# Patient Record
Sex: FEMALE | Race: WHITE | Employment: OTHER | URBAN - METROPOLITAN AREA
[De-identification: names, ages, dates, MRNs, and addresses within clinical notes are randomized per-mention and may not be internally consistent; named-entity substitution may affect disease eponyms.]

---

## 2017-01-03 ENCOUNTER — TELEPHONE (OUTPATIENT)
Dept: INTERNAL MEDICINE CLINIC | Age: 67
End: 2017-01-03

## 2017-01-03 NOTE — TELEPHONE ENCOUNTER
Called the patient and left a message to call the office, awaiting return call. Urine culture came back neg. Did she start treatment with bactrim? Is her abd pain improved?

## 2017-01-03 NOTE — TELEPHONE ENCOUNTER
Called and notified patient of labs, neg urine culture. She states that her lower abd pain is gone, still has some lower back pain. Will complete treatment with macrobid. Will follow up this week.

## 2017-01-03 NOTE — TELEPHONE ENCOUNTER
Pt returned call. Was given info below. She says she started the med on Sunday. No Improvement with the pain.     Wants call back [phone]

## 2017-01-05 ENCOUNTER — HOSPITAL ENCOUNTER (OUTPATIENT)
Dept: MAMMOGRAPHY | Age: 67
Discharge: HOME | End: 2017-01-05
Attending: OBSTETRICS & GYNECOLOGY
Payer: MEDICARE

## 2017-01-05 DIAGNOSIS — Z12.31 VISIT FOR SCREENING MAMMOGRAM: ICD-10-CM

## 2017-01-05 PROCEDURE — 77067 SCR MAMMO BI INCL CAD: CPT

## 2017-05-23 ENCOUNTER — OFFICE VISIT (OUTPATIENT)
Dept: INTERNAL MEDICINE CLINIC | Age: 67
End: 2017-05-23

## 2017-05-23 VITALS
SYSTOLIC BLOOD PRESSURE: 114 MMHG | BODY MASS INDEX: 21.13 KG/M2 | WEIGHT: 126.8 LBS | HEIGHT: 65 IN | HEART RATE: 77 BPM | DIASTOLIC BLOOD PRESSURE: 70 MMHG | OXYGEN SATURATION: 95 % | TEMPERATURE: 95.5 F | RESPIRATION RATE: 12 BRPM

## 2017-05-23 DIAGNOSIS — G62.9 NEUROPATHY: ICD-10-CM

## 2017-05-23 DIAGNOSIS — M50.90 CERVICAL DISC DISEASE: Primary | ICD-10-CM

## 2017-05-23 DIAGNOSIS — M79.18 MYOFASCIAL PAIN: ICD-10-CM

## 2017-05-23 RX ORDER — DILTIAZEM HYDROCHLORIDE 180 MG/1
180 CAPSULE, EXTENDED RELEASE ORAL DAILY
COMMUNITY
Start: 2017-03-01 | End: 2017-11-10 | Stop reason: DRUGHIGH

## 2017-05-23 RX ORDER — ZOSTER VACCINE LIVE 19400 [PFU]/.65ML
INJECTION, POWDER, LYOPHILIZED, FOR SUSPENSION SUBCUTANEOUS
COMMUNITY
Start: 2017-02-14 | End: 2017-05-31 | Stop reason: ALTCHOICE

## 2017-05-23 RX ORDER — METHYLPREDNISOLONE 4 MG/1
TABLET ORAL
Qty: 1 DOSE PACK | Refills: 0 | Status: CANCELLED | OUTPATIENT
Start: 2017-05-23

## 2017-05-23 RX ORDER — GABAPENTIN 300 MG/1
CAPSULE ORAL
Qty: 60 CAP | Refills: 6 | Status: SHIPPED | OUTPATIENT
Start: 2017-05-23 | End: 2017-10-04 | Stop reason: SDUPTHER

## 2017-05-23 NOTE — PROGRESS NOTES
Chief Complaint   Patient presents with    Tingling     bilateral hand tingling     Shoulder Pain     Bilateral Pain Shoulder that radiates down each arm and this happens when lying down flat     1. Have you been to the ER, urgent care clinic since your last visit? Hospitalized since your last visit? No    2. Have you seen or consulted any other health care providers outside of the [address] [place] [address] since your last visit? Include any pap smears or colon screening.  No

## 2017-05-23 NOTE — MR AVS SNAPSHOT
Visit Information Date & Time Provider Department Dept. Phone Encounter #  
 5/23/2017 11:30 AM Dasha Barreto MD Internist of [facility] [address] [id number] Follow-up Instructions Return in about 4 months (around 9/23/2017). Your Appointments 6/7/2017 11:00 AM  
Follow Up with Monica Laguna MD  
[address] [facility]) Appt Note: 6mon f/u  
 [address] [address] [phone]  
  
   
 [facility] [id number] Upcoming Health Maintenance Date Due DTaP/Tdap/Td series (1 - Tdap) 1/2/2007 MEDICARE YEARLY EXAM 1/14/2016 Pneumococcal 65+ Low/Medium Risk (2 of 2 - PPSV23) 7/7/2017 INFLUENZA AGE 9 TO ADULT 8/1/2017 BREAST CANCER SCRN MAMMOGRAM 1/5/2018 GLAUCOMA SCREENING Q2Y 6/28/2018 COLONOSCOPY 4/3/2019 Allergies as of 5/23/2017  Review Complete On: 5/23/2017 By: aMddie Howard Severity Noted Reaction Type Reactions Plaquenil [Hydroxychloroquine] High 04/25/2016    Nausea and Vomiting Pcn [Penicillins]  05/17/2010    Itching Zithromax [Azithromycin]  05/17/2010    Rash, Other (comments), Cosmo Gracia's records state skin peeling. High fever, peeling Current Immunizations  Reviewed on 12/1/2016 Name Date Influenza Vaccine 11/2/2016, 11/6/2015, 9/20/2014, 10/29/2013 Influenza Vaccine Split 10/3/2012, 9/23/2011, 11/1/2010 Pneumococcal Conjugate (PCV-13) 7/7/2016 TD Vaccine 1/1/2007 Zoster Vaccine, Live 2/14/2017 Not reviewed this visit You Were Diagnosed With   
  
 Codes Comments Neuropathy     ICD-10-CM: G62.9 ICD-9-CM: 355.9 Myofascial pain     ICD-10-CM: M79.1 ICD-9-CM: 729.1 Vitals BP Pulse Temp Resp Height(growth percentile) Weight(growth percentile)  114/70 (BP 1 Location: Left arm, BP Patient Position: Sitting) 77 95.5 °F (35.3 °C) (Oral) 12 5' 5\" (1.651 m) 126 lb 12.8 oz (57.5 kg) LMP SpO2 BMI OB Status Smoking Status 04/25/2016 95% 21.1 kg/m2 Postmenopausal Never Smoker Vitals History BMI and BSA Data Body Mass Index Body Surface Area  
 21.1 kg/m 2 1.62 m 2 Preferred Pharmacy Pharmacy Name Phone [facility] [address], [address] [phone] Your Updated Medication List  
  
   
This list is accurate as of: 5/23/17 12:15 PM.  Always use your most recent med list.  
  
  
  
  
 acetaminophen 650 mg CR tablet Commonly known as:  TYLENOL Take 650 mg by mouth three (3) times daily as needed for Pain. CALCIUM 500+D 500 mg(1,250mg) -200 unit per tablet Generic drug:  calcium-vitamin D Take 1 Tab by mouth two (2) times daily (with meals). CARTIA  mg ER capsule Generic drug:  dilTIAZem CD Take 180 mg by mouth daily. cyanocobalamin 1,000 mcg tablet Take 1,000 mcg by mouth two (2) times a day. diclofenac 1 % Gel Commonly known as:  VOLTAREN Apply  to affected area four (4) times daily. folic acid 1 mg tablet Commonly known as:  Google Take 1 mg by mouth daily. gabapentin 300 mg capsule Commonly known as:  NEURONTIN  
2 tabs at bed time  
  
 melatonin Tab tablet Take 8 mg by mouth nightly. methotrexate 2.5 mg tablet Commonly known as:  [place] Take 2.5 mg by mouth every Wednesday. Take 6 tablets of 2.5 mg every Wednesday  
  
 predniSONE 5 mg tablet Commonly known as:  [place] Take 2.5 mg by mouth every other day. varicella-zoster vacine live 19,400 unit/0.65 mL Susr injection Generic drug:  varicella zoster vacine live VITAMIN C 500 mg tablet Generic drug:  ascorbic acid (vitamin C) Take 500 mg by mouth two (2) times a day. XARELTO 20 mg Tab tablet Generic drug:  rivaroxaban Take 20 mg by mouth daily (with dinner). Prescriptions Sent to Pharmacy Refills  
 gabapentin (NEURONTIN) 300 mg capsule 6 Si tabs at bed time Class: Normal  
 Pharmacy: Loida Huber at [address], [facility] #: [phone] We Performed the Following REFERRAL TO ORTHOPEDICS [[id number] Custom] Follow-up Instructions Return in about 4 months (around 2017). To-Do List   
 2017 9:00 AM  
  Appointment with [facility] ER BONE DEXA RM 1 at [facility] ER RAD BONE DENSITY ([phone]) OUTSIDE FILMS  - Any outside films related to the study being scheduled should be brought with you on the day of the exam.  If this cannot be done there may be a delay in the reading of the study. MEDICATIONS  - Patient must bring a complete list of all medications currently taking to include prescriptions, over-the-counter meds, herbals, vitamins & any dietary supplements - Patient must discontinue use of calcium, vitamins, or calcium supplements including antacids (calcium based) 24 hours before scan. GENERAL INSTRUCTIONS  - [facility] cannot accommodate patients on stretchers, patient must be able to walk into the room and be able to sit up for a portion of the exam.  
  
  
Referral Information Referral ID Referred By Referred To  
  
 [id number] Paula Baker Not Available Visits Status Start Date End Date 1 New Request 17 If your referral has a status of pending review or denied, additional information will be sent to support the outcome of this decision. Patient Instructions Health Maintenance Due Topic Date Due  
 DTaP/Tdap/Td series (1 - Tdap) 2007  MEDICARE YEARLY EXAM  2016 Medicare Part B Preventive Services Limitations Recommendation Scheduled Bone Mass Measurement 
(age 72 & older, biennial) Requires diagnosis related to osteoporosis or estrogen deficiency.  Biennial benefit unless patient has history of long-term glucocorticoid tx or baseline is needed because initial test was by other method This should be done at age 72 and again if on osteoporosis medication at 2-3 year intervals. Up to date Cardiovascular Screening Blood Tests (every 5 years) Total cholesterol, HDL, Triglycerides Order as a panel if possible We should check your cholesterol panel at least once every 5 years. Up to date Colorectal Cancer Screening 
-Fecal occult blood test (annual) -Flexible sigmoidoscopy (5y) 
-Screening colonoscopy (10y) -Barium Enema  Due per your Gastroenterologist's recommendations. Up to date Counseling to Prevent Tobacco Use (up to 8 sessions per year) - Counseling greater than 3 and up to 10 minutes - Counseling greater than 10 minutes Patients must be asymptomatic of tobacco-related conditions to receive as preventive service Continue with smoking cessation Diabetes Screening Tests (at least every 3 years, Medicare covers annually or at 6-month intervals for prediabetic patients) Fasting blood sugar (FBS) or glucose tolerance test (GTT) Patient must be diagnosed with one of the following: 
-Hypertension, Dyslipidemia, obesity, previous impaired FBS or GTT 
Or any two of the following: overweight, FH of diabetes, age ? 72, history of gestational diabetes, birth of baby weighing more than 9 pounds Should be done yearly Up to date Diabetes Self-Management Training (DSMT) (no USPSTF recommendation) Requires referral by treating physician for patient with diabetes or renal disease. 10 hours of initial DSMT session of no less than 30 minutes each in a continuous 12-month period. 2 hours of follow-up DSMT in subsequent years. Not applicable Glaucoma Screening (no USPSTF recommendation) Diabetes mellitus, family history, , age 48 or over,  American, age 72 or over Continue with annual eye exams. Up to date Human Immunodeficiency Virus (HIV) Screening (annually for increased risk patients) HIV-1 and HIV-2 by EIA, RONY, rapid antibody test, or oral mucosa transudate Patient must be at increased risk for HIV infection per USPSTF guidelines or pregnant. Tests covered annually for patients at increased risk. Pregnant patients may receive up to 3 test during pregnancy. Not applicable Medical Nutrition Therapy (MNT) (for diabetes or renal disease not recommended schedule) Requires referral by treating physician for patient with diabetes or renal disease. Can be provided in same year as diabetes self-management training (DSMT), and CMS recommends medical nutrition therapy take place after DSMT. Up to 3 hours for initial year and 2 hours in subsequent years. Not applicable Shingles Vaccination A shingles vaccine is also recommended once in a lifetime after age 61 Vaccination recommended for shingles vaccination. Up to date Seasonal Influenza Vaccination (annually)  Continue with yearly \"flu\" shot annually Up to date Pneumococcal Vaccination (once after 65)  Please receive this vaccination at age 72. Up to date Hepatitis B Vaccinations (if medium/high risk) Medium/high risk factors:  End-stage renal disease, Hemophiliacs who received Factor VIII or IX concentrates, Clients of institutions for the mentally retarded, Persons who live in the same house as a HepB virus carrier, Homosexual men, Illicit injectable drug abusers. Not applicable Screening Mammography (biennial age 54-69) Annually (age 36 or over) You need a mammogram yearly to screen for breast cancer. Up to date Screening Pap Tests and Pelvic Examination (up to age 79 and after 79 if unknown history or abnormal study last 10 years) Every 24 months except high risk You need no Pap smear at this time.    
Ultrasound Screening for Abdominal Aortic Aneurysm (AAA) (once) Patient must be referred through IPPE and not have had a screening for abdominal aortic aneurysm before under Medicare. Limited to patients who meet one of the following criteria: 
- Men who are 73-68 years old and have smoked more than 100 cigarettes in their lifetime. 
-Anyone with a FH of AAA 
-Anyone recommended for screening by USPSTF Not applicable Neuropathic Pain: Care Instructions Your Care Instructions Neuropathic pain is caused by pressure on or damage to your nerves. It's often simply called nerve pain. Some people feel this type of pain all the time. For others, it comes and goes. Diabetes, shingles, or an injury can cause nerve pain. Many people say the pain feels sharp, burning, or stabbing. But some people feel it as a dull ache. In some cases, it makes your skin very sensitive. So touch, pressure, and other sensations that did not hurt before may now cause pain. It's important to know that this kind of pain is real and can affect your quality of life. It's also important to know that treatment can help. Treatment includes pain medicines, exercise, and physical therapy. Medicines can help reduce the number of pain signals that travel over the nerves. This can make the painful areas less sensitive. It can also help you sleep better and improve your mood. But medicines are only one part of successful treatment. Most people do best with more than one kind of treatment. Your doctor may recommend that you try cognitive-behavioral therapy and stress management. Or, if needed, you may decide to try to quit smoking, lower your blood pressure, or better control blood sugar. These kinds of healthy changes can also make a difference. If you feel that your treatment is not working, talk to your doctor. And be sure to tell your doctor if you think you might be depressed or anxious. These are common problems that can also be treated. Follow-up care is a key part of your treatment and safety.  Be sure to make and go to all appointments, and call your doctor if you are having problems. It's also a good idea to know your test results and keep a list of the medicines you take. How can you care for yourself at home? · Be safe with medicines. Read and follow all instructions on the label. ¨ If the doctor gave you a prescription medicine for pain, take it as prescribed. ¨ If you are not taking a prescription pain medicine, ask your doctor if you can take an over-the-counter medicine. · Save hard tasks for days when you have less pain. Follow a hard task with an easy task. And remember to take breaks. · Relax, and reduce stress. You may want to try deep breathing or meditation. These can help. · Keep moving. Gentle, daily exercise can help reduce pain. Your doctor or physical therapist can tell you what type of exercise is best for you. This may include walking, swimming, and stationary biking. It may also include stretches and range-of-motion exercises. · Try heat, cold packs, and massage. · Get enough sleep. Constant pain can make you more tired. If the pain makes it hard to sleep, talk with your doctor. · Think positively. Your thoughts can affect your pain. Do fun things to distract yourself from the pain. See a movie, read a book, listen to music, or spend time with a friend. · Keep a pain diary. Try to write down how strong your pain is and what it feels like. Also try to notice and write down how your moods, thoughts, sleep, activities, and medicine affect your pain. These notes can help you and your doctor find the best ways to treat your pain. Reducing constipation caused by pain medicine Pain medicines often cause constipation. To reduce constipation: 
· Include fruits, vegetables, beans, and whole grains in your diet each day. These foods are high in fiber. · Drink plenty of fluids, enough so that your urine is light yellow or clear like water.  If you have kidney, heart, or liver disease and have to limit fluids, talk with your doctor before you increase the amount of fluids you drink. · Get some exercise every day. Build up slowly to 30 to 60 minutes a day on 5 or more days of the week. · Take a fiber supplement, such as Citrucel or Metamucil, every day if needed. Read and follow all instructions on the label. · Schedule time each day for a bowel movement. Having a daily routine may help. Take your time and do not strain when having a bowel movement. · Ask your doctor about a laxative. The goal is to have one easy bowel movement every 1 to 2 days. Do not let constipation go untreated for more than 3 days. When should you call for help? Call your doctor now or seek immediate medical care if: 
· You feel sad, anxious, or hopeless for more than a few days. This could mean you are depressed. Depression is common in people who have a lot of pain. But it can be treated. · You have trouble with bowel movements, such as: 
¨ No bowel movement in 3 days. ¨ Blood in the anal area, in your stool, or on the toilet paper. ¨ Diarrhea for more than 24 hours. Watch closely for changes in your health, and be sure to contact your doctor if: 
· Your pain is getting worse. · You can't sleep because of pain. · You are very worried or anxious about your pain. · You have trouble taking your pain medicine. · You have any concerns about your pain medicine or its side effects. · You have vomiting or cramps for more than 2 hours. Where can you learn more? Go to http://jinny-astrid.info/. Enter H595 in the search box to learn more about \"Neuropathic Pain: Care Instructions. \" Current as of: October 14, 2016 Content Version: 11.2 © 0164-0235 [employer]. Care instructions adapted under license by [employer] (which disclaims liability or warranty for this information).  If you have questions about a medical condition or this instruction, always ask your healthcare professional. [address] any warranty or liability for your use of this information. Introducing [facility]! Dear Andrew Garcia: 
Thank you for requesting a "[employer]" account. Our records indicate that you already have an active "[employer]" account. You can access your account anytime at [email or web address] Did you know that you can access your hospital and ER discharge instructions at any time in "[employer]"? You can also review all of your test results from your hospital stay or ER visit. Additional Information If you have questions, please visit the Frequently Asked Questions section of the "[employer]" website at [email or web address] Remember, "[employer]" is NOT to be used for urgent needs. For medical emergencies, dial 911. Now available from your iPhone and Android! Please provide this summary of care documentation to your next provider. Your primary care clinician is listed as Vanessa Carlton. If you have any questions after today's visit, please call [phone].

## 2017-05-23 NOTE — PATIENT INSTRUCTIONS
Health Maintenance Due   Topic Date Due    DTaP/Tdap/Td series (1 - Tdap) 01/02/2007    MEDICARE YEARLY EXAM  01/14/2016         Medicare Part B Preventive Services Limitations Recommendation Scheduled   Bone Mass Measurement  (age 72 & older, biennial) Requires diagnosis related to osteoporosis or estrogen deficiency. Biennial benefit unless patient has history of long-term glucocorticoid tx or baseline is needed because initial test was by other method This should be done at age 72 and again if on osteoporosis medication at 2-3 year intervals. Up to date   Cardiovascular Screening Blood Tests (every 5 years)  Total cholesterol, HDL, Triglycerides Order as a panel if possible We should check your cholesterol panel at least once every 5 years. Up to date   Colorectal Cancer Screening  -Fecal occult blood test (annual)  -Flexible sigmoidoscopy (5y)  -Screening colonoscopy (10y)  -Barium Enema  Due per your Gastroenterologist's recommendations. Up to date   Counseling to Prevent Tobacco Use (up to 8 sessions per year)  - Counseling greater than 3 and up to 10 minutes  - Counseling greater than 10 minutes Patients must be asymptomatic of tobacco-related conditions to receive as preventive service Continue with smoking cessation    Diabetes Screening Tests (at least every 3 years, Medicare covers annually or at 6-month intervals for prediabetic patients)    Fasting blood sugar (FBS) or glucose tolerance test (GTT) Patient must be diagnosed with one of the following:  -Hypertension, Dyslipidemia, obesity, previous impaired FBS or GTT  Or any two of the following: overweight, FH of diabetes, age ? 72, history of gestational diabetes, birth of baby weighing more than 9 pounds Should be done yearly Up to date   Diabetes Self-Management Training (DSMT) (no USPSTF recommendation) Requires referral by treating physician for patient with diabetes or renal disease.  10 hours of initial DSMT session of no less than 30 minutes each in a continuous 12-month period. 2 hours of follow-up DSMT in subsequent years. Not applicable    Glaucoma Screening (no USPSTF recommendation) Diabetes mellitus, family history, , age 48 or over,  American, age 72 or over Continue with annual eye exams. Up to date   Human Immunodeficiency Virus (HIV) Screening (annually for increased risk patients)  HIV-1 and HIV-2 by EIA, RONY, rapid antibody test, or oral mucosa transudate Patient must be at increased risk for HIV infection per USPSTF guidelines or pregnant. Tests covered annually for patients at increased risk. Pregnant patients may receive up to 3 test during pregnancy. Not applicable    Medical Nutrition Therapy (MNT) (for diabetes or renal disease not recommended schedule) Requires referral by treating physician for patient with diabetes or renal disease. Can be provided in same year as diabetes self-management training (DSMT), and CMS recommends medical nutrition therapy take place after DSMT. Up to 3 hours for initial year and 2 hours in subsequent years. Not applicable    Shingles Vaccination A shingles vaccine is also recommended once in a lifetime after age 61 Vaccination recommended for shingles vaccination. Up to date   Seasonal Influenza Vaccination (annually)  Continue with yearly \"flu\" shot annually Up to date   Pneumococcal Vaccination (once after 65)  Please receive this vaccination at age 72. Up to date   Hepatitis B Vaccinations (if medium/high risk) Medium/high risk factors:  End-stage renal disease,  Hemophiliacs who received Factor VIII or IX concentrates, Clients of institutions for the mentally retarded, Persons who live in the same house as a HepB virus carrier, Homosexual men, Illicit injectable drug abusers. Not applicable    Screening Mammography (biennial age 54-69) Annually (age 36 or over) You need a mammogram yearly to screen for breast cancer.  Up to date   Screening Pap Tests and Pelvic Examination (up to age 79 and after 79 if unknown history or abnormal study last 10 years) Every 24 months except high risk You need no Pap smear at this time. Ultrasound Screening for Abdominal Aortic Aneurysm (AAA) (once) Patient must be referred through IPPE and not have had a screening for abdominal aortic aneurysm before under Medicare. Limited to patients who meet one of the following criteria:  - Men who are 73-68 years old and have smoked more than 100 cigarettes in their lifetime.  -Anyone with a FH of AAA  -Anyone recommended for screening by USPSTF Not applicable               Neuropathic Pain: Care Instructions  Your Care Instructions  Neuropathic pain is caused by pressure on or damage to your nerves. It's often simply called nerve pain. Some people feel this type of pain all the time. For others, it comes and goes. Diabetes, shingles, or an injury can cause nerve pain. Many people say the pain feels sharp, burning, or stabbing. But some people feel it as a dull ache. In some cases, it makes your skin very sensitive. So touch, pressure, and other sensations that did not hurt before may now cause pain. It's important to know that this kind of pain is real and can affect your quality of life. It's also important to know that treatment can help. Treatment includes pain medicines, exercise, and physical therapy. Medicines can help reduce the number of pain signals that travel over the nerves. This can make the painful areas less sensitive. It can also help you sleep better and improve your mood. But medicines are only one part of successful treatment. Most people do best with more than one kind of treatment. Your doctor may recommend that you try cognitive-behavioral therapy and stress management. Or, if needed, you may decide to try to quit smoking, lower your blood pressure, or better control blood sugar. These kinds of healthy changes can also make a difference.   If you feel that your treatment is not working, talk to your doctor. And be sure to tell your doctor if you think you might be depressed or anxious. These are common problems that can also be treated. Follow-up care is a key part of your treatment and safety. Be sure to make and go to all appointments, and call your doctor if you are having problems. It's also a good idea to know your test results and keep a list of the medicines you take. How can you care for yourself at home? · Be safe with medicines. Read and follow all instructions on the label. ¨ If the doctor gave you a prescription medicine for pain, take it as prescribed. ¨ If you are not taking a prescription pain medicine, ask your doctor if you can take an over-the-counter medicine. · Save hard tasks for days when you have less pain. Follow a hard task with an easy task. And remember to take breaks. · Relax, and reduce stress. You may want to try deep breathing or meditation. These can help. · Keep moving. Gentle, daily exercise can help reduce pain. Your doctor or physical therapist can tell you what type of exercise is best for you. This may include walking, swimming, and stationary biking. It may also include stretches and range-of-motion exercises. · Try heat, cold packs, and massage. · Get enough sleep. Constant pain can make you more tired. If the pain makes it hard to sleep, talk with your doctor. · Think positively. Your thoughts can affect your pain. Do fun things to distract yourself from the pain. See a movie, read a book, listen to music, or spend time with a friend. · Keep a pain diary. Try to write down how strong your pain is and what it feels like. Also try to notice and write down how your moods, thoughts, sleep, activities, and medicine affect your pain. These notes can help you and your doctor find the best ways to treat your pain. Reducing constipation caused by pain medicine  Pain medicines often cause constipation.  To reduce constipation:  · Include fruits, vegetables, beans, and whole grains in your diet each day. These foods are high in fiber. · Drink plenty of fluids, enough so that your urine is light yellow or clear like water. If you have kidney, heart, or liver disease and have to limit fluids, talk with your doctor before you increase the amount of fluids you drink. · Get some exercise every day. Build up slowly to 30 to 60 minutes a day on 5 or more days of the week. · Take a fiber supplement, such as Citrucel or Metamucil, every day if needed. Read and follow all instructions on the label. · Schedule time each day for a bowel movement. Having a daily routine may help. Take your time and do not strain when having a bowel movement. · Ask your doctor about a laxative. The goal is to have one easy bowel movement every 1 to 2 days. Do not let constipation go untreated for more than 3 days. When should you call for help? Call your doctor now or seek immediate medical care if:  · You feel sad, anxious, or hopeless for more than a few days. This could mean you are depressed. Depression is common in people who have a lot of pain. But it can be treated. · You have trouble with bowel movements, such as:  ¨ No bowel movement in 3 days. ¨ Blood in the anal area, in your stool, or on the toilet paper. ¨ Diarrhea for more than 24 hours. Watch closely for changes in your health, and be sure to contact your doctor if:  · Your pain is getting worse. · You can't sleep because of pain. · You are very worried or anxious about your pain. · You have trouble taking your pain medicine. · You have any concerns about your pain medicine or its side effects. · You have vomiting or cramps for more than 2 hours. Where can you learn more? Go to http://jinny-astrid.info/. Enter F351 in the search box to learn more about \"Neuropathic Pain: Care Instructions. \"  Current as of: October 14, 2016  Content Version: 11.2  © 1055-7006 [employer]. Care instructions adapted under license by [employer] (which disclaims liability or warranty for this information). If you have questions about a medical condition or this instruction, always ask your healthcare professional. [address] any warranty or liability for your use of this information.

## 2017-05-23 NOTE — PROGRESS NOTES
INTERNISTS OF [facility]:  5/30/2017, MRN: [id number]      Noel Porter is a 78 y.o. female and presents to clinic for Tingling (bilateral hand tingling ) and Shoulder Pain (Bilateral Pain Shoulder that radiates down each arm and this happens when lying down flat)    Subjective: The patient is a 79-year-old female with history of SIERRA, left foot bone spur, lichen sclerosis per biopsy in 2012, cervical disc disease, peripheral artery disease, varicose veins, Sjogren's syndrome, Keratoconjunctivitis sicca, dysphasia, paroxysmal supraventricular tachycardia, mitral valve prolapse, multiple thyroid nodules, dysphasia, and neuropathy per EHR. BUE Paresthesias: The pt has h/o BUE paresthesias for >4 wks. pain originates from her shoulders in addition to her neck and radiates down to her fingertips. She had x-rays done of her cervical spine 2016. Findings were consistent with multilevel degenerative disc disease and osteoarthritis. There is also findings suggestive of neural foraminal narrowing. No alleviating factors are known. Pain is sharp in quality. Patient Active Problem List    Diagnosis Date Noted    SIERRA (nonalcoholic steatohepatitis) 05/30/2017    Bone spur of left foot 68/84/6160    Lichen sclerosus 66/83 05/30/2017    Cervical disc disease 01/08/2016    PAD [facility]peripheral artery disease) ([facility] 05/02/2014    Varicose veins of lower extremities with other complications 54/73/0078    Sjogren's syndrome ([facility]     Keratoconjunctivitis sicca ([facility] 03/14/2012    PSVT (paroxysmal supraventricular tachycardia) ([facility] 02/08/2011    Dysphagia 11/24/2010    MVP (mitral valve prolapse)     TIA (transient ischemic attack)     Multiple thyroid nodules     Neuropathy        Current Outpatient Prescriptions   Medication Sig Dispense Refill    CARTIA  mg ER capsule Take 180 mg by mouth daily.       VARICELLA-ZOSTER VACINE LIVE 19,400 unit/0.65 mL susr injection       gabapentin (NEURONTIN) 300 mg capsule 2 tabs at bed time 60 Cap 6    folic acid (FOLVITE) 1 mg tablet Take 1 mg by mouth daily.  predniSONE (DELTASONE) 5 mg tablet Take 2.5 mg by mouth every other day.  cyanocobalamin 1,000 mcg tablet Take 1,000 mcg by mouth two (2) times a day.  acetaminophen (TYLENOL) 650 mg CR tablet Take 650 mg by mouth three (3) times daily as needed for Pain.  XARELTO 20 mg tab tablet Take 20 mg by mouth daily (with dinner).  melatonin tab tablet Take 8 mg by mouth nightly.  ascorbic acid (VITAMIN C) 500 mg tablet Take 500 mg by mouth two (2) times a day.  calcium-vitamin D (CALCIUM 500+D) 500 mg(1,250mg) -200 unit per tablet Take 1 Tab by mouth two (2) times daily (with meals).  methotrexate (RHEUMATREX) 2.5 mg tablet Take 2.5 mg by mouth every Wednesday. Take 6 tablets of 2.5 mg every Wednesday      diclofenac (VOLTAREN) 1 % gel Apply  to affected area four (4) times daily. Allergies   Allergen Reactions    Plaquenil [Hydroxychloroquine] Nausea and Vomiting    Pcn [Penicillins] Itching    Zithromax [Azithromycin] Rash, Other (comments) and Hives     [facility] records state skin peeling. High fever, peeling       Past Medical History:   Diagnosis Date    [facility]     Arthritis     Feet    Memory change     Menopause     Multiple thyroid nodules 2009    under care of Dr. Zoya Worrell MVP (mitral valve prolapse)     under care of Dr. Nancy Lie Neuropathy     Plantar fasciitis     PSVT (paroxysmal supraventricular tachycardia) ([facility] 2/8/2011    Right knee pain     Routine gynecological examination     done by Dr. Elke Last S/P colonoscopy     done past 1-2 yrs?   Dr. Linder Sick Sjogren's syndrome [facility])     sees Dr. Avril Mitchell    TIA (transient ischemic attack)     possible history of    Tibialis tendonitis     following with Dr Garrett Hodgkin ankle       Past Surgical History:   Procedure Laterality Date    DILATION AND CURETTAGE  1987    HX BREAST AUGMENTATION  1984    HX DILATION AND CURETTAGE  1/2013    HX TONSILLECTOMY      IMPLANT BREAST SILICONE/EQ      TARSAL TUNNEL RELEASE  1994    Right       Family History   Problem Relation Age of Onset    Hypertension Father     High Cholesterol Father     Diabetes Father     Stroke Father     Heart Attack Father     Cancer Father     Heart Disease Father     Depression Sister     Breast Cancer Maternal Grandmother        Social History   Substance Use Topics    Smoking status: Never Smoker    Smokeless tobacco: Never Used      Comment: social - 1 cigarette per day    Alcohol use 0.0 oz/week     0 Standard drinks or equivalent per week      Comment: rarely       ROS   Review of Systems   Constitutional: Negative for chills and fever. HENT: Negative for ear pain, hearing loss and sore throat. Eyes: Negative for blurred vision and pain. Respiratory: Negative for cough and shortness of breath. Cardiovascular: Negative for chest pain. Gastrointestinal: Negative for abdominal pain. Genitourinary: Negative for dysuria. Musculoskeletal: Positive for joint pain and neck pain. Negative for myalgias. Skin: Negative for rash. Neurological: Positive for tingling. Negative for focal weakness and headaches. Endo/Heme/Allergies: Does not bruise/bleed easily. Psychiatric/Behavioral: Negative for substance abuse. Objective     Vitals:    05/23/17 1130 05/23/17 1134   BP: 114/70    Pulse: 77    Resp: 12    Temp: 95.5 °F (35.3 °C)    TempSrc: Oral    SpO2: 95%    Weight: 126 lb 12.8 oz (57.5 kg)    Height: 5' 5\" (1.651 m)    PainSc:   5   6   PainLoc: Hand Shoulder   LMP: 04/25/2016       Physical Exam   Constitutional: She is oriented to person, place, and time and well-developed, well-nourished, and in no distress. HENT:   Head: Normocephalic and atraumatic.    Right Ear: External ear normal.   Left Ear: External ear normal.   Nose: Nose normal.   Mouth/Throat: Oropharynx is clear and moist. No oropharyngeal exudate. Eyes: Conjunctivae and EOM are normal. Pupils are equal, round, and reactive to light. Right eye exhibits no discharge. Left eye exhibits no discharge. No scleral icterus. Neck: Neck supple. Cardiovascular: Normal rate, regular rhythm, normal heart sounds and intact distal pulses. Exam reveals no gallop and no friction rub. No murmur heard. Pulmonary/Chest: Effort normal and breath sounds normal. No respiratory distress. She has no wheezes. She has no rales. Abdominal: Soft. Bowel sounds are normal. She exhibits no distension. There is no tenderness. There is no rebound and no guarding. No hepatomegaly   Musculoskeletal: She exhibits no edema or tenderness (BUE are NTTP). All spinous processes are nontender to palpation. There are no paraspinal muscles are tender to palpation. Lymphadenopathy:     She has no cervical adenopathy. Neurological: She is alert and oriented to person, place, and time. She exhibits normal muscle tone. Gait normal.   Skin: Skin is warm and dry. No erythema. Psychiatric: Affect normal.   Nursing note and vitals reviewed.       LABS   Data Review:   Lab Results   Component Value Date/Time    WBC 5.6 12/31/2016 08:09 AM    HGB 14.8 12/31/2016 08:09 AM    HCT 44.1 12/31/2016 08:09 AM    PLATELET 810 13/12/8249 08:09 AM    MCV 92.1 12/31/2016 08:09 AM       Lab Results   Component Value Date/Time    Sodium 140 12/31/2016 08:09 AM    Potassium 3.9 12/31/2016 08:09 AM    Chloride 104 12/31/2016 08:09 AM    CO2 26 12/31/2016 08:09 AM    Anion gap 10 12/31/2016 08:09 AM    Glucose 75 12/31/2016 08:09 AM    BUN 15 12/31/2016 08:09 AM    Creatinine 0.78 12/31/2016 08:09 AM    BUN/Creatinine ratio 19 12/31/2016 08:09 AM    GFR est AA >60 12/31/2016 08:09 AM    GFR est non-AA >60 12/31/2016 08:09 AM    Calcium 8.9 12/31/2016 08:09 AM       Lab Results   Component Value Date/Time    Cholesterol, total 175 12/31/2016 08:09 AM    HDL Cholesterol 75 12/31/2016 08:09 AM    LDL, calculated 89.8 12/31/2016 08:09 AM    VLDL, calculated 10.2 12/31/2016 08:09 AM    Triglyceride 51 12/31/2016 08:09 AM    CHOL/HDL Ratio 2.3 12/31/2016 08:09 AM       Lab Results   Component Value Date/Time    Hemoglobin A1c 5.5 07/19/2016 09:10 AM       Assessment/Plan:   1. Neuropathy: Has h/o cervical disc disease and foraminal narrowing per xray results from 2016.  -We will try a trial of gabapentin.  -Placing a referral to orthopedics. ORDERS:  - gabapentin (NEURONTIN) 300 mg capsule; 2 tabs at bed time  Dispense: 60 Cap; Refill: 6  - REFERRAL TO ORTHOPEDICS    2. Health maintenance:  -Patient is overdue for a Medicare wellness visit. Health Maintenance Due   Topic Date Due    DTaP/Tdap/Td series (1 - Tdap) 01/02/2007    MEDICARE YEARLY EXAM  01/14/2016       Lab review: labs are reviewed in the EHR. All imaging study reports in the EHR were reviewed. I have discussed the diagnosis with the patient and the intended plan as seen in the above orders. The patient has received an after-visit summary and questions were answered concerning future plans. I have discussed medication side effects and warnings with the patient as well. I have reviewed the plan of care with the patient, accepted their input and they are in agreement with the treatment goals. All questions were answered. The patient understands the plan of care. Handouts provided today with above information. Pt instructed if symptoms worsen to call the office or report to the ED for continued care. Greater than 50% of the visit time was spent in counseling and/or coordination of care. Follow-up Disposition:  Return in about 4 months (around 9/23/2017).     Emil Cottrell MD

## 2017-05-30 PROBLEM — K75.81 NASH (NONALCOHOLIC STEATOHEPATITIS): Status: ACTIVE | Noted: 2017-05-30

## 2017-05-30 PROBLEM — M77.52 BONE SPUR OF LEFT FOOT: Status: ACTIVE | Noted: 2017-05-30

## 2017-05-30 PROBLEM — L90.0 LICHEN SCLEROSUS: Status: ACTIVE | Noted: 2017-05-30

## 2017-05-31 ENCOUNTER — OFFICE VISIT (OUTPATIENT)
Dept: ORTHOPEDIC SURGERY | Age: 67
End: 2017-05-31

## 2017-05-31 VITALS
OXYGEN SATURATION: 98 % | WEIGHT: 126.6 LBS | BODY MASS INDEX: 21.09 KG/M2 | DIASTOLIC BLOOD PRESSURE: 79 MMHG | RESPIRATION RATE: 16 BRPM | TEMPERATURE: 97.6 F | SYSTOLIC BLOOD PRESSURE: 116 MMHG | HEART RATE: 80 BPM | HEIGHT: 65 IN

## 2017-05-31 DIAGNOSIS — M79.18 MYOFASCIAL MUSCLE PAIN: ICD-10-CM

## 2017-05-31 DIAGNOSIS — G62.9 NEUROPATHY: Primary | ICD-10-CM

## 2017-05-31 RX ORDER — CHOLECALCIFEROL TAB 125 MCG (5000 UNIT) 125 MCG
1000 TAB ORAL DAILY
COMMUNITY

## 2017-05-31 NOTE — PROGRESS NOTES
[address]  [address]  [place]  Phone: [phone]  Fax: [phone]        Wyatt Rodriguez  : 1950  PCP: Elizabeth Narayanan MD  2017    PROGRESS NOTE      ASSESSMENT AND PLAN    Marilee Blount comes in to the office today for PT and medication f/u. She reports her pain increased after PT. She wore bilateral wrist splints which also did not relieve the pain. Then she was referred back to occupational therapy with some relief of hand discomfort. She reports a new pain in her cervical region. She reports to taking arthritis tylenol 650 mg TID. She is currently taking Xarelto for A-fib. She does have a antony score of 0-2, depending on the TIA she had in the past which was provoked by OCPs. We did dry needling in the cervical region today in office due to myofascial pain that provided great relief. This likely means her pain is mostly myofascial in nature. She was referred to PT for dry needling. We discussed getting an MRI if this round of PT does not help significantly. Pt will f/u in 6 weeks or sooner as needed. [facility] for neck pain. Diagnoses and all orders for this visit:    Neuropathy  -     REFERRAL TO PHYSICAL THERAPY    Myofascial muscle pain  -     REFERRAL TO PHYSICAL THERAPY         Follow-up Disposition:  Return in about 6 weeks (around 2017), or if symptoms worsen or fail to improve. HISTORY OF PRESENT ILLNESS  Marilee Blount is a 78 y.o. female. A&P / HPI from 2016:  Julius Lozada was in the office today c/o radiating pain into her fingers x 1.5 months, mild cervical pain is reported. Her symptoms are most likely related to neuropathy or CTS. Cervical radiculopathy is lower on the differential given her history. She has a component of myofascial pain related to posture with straightening of her cervical lordosis.  She will go to PT for a functional evaluation.      She will also take Gabapentin 300mg two tabs before bed. The risks, benefits, and potential side effects of this medication were discussed. She will have her EMG done by her neurologist in several days. She will f/u prn depending on the findings of her EMG, and she will have an MRI in the future if necessary.     This plan was reviewed with the patient and patient agrees. All questions were answered. More than half the visit today was spent on counseling and coordination of care. Heather Oliver Updates from 05/31/17:  Pt presents for PT and medication f/u. She reports her pain increased after PT. She wore bilateral wrist splints which also did not relive the pain. Then she was referred to PT for occupational therapy with no relief. She reports a new pain in her cervical region. PAST MEDICAL HISTORY   Past Medical History:   Diagnosis Date    A-fib [facility])     Arthritis     Feet    Memory change     Menopause     Multiple thyroid nodules 2009    under care of Dr. Anibal Arizmendi MVP (mitral valve prolapse)     under care of Dr. Mona Mercado Neuropathy     Plantar fasciitis     [facility]    Right knee pain     Routine gynecological examination     done by Dr. Anabel Rangel S/P colonoscopy     done past 1-2 yrs? Dr. Lidia Rahman Sjogren's syndrome [facility])     sees Dr. Margarita Trevizo    TIA (transient ischemic attack)     possible history of    Tibialis tendonitis     following with Dr Wilson Gamboa ankle       Past Surgical History:   Procedure Laterality Date    DILATION AND CURETTAGE  1987    HX BREAST AUGMENTATION  1984    HX DILATION AND CURETTAGE  1/2013    HX TONSILLECTOMY      IMPLANT BREAST SILICONE/EQ      TARSAL TUNNEL RELEASE  1994    Right   . MEDICATIONS      Current Outpatient Prescriptions   Medication Sig Dispense Refill    cholecalciferol, VITAMIN D3, (VITAMIN D3) 5,000 unit tab tablet Take  by mouth daily.       CARTIA XT 180 mg ER capsule Take 180 mg by mouth daily.  gabapentin (NEURONTIN) 300 mg capsule 2 tabs at bed time 60 Cap 6    folic acid (FOLVITE) 1 mg tablet Take 1 mg by mouth daily.  predniSONE (DELTASONE) 5 mg tablet Take 2.5 mg by mouth every other day.  cyanocobalamin 1,000 mcg tablet Take 1,000 mcg by mouth two (2) times a day.  diclofenac (VOLTAREN) 1 % gel Apply  to affected area four (4) times daily.  acetaminophen (TYLENOL) 650 mg CR tablet Take 650 mg by mouth three (3) times daily as needed for Pain.  XARELTO 20 mg tab tablet Take 20 mg by mouth daily (with dinner).  melatonin tab tablet Take 8 mg by mouth nightly.  ascorbic acid (VITAMIN C) 500 mg tablet Take 500 mg by mouth two (2) times a day.  calcium-vitamin D (CALCIUM 500+D) 500 mg(1,250mg) -200 unit per tablet Take 1 Tab by mouth two (2) times daily (with meals).  methotrexate (RHEUMATREX) 2.5 mg tablet Take 2.5 mg by mouth every Wednesday. Take 6 tablets of 2.5 mg every Wednesday          ALLERGIES    Allergies   Allergen Reactions    Plaquenil [Hydroxychloroquine] Nausea and Vomiting    Pcn [Penicillins] Itching    Zithromax [Azithromycin] Rash, Other (comments) and [facility] Gracia's records state skin peeling.   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 Respiratory: Negative for shortness of breath. Cardiovascular: Negative for chest pain and leg swelling. Gastrointestinal: Negative for constipation, nausea and vomiting. Neurological: Negative for dizziness, tingling, seizures, loss of consciousness and headaches. Psychiatric/Behavioral: The patient does not have insomnia. PHYSICAL EXAMINATION  Visit Vitals    /79    Pulse 80    Temp 97.6 °F (36.4 °C) (Oral)    Resp 16    Ht 5' 5\" (1.651 m)    Wt 126 lb 9.6 oz (57.4 kg)    LMP 04/25/2016    SpO2 98%    BMI 21.07 kg/m2       Pain Assessment  1/27/2016   Location of Pain Neck;Hand   Location Modifiers -   Severity of Pain 0   Quality of Pain Burning; Other (Comment)   Quality of Pain Comment numbness and tingling, tightness, grinding   Duration of Pain -   Frequency of Pain -   Aggravating Factors Other (Comment)   Aggravating Factors Comment night time, sleeping, lifting   Limiting Behavior -   Relieving Factors (No Data)   Relieving Factors Comment neurontin some   Result of Injury -           Constitutional:  Well developed, well nourished, in no acute distress. Psychiatric: Affect and mood are appropriate. Integumentary: No rashes or abrasions noted on exposed areas. SPINE/MUSCULOSKELETAL EXAM    Cervical spine:  Neck is midline. Normal muscle tone. No focal atrophy is noted. ROM pain free. Shoulder ROM intact but slightly less flexible on the right side. No tnderness to palpation. Negative Spurling's sign. Negative Tinel's sign.  Negative Covarrubias's sign.       Sensation grossly intact to light touch.      Lumbar spine:  No rash, ecchymosis, or gross obliquity. No fasciculations. No focal atrophy is noted. No pain with hip ROM. Range of motion is normal. No tenderness to palpation. No tenderness to palpation at the sciatic notch. SI joints non-tender. Trochanters non tender.      Sensation grossly intact to light touch. Updates from 05/31/17:  Tenderness to palpitation in the cervical region.         MOTOR:      Biceps  Triceps Deltoids Wrist Ext Wrist Flex Hand Intrin   Right 5/5 5/5 5/5 5/5 5/5 5/5   Left 5/5 5/5 5/5 5/5 5/5 5/5             Hip Flex  Quads Hamstrings Ankle DF EHL Ankle PF   Right 5/5 5/5 5/5 5/5 5/5 5/5   Left 5/5 5/5 5/5 5/5 5/5 5/5     DTRs are 1+ biceps, triceps, brachioradialis, patella, and Achilles.     Negative Straight Leg raise. Squat not tested. No difficulty with tandem gait. Normal heel walk. Painful, limited toe rise (she has orthotics in her shoes and has had the issue addressed in the past regarding achilles tendon pain).      Ambulation without assistive device. FWB.       RADIOGRAPHS  Cervical xray films from 1/8/2016 reviewed:  1) Multilevel degenerative disc disease. 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      reviewed     Ms. Chris Reyes has a reminder for a \"due or due soon\" health maintenance.  I have asked that she contact her primary care provider for follow-up on this health maintenance.      Kareem Spears     Written by Anthony [address], as dictated by Elsie Salguero MD.

## 2017-05-31 NOTE — PATIENT INSTRUCTIONS
Neck: Exercises  Your Care Instructions  Here are some examples of typical rehabilitation exercises for your condition. Start each exercise slowly. Ease off the exercise if you start to have pain. Your doctor or physical therapist will tell you when you can start these exercises and which ones will work best for you. How to do the exercises  Note: Stretching should make you feel a gentle stretch, but no pain. Stop any strengthening exercise that makes pain worse. Neck stretch    1. This stretch works best if you keep your shoulder down as you lean away from it. To help you remember to do this, start by relaxing your shoulders and lightly holding on to your thighs or your chair. 2. Tilt your head toward your shoulder and hold for 15 to 30 seconds. Let the weight of your head stretch your muscles. 3. If you would like a little added stretch, use your hand to gently and steadily pull your head toward your shoulder. For example, keeping your right shoulder down, lean your head to the left. 4. Repeat 2 to 4 times toward each shoulder. Diagonal neck stretch    1. Turn your head slightly toward the direction you will be stretching, and tilt your head diagonally toward your chest and hold for 15 to 30 seconds. 2. If you would like a little added stretch, use your hand to gently and steadily pull your head forward on the diagonal.  3. Repeat 2 to 4 times toward each side. Dorsal glide stretch    1. Sit or stand tall and look straight ahead. 2. Slowly tuck your chin as you glide your head backward over your body  3. Hold for a count of 6, and then relax for up to 10 seconds. 4. Repeat 8 to 12 times. Note: The dorsal glide stretches the back of the neck. If you feel pain, do not glide so far back. Some people find this exercise easier to do while lying on their backs with an ice pack on the neck. Chest and shoulder stretch    1.  Sit or stand tall and glide your head backward as in the dorsal glide stretch. 2. Raise both arms so that your hands are next to your ears. 3. Take a deep breath, and as you breathe out, lower your elbows down and behind your back. You will feel your shoulder blades slide down and together, and at the same time you will feel a stretch across your chest and the front of your shoulders. 4. Hold for about 6 seconds, and then relax for up to 10 seconds. 5. Repeat 8 to 12 times. Strengthening: Hands on head    1. Move your head backward, forward, and side to side against gentle pressure from your hands, holding each position for about 6 seconds. 2. Repeat 8 to 12 times. Follow-up care is a key part of your treatment and safety. Be sure to make and go to all appointments, and call your doctor if you are having problems. It's also a good idea to know your test results and keep a list of the medicines you take. Where can you learn more? Go to http://jinny-astrid.info/. Enter P975 in the search box to learn more about \"Neck: Exercises. \"  Current as of: May 23, 2016  Content Version: 11.2  © 6958-0183 [employer]. Care instructions adapted under license by [employer] (which disclaims liability or warranty for this information). If you have questions about a medical condition or this instruction, always ask your healthcare professional. [address] any warranty or liability for your use of this information. Neck: Exercises  Your Care Instructions  Here are some examples of typical rehabilitation exercises for your condition. Start each exercise slowly. Ease off the exercise if you start to have pain. Your doctor or physical therapist will tell you when you can start these exercises and which ones will work best for you. How to do the exercises  Note: Stretching should make you feel a gentle stretch, but no pain. Stop any strengthening exercise that makes pain worse. Neck stretch    5.  This stretch works best if you keep your shoulder down as you lean away from it. To help you remember to do this, start by relaxing your shoulders and lightly holding on to your thighs or your chair. 6. Tilt your head toward your shoulder and hold for 15 to 30 seconds. Let the weight of your head stretch your muscles. 7. If you would like a little added stretch, use your hand to gently and steadily pull your head toward your shoulder. For example, keeping your right shoulder down, lean your head to the left. 8. Repeat 2 to 4 times toward each shoulder. Diagonal neck stretch    4. Turn your head slightly toward the direction you will be stretching, and tilt your head diagonally toward your chest and hold for 15 to 30 seconds. 5. If you would like a little added stretch, use your hand to gently and steadily pull your head forward on the diagonal.  6. Repeat 2 to 4 times toward each side. Dorsal glide stretch    5. Sit or stand tall and look straight ahead. 6. Slowly tuck your chin as you glide your head backward over your body  7. Hold for a count of 6, and then relax for up to 10 seconds. 8. Repeat 8 to 12 times. Note: The dorsal glide stretches the back of the neck. If you feel pain, do not glide so far back. Some people find this exercise easier to do while lying on their backs with an ice pack on the neck. Chest and shoulder stretch    6. Sit or stand tall and glide your head backward as in the dorsal glide stretch. 7. Raise both arms so that your hands are next to your ears. 8. Take a deep breath, and as you breathe out, lower your elbows down and behind your back. You will feel your shoulder blades slide down and together, and at the same time you will feel a stretch across your chest and the front of your shoulders. 9. Hold for about 6 seconds, and then relax for up to 10 seconds. 10. Repeat 8 to 12 times. Strengthening: Hands on head    3.  Move your head backward, forward, and side to side against gentle pressure from your hands, holding each position for about 6 seconds. 4. Repeat 8 to 12 times. Follow-up care is a key part of your treatment and safety. Be sure to make and go to all appointments, and call your doctor if you are having problems. It's also a good idea to know your test results and keep a list of the medicines you take. Where can you learn more? Go to http://jinny-astrid.info/. Enter P975 in the search box to learn more about \"Neck: Exercises. \"  Current as of: May 23, 2016  Content Version: 11.2  © 5460-9889 [employer]. Care instructions adapted under license by [employer] (which disclaims liability or warranty for this information). If you have questions about a medical condition or this instruction, always ask your healthcare professional. [address] any warranty or liability for your use of this information.

## 2017-05-31 NOTE — MR AVS SNAPSHOT
Visit Information Date & Time Provider Department Dept. Phone Encounter #  
 5/31/2017 10:30 AM Bela Nolen MD [facility] [phone] Follow-up Instructions Return in about 6 weeks (around 7/12/2017), or if symptoms worsen or fail to improve. Your Appointments 6/7/2017 11:00 AM  
Follow Up with Vinny Melton MD  
[facility] [address] [facility]) Appt Note: 6mon f/u  
 [address] [facility] [address]  
[phone]  
  
   
 [place] [id number]  
  
    
 9/26/2017 11:00 AM  
Office Visit with Ashley Amado MD  
Internist of [address] [place] [address]) Appt Note: ov 4mos. grant [address], [address] [place] [address] [address]  
  
   
 [address], [address] Upcoming Health Maintenance Date Due DTaP/Tdap/Td series (1 - Tdap) 1/2/2007 MEDICARE YEARLY EXAM 1/14/2016 Pneumococcal 65+ Low/Medium Risk (2 of 2 - PPSV23) 7/7/2017 INFLUENZA AGE 9 TO ADULT 8/1/2017 BREAST CANCER SCRN MAMMOGRAM 1/5/2018 GLAUCOMA SCREENING Q2Y 6/28/2018 COLONOSCOPY 4/3/2019 Allergies as of 5/31/2017  Review Complete On: 5/31/2017 By: Kim Jaramillo LPN Severity Noted Reaction Type Reactions Plaquenil [Hydroxychloroquine] High 04/25/2016    Nausea and Vomiting Pcn [Penicillins]  05/17/2010    Itching Zithromax [Azithromycin]  05/17/2010    Rash, Other (comments), Hives [facility] records state skin peeling. High fever, peeling Current Immunizations  Reviewed on 12/1/2016 Name Date Influenza Vaccine 11/2/2016, 11/6/2015, 9/20/2014, 10/29/2013 Influenza Vaccine Split 10/3/2012, 9/23/2011, 11/1/2010 Pneumococcal Conjugate (PCV-13) 7/7/2016 TD Vaccine 1/1/2007 Zoster Vaccine, Live 2/14/2017 Not reviewed this visit You Were Diagnosed With   
 Codes Comments Neuropathy    -  Primary ICD-10-CM: G62.9 ICD-9-CM: 355.9 Myofascial muscle pain     ICD-10-CM: M79.1 ICD-9-CM: 729.1 Vitals BP Pulse Temp Resp Height(growth percentile) Weight(growth percentile) 116/79 80 97.6 °F (36.4 °C) (Oral) 16 5' 5\" (1.651 m) 126 lb 9.6 oz (57.4 kg) LMP SpO2 BMI OB Status Smoking Status 04/25/2016 98% 21.07 kg/m2 Postmenopausal Never Smoker Vitals History BMI and BSA Data Body Mass Index Body Surface Area 21.07 kg/m 2 1.62 m 2 Preferred Pharmacy Pharmacy Name Phone [facility] [address], [address] On license of [facility] [phone] Your Updated Medication List  
  
   
This list is accurate as of: 5/31/17 11:29 AM.  Always use your most recent med list.  
  
  
  
  
 acetaminophen 650 mg CR tablet Commonly known as:  TYLENOL Take 650 mg by mouth three (3) times daily as needed for Pain. CALCIUM 500+D 500 mg(1,250mg) -200 unit per tablet Generic drug:  calcium-vitamin D Take 1 Tab by mouth two (2) times daily (with meals). CARTIA  mg ER capsule Generic drug:  dilTIAZem CD Take 180 mg by mouth daily. cholecalciferol (VITAMIN D3) 5,000 unit Tab tablet Commonly known as:  VITAMIN D3 Take  by mouth daily. cyanocobalamin 1,000 mcg tablet Take 1,000 mcg by mouth two (2) times a day. diclofenac 1 % Gel Commonly known as:  VOLTAREN Apply  to affected area four (4) times daily. folic acid 1 mg tablet Commonly known as:  Google Take 1 mg by mouth daily. gabapentin 300 mg capsule Commonly known as:  NEURONTIN  
2 tabs at bed time  
  
 melatonin Tab tablet Take 8 mg by mouth nightly. methotrexate 2.5 mg tablet Commonly known as:  Mk Garcia Take 2.5 mg by mouth every Wednesday. Take 6 tablets of 2.5 mg every Wednesday  
  
 predniSONE 5 mg tablet Commonly known as:  Cornel Vargas  
 Take 2.5 mg by mouth every other day. VITAMIN C 500 mg tablet Generic drug:  ascorbic acid (vitamin C) Take 500 mg by mouth two (2) times a day. XARELTO 20 mg Tab tablet Generic drug:  rivaroxaban Take 20 mg by mouth daily (with dinner). We Performed the Following REFERRAL TO PHYSICAL THERAPY [XIZ23 Custom] Comments:  
 eval and treat, dry needling if indicated, Pilates equipment based exercises; Patient has cervical myofascial pain. Follow-up Instructions Return in about 6 weeks (around 7/12/2017), or if symptoms worsen or fail to improve. To-Do List   
 09/19/2017 9:00 AM  
  Appointment with [facility] BONE DEXA RM 1 at [facility] RAD BONE DENSITY ([phone]) OUTSIDE FILMS  - Any outside films related to the study being scheduled should be brought with you on the day of the exam.  If this cannot be done there may be a delay in the reading of the study. MEDICATIONS  - Patient must bring a complete list of all medications currently taking to include prescriptions, over-the-counter meds, herbals, vitamins & any dietary supplements - Patient must discontinue use of calcium, vitamins, or calcium supplements including antacids (calcium based) 24 hours before scan. GENERAL INSTRUCTIONS  - [facility] cannot accommodate patients on stretchers, patient must be able to walk into the room and be able to sit up for a portion of the exam.  
  
  
Referral Information Referral ID Referred By Referred To  
  
 [id number] Garret Cowden, DAVID Not Available Visits Status Start Date End Date 1 New Request 5/31/17 5/31/18 If your referral has a status of pending review or denied, additional information will be sent to support the outcome of this decision. Patient Instructions Neck: Exercises Your Care Instructions Here are some examples of typical rehabilitation exercises for your condition. Start each exercise slowly.  Ease off the exercise if you start to have pain. Your doctor or physical therapist will tell you when you can start these exercises and which ones will work best for you. How to do the exercises Note: Stretching should make you feel a gentle stretch, but no pain. Stop any strengthening exercise that makes pain worse. Neck stretch 1. This stretch works best if you keep your shoulder down as you lean away from it. To help you remember to do this, start by relaxing your shoulders and lightly holding on to your thighs or your chair. 2. Tilt your head toward your shoulder and hold for 15 to 30 seconds. Let the weight of your head stretch your muscles. 3. If you would like a little added stretch, use your hand to gently and steadily pull your head toward your shoulder. For example, keeping your right shoulder down, lean your head to the left. 4. Repeat 2 to 4 times toward each shoulder. Diagonal neck stretch 1. Turn your head slightly toward the direction you will be stretching, and tilt your head diagonally toward your chest and hold for 15 to 30 seconds. 2. If you would like a little added stretch, use your hand to gently and steadily pull your head forward on the diagonal. 
3. Repeat 2 to 4 times toward each side. Dorsal glide stretch 1. Sit or stand tall and look straight ahead. 2. Slowly tuck your chin as you glide your head backward over your body 3. Hold for a count of 6, and then relax for up to 10 seconds. 4. Repeat 8 to 12 times. Note: The dorsal glide stretches the back of the neck. If you feel pain, do not glide so far back. Some people find this exercise easier to do while lying on their backs with an ice pack on the neck. Chest and shoulder stretch 1. Sit or stand tall and glide your head backward as in the dorsal glide stretch. 2. Raise both arms so that your hands are next to your ears.  
3. Take a deep breath, and as you breathe out, lower your elbows down and behind your back. You will feel your shoulder blades slide down and together, and at the same time you will feel a stretch across your chest and the front of your shoulders. 4. Hold for about 6 seconds, and then relax for up to 10 seconds. 5. Repeat 8 to 12 times. Strengthening: Hands on head 1. Move your head backward, forward, and side to side against gentle pressure from your hands, holding each position for about 6 seconds. 2. Repeat 8 to 12 times. Follow-up care is a key part of your treatment and safety. Be sure to make and go to all appointments, and call your doctor if you are having problems. It's also a good idea to know your test results and keep a list of the medicines you take. Where can you learn more? Go to http://jinny-astrid.info/. Enter P975 in the search box to learn more about \"Neck: Exercises. \" Current as of: May 23, 2016 Content Version: 11.2 © 4152-4196 [employer]. Care instructions adapted under license by [employer] (which disclaims liability or warranty for this information). If you have questions about a medical condition or this instruction, always ask your healthcare professional. [address] any warranty or liability for your use of this information. Neck: Exercises Your Care Instructions Here are some examples of typical rehabilitation exercises for your condition. Start each exercise slowly. Ease off the exercise if you start to have pain. Your doctor or physical therapist will tell you when you can start these exercises and which ones will work best for you. How to do the exercises Note: Stretching should make you feel a gentle stretch, but no pain. Stop any strengthening exercise that makes pain worse. Neck stretch 5. This stretch works best if you keep your shoulder down as you lean away from it.  To help you remember to do this, start by relaxing your shoulders and lightly holding on to your thighs or your chair. 6. Tilt your head toward your shoulder and hold for 15 to 30 seconds. Let the weight of your head stretch your muscles. 7. If you would like a little added stretch, use your hand to gently and steadily pull your head toward your shoulder. For example, keeping your right shoulder down, lean your head to the left. 8. Repeat 2 to 4 times toward each shoulder. Diagonal neck stretch 4. Turn your head slightly toward the direction you will be stretching, and tilt your head diagonally toward your chest and hold for 15 to 30 seconds. 5. If you would like a little added stretch, use your hand to gently and steadily pull your head forward on the diagonal. 
6. Repeat 2 to 4 times toward each side. Dorsal glide stretch 5. Sit or stand tall and look straight ahead. 6. Slowly tuck your chin as you glide your head backward over your body 7. Hold for a count of 6, and then relax for up to 10 seconds. 8. Repeat 8 to 12 times. Note: The dorsal glide stretches the back of the neck. If you feel pain, do not glide so far back. Some people find this exercise easier to do while lying on their backs with an ice pack on the neck. Chest and shoulder stretch 6. Sit or stand tall and glide your head backward as in the dorsal glide stretch. 7. Raise both arms so that your hands are next to your ears. 8. Take a deep breath, and as you breathe out, lower your elbows down and behind your back. You will feel your shoulder blades slide down and together, and at the same time you will feel a stretch across your chest and the front of your shoulders. 9. Hold for about 6 seconds, and then relax for up to 10 seconds. 10. Repeat 8 to 12 times. Strengthening: Hands on head 3. Move your head backward, forward, and side to side against gentle pressure from your hands, holding each position for about 6 seconds. 4. Repeat 8 to 12 times. Follow-up care is a key part of your treatment and safety. Be sure to make and go to all appointments, and call your doctor if you are having problems. It's also a good idea to know your test results and keep a list of the medicines you take. Where can you learn more? Go to http://jinny-astrid.info/. Enter P975 in the search box to learn more about \"Neck: Exercises. \" Current as of: May 23, 2016 Content Version: 11.2 © 6770-2977 [employer] Care instructions adapted under license by [employer] (which disclaims liability or warranty for this information). If you have questions about a medical condition or this instruction, always ask your healthcare professional. [address] any warranty or liability for your use of this information. Introducing [facility] & HEALTH SERVICES! Dear Camille Mccoy: 
Thank you for requesting a Renovagen account. Our records indicate that you already have an active Renovagen account. You can access your account anytime at [email or web address] Did you know that you can access your hospital and ER discharge instructions at any time in Renovagen? You can also review all of your test results from your hospital stay or ER visit. Additional Information If you have questions, please visit the Frequently Asked Questions section of the Renovagen website at [email or web address] Remember, Renovagen is NOT to be used for urgent needs. For medical emergencies, dial 911. Now available from your iPhone and Android! Please provide this summary of care documentation to your next provider. Your primary care clinician is listed as Iesha Pierce. If you have any questions after today's visit, please call [phone].

## 2017-06-07 ENCOUNTER — OFFICE VISIT (OUTPATIENT)
Dept: NEUROLOGY | Age: 67
End: 2017-06-07

## 2017-06-07 VITALS
HEIGHT: 65 IN | OXYGEN SATURATION: 98 % | TEMPERATURE: 98.6 F | BODY MASS INDEX: 21.09 KG/M2 | WEIGHT: 126.6 LBS | HEART RATE: 77 BPM | SYSTOLIC BLOOD PRESSURE: 100 MMHG | DIASTOLIC BLOOD PRESSURE: 78 MMHG

## 2017-06-07 DIAGNOSIS — G45.4 TRANSIENT GLOBAL AMNESIA: ICD-10-CM

## 2017-06-07 DIAGNOSIS — R55 SYNCOPE, UNSPECIFIED SYNCOPE TYPE: Primary | ICD-10-CM

## 2017-06-07 NOTE — MR AVS SNAPSHOT
Visit Information Date & Time Provider Department Dept. Phone Encounter #  
 6/7/2017 11:00 AM Arabella Astorga, [facility] [phone] [id number] Follow-up Instructions Return if symptoms worsen or fail to improve. Your Appointments 7/12/2017 10:30 AM  
Follow Up with Tanvi Oliveros MD  
[address] [facility] [facility]) Appt Note: 6 wk f/u neck [address] [facility] [address]  
[phone]  
  
   
 [address] [facility],[address] [facility]  
  
    
 9/26/2017 11:00 AM  
Office Visit with lEvis Ramirez MD  
Internist of [address] [place] [address]) Appt Note: ov 4mos. grant [address], Suite [facility] [address] [place]  
  
   
 [address], [address] Upcoming Health Maintenance Date Due DTaP/Tdap/Td series (1 - Tdap) 1/2/2007 MEDICARE YEARLY EXAM 1/14/2016 Pneumococcal 65+ Low/Medium Risk (2 of 2 - PPSV23) 7/7/2017 INFLUENZA AGE 9 TO ADULT 8/1/2017 BREAST CANCER SCRN MAMMOGRAM 1/5/2018 GLAUCOMA SCREENING Q2Y 6/28/2018 COLONOSCOPY 4/3/2019 Allergies as of 6/7/2017  Review Complete On: 6/7/2017 By: Arabella Astorga MD  
  
 Severity Noted Reaction Type Reactions Plaquenil [Hydroxychloroquine] High 04/25/2016    Nausea and Vomiting Pcn [Penicillins]  05/17/2010    Itching Zithromax [Azithromycin]  05/17/2010    Rash, Other (comments), [facility]'s records state skin peeling. High fever, peeling Current Immunizations  Reviewed on 12/1/2016 Name Date Influenza Vaccine 11/2/2016, 11/6/2015, 9/20/2014, 10/29/2013 Influenza Vaccine Split 10/3/2012, 9/23/2011, 11/1/2010 Pneumococcal Conjugate (PCV-13) 7/7/2016 TD Vaccine 1/1/2007 Zoster Vaccine, Live 2/14/2017 Not reviewed this visit You Were Diagnosed With   
  
 Codes Comments Syncope, unspecified syncope type    -  Primary ICD-10-CM: R55 
ICD-9-CM: 780.2 Transient global amnesia     ICD-10-CM: G45.4 ICD-9-CM: 437.7 Vitals BP Pulse Temp Height(growth percentile) Weight(growth percentile) LMP  
 100/78 77 98.6 °F (37 °C) (Oral) 5' 5\" (1.651 m) 126 lb 9.6 oz (57.4 kg) 04/25/2016 SpO2 BMI OB Status Smoking Status 98% 21.07 kg/m2 Postmenopausal Never Smoker BMI and BSA Data Body Mass Index Body Surface Area 21.07 kg/m 2 1.62 m 2 Preferred Pharmacy Pharmacy Name Phone [facility] [address], [address] [facility] [phone] Your Updated Medication List  
  
   
This list is accurate as of: 6/7/17 12:06 PM.  Always use your most recent med list.  
  
  
  
  
 acetaminophen 650 mg CR tablet Commonly known as:  TYLENOL Take 650 mg by mouth three (3) times daily as needed for Pain. CALCIUM 500+D 500 mg(1,250mg) -200 unit per tablet Generic drug:  calcium-vitamin D Take 1 Tab by mouth two (2) times daily (with meals). CARTIA  mg ER capsule Generic drug:  dilTIAZem CD Take 180 mg by mouth daily. cholecalciferol (VITAMIN D3) 5,000 unit Tab tablet Commonly known as:  VITAMIN D3 Take  by mouth daily. cyanocobalamin 1,000 mcg tablet Take 1,000 mcg by mouth two (2) times a day. diclofenac 1 % Gel Commonly known as:  VOLTAREN Apply  to affected area four (4) times daily. folic acid 1 mg tablet Commonly known as:  Google Take 1 mg by mouth daily. gabapentin 300 mg capsule Commonly known as:  NEURONTIN  
2 tabs at bed time  
  
 melatonin Tab tablet Take 8 mg by mouth nightly. methotrexate 2.5 mg tablet Commonly known as:  Germán Skip Take 2.5 mg by mouth every Wednesday. Take 6 tablets of 2.5 mg every Wednesday  
  
 predniSONE 5 mg tablet Commonly known as:  Elise Sake Take 2.5 mg by mouth every other day. VITAMIN C 500 mg tablet Generic drug:  ascorbic acid (vitamin C) Take 500 mg by mouth two (2) times a day. XARELTO 20 mg Tab tablet Generic drug:  rivaroxaban Take 20 mg by mouth daily (with dinner). Follow-up Instructions Return if symptoms worsen or fail to improve. To-Do List   
 06/09/2017 10:00 AM  
  Appointment with Jean Paul Anguiano, PT at [facility]  [facility] ([phone])  
  
 09/19/2017 9:00 AM  
  Appointment with [facility] BONE DEXA RM 1 at [facility] RAD BONE DENSITY ([phone]) OUTSIDE FILMS  - Any outside films related to the study being scheduled should be brought with you on the day of the exam.  If this cannot be done there may be a delay in the reading of the study. MEDICATIONS  - Patient must bring a complete list of all medications currently taking to include prescriptions, over-the-counter meds, herbals, vitamins & any dietary supplements - Patient must discontinue use of calcium, vitamins, or calcium supplements including antacids (calcium based) 24 hours before scan. GENERAL INSTRUCTIONS  - [facility] cannot accommodate patients on stretchers, patient must be able to walk into the room and be able to sit up for a portion of the exam.  
  
  
Patient Instructions May use lidocaine and zostrix otc for joint pain Introducing [facility] & HEALTH SERVICES! Dear Mattie Soler: 
Thank you for requesting a Praccel account. Our records indicate that you already have an active Praccel account. You can access your account anytime at [email or web address] Did you know that you can access your hospital and ER discharge instructions at any time in Praccel? You can also review all of your test results from your hospital stay or ER visit. Additional Information If you have questions, please visit the Frequently Asked Questions section of the Praccel website at [email or web address] Remember, MyChart is NOT to be used for urgent needs. For medical emergencies, dial 911. Now available from your iPhone and Android! Please provide this summary of care documentation to your next provider. Your primary care clinician is listed as Sindi Farrar. If you have any questions after today's visit, please call [phone].

## 2017-06-07 NOTE — PROGRESS NOTES
[facility]             [address] [place], [address]    6/7/2017    HPI:  Kristina Sullivan is a 78 y.o., right handed,   female, who presents with episode of amnesia. patient was hospitalized at [facility] after her initial evaluation at [facility] for episode of amnesia with profound nausea and vomiting. Patient reports going into the hospital with no clear memory until the next night. She does recall the date of episode Saturday after prompting. She denies prior episodes of memory loss. She does have occasional difficulty with placement of items misnamed mean objects. She notes that she was on prednisone as a taper when she had this episode. She underwent extensive evaluation including EEG MRI and MRA examinations. These were all essentially normal. She does have father had dementia in his 76s. Patient also had new onset a fib and was placed on xarelto during admission. She also carries a diagnosis of Sjogren's and notes generalized pain, especially in the arms with movement improved with voltaren gel    The patient reports episode while exercising but seated where she began to experience visual disturbances like floaters when turning her head first to the right than left. When facing forward she closes her eyes and briefly lost consciousness. She denied postictal confusion. She has recently been diagnosed with atrial fib and has had her medications adjusted. It was lowered. Following this episode. She's had no further bouts. She has had no further episodes of transient global amnesia. Reviewed previous workup, which was extensive, including MRI/MRA head, neck, and EEG examination performed at [facility]    Patient returns without further episodes of amnesia or syncope. However she has multiple questions which are terms of nutritional supplementation and consideration of integrative medicine.   She reports that she has been diagnosed with rheumatoid arthritis. She notes no pain but increased stiffness she continues to have difficulty walking at times especially initially  No new difficulties  Current Outpatient Prescriptions   Medication Sig Dispense Refill    cholecalciferol, VITAMIN D3, (VITAMIN D3) 5,000 unit tab tablet Take  by mouth daily.  CARTIA  mg ER capsule Take 180 mg by mouth daily.  gabapentin (NEURONTIN) 300 mg capsule 2 tabs at bed time 60 Cap 6    folic acid (FOLVITE) 1 mg tablet Take 1 mg by mouth daily.  predniSONE (DELTASONE) 5 mg tablet Take 2.5 mg by mouth every other day.  cyanocobalamin 1,000 mcg tablet Take 1,000 mcg by mouth two (2) times a day.  diclofenac (VOLTAREN) 1 % gel Apply  to affected area four (4) times daily.  acetaminophen (TYLENOL) 650 mg CR tablet Take 650 mg by mouth three (3) times daily as needed for Pain.  XARELTO 20 mg tab tablet Take 20 mg by mouth daily (with dinner).  melatonin tab tablet Take 8 mg by mouth nightly.  ascorbic acid (VITAMIN C) 500 mg tablet Take 500 mg by mouth two (2) times a day.  calcium-vitamin D (CALCIUM 500+D) 500 mg(1,250mg) -200 unit per tablet Take 1 Tab by mouth two (2) times daily (with meals).  methotrexate (RHEUMATREX) 2.5 mg tablet Take 2.5 mg by mouth every Wednesday. Take 6 tablets of 2.5 mg every Wednesday         Past Medical History:   Diagnosis Date    A-fib [facility])     Arthritis     Feet    Memory change     Menopause     Multiple thyroid nodules 2009    under care of Dr. Karoline Romberg MVP (mitral valve prolapse)     under care of Dr. Acosta Bending Neuropathy     Plantar fasciitis     [facility]    Right knee pain     Routine gynecological examination     done by Dr. Sha Renner S/P colonoscopy     done past 1-2 yrs?   Dr. Dali Leon Sjogren's syndrome [facility])     sees Dr. Kim Bettencourt    TIA (transient ischemic attack)     possible history of    Tibialis tendonitis following with Dr Paradise Scott ankle       Past Surgical History:   Procedure Laterality Date    DILATION AND CURETTAGE  1987    HX BREAST AUGMENTATION  1984    HX DILATION AND CURETTAGE  1/2013    HX TONSILLECTOMY      IMPLANT BREAST SILICONE/EQ      TARSAL TUNNEL RELEASE  1994    Right     Family History   Problem Relation Age of Onset    Hypertension Father     High Cholesterol Father     Diabetes Father     Stroke Father     Heart Attack Father     Cancer Father     Heart Disease Father     Depression Sister     Breast Cancer Maternal Grandmother      Allergies   Allergen Reactions    Plaquenil [Hydroxychloroquine] Nausea and Vomiting    Pcn [Penicillins] Itching    Zithromax [Azithromycin] Rash, Other (comments) and Hives     [facility] records state skin peeling. High fever, peeling       Review of Systems:   Review of Systems - History obtained from the patient  General ROS: positive for  - sleep disturbance  Psychological ROS: negative  ENT ROS: negative  Hematological and Lymphatic ROS: negative  Endocrine ROS: negative  Respiratory ROS: no cough, shortness of breath, or wheezing  Cardiovascular ROS: positive for - palpitations  Gastrointestinal ROS: nausea   Genito-Urinary ROS: no dysuria, trouble voiding, or hematuria  Musculoskeletal ROS:  positive for - gait disturbance, joint pain, joint stiffness and pain in elbow - bilateral, foot - bilateral, knee - bilateral and wrist - bilateral  Neurological ROS: positive for - confusion, gait disturbance, memory loss and numbness/tingling  Dermatological ROS: negative    PHYSICAL EXAMINATION:    Visit Vitals    /78    Pulse 77    Temp 98.6 °F (37 °C) (Oral)    Ht 5' 5\" (1.651 m)    Wt 57.4 kg (126 lb 9.6 oz)    SpO2 98%    BMI 21.07 kg/m2     General:  Well defined, nourished, and groomed individual in no acute distress.     Neck: Supple, nontender, thyroid within normal limits, no JVD, no bruits, no pain with resistance to active range of motion. Heart: Regular rate and rhythm, no murmurs, rub, or gallop. Normal S1S2. Lungs:  Clear to auscultation bilaterally with equal chest expansion, no cough, no wheeze  Musculoskeletal:  Extremities revealed no edema Psych:  Good mood and normal affect    NEUROLOGICAL EXAMINATION:     Mental Status:   Alert and oriented to person, place, and time with recent and remote memory intact. Attention span and concentration are normal. Speech is fluent with a full fund of knowledge. Cranial Nerves:    II, III, IV, VI:  Visual acuity grossly intact. Visual fields are normal.    Pupils are equal, round, and reactive to light and accommodation. Extra-ocular movements are full and fluid. Fundoscopic exam was benign, no ptosis or nystagmus. V-XII: Hearing is grossly intact. Facial features are symmetric, with normal sensation and strength. Motor Examination: Normal tone, bulk, and strength, 5/5 muscle strength throughout except distally bue and ble. No cogwheel rigidity or clonus present. Coordination:     No resting or intention tremor    Gait and Station:  slow walking decreased arm swing. Assessment and Plan:   Sina Peraza is a 78 y.o. right handed female whose history and physical are consistent with single  episode of prolonged transient global amnesia. Sina Peraza who has risk factors including afib Connective tissue disease    Stephy Prom was seen today for neurologic problem. Diagnoses and all orders for this visit:    Syncope, unspecified syncope type    Transient global amnesia      Follow-up Disposition:  Return if symptoms worsen or fail to improve. Reveiwed work up  Notes in [facility]  Reviewed need for vit b12  Supplementation vit d supplementation. Reviewed extensive workup .   I spent 20  minutes with the patient in face-to-face consultation, of which greater than 50% was spent in counseling and coordination of care as described above.

## 2017-06-09 ENCOUNTER — HOSPITAL ENCOUNTER (OUTPATIENT)
Dept: PHYSICAL THERAPY | Age: 67
Discharge: HOME | End: 2017-06-09
Payer: MEDICARE

## 2017-06-09 PROCEDURE — G8985 CARRY GOAL STATUS: HCPCS

## 2017-06-09 PROCEDURE — 97530 THERAPEUTIC ACTIVITIES: CPT

## 2017-06-09 PROCEDURE — 97162 PT EVAL MOD COMPLEX 30 MIN: CPT

## 2017-06-09 PROCEDURE — G8984 CARRY CURRENT STATUS: HCPCS

## 2017-06-09 PROCEDURE — 97110 THERAPEUTIC EXERCISES: CPT

## 2017-06-09 NOTE — PROGRESS NOTES
Physical Therapy Evaluation Cervical Spine - [facility]    SUBJECTIVE  Pain Level (0-10 scale): 8  []constant []intermittent []improving []worsening []no change since onset    Any medication changes, allergies to medications, adverse drug reactions, diagnosis change, or new procedure performed?: [x] No    [] Yes (see summary sheet for update)  Subjective functional status/changes:     PLOF: Walking, gardening, R hand dominant  Limitations to PLOF: able to perform  Mechanism of Injury: 2-3 months ago pain started in B shlds, no specific injury; she reports her neck has hurt on/off over the past year  Current symptoms/Complaints: Unable to lie on either shoulder  Previous Treatment/Compliance: PT for neck pain in 2016, acupuncture treatment, gentle exercising  PMHx/Surgical Hx: arthritis, mitral valve prolapse, sjogren's syndrome, atrial fibrillation, carpal tunnel, rheumatoid arthritis  Work Hx: Retired  Living Situation: lives with   Pt Goals: \"Stop pain in shoulders, down arms. \"  Barriers: []pain []financial []time []transportation []other  Motivation: Fair  Substance use: []Alcohol []Tobacco []other:   FABQ Score: []low [x]elevate  Cognition: A & O x 3    Other: Fair health status, Carry CK/CK 49/60    OBJECTIVE/EXAMINATION  Symptoms  Aggravated by:   [] Bending [] Sitting [] Standing [] Reaching Overhead   [] Moving [] Cough [] Sneeze [] Eating   [] AM  [x] PM  Lying:  [] sup   [] pro   [x] sidelying   [] Other:     Eased by:    [] Bending [] Sitting [] Standing Lying: [] sup  [] pro  [] sidelying   [x] Moving [] AM  [] PM  [] Other:     General Health:  Red Flags Indicated? [] Yes    [x] No  [] Yes [x] No Recent weight change (If yes, due to dieting?  [] Yes  [] No)   [] Yes [x] No Persistent cough  [] Yes [x] No Unremitting pain at night  [] Yes [x] No Dizziness  [] Yes [x] No Blurred vision  [x] Yes [] No Hands more cold or painful in cold weather  [] Yes [x] No Ringing in ears  [] Yes [x] No Difficulty swallowing  [] Yes [x] No Dysfunction of bowel or bladder reports constipation though  [] Yes [x] No Recent illness within past 3 weeks (i.e, cold, flu)  [] Yes [x] No Jaw pain      Headaches: Do you have headaches? [] Yes   [x] No  How often do you get headaches? How long does the headache last?  What aggravates it? What relieves it? Does the headache coincide with any other symptoms (visual disturbances, light sensitivity)? Where is the headache? Does it change locations? Other:    OBJECTIVE  Posture: [] WNL  Head Position: fairly upright  Shoulder/Scapular Position:  C-Kyphosis:  [] increased   [] decreased   C-Lordosis:   [] increased   [] decreased  T-Kyphosis:  [] increased   [] decreased  T-Lordosis:   [] increased   [] decreased     TMJ: [x] N/A [] Abnormal - ROM:   Palpation:    Cervical Retraction: [] WNL    [] Abnormal:    Shoulder/Scapular Screen: [] WNL    [x] Abnormal: lowered R scap compared to L    Active Movements: [] N/A   [] Too acute   [] Other:  ROM  AROM MMT Comments:pain, area   Forward flexion 46 4    Extension 22 4    SB right 11 4    SB left  15 4    Rotation right 45 4    Rotation left 25 3  p!      Thoracic Spine: [] N/A    [] WNL   [x] Other: good rib springing, and poor CPA mobility along entire thoracic spine    PROM:    Palpation:  [] Min  [x] Mod  [] Severe    Location: TTP about B UT  [] Min  [] Mod  [] Severe    Location:  [] Min  [] Mod  [] Severe    Location:    Neuro Screen (myotome/dematome/felexes): [x] WNL - brisk reflexes B UE  Myotome Level Muscle Test Myotome Level Muscle Test   C5 Shoulder Adduction - Deltoid C8 Finger Flexors   C6 Wrist Extension T1 Finger Abduction - Interossei   C7 Elbow Extension     Comments:  Upper Limb Tension Tests: [] N/A       Ulnar: [] R    [] L    [] +    [] -       Median: [] R    [] L    [] +    [] -       Radial: [] R    [] L    [] +    [] -    Special Tests:  Cervical:        Vertebral Artery:  [] R    [] L    [] +    [] - Alar Ligament: [x] R    [x] L    [] +    [x] -       Sharp Padmini Lig: [x] R    [x] L    [] +    [x] -       Spurling's:  [] R    [x] L    [x] +    [] -       Distraction:  [x] R    [x] L    [x] +    [] -       Compression: [x] R    [x] L    [x] +    [] -    Thoracic Outlet Tests: [x] N/A       Adson's:  [] R    [] L    [] +    [] -       Hyperabduction: [] R    [] L    [] +    [] -       Ariel's:  [] R    [] L    [] +    [] -       Brenda:  [] R    [] L    [] +    [] -    Diaphragmatic Breathing: [] Normal    [] Abnormal    Muscle Flexibility: [] N/A   Scalenes: [] WNL    [] Tight    [] R    [] L   Upper Trap: [] WNL    [x] Tight    [x] R    [x] L   Levator: [] WNL    [] Tight    [] R    [] L   Pect.  Minor: [] WNL    [] Tight    [] R    [] L    Global Muscular Weakness: [] N/A   Lower and mid trap = 3-/5   Gross shld screening 4-/5    Other tests/comments:

## 2017-06-09 NOTE — PROGRESS NOTES
[facility]  [address]  [place]), [address]  [phone] fax    Plan of Care/ Statement of Necessity for Physical Therapy Services    Patient name: Marilee Blount Start of Care: 2017   Referral source: Nancy Johnson MD : 1950    Medical Diagnosis: Polyneuropathy, unspecified [G62.9]  Myalgia [M79.1]   Onset Date:2-3 months ago    Treatment Diagnosis: Cervical radiculopathy L>R   Prior Hospitalization: see medical history Provider#: [id number]   Medications: Verified on Patient summary List    Comorbidities: arthritis, mitral valve prolapse, sjogren's syndrome, atrial fibrillation, carpal tunnel, rheumatoid arthritis   Prior Level of Function: Walking, gardening; sees an acupuncturist; able to sleep on both sides without pain      The Plan of Care and following information is based on the information from the initial evaluation. Assessment/ key information: Pt presents with S&S consistent with cervical radiculopathy (L>R), with limitations noted in cervical AROM (flex = 46 degrees, ext = 22 degrees, R SB = 11 degrees, L SB = 15 degrees, R rot = 45 degrees, L rot = 25 degrees), painful L cervical rotation with 3/5 MMT, poor thoracic mobility, TTP about B UT, brisk reflexes B UE, (+) L Spurling's, (+) compression and distraction, B Lower and mid trap MMT 3-/5, and FOTO score of 49. Pt would benefit from skilled PT intervention to address the above listed limitations and allow improved functional task completion.     Evaluation Complexity History HIGH Complexity :3+ comorbidities / personal factors will impact the outcome/ POC ; Examination MEDIUM Complexity : 3 Standardized tests and measures addressing body structure, function, activity limitation and / or participation in recreation  ;Presentation LOW Complexity : Stable, uncomplicated  ;Clinical Decision Making MEDIUM Complexity : FOTO score of 26-74  Overall Complexity Rating: MEDIUM  Problem List: pain affecting function, decrease ROM, decrease strength, decrease ADL/ functional abilitiies, decrease activity tolerance and decrease flexibility/ joint mobility   Treatment Plan may include any combination of the following: Therapeutic exercise, Therapeutic activities, Neuromuscular re-education, Physical agent/modality, Manual therapy, Patient education, Self Care training, Functional mobility training and Other: dry needling if indicated  Patient / Family readiness to learn indicated by: asking questions, trying to perform skills and interest  Persons(s) to be included in education: patient (P)  Barriers to Learning/Limitations: yes;  physical  Patient Goal (s): Stop pain in shoulders, down arms.   Patient Self Reported Health Status: fair  Rehabilitation Potential: good    Short Term Goals: To be accomplished in one weeks:   1. Pt will demonstrate compliance with HEP for self management of symptoms. Long Term Goals: To be accomplished in four weeks:   1. Pt will demonstrate FOTO to 60 or greater to indicate improved functional task completion. 2.  Pt will demonstrate L cervical rotation to 50 degrees or greater to improve driving ADLs. 3.  Pt will demonstrate L cervical rotation MMT to 4/5 to improve cervical stability with ADLs. 4.  Pt will report ability to sleep four hours or more without waking up from pain to improve return to normalized sleep schedule. Frequency / Duration: Patient to be seen 2 times per week for 4 weeks. Patient/ Caregiver education and instruction: Diagnosis, prognosis, self care, activity modification and exercises   [x]  Plan of care has been reviewed with PTA    G-Codes (GP)  Carry   Current  CK= 40-59%    Goal  CK= 40-59%    The severity rating is based on clinical judgment and the FOTO score.     Certification Period: 60 days (06/09/2017 - 08/09/2017)  Tessa Carrizales, PT 6/9/2017 10:51 AM    ________________________________________________________________________    I certify that the above Therapy Services are being furnished while the patient is under my care. I agree with the treatment plan and certify that this therapy is necessary.     [de-identified] Signature:____________________  Date:____________Time: _________    Please sign and return to [facility] [address] [address] [address] [place] [address], [facility] [address]  [phone] fax

## 2017-06-09 NOTE — PROGRESS NOTES
PT DAILY TREATMENT NOTE - [facility] 3-16    Patient Name: Audrey Chávez  Date:2017  : 1950  [x]  Patient  Verified  [place]or: [place] MED[place] / Plan: [place] MEDICARE PART A & B / Product Type: Medicare /    In time:1005  Out time:1046  Total Treatment Time (min): 41  Total Timed Codes (min): 23  1:1 Treatment Time ( [address] only): 41   Visit #: 1 of 8    Treatment Area: Polyneuropathy, unspecified [G62.9]  Myalgia [M79.1]    SUBJECTIVE  Pain Level (0-10 scale): 8  Any medication changes, allergies to medications, adverse drug reactions, diagnosis change, or new procedure performed?: [x] No    [] Yes (see summary sheet for update)  Subjective functional status/changes:   [] No changes reported  Mechanism of Injury: 2-3 months ago pain started in B shlds, no specific injury; she reports her neck has hurt on/off over the past year  Current symptoms/Complaints: Unable to lie on either shoulder  Previous Treatment/Compliance: PT for neck pain in 2016, acupuncture treatment, gentle exercising  PMHx/Surgical Hx: arthritis, mitral valve prolapse, sjogren's syndrome, atrial fibrillation, carpal tunnel, rheumatoid arthritis    OBJECTIVE    18 min [x]Eval                  []Re-Eval     8 min Therapeutic Exercise:  [x] See flow sheet :   Rationale: increase ROM to improve the patients ability to perform ADLs    15 min Therapeutic Activity:  []  See flow sheet :   Rationale: Reviewed with pt involved anatomy and pathology, treatment plan, and goals.           With   [] TE   [] TA   [] neuro   [] other: Patient Education: [x] Review HEP    [] Progressed/Changed HEP based on:   [] positioning   [] body mechanics   [] transfers   [] heat/ice application    [] other:      Other Objective/Functional Measures: see eval.     Pain Level (0-10 scale) post treatment: 8    ASSESSMENT/Changes in Function:   Pt presents with S&S consistent with cervical radiculopathy (L>R), with limitations noted in cervical AROM (flex = 46 degrees, ext = 22 degrees, R SB = 11 degrees, L SB = 15 degrees, R rot = 45 degrees, L rot = 25 degrees), painful L cervical rotation with 3/5 MMT, poor thoracic mobility, TTP about B UT, brisk reflexes B UE, (+) L Spurling's, (+) compression and distraction, B Lower and mid trap MMT 3-/5, and FOTO score of 49. Pt would benefit from skilled PT intervention to address the above listed limitations and allow improved functional task completion. Patient will continue to benefit from skilled PT services to modify and progress therapeutic interventions, address functional mobility deficits, address ROM deficits, address strength deficits, analyze and address soft tissue restrictions, analyze and cue movement patterns, analyze and modify body mechanics/ergonomics, assess and modify postural abnormalities and instruct in home and community integration to attain remaining goals. [x]  See Plan of Care  []  See progress note/recertification  []  See Discharge Summary         Progress towards goals / Updated goals:  Short Term Goals: To be accomplished in one weeks:  1. Pt will demonstrate compliance with HEP for self management of symptoms. Long Term Goals: To be accomplished in four weeks:  1. Pt will demonstrate FOTO to 60 or greater to indicate improved functional task completion. 2. Pt will demonstrate L cervical rotation to 50 degrees or greater to improve driving ADLs. 3. Pt will demonstrate L cervical rotation MMT to 4/5 to improve cervical stability with ADLs. 4. Pt will report ability to sleep four hours or more without waking up from pain to improve return to normalized sleep schedule.     PLAN  [x]  Upgrade activities as tolerated     [x]  Continue plan of care  [x]  Update interventions per flow sheet       []  Discharge due to:_  []  Other:_      Brigid Sterling PT 6/9/2017  11:03 AM    Future Appointments  Date Time Provider Odin Rivera   6/13/2017 7:00 AM Dustin Swanson PTA [facility]   6/16/2017 10:30 AM Hannah Hdz, PT [facility]   6/19/2017 3:30 PM Hannah Hdz, PT [facility]   6/21/2017 11:00 AM Daisha Urban, PTA [facility]   7/12/2017 10:30 AM Kwasi Malone  [address]    9/19/2017 9:00 AM [facility] BONE DEXA RM 1 [facility]   9/26/2017 11:00 AM Lisset Dhaliwal MD [facility]

## 2017-06-13 ENCOUNTER — HOSPITAL ENCOUNTER (OUTPATIENT)
Dept: PHYSICAL THERAPY | Age: 67
Discharge: HOME | End: 2017-06-13
Payer: MEDICARE

## 2017-06-13 PROCEDURE — 97140 MANUAL THERAPY 1/> REGIONS: CPT

## 2017-06-13 PROCEDURE — 97110 THERAPEUTIC EXERCISES: CPT

## 2017-06-13 PROCEDURE — 97112 NEUROMUSCULAR REEDUCATION: CPT

## 2017-06-13 NOTE — PROGRESS NOTES
PT DAILY TREATMENT NOTE - [facility]     Patient Name: Lilibeth Cunningham  Date:2017  : 1950  [x]  Patient  Verified  Payor: Zarina Bars / Plan: [place] MED[place] & B / Product Type: Medicare /    In time:7:00  Out time:7:43  Total Treatment Time (min): 43  Total Timed Codes (min): 43  1:1 Treatment Time ([address] only): 45   Visit #: 2 of 8    Treatment Area: Myalgia [M79.1]  Cervicalgia [M54.2]    SUBJECTIVE  Pain Level (0-10 scale): 8/10  Any medication changes, allergies to medications, adverse drug reactions, diagnosis change, or new procedure performed?: [x] No    [] Yes (see summary sheet for update)  Subjective functional status/changes:   [] No changes reported  Pt reports taking a long hot bath which reduced symptoms. OBJECTIVE    25 min Therapeutic Exercise:  [x] See flow sheet :   Rationale: increase ROM and increase strength to improve the patients ability to tolerate ADLs. 10 min Neuromuscular Re-education:  [x]  See flow sheet :   Rationale: increase strength, improve coordination and increase proprioception  to improve the patients ability to perform functional activities. 8 min Manual Therapy:  SOR, manual traction, TPR to (B) UT. Rationale: decrease pain, increase ROM and increase tissue extensibility to improve tolerance to ADLs. With   [] TE   [] TA   [] neuro   [] other: Patient Education: [x] Review HEP    [] Progressed/Changed HEP based on:   [] positioning   [] body mechanics   [] transfers   [] heat/ice application    [] other:      Other Objective/Functional Measures: initiated exercises as per flow sheet. Pain Level (0-10 scale) post treatment: 8/10    ASSESSMENT/Changes in Function: Pt has significant trigger points to (B) UT/LS with significant TTP.      Patient will continue to benefit from skilled PT services to modify and progress therapeutic interventions, address functional mobility deficits, address ROM deficits, address strength deficits, analyze and address soft tissue restrictions, analyze and cue movement patterns, analyze and modify body mechanics/ergonomics and assess and modify postural abnormalities to attain remaining goals. []  See Plan of Care  []  See progress note/recertification  []  See Discharge Summary         Progress towards goals / Updated goals:  Short Term Goals: To be accomplished in one weeks:  1. Pt will demonstrate compliance with HEP for self management of symptoms. Long Term Goals: To be accomplished in four weeks:  1. Pt will demonstrate FOTO to 60 or greater to indicate improved functional task completion. 2. Pt will demonstrate L cervical rotation to 50 degrees or greater to improve driving ADLs. 3. Pt will demonstrate L cervical rotation MMT to 4/5 to improve cervical stability with ADLs. 4. Pt will report ability to sleep four hours or more without waking up from pain to improve return to normalized sleep schedule.     PLAN  []  Upgrade activities as tolerated     [x]  Continue plan of care  []  Update interventions per flow sheet       []  Discharge due to:_  []  Other:_      Carmenza Noel PTA 6/13/2017  7:06 AM    Future Appointments  Date Time Provider Odin Rivera   6/16/2017 10:30 AM Leonardo Zendejas PT [facility]   6/19/2017 3:30 PM [facility]   6/21/2017 11:00 AM [facility]   7/12/2017 10:30 AM Wing Fred  [address]   9/19/2017 9:00 AM HBV BONE DEXA RM 1 [facility]   9/26/2017 11:00 AM Samantha Hardy MD [facility]

## 2017-06-16 ENCOUNTER — HOSPITAL ENCOUNTER (OUTPATIENT)
Dept: PHYSICAL THERAPY | Age: 67
Discharge: HOME | End: 2017-06-16
Payer: MEDICARE

## 2017-06-16 PROCEDURE — 97110 THERAPEUTIC EXERCISES: CPT

## 2017-06-16 PROCEDURE — 97112 NEUROMUSCULAR REEDUCATION: CPT

## 2017-06-16 NOTE — PROGRESS NOTES
PT DAILY TREATMENT NOTE - [facility] 3-16    Patient Name: Noel Porter  Date:2017  : 1950  [x]  Patient  Verified  Payor: Onel Gardiner / Plan: [place] MEDICARE PART A & B / Product Type: Medicare /    In time:1030  Out time:1105  Total Treatment Time (min): 35  Total Timed Codes (min): 35  1:1 Treatment Time ( only): 23   Visit #: 3 of 8    Treatment Area: Myalgia [M79.1]  Cervicalgia [M54.2]    SUBJECTIVE  Pain Level (0-10 scale): 0  Any medication changes, allergies to medications, adverse drug reactions, diagnosis change, or new procedure performed?: [x] No    [] Yes (see summary sheet for update)  Subjective functional status/changes:   [] No changes reported  Pt reports her pain is okay if she is not doing anything. She still has pain with rolling on her side, and her fingers go numb with the arm bike, and she is able to compensate with using her palms. She does think sleeping at night has gotten a \"wee bit better. \"    OBJECTIVE     min []Eval                  []Re-Eval     27 min Therapeutic Exercise:  [x] See flow sheet :   Rationale: increase ROM and increase strength to improve the patients ability to perform ADLs    8 min Manual Therapy:  SOR, O/A MET, MFR along B UT   Rationale: decrease pain, increase ROM and increase tissue extensibility to improve ADLs             With   [] TE   [] TA   [] neuro   [] other: Patient Education: [x] Review HEP    [] Progressed/Changed HEP based on:   [] positioning   [] body mechanics   [] transfers   [] heat/ice application    [] other:      Other Objective/Functional Measures:   HEP - met    Pain Level (0-10 scale) post treatment: 0    ASSESSMENT/Changes in Function:   Pt demonstrates slowly improving symptoms, reporting slightly better ability to sleep recently. She does still have continued trigger points along B UT L>R today. Educated pt on difference between PT targeted treatment of a trigger point and a massage therapist's treatment.   Pt may trial with mechanical traction NV if symptom still present. Patient will continue to benefit from skilled PT services to modify and progress therapeutic interventions, address functional mobility deficits, address ROM deficits, address strength deficits, analyze and address soft tissue restrictions, analyze and cue movement patterns, analyze and modify body mechanics/ergonomics, assess and modify postural abnormalities and instruct in home and community integration to attain remaining goals. []  See Plan of Care  []  See progress note/recertification  []  See Discharge Summary         Progress towards goals / Updated goals:  Short Term Goals: To be accomplished in one weeks:  1. Pt will demonstrate compliance with HEP for self management of symptoms. Met 06/16/2017  Long Term Goals: To be accomplished in four weeks:  1. Pt will demonstrate FOTO to 60 or greater to indicate improved functional task completion. 2. Pt will demonstrate L cervical rotation to 50 degrees or greater to improve driving ADLs. 3. Pt will demonstrate L cervical rotation MMT to 4/5 to improve cervical stability with ADLs. 4. Pt will report ability to sleep four hours or more without waking up from pain to improve return to normalized sleep schedule.  Reports a \"wee bit better\" 06/16/2017    PLAN  [x]  Upgrade activities as tolerated     [x]  Continue plan of care  [x]  Update interventions per flow sheet       []  Discharge due to:_  []  Other:_      Leonardo Zendejas PT 6/16/2017  10:33 AM    Future Appointments  Date Time Provider Odin Rivera   6/19/2017 3:30 PM [facility]   6/21/2017 11:00 [facility]   7/12/2017 10:30 AM Wing Fred  [address]    9/19/2017 9:00 AM [facility] BONE DEXA RM 1 [facility]   9/26/2017 11:00 AM Samantha Hardy MD [facility]

## 2017-06-19 ENCOUNTER — HOSPITAL ENCOUNTER (OUTPATIENT)
Dept: PHYSICAL THERAPY | Age: 67
Discharge: HOME | End: 2017-06-19
Payer: MEDICARE

## 2017-06-19 PROCEDURE — 97140 MANUAL THERAPY 1/> REGIONS: CPT

## 2017-06-19 NOTE — PROGRESS NOTES
PT DAILY TREATMENT NOTE - [facility] 3-16    Patient Name: Davy Felix  Date:2017  : 1950  [x]  Patient  Verified  Payor: Arslan Hoffmann / Plan: [place] MEDICARE PART A & B / Product Type: Medicare /    In time:1530  Out time:1605  Total Treatment Time (min): 35  Total Timed Codes (min): 35  1:1 Treatment Time ( only): 14   Visit #: 4 of 8    Treatment Area: Myalgia [M79.1]  Cervicalgia [M54.2]    SUBJECTIVE  Pain Level (0-10 scale): 0  Any medication changes, allergies to medications, adverse drug reactions, diagnosis change, or new procedure performed?: [x] No    [] Yes (see summary sheet for update)  Subjective functional status/changes:   [] No changes reported  Pt reports her neck pain is better, but not perfect. She feels occasional feelings of catching, but they are fewer and farther between. OBJECTIVE     min []Eval                  []Re-Eval     27 min Therapeutic Exercise:  [x] See flow sheet :   Rationale: increase ROM and increase strength to improve the patients ability to perform ADLs    8 min Manual Therapy:  SOR, O/A MET, MFR along B UT   Rationale: decrease pain, increase ROM and increase tissue extensibility to perform ADLs          With   [] TE   [] TA   [] neuro   [] other: Patient Education: [x] Review HEP    [] Progressed/Changed HEP based on:   [] positioning   [] body mechanics   [] transfers   [] heat/ice application    [] other:      Other Objective/Functional Measures: requires cues with s/l shld stability exercises     Pain Level (0-10 scale) post treatment: 0    ASSESSMENT/Changes in Function:   Pt reports steadily improving symptoms, with less pain overall. She does report some occasional catching, L>R, however R UT trigger point more active with twitch response from manual MFR.     Patient will continue to benefit from skilled PT services to modify and progress therapeutic interventions, address functional mobility deficits, address ROM deficits, address strength deficits, analyze and address soft tissue restrictions, analyze and cue movement patterns, analyze and modify body mechanics/ergonomics, assess and modify postural abnormalities and instruct in home and community integration to attain remaining goals. []  See Plan of Care  []  See progress note/recertification  []  See Discharge Summary         Progress towards goals / Updated goals:  Short Term Goals: To be accomplished in one weeks:  1. Pt will demonstrate compliance with HEP for self management of symptoms. Met 06/16/2017  Long Term Goals: To be accomplished in four weeks:  1. Pt will demonstrate FOTO to 60 or greater to indicate improved functional task completion. 2. Pt will demonstrate L cervical rotation to 50 degrees or greater to improve driving ADLs. Not met, B UT trigger point restrictions 06/19/2017  3. Pt will demonstrate L cervical rotation MMT to 4/5 to improve cervical stability with ADLs. 4. Pt will report ability to sleep four hours or more without waking up from pain to improve return to normalized sleep schedule.  Reports a \"wee bit better\" 06/16/2017; reports better sleeping 06/19/2017    PLAN  [x]  Upgrade activities as tolerated     [x]  Continue plan of care  [x]  Update interventions per flow sheet       []  Discharge due to:_  []  Other:_      Cole Contreras, PT 6/19/2017  3:32 PM    Future Appointments  Date Time Provider Odin Rivera   6/21/2017 11:00 AM Sabiha Roberts PTA [facility]   7/12/2017 10:30 AM Lamar Carlos  [address]   9/19/2017 9:00 AM HBV BONE DEXA RM 1 [facility]   9/26/2017 11:00 AM Jeanna Beltran MD [facility]

## 2017-06-21 ENCOUNTER — HOSPITAL ENCOUNTER (OUTPATIENT)
Dept: PHYSICAL THERAPY | Age: 67
Discharge: HOME | End: 2017-06-21
Payer: MEDICARE

## 2017-06-21 PROCEDURE — 97140 MANUAL THERAPY 1/> REGIONS: CPT

## 2017-06-21 PROCEDURE — 97110 THERAPEUTIC EXERCISES: CPT

## 2017-06-21 NOTE — PROGRESS NOTES
PT DAILY TREATMENT NOTE - [facility]     Patient Name: Sabrina Cornelius  Date:2017  : 1950  [x]  Patient  Verified  Payor: Johnnie Pisano / Plan: [place] MEDICARE PART A & B / Product Type: Medicare /    In time:11:00  Out time:11:45  Total Treatment Time (min): 45  Total Timed Codes (min): 45  1:1 Treatment Time ([facility] only): 37   Visit #: 5 of 8    Treatment Area: Myalgia [M79.1]  Cervicalgia [M54.2]    SUBJECTIVE  Pain Level (0-10 scale): 0/10  Any medication changes, allergies to medications, adverse drug reactions, diagnosis change, or new procedure performed?: [x] No    [] Yes (see summary sheet for update)  Subjective functional status/changes:   [] No changes reported  Pt reports neck ans shoulders feel better and she has decreased difficulty sleeping but she does report her elbows are locking up at night. OBJECTIVE    27 min Therapeutic Exercise:  [x] See flow sheet :   Rationale: increase ROM and increase strength to improve the patients ability to tolerate ADLs. 10 min Neuromuscular Re-education:  [x]  See flow sheet :   Rationale: increase strength, improve coordination and increase proprioception  to improve the patients ability to perform functional activities. 8 min Manual Therapy:  SOR, TPR to (B) UT. Rationale: decrease pain, increase ROM, increase tissue extensibility and decrease trigger points to improve tolerance to functional activities. With   [] TE   [] TA   [] neuro   [] other: Patient Education: [x] Review HEP    [] Progressed/Changed HEP based on:   [] positioning   [] body mechanics   [] transfers   [] heat/ice application    [] other:      Other Objective/Functional Measures: mod vc's with s/l series     Pain Level (0-10 scale) post treatment: 0/10    ASSESSMENT/Changes in Function: Pt demonstrates improved cervical mobility when performing rotations and side bends.      Patient will continue to benefit from skilled PT services to modify and progress therapeutic interventions, address functional mobility deficits, address ROM deficits, address strength deficits, analyze and address soft tissue restrictions, analyze and cue movement patterns and analyze and modify body mechanics/ergonomics to attain remaining goals. []  See Plan of Care  []  See progress note/recertification  []  See Discharge Summary         Progress towards goals / Updated goals:  Short Term Goals: To be accomplished in one weeks:  1. Pt will demonstrate compliance with HEP for self management of symptoms. Met 06/16/2017  Long Term Goals: To be accomplished in four weeks:  1. Pt will demonstrate FOTO to 60 or greater to indicate improved functional task completion. 2. Pt will demonstrate L cervical rotation to 50 degrees or greater to improve driving ADLs. Not met, B UT trigger point restrictions 06/19/2017  3. Pt will demonstrate L cervical rotation MMT to 4/5 to improve cervical stability with ADLs. 4. Pt will report ability to sleep four hours or more without waking up from pain to improve return to normalized sleep schedule.  Reports a \"wee bit better\" 06/16/2017; reports better sleeping 06/19/2017    PLAN  []  Upgrade activities as tolerated     [x]  Continue plan of care  []  Update interventions per flow sheet       []  Discharge due to:_  []  Other:_      Abiodun Malloy PTA 6/21/2017  11:27 AM    Future Appointments  Date Time Provider Odin Rivera   6/27/2017 3:00 PM Ernestine Bridges, PT [facility]   6/30/2017 11:30 AM [facility]   7/5/2017 2:30 PM [facility] Bridges[facility]   7/12/2017 10:30 AM Cal Cruz  [address]    9/19/2017 9:00 AM HBV BONE DEXA RM 1 [facility]   9/26/2017 11:00 AM Emil Cottrell MD [facility]

## 2017-06-27 ENCOUNTER — HOSPITAL ENCOUNTER (OUTPATIENT)
Dept: PHYSICAL THERAPY | Age: 67
Discharge: HOME | End: 2017-06-27
Payer: MEDICARE

## 2017-06-27 PROCEDURE — 97112 NEUROMUSCULAR REEDUCATION: CPT

## 2017-06-27 PROCEDURE — 97140 MANUAL THERAPY 1/> REGIONS: CPT

## 2017-06-27 NOTE — PROGRESS NOTES
PT DAILY TREATMENT NOTE - [facility] 3-16    Patient Name: Susie Im  Date:2017  : 1950  [x]  Patient  Verified  Payor: Andreaaaliyah Patrick / Plan: [place] MEDICARE PART A & B / Product Type: Medicare /    In time:1500  Out time:1538  Total Treatment Time (min): 38  Total Timed Codes (min): 38  1:1 Treatment Time ( only): 25  Visit #: 6 of 8    Treatment Area: Myalgia [M79.1]  Cervicalgia [M54.2]    SUBJECTIVE  Pain Level (0-10 scale): 0  Any medication changes, allergies to medications, adverse drug reactions, diagnosis change, or new procedure performed?: [x] No    [] Yes (see summary sheet for update)  Subjective functional status/changes:   [] No changes reported  Pt reports she is feeling alright, but after lying on her side, she has a \"kink\" in her shld. OBJECTIVE     min []Eval                  []Re-Eval     8 min Therapeutic Exercise:  [x] See flow sheet :   Rationale: increase ROM and increase strength to improve the patients ability to perform ADLs    22 min Neuromuscular Re-education:  [x]  See flow sheet :   Rationale: increase strength, improve coordination and increase proprioception  to improve the patients ability to improve stability    8 min Manual Therapy:  SOR, O/A MET, MFR along L UT, type II ext MET along the L   Rationale: decrease pain, increase ROM and increase tissue extensibility to improve functional ADLs             With   [] TE   [] TA   [] neuro   [] other: Patient Education: [x] Review HEP    [] Progressed/Changed HEP based on:   [] positioning   [] body mechanics   [] transfers   [] heat/ice application    [] other:      Other Objective/Functional Measures: decreased mobility along L upper cervical flex     Pain Level (0-10 scale) post treatment: 0    ASSESSMENT/Changes in Function:   Pt demonstrated ability to progress in weight for s/l shld exercises. Able to release L UT pain with manual, and will start to teach self MFR with lacrosse ball.   Reviewed pillow positioning for decreased pain with sidelying. Patient will continue to benefit from skilled PT services to modify and progress therapeutic interventions, address functional mobility deficits, address ROM deficits, address strength deficits, analyze and address soft tissue restrictions, analyze and cue movement patterns, analyze and modify body mechanics/ergonomics, assess and modify postural abnormalities and instruct in home and community integration to attain remaining goals. []  See Plan of Care  []  See progress note/recertification  []  See Discharge Summary         Progress towards goals / Updated goals:  Short Term Goals: To be accomplished in one weeks:  1. Pt will demonstrate compliance with HEP for self management of symptoms. Met 06/16/2017  Long Term Goals: To be accomplished in four weeks:  1. Pt will demonstrate FOTO to 60 or greater to indicate improved functional task completion. 2. Pt will demonstrate L cervical rotation to 50 degrees or greater to improve driving ADLs. Not met, B UT trigger point restrictions 06/19/2017  3. Pt will demonstrate L cervical rotation MMT to 4/5 to improve cervical stability with ADLs. Not met, overactive L UT 06/27/2017  4. Pt will report ability to sleep four hours or more without waking up from pain to improve return to normalized sleep schedule.  Reports a \"wee bit better\" 06/16/2017; reports better sleeping 06/19/2017    PLAN  [x]  Upgrade activities as tolerated     [x]  Continue plan of care  [x]  Update interventions per flow sheet       []  Discharge due to:_  []  Other:_      Mary Anguiano PT 6/27/2017  3:03 PM    Future Appointments  Date Time Provider [facility]   6/30/2017 11:30 AM Rachel Sinha PTA [facility]   7/5/2017 2:30 PM Mary Anguiano PT [facility]   7/12/2017 10:30 AM Amos Carrillo  [address]   9/19/2017 9:00 AM HBV BONE DEXA RM 1 [facility]   9/26/2017 11:00 AM Clark Oquendo MD [facility]

## 2017-06-30 ENCOUNTER — HOSPITAL ENCOUNTER (OUTPATIENT)
Dept: PHYSICAL THERAPY | Age: 67
Discharge: HOME | End: 2017-06-30
Payer: MEDICARE

## 2017-06-30 PROCEDURE — 97110 THERAPEUTIC EXERCISES: CPT

## 2017-06-30 PROCEDURE — 97140 MANUAL THERAPY 1/> REGIONS: CPT

## 2017-06-30 NOTE — PROGRESS NOTES
PT DAILY TREATMENT NOTE - [facility]     Patient Name: Meenu Bonilla  Date:2017  : 1950  [x]  Patient  Verified  Payor: [place] MEDICARE / Plan: [place] MEDICARE PART A & B / Product Type: Medicare /    In time:11:30  Out time:12:13  Total Treatment Time (min): 43  Total Timed Codes (min): 43  1:1 Treatment Time ([address] only): 38   Visit #: 7 of 8    Treatment Area: Myalgia [M79.1]  Cervicalgia [M54.2]    SUBJECTIVE  Pain Level (0-10 scale): 0/10  Any medication changes, allergies to medications, adverse drug reactions, diagnosis change, or new procedure performed?: [x] No    [] Yes (see summary sheet for update)  Subjective functional status/changes:   [] No changes reported  Pt reports no new complaints. Pt reports no change in elbow pain. OBJECTIVE    25 min Therapeutic Exercise:  [x] See flow sheet :   Rationale: increase ROM and increase strength to improve the patients ability to tolerate ADLs. 10 min Neuromuscular Re-education:  [x]  See flow sheet :   Rationale: increase strength, improve coordination and increase proprioception  to improve the patients ability to perform functional activities. 8 min Manual Therapy:  SOR, manual traction, TPR to C/S paraspinals, UT   Rationale: decrease pain, increase ROM and increase tissue extensibility to improve tolerance to functional activities. With   [] TE   [] TA   [] neuro   [] other: Patient Education: [x] Review HEP    [] Progressed/Changed HEP based on:   [] positioning   [] body mechanics   [] transfers   [] heat/ice application    [] other:      Other Objective/Functional Measures: Continued limitations through cervical ROM per visual assessment. Pain Level (0-10 scale) post treatment: 0/10    ASSESSMENT/Changes in Function: Pt demonstrates some improved control in deep cervical flexors; demonstrating improved sitting posture.      Patient will continue to benefit from skilled PT services to modify and progress therapeutic interventions, address functional mobility deficits, address ROM deficits, address strength deficits, analyze and address soft tissue restrictions, analyze and cue movement patterns and analyze and modify body mechanics/ergonomics to attain remaining goals. []  See Plan of Care  []  See progress note/recertification  []  See Discharge Summary         Progress towards goals / Updated goals:  Short Term Goals: To be accomplished in one weeks:  1. Pt will demonstrate compliance with HEP for self management of symptoms. Met 06/16/2017  Long Term Goals: To be accomplished in four weeks:  1. Pt will demonstrate FOTO to 60 or greater to indicate improved functional task completion. 2. Pt will demonstrate L cervical rotation to 50 degrees or greater to improve driving ADLs. Not met, B UT trigger point restrictions 06/19/2017  3. Pt will demonstrate L cervical rotation MMT to 4/5 to improve cervical stability with ADLs. Not met, overactive L UT 06/27/2017  4. Pt will report ability to sleep four hours or more without waking up from pain to improve return to normalized sleep schedule.  Reports a \"wee bit better\" 06/16/2017; reports better sleeping 06/19/2017    PLAN  []  Upgrade activities as tolerated     [x]  Continue plan of care  []  Update interventions per flow sheet       []  Discharge due to:_  []  Other:_      Gera Cartwright, PTA 6/30/2017  1:05 PM    Future Appointments  Date Time Provider Odin Rivera   7/5/2017 2:30 PM Carlene Drummond, PT [facility]   7/12/2017 10:30 AM Chester Mercado  [address]   9/19/2017 9:00 AM HBV BONE DEXA RM 1 [facility]   9/26/2017 11:00 AM Philip Duverney, MD [facility]

## 2017-07-05 ENCOUNTER — HOSPITAL ENCOUNTER (OUTPATIENT)
Dept: PHYSICAL THERAPY | Age: 67
Discharge: HOME | End: 2017-07-05
Payer: MEDICARE

## 2017-07-05 PROCEDURE — 97140 MANUAL THERAPY 1/> REGIONS: CPT

## 2017-07-05 PROCEDURE — 97110 THERAPEUTIC EXERCISES: CPT

## 2017-07-05 PROCEDURE — G8984 CARRY CURRENT STATUS: HCPCS

## 2017-07-05 PROCEDURE — G8985 CARRY GOAL STATUS: HCPCS

## 2017-07-05 NOTE — PROGRESS NOTES
[facility]  [address]  [place], [address]  [phone] fax    Medicare Progress Report    Patient name: Watson García Start of Care: 2017   Referral source: Jazmine Torres MD : 1950                         Medical Diagnosis: Polyneuropathy, unspecified [G62.9]  Myalgia [M79.1] Onset Date:2-3 months ago                         Treatment Diagnosis: Cervical radiculopathy L>R   Prior Hospitalization: see medical history Provider#: [id number]   Medications: Verified on Patient summary List    Comorbidities: arthritis, mitral valve prolapse, sjogren's syndrome, atrial fibrillation, carpal tunnel, rheumatoid arthritis   Prior Level of Function: Walking, gardening; sees an acupuncturist; able to sleep on both sides without pain    Visits from Start of Care: 8    Missed Visits: 0    Reporting Period: 2017 to 2017    Subjective Reports: Pt reports her hands continue to go numb when she does the arm bike and with the pec stretch. She is unsure if the pain is arthritis or carpal tunnel or coming from her neck. Goals:  Short Term Goals: To be accomplished in one weeks:  1. Pt will demonstrate compliance with HEP for self management of symptoms. Met 2017  Long Term Goals: To be accomplished in four weeks:  1. Pt will demonstrate FOTO to 60 or greater to indicate improved functional task completion. Not met 54 2017  2. Pt will demonstrate L cervical rotation to 50 degrees or greater to improve driving ADLs. Not met, 23 degrees 2017  3. Pt will demonstrate L cervical rotation MMT to 4/5 to improve cervical stability with ADLs. Not met, overactive L UT 2017; met 4+/5 2017  4. Pt will report ability to sleep four hours or more without waking up from pain to improve return to normalized sleep schedule.  Reports a \"wee bit better\" 2017; reports better sleeping 2017; met 2017    Key functional changes:   HEP - compliant  FOTO 54  L cervical rotation 23 degrees  L cervical rotation MMT 4+/5  Sleeping four hours: met         Problems/ barriers to goal attainment: Hand arthritis/CTS    Assessment / Recommendations:   Pt demonstrates limited progress in PT - she reports continued \"zinger\" symptoms along B hands, shooting from ulnar aspect of hands to elbow, and restricted in L cervical rotation. She has been able to sleep better, and FOTO improved to 54/100. Will place pt on hold until MD follow up next week, with pt to call PT afterward to determine need of continued care. Pt reports understanding. If continuing with PT, will continue at 2x/wk for 2 weeks. Problem List: pain affecting function, decrease ROM, decrease strength, decrease ADL/ functional abilitiies, decrease activity tolerance, decrease flexibility/ joint mobility and decrease transfer abilities   Treatment Plan: Therapeutic exercise, Therapeutic activities, Neuromuscular re-education, Physical agent/modality, Manual therapy, Patient education, Self Care training and Functional mobility training    Patient Goal (s) has been updated and includes: Pt would like to get her hand issues under control. Updated Goals to be accomplished in 2 weeks:              1.  Pt will demonstrate FOTO to 60 or greater to indicate improved functional task completion. 2.  Pt will demonstrate L cervical rotation to 50 degrees or greater to improve driving ADLs. Frequency / Duration: Patient to be seen 2 times per week for 2 weeks:    G-Codes (GP)  Carry   Current  CK= 40-59%    Goal  CK= 40-59%      The severity rating is based on clinical judgment and the FOTO score.       Sheyla Holm, PT 7/5/2017 3:29 PM

## 2017-07-05 NOTE — PROGRESS NOTES
PT DAILY TREATMENT NOTE - [facility] 3-16    Patient Name: Donis Kang  Date:2017  : 1950  [x]  Patient  Verified  [place]or: [place] MED[place]ARE / Plan: [place] MEDICARE PART A & B / Product Type: Medicare /    In time:1435  Out time:1519  Total Treatment Time (min): 44  Total Timed Codes (min): 44  1:1 Treatment Time ( only): 29   Visit #: 8 of 8    Treatment Area: Myalgia [M79.1]  Cervicalgia [M54.2]    SUBJECTIVE  Pain Level (0-10 scale): 0  Any medication changes, allergies to medications, adverse drug reactions, diagnosis change, or new procedure performed?: [x] No    [] Yes (see summary sheet for update)  Subjective functional status/changes:   [] No changes reported  Pt reports her hands continue to go numb when she does the arm bike and with the pec stretch. She is unsure if the pain is arthritis or carpal tunnel or coming from her neck. OBJECTIVE     min []Eval                  []Re-Eval     36 min Therapeutic Exercise:  [x] See flow sheet :   Rationale: increase ROM and increase strength to improve the patients ability to perform ADLs    8 min Manual Therapy:  SOR, O/A MET to L, B UT MFR, B pec MFR   Rationale: decrease pain, increase ROM, increase tissue extensibility and decrease trigger points to improve ADLs        . With   [] TE   [] TA   [] neuro   [] other: Patient Education: [x] Review HEP    [] Progressed/Changed HEP based on:   [] positioning   [] body mechanics   [] transfers   [] heat/ice application    [] other:      Other Objective/Functional Measures:   HEP - compliant  FOTO 54  L cervical rotation 23 degrees  L cervical rotation MMT 4+/5  Sleeping four hours: met     Pain Level (0-10 scale) post treatment: 0    ASSESSMENT/Changes in Function:   Pt demonstrates limited progress in PT - she reports continued \"zinger\" symptoms along B hands, shooting from ulnar aspect of hands to elbow, and restricted in L cervical rotation.   She has been able to sleep better, and FOTO improved to 54/100. Will place pt on hold until MD follow up next week, with pt to call PT afterward to determine need of continued care. Pt reports understanding. If continuing with PT, will continue at 2x/wk for 2 weeks. Patient will continue to benefit from skilled PT services to modify and progress therapeutic interventions, address functional mobility deficits, address ROM deficits, address strength deficits, analyze and address soft tissue restrictions, analyze and cue movement patterns, analyze and modify body mechanics/ergonomics, assess and modify postural abnormalities and instruct in home and community integration to attain remaining goals. []  See Plan of Care  [x]  See progress note/recertification  []  See Discharge Summary         Progress towards goals / Updated goals:  Short Term Goals: To be accomplished in one weeks:  1. Pt will demonstrate compliance with HEP for self management of symptoms. Met 06/16/2017  Long Term Goals: To be accomplished in four weeks:  1. Pt will demonstrate FOTO to 60 or greater to indicate improved functional task completion. Not met 54 7/5/2017  2. Pt will demonstrate L cervical rotation to 50 degrees or greater to improve driving ADLs. Not met, 23 degrees 7/5/2017  3. Pt will demonstrate L cervical rotation MMT to 4/5 to improve cervical stability with ADLs. Not met, overactive L UT 06/27/2017; met 4+/5 7/5/2017  4. Pt will report ability to sleep four hours or more without waking up from pain to improve return to normalized sleep schedule.  Reports a \"wee bit better\" 06/16/2017; reports better sleeping 06/19/2017; met 7/5/2017    PLAN  [x]  Upgrade activities as tolerated     [x]  Continue plan of care  [x]  Update interventions per flow sheet       []  Discharge due to:_  [x]  Other: awaiting MD f/u      Juan Antonio Leiva, PT 7/5/2017  2:57 PM    Future Appointments  Date Time Provider Odin [place]   7/12/2017 10:30 AM Ayesha Manrique  [address] 9/19/2017 9:00 AM [facility] BONE DEXA RM 1 [facility]   9/26/2017 11:00 AM Pedrito Watkins MD [facility]

## 2017-07-12 ENCOUNTER — OFFICE VISIT (OUTPATIENT)
Dept: ORTHOPEDIC SURGERY | Age: 67
End: 2017-07-12

## 2017-07-12 VITALS
SYSTOLIC BLOOD PRESSURE: 106 MMHG | DIASTOLIC BLOOD PRESSURE: 74 MMHG | HEART RATE: 73 BPM | HEIGHT: 65 IN | BODY MASS INDEX: 21.33 KG/M2 | WEIGHT: 128 LBS | RESPIRATION RATE: 18 BRPM | TEMPERATURE: 97.6 F

## 2017-07-12 DIAGNOSIS — G62.9 NEUROPATHY: Primary | ICD-10-CM

## 2017-07-12 DIAGNOSIS — M79.18 MYOFASCIAL PAIN: ICD-10-CM

## 2017-07-12 RX ORDER — GABAPENTIN 300 MG/1
900 CAPSULE ORAL
Qty: 90 CAP | Refills: 2 | Status: SHIPPED | OUTPATIENT
Start: 2017-07-12 | End: 2017-10-28 | Stop reason: SDUPTHER

## 2017-07-12 NOTE — MR AVS SNAPSHOT
Visit Information Date & Time Provider Department Dept. Phone Encounter #  
 7/12/2017 10:30 AM Janelle Almazan MD [facility] [phone] [id number] Follow-up Instructions Return in about 5 weeks (around 8/16/2017), or if symptoms worsen or fail to improve. Your Appointments 9/26/2017 11:00 AM  
Office Visit with Gen Betts MD  
Internist of [facility] [address] [place] [address]) Appt Note: ov 4mos. grant [address], [address] [place] [id number] [place] [address] [address]  
  
   
 [address], [address] Upcoming Health Maintenance Date Due DTaP/Tdap/Td series (1 - Tdap) 1/2/2007 MEDICARE YEARLY EXAM 1/14/2016 Pneumococcal 65+ Low/Medium Risk (2 of 2 - PPSV23) 7/7/2017 INFLUENZA AGE 9 TO ADULT 8/1/2017 BREAST CANCER SCRN MAMMOGRAM 1/5/2018 GLAUCOMA SCREENING Q2Y 6/28/2018 COLONOSCOPY 4/3/2019 Allergies as of 7/12/2017  Review Complete On: 7/12/2017 By: Tete Perdomo Severity Noted Reaction Type Reactions Plaquenil [Hydroxychloroquine] High 04/25/2016    Nausea and Vomiting Pcn [Penicillins]  05/17/2010    Itching Prednisone  06/09/2017    Nausea and Vomiting She reports a plaquenil/prednisone come causes vomiting Zithromax [Azithromycin]  05/17/2010    Rash, Other (comments), [facility] Gracia's records state skin peeling. High fever, peeling Current Immunizations  Reviewed on 12/1/2016 Name Date Influenza Vaccine 11/2/2016, 11/6/2015, 9/20/2014, 10/29/2013 Influenza Vaccine Split 10/3/2012, 9/23/2011, 11/1/2010 Pneumococcal Conjugate (PCV-13) 7/7/2016 TD Vaccine 1/1/2007 Zoster Vaccine, Live 2/14/2017 Not reviewed this visit You Were Diagnosed With   
  
 Codes Comments Neuropathy ([facility] [address]    -  Primary ICD-10-CM: G62.9 ICD-9-CM: 355.9 Myofascial pain     ICD-10-CM: M79.1 ICD-9-CM: 729.1 Vitals BP Pulse Temp Resp Height(growth percentile) Weight(growth percentile) 106/74 73 97.6 °F (36.4 °C) (Oral) 18 5' 5\" (1.651 m) 128 lb (58.1 kg) LMP BMI OB Status Smoking Status 04/25/2016 21.3 kg/m2 Postmenopausal Never Smoker BMI and BSA Data Body Mass Index Body Surface Area  
 21.3 kg/m 2 1.63 m 2 Preferred Pharmacy Pharmacy Name Phone [facility], [address] [phone] Your Updated Medication List  
  
   
This list is accurate as of: 7/12/17 11:21 AM.  Always use your most recent med list.  
  
  
  
  
 acetaminophen 650 mg CR tablet Commonly known as:  TYLENOL Take 650 mg by mouth three (3) times daily as needed for Pain. CALCIUM 500+D 500 mg(1,250mg) -200 unit per tablet Generic drug:  calcium-vitamin D Take 1 Tab by mouth two (2) times daily (with meals). CARTIA  mg ER capsule Generic drug:  dilTIAZem CD Take 180 mg by mouth daily. cholecalciferol (VITAMIN D3) 5,000 unit Tab tablet Commonly known as:  VITAMIN D3 Take  by mouth daily. cyanocobalamin 1,000 mcg tablet Take 1,000 mcg by mouth two (2) times a day. diclofenac 1 % Gel Commonly known as:  VOLTAREN Apply  to affected area four (4) times daily. folic acid 1 mg tablet Commonly known as:  Google Take 1 mg by mouth daily. * gabapentin 300 mg capsule Commonly known as:  NEURONTIN  
2 tabs at bed time * gabapentin 300 mg capsule Commonly known as:  NEURONTIN Take 3 Caps by mouth nightly. melatonin Tab tablet Take 8 mg by mouth nightly. methotrexate 2.5 mg tablet Commonly known as:  Andrea Serve Take 2.5 mg by mouth every Wednesday. Take 6 tablets of 2.5 mg every Wednesday  
  
 predniSONE 5 mg tablet Commonly known as:  Sam Noss Take 2.5 mg by mouth every other day. VITAMIN C 500 mg tablet Generic drug:  ascorbic acid (vitamin C) Take 500 mg by mouth two (2) times a day. XARELTO 20 mg Tab tablet Generic drug:  rivaroxaban Take 20 mg by mouth daily (with dinner). * Notice: This list has 2 medication(s) that are the same as other medications prescribed for you. Read the directions carefully, and ask your doctor or other care provider to review them with you. Prescriptions Sent to Pharmacy Refills  
 gabapentin (NEURONTIN) 300 mg capsule 2 Sig: Take 3 Caps by mouth nightly. Class: Normal  
 Pharmacy: Rashaun Mayer at [address] [place] [address], [address] [place] [address] #: [phone] Route: Oral  
  
Follow-up Instructions Return in about 5 weeks (around 8/16/2017), or if symptoms worsen or fail to improve. To-Do List   
 07/19/2017 Neurology:  EMG TWO EXTREMITIES UPPER   
  
 09/19/2017 9:00 AM  
  Appointment with [facility] BONE DEXA RM 1 at [facility] RAD BONE DENSITY ([phone]) OUTSIDE FILMS  - Any outside films related to the study being scheduled should be brought with you on the day of the exam.  If this cannot be done there may be a delay in the reading of the study. MEDICATIONS  - Patient must bring a complete list of all medications currently taking to include prescriptions, over-the-counter meds, herbals, vitamins & any dietary supplements - Patient must discontinue use of calcium, vitamins, or calcium supplements including antacids (calcium based) 24 hours before scan. GENERAL INSTRUCTIONS  - [facility] cannot accommodate patients on stretchers, patient must be able to walk into the room and be able to sit up for a portion of the exam.  
  
  
Introducing [facility]! Dear Lilliam Heart: 
Thank you for requesting a [employer] account. Our records indicate that you already have an active [employer] account. You can access your account anytime at [email or web address] Did you know that you can access your hospital and ER discharge instructions at any time in [employer]? You can also review all of your test results from your hospital stay or ER visit. Additional Information If you have questions, please visit the Frequently Asked Questions section of the [employer] website at [email or web address] Remember, [employer] is NOT to be used for urgent needs. For medical emergencies, dial 911. Now available from your iPhone and Android! Please provide this summary of care documentation to your next provider. Your primary care clinician is listed as Rasheeda Gandhi. If you have any questions after today's visit, please call [phone].

## 2017-07-12 NOTE — PROGRESS NOTES
[facility] [address].  [address], [address]  [facility], [address]  Phone: [phone]  Fax: [phone]        Yesika Doran  : 1950  PCP: Philip Duverney, MD  2017    PROGRESS NOTE      ASSESSMENT AND PLAN    Mitzy Kohler comes in to the office today for PT f/u. She found that the physical therapy helped improved her pain in the cervical region. She is still experiencing pain and paraesthesia that radiates into the bilateral hands while sleeping. She is having trouble with grasp. Based on her continuing radicular and pain symptoms. I referred her to get a BLE EMG, to further evaluate the possibility of radiculopathy vs peripheral nerve injury. I also increased her Gabapentin to 900 mg at night time. She will try to take it earlier in the evening as well. Pt will f/u in 5 weeks or sooner as needed. Deepak Mansfield was seen today for neck pain and follow-up. Diagnoses and all orders for this visit:    Neuropathy ([facility] [address]  -     EMG TWO EXTREMITIES UPPER; Future  -     gabapentin (NEURONTIN) 300 mg capsule; Take 3 Caps by mouth nightly. Myofascial pain  -     gabapentin (NEURONTIN) 300 mg capsule; Take 3 Caps by mouth nightly. Follow-up Disposition:  Return in about 5 weeks (around 2017), or if symptoms worsen or fail to improve. HISTORY OF PRESENT ILLNESS  Mitzy Kohler is a 78 y.o. female. A&P / HPI from 2016:  Jr Gaffney was in the office today c/o radiating pain into her fingers x 1.5 months, mild cervical pain is reported. Her symptoms are most likely related to neuropathy or CTS. Cervical radiculopathy is lower on the differential given her history. She has a component of myofascial pain related to posture with straightening of her cervical lordosis. She will go to PT for a functional evaluation.       She will also take Gabapentin 300mg two tabs before bed.  The risks, benefits, and potential side effects of this medication were discussed. She will have her EMG done by her neurologist in several days. She will f/u prn depending on the findings of her EMG, and she will have an MRI in the future if necessary.      This plan was reviewed with the patient and patient agrees. All questions were answered. More than half the visit today was spent on counseling and coordination of care. .     Updates from 05/31/17:  Pt presents for PT and medication f/u. She reports her pain increased after PT. She wore bilateral wrist splints which also did not relive the pain. Then she was referred to PT for occupational therapy with no relief. She reports a new pain in her cervical region. Updates from 07/12/17:  Pt presents for improved pain in the cervical region. At the last visit she was referred to PT, which provided good relief. PAST MEDICAL HISTORY   Past Medical History:   Diagnosis Date    A-fib [facility])     Arthritis     Feet    Memory change     Menopause     Multiple thyroid nodules 2009    under care of Dr. Jesse Caro MVP (mitral valve prolapse)     under care of Dr. Alverto Fuller Neuropathy [facility])     Plantar fasciitis     [facility] 2/8/2011    Right knee pain     Routine gynecological examination     done by Dr. Brenda Eagle S/P colonoscopy     done past 1-2 yrs? Dr. Maureen Bravo Sjogren's syndrome [facility])     sees Dr. Laureen Fish    TIA (transient ischemic attack)     possible history of    Tibialis tendonitis     following with Dr Heidi Simms ankle       Past Surgical History:   Procedure Laterality Date    DILATION AND CURETTAGE  1987    HX BREAST AUGMENTATION  1984    HX DILATION AND CURETTAGE  1/2013    HX TONSILLECTOMY      IMPLANT BREAST SILICONE/EQ      TARSAL TUNNEL RELEASE  1994    Right   .       MEDICATIONS      Current Outpatient Prescriptions   Medication Sig Dispense Refill    cholecalciferol, VITAMIN D3, (VITAMIN D3) 5,000 unit tab tablet Take by mouth daily.  CARTIA  mg ER capsule Take 180 mg by mouth daily.  gabapentin (NEURONTIN) 300 mg capsule 2 tabs at bed time 60 Cap 6    folic acid (FOLVITE) 1 mg tablet Take 1 mg by mouth daily.  predniSONE (DELTASONE) 5 mg tablet Take 2.5 mg by mouth every other day.  cyanocobalamin 1,000 mcg tablet Take 1,000 mcg by mouth two (2) times a day.  diclofenac (VOLTAREN) 1 % gel Apply  to affected area four (4) times daily.  acetaminophen (TYLENOL) 650 mg CR tablet Take 650 mg by mouth three (3) times daily as needed for Pain.  XARELTO 20 mg tab tablet Take 20 mg by mouth daily (with dinner).  melatonin tab tablet Take 8 mg by mouth nightly.  ascorbic acid (VITAMIN C) 500 mg tablet Take 500 mg by mouth two (2) times a day.  calcium-vitamin D (CALCIUM 500+D) 500 mg(1,250mg) -200 unit per tablet Take 1 Tab by mouth two (2) times daily (with meals).  methotrexate (RHEUMATREX) 2.5 mg tablet Take 2.5 mg by mouth every Wednesday. Take 6 tablets of 2.5 mg every Wednesday          ALLERGIES    Allergies   Allergen Reactions    Plaquenil [Hydroxychloroquine] Nausea and Vomiting    Pcn [Penicillins] Itching    Prednisone Nausea and Vomiting     She reports a plaquenil/prednisone come causes vomiting    Zithromax [Azithromycin] Rash, Other (comments) and Hives     [facility] records state skin peeling.   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 Respiratory: Negative for shortness of breath. Cardiovascular: Negative for chest pain and leg swelling. Gastrointestinal: Negative for constipation, nausea and vomiting. Neurological: Negative for dizziness, tingling, seizures, loss of consciousness, weakness and headaches. Psychiatric/Behavioral: The patient does not have insomnia. PHYSICAL EXAMINATION  Visit Vitals    /74    Pulse 73    Temp 97.6 °F (36.4 °C) (Oral)    Resp 18    Ht 5' 5\" (1.651 m)    Wt 128 lb (58.1 kg)    LMP 04/25/2016    BMI 21.3 kg/m2       Pain Assessment  1/27/2016   Location of Pain Neck;Hand   Location Modifiers -   Severity of Pain 0   Quality of Pain Burning; Other (Comment)   Quality of Pain Comment numbness and tingling, tightness, grinding   Duration of Pain -   Frequency of Pain -   Aggravating Factors Other (Comment)   Aggravating Factors Comment night time, sleeping, lifting   Limiting Behavior -   Relieving Factors (No Data)   Relieving Factors Comment neurontin some   Result of Injury -           Constitutional:  Well developed, well nourished, in no acute distress. Psychiatric: Affect and mood are appropriate. Integumentary: No rashes or abrasions noted on exposed areas. SPINE/MUSCULOSKELETAL EXAM    Cervical spine:  Neck is midline. Normal muscle tone. No focal atrophy is noted. ROM pain free. Shoulder ROM intact but slightly less flexible on the right side. No tnderness to palpation. Negative Spurling's sign. Negative Tinel's sign.  Negative Covarrubias's sign.       Sensation grossly intact to light touch.       Lumbar spine:  No rash, ecchymosis, or gross obliquity. No fasciculations. No focal atrophy is noted. No pain with hip ROM. Range of motion is normal. No tenderness to palpation. No tenderness to palpation at the sciatic notch. SI joints non-tender. Trochanters non tender.      Sensation grossly intact to light touch.     Updates from 05/31/17:  Tenderness to palpitation in the cervical region. MOTOR:      Biceps  Triceps Deltoids Wrist Ext Wrist Flex Hand Intrin   Right 5/5 5/5 5/5 5/5 5/5 5/5   Left 5/5 5/5 5/5 5/5 5/5 5/5             Hip Flex  Quads Hamstrings Ankle DF EHL Ankle PF   Right 5/5 5/5 5/5 5/5 5/5 5/5   Left 5/5 5/5 5/5 5/5 5/5 5/5     DTRs are 1+ biceps, triceps, brachioradialis, patella, and Achilles.      Negative Straight Leg raise. Squat not tested. No difficulty with tandem gait. Normal heel walk. Painful, limited toe rise (she has orthotics in her shoes and has had the issue addressed in the past regarding achilles tendon pain).       Ambulation without assistive device. FWB.       RADIOGRAPHS  Cervical xray films from 1/8/2016 reviewed:  1) Multilevel degenerative disc disease. 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       reviewed      Ms. Krysta Sierra has a reminder for a \"due or due soon\" health maintenance. I have asked that she contact her primary care provider for follow-up on this health maintenance.       Written by Nilson Askew, as dictated by Dr. Jovita Moon. I, Dr. Jovita Moon, confirm that all documentation is accurate.

## 2017-07-18 ENCOUNTER — DOCUMENTATION ONLY (OUTPATIENT)
Dept: ORTHOPEDIC SURGERY | Age: 67
End: 2017-07-18

## 2017-07-18 NOTE — PROGRESS NOTES
LIAM BLE has been faxed to Dr. Lazaro Limb office for scheduling, [phone], fax [phone]. She is a current patient of the practice.

## 2017-07-20 ENCOUNTER — TELEPHONE (OUTPATIENT)
Dept: ORTHOPEDIC SURGERY | Age: 67
End: 2017-07-20

## 2017-07-20 NOTE — TELEPHONE ENCOUNTER
PATIENT CALLED FOR . PATIENT SAID THAT  NO ONE HAD CALLED HER TO SCHEDULE HER EMG. PATIENT WOULD LIKE A CALL BACK WITH THE DATE, TIME AND LOCATION FOR THE EMG. PATIENT TEL. [phone] OR C# [phone]. NOTE: PATIENT HAS A FU APPOINTMENT ALREADY SCHEDULE TO SEE  ON 8/17/17 TO GET THE RESULTS FOR THE EMG.

## 2017-08-04 ENCOUNTER — TELEPHONE (OUTPATIENT)
Dept: ORTHOPEDIC SURGERY | Age: 67
End: 2017-08-04

## 2017-08-04 NOTE — TELEPHONE ENCOUNTER
Patient called in requesting to see if Dr. Stephens Rather has received her results from her EMG she had done in July. Patient would like a confirmation call so she could schedule a sooner appt. Pt can be reached at [phone].

## 2017-08-04 NOTE — TELEPHONE ENCOUNTER
EMG results obtained in office. Placed in tray at , please contact patient to schedule EMG follow up.

## 2017-08-04 NOTE — TELEPHONE ENCOUNTER
Please verify we have the EMG results and have the patient moved up if the schedule is permitting with Dr. Don Javier

## 2017-08-14 DIAGNOSIS — G62.9 NEUROPATHY: ICD-10-CM

## 2017-08-17 ENCOUNTER — OFFICE VISIT (OUTPATIENT)
Dept: ORTHOPEDIC SURGERY | Age: 67
End: 2017-08-17

## 2017-08-17 VITALS
TEMPERATURE: 97.5 F | RESPIRATION RATE: 16 BRPM | DIASTOLIC BLOOD PRESSURE: 74 MMHG | SYSTOLIC BLOOD PRESSURE: 114 MMHG | BODY MASS INDEX: 20.73 KG/M2 | WEIGHT: 124.6 LBS | HEART RATE: 71 BPM

## 2017-08-17 DIAGNOSIS — G56.03 BILATERAL CARPAL TUNNEL SYNDROME: Primary | ICD-10-CM

## 2017-08-17 NOTE — PROGRESS NOTES
[address], [address]  [place], [address] [place] [address] Street  Phone: [phone]  Fax: [phone]        Vaughn Acosta  : 1950  PCP: Gabby Mccann MD  2017    PROGRESS NOTE      ASSESSMENT AND PLAN    Albert Lawrence comes in to the office today for a BUE EMG f/u. The study showed significant evidence of carpal tunnel syndrome that is more pronounced on the right extremity than the left. At this time, I referred her to f/u with her plastic surgeon for further treatment. I also advised her to ask her rheumatologist about Pennsaid to provide relief from the arthritis stand point since the diclofenac gel she is on is not working as well as the voltaren branded product. Pt will f/u prn. Diagnoses and all orders for this visit:    1. Bilateral carpal tunnel syndrome       Follow-up Disposition:  Return if symptoms worsen or fail to improve. HISTORY OF PRESENT ILLNESS  Albert Lawrence is a 78 y.o. female. A&P / HPI from 2016:  Shawn Almendarez was in the office today c/o radiating pain into her fingers x 1.5 months, mild cervical pain is reported. Her symptoms are most likely related to neuropathy or CTS. Cervical radiculopathy is lower on the differential given her history. She has a component of myofascial pain related to posture with straightening of her cervical lordosis. She will go to PT for a functional evaluation.       She will also take Gabapentin 300mg two tabs before bed. The risks, benefits, and potential side effects of this medication were discussed. She will have her EMG done by her neurologist in several days. She will f/u prn depending on the findings of her EMG, and she will have an MRI in the future if necessary.      This plan was reviewed with the patient and patient agrees. All questions were answered. More than half the visit today was spent on counseling and coordination of care. .      Updates from 05/31/17:  Pt presents for PT and medication f/u. She reports her pain increased after PT. She wore bilateral wrist splints which also did not relive the pain. Then she was referred to PT for occupational therapy with no relief. She reports a new pain in her cervical region.      Updates from 07/12/17:  Pt presents for improved pain in the cervical region. At the last visit she was referred to PT, which provided good relief.        Updates from 08/17/17:  Pt presents with continued radicular symptoms in the upper extremities that is more prominent on the right side. She was referred to get a BUE EMG, which showed significant evidence to prove carpal tunnel syndrome that is more pronounced on the right. She notes that her symptoms in the left upper extremity have increased since the last visit. PAST MEDICAL HISTORY   Past Medical History:   Diagnosis Date    A-fib [facility]     Arthritis     Feet    Memory change     Menopause     Multiple thyroid nodules 2009    under care of Dr. Devin Padgett MVP (mitral valve prolapse)     under care of Dr. Collins Age Neuropathy [facility])     Plantar fasciitis     PSVT (paroxysmal supraventricular tachycardia) ([facility] [address] 2/8/2011    Right knee pain     Routine gynecological examination     done by Dr. Shanon Cooks S/P colonoscopy     done past 1-2 yrs? Dr. Tasha Matute Sjogren's syndrome [facility])     sees Dr. Arianna Roblero    TIA (transient ischemic attack)     possible history of    Tibialis tendonitis     following with Dr Frank Malloy ankle       Past Surgical History:   Procedure Laterality Date    DILATION AND CURETTAGE  1987    HX BREAST AUGMENTATION  1984    HX DILATION AND CURETTAGE  1/2013    HX TONSILLECTOMY      IMPLANT BREAST SILICONE/EQ      TARSAL TUNNEL RELEASE  1994    Right   .       MEDICATIONS      Current Outpatient Prescriptions   Medication Sig Dispense Refill    cholecalciferol, VITAMIN D3, (VITAMIN D3) 5,000 unit tab tablet Take  by mouth daily.      CARTIA  mg ER capsule Take 180 mg by mouth daily.  gabapentin (NEURONTIN) 300 mg capsule 2 tabs at bed time 60 Cap 6    folic acid (FOLVITE) 1 mg tablet Take 1 mg by mouth daily.  predniSONE (DELTASONE) 5 mg tablet Take 2.5 mg by mouth every other day.  cyanocobalamin 1,000 mcg tablet Take 1,000 mcg by mouth two (2) times a day.  diclofenac (VOLTAREN) 1 % gel Apply  to affected area four (4) times daily.  acetaminophen (TYLENOL) 650 mg CR tablet Take 650 mg by mouth three (3) times daily as needed for Pain.  XARELTO 20 mg tab tablet Take 20 mg by mouth daily (with dinner).  melatonin tab tablet Take 8 mg by mouth nightly.  ascorbic acid (VITAMIN C) 500 mg tablet Take 500 mg by mouth two (2) times a day.  calcium-vitamin D (CALCIUM 500+D) 500 mg(1,250mg) -200 unit per tablet Take 1 Tab by mouth two (2) times daily (with meals).  gabapentin (NEURONTIN) 300 mg capsule Take 3 Caps by mouth nightly. 90 Cap 2    methotrexate (RHEUMATREX) 2.5 mg tablet Take 2.5 mg by mouth every Wednesday. Take 6 tablets of 2.5 mg every Wednesday          ALLERGIES    Allergies   Allergen Reactions    Plaquenil [Hydroxychloroquine] Nausea and Vomiting    Pcn [Penicillins] Itching    Prednisone Nausea and Vomiting     She reports a plaquenil/prednisone come causes vomiting    Zithromax [Azithromycin] Rash, Other (comments) and Hives     [facility] records state skin peeling.   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 Respiratory: Negative for shortness of breath. Cardiovascular: Negative for chest pain and leg swelling. Gastrointestinal: Negative for constipation, nausea and vomiting. Neurological: Negative for dizziness, tingling, seizures, loss of consciousness, weakness and headaches. Psychiatric/Behavioral: The patient does not have insomnia. PHYSICAL EXAMINATION  Visit Vitals    /74 (BP 1 Location: Left arm, BP Patient Position: Sitting)    Pulse 71    Temp 97.5 °F (36.4 °C) (Oral)    Resp 16    Wt 124 lb 9.6 oz (56.5 kg)    LMP 04/25/2016    BMI 20.73 kg/m2       Pain Assessment  8/17/2017   Location of Pain Back;Hand   Location Modifiers Right;Left   Severity of Pain 4   Quality of Pain Aching   Quality of Pain Comment numbness, tingling   Duration of Pain -   Frequency of Pain Constant   Aggravating Factors Walking;Standing;Stairs;Straightening;Bending   Aggravating Factors Comment -   Limiting Behavior -   Relieving Factors Nothing   Relieving Factors Comment -   Result of Injury -           Constitutional:  Well developed, well nourished, in no acute distress. Psychiatric: Affect and mood are appropriate. Integumentary: No rashes or abrasions noted on exposed areas. SPINE/MUSCULOSKELETAL EXAM    Cervical spine:  Neck is midline. Normal muscle tone. No focal atrophy is noted. ROM pain free. Shoulder ROM intact but slightly less flexible on the right side.  No tnderness to palpation. Negative Spurling's sign. Negative Tinel's sign. Negative Covarrubias's sign.       Sensation grossly intact to light touch.       Lumbar spine:  No rash, ecchymosis, or gross obliquity. No fasciculations. No focal atrophy is noted. No pain with hip ROM. Range of motion is normal. No tenderness to palpation. No tenderness to palpation at the sciatic notch. SI joints non-tender. Trochanters non tender.      Sensation grossly intact to light touch.      Updates from 05/31/17:  Tenderness to palpitation in the cervical region.         MOTOR:      Biceps  Triceps Deltoids Wrist Ext Wrist Flex Hand Intrin   Right 5/5 5/5 5/5 5/5 5/5 5/5   Left 5/5 5/5 5/5 5/5 5/5 5/5             Hip Flex  Quads Hamstrings Ankle DF EHL Ankle PF   Right 5/5 5/5 5/5 5/5 5/5 5/5   Left 5/5 5/5 5/5 5/5 5/5 5/5     DTRs are 1+ biceps, triceps, brachioradialis, patella, and Achilles.      Negative Straight Leg raise. Squat not tested. No difficulty with tandem gait. Normal heel walk. Painful, limited toe rise (she has orthotics in her shoes and has had the issue addressed in the past regarding achilles tendon pain).       Ambulation without assistive device. FWB.       RADIOGRAPHS  BUE EMG taken on 07/26/2017 personally reviewed with patient:  Impression: This EMG study, as preformed, is suggestive of a severe right-sided carpal tunnel syndrome with motor axonal involvement. There is a mild to moderate carpal tunnel syndrome on the left side without any motor axonal involvement. I do not see any signs of radiculopathy. Cervical xray films from 1/8/2016 reviewed:  1) Multilevel degenerative disc disease. 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       reviewed      Ms. Rhiannon Hillman has a reminder for a \"due or due soon\" health maintenance.  I have asked that she contact her primary care provider for follow-up on this health maintenance.       Written by Pam Rajan, as dictated by  Leela Fountain, Dr. Mattie Riggs, confirm that all documentation is accurate.

## 2017-08-17 NOTE — MR AVS SNAPSHOT
Visit Information Date & Time Provider Department Dept. Phone Encounter #  
 8/17/2017  8:30 AM Dara Vyas MD [facility] [phone] [id number] Follow-up Instructions Return if symptoms worsen or fail to improve. Your Appointments 9/26/2017 11:00 AM  
Office Visit with Krishna Moreno MD  
Internist of [address] [place] [address] [facility]) Appt Note: ov 4mos. grant [address], [address] [address] [address] [place]  
  
   
 [address], [address] Upcoming Health Maintenance Date Due DTaP/Tdap/Td series (1 - Tdap) 1/2/2007 MEDICARE YEARLY EXAM 1/14/2016 INFLUENZA AGE 9 TO ADULT 8/1/2017 BREAST CANCER SCRN MAMMOGRAM 1/5/2018 GLAUCOMA SCREENING Q2Y 6/28/2018 COLONOSCOPY 4/3/2019 Allergies as of 8/17/2017  Review Complete On: 8/17/2017 By: Suzie Porteous Severity Noted Reaction Type Reactions Plaquenil [Hydroxychloroquine] High 04/25/2016    Nausea and Vomiting Pcn [Penicillins]  05/17/2010    Itching Prednisone  06/09/2017    Nausea and Vomiting She reports a plaquenil/prednisone come causes vomiting Zithromax [Azithromycin]  05/17/2010    Rash, Other (comments), Hives [facility] records state skin peeling. High fever, peeling Current Immunizations  Reviewed on 12/1/2016 Name Date Influenza Vaccine 11/2/2016, 11/6/2015, 9/20/2014, 10/29/2013 Influenza Vaccine Split 10/3/2012, 9/23/2011, 11/1/2010 Pneumococcal Conjugate (PCV-13) 7/7/2016 Pneumococcal Polysaccharide (PPSV-23) 7/16/2017 TD Vaccine 1/1/2007 Zoster Vaccine, Live 2/14/2017 Not reviewed this visit You Were Diagnosed With   
  
 Codes Comments Bilateral carpal tunnel syndrome    -  Primary ICD-10-CM: G56.03 
ICD-9-CM: 354.0 Vitals  BP Pulse Temp Resp Weight(growth percentile) LMP  
 114/74 (BP 1 Location: Left arm, BP Patient Position: Sitting) 71 97.5 °F (36.4 °C) (Oral) 16 124 lb 9.6 oz (56.5 kg) 04/25/2016 BMI OB Status Smoking Status 20.73 kg/m2 Postmenopausal Never Smoker BMI and BSA Data Body Mass Index Body Surface Area 20.73 kg/m 2 1.61 m 2 Preferred Pharmacy Pharmacy Name Phone [facility] [id number], [address] [phone] Your Updated Medication List  
  
   
This list is accurate as of: 8/17/17  9:22 AM.  Always use your most recent med list.  
  
  
  
  
 acetaminophen 650 mg CR tablet Commonly known as:  TYLENOL Take 650 mg by mouth three (3) times daily as needed for Pain. CALCIUM 500+D 500 mg(1,250mg) -200 unit per tablet Generic drug:  calcium-vitamin D Take 1 Tab by mouth two (2) times daily (with meals). CARTIA  mg ER capsule Generic drug:  dilTIAZem CD Take 180 mg by mouth daily. cholecalciferol (VITAMIN D3) 5,000 unit Tab tablet Commonly known as:  VITAMIN D3 Take  by mouth daily. cyanocobalamin 1,000 mcg tablet Take 1,000 mcg by mouth two (2) times a day. diclofenac 1 % Gel Commonly known as:  VOLTAREN Apply  to affected area four (4) times daily. folic acid 1 mg tablet Commonly known as:  Google Take 1 mg by mouth daily. * gabapentin 300 mg capsule Commonly known as:  NEURONTIN  
2 tabs at bed time * gabapentin 300 mg capsule Commonly known as:  NEURONTIN Take 3 Caps by mouth nightly. melatonin Tab tablet Take 8 mg by mouth nightly. methotrexate 2.5 mg tablet Commonly known as:  Benedicto Buitrago Take 2.5 mg by mouth every Wednesday. Take 6 tablets of 2.5 mg every Wednesday  
  
 predniSONE 5 mg tablet Commonly known as:  Reji Palacios Take 2.5 mg by mouth every other day. VITAMIN C 500 mg tablet Generic drug:  ascorbic acid (vitamin C) Take 500 mg by mouth two (2) times a day. XARELTO 20 mg Tab tablet Generic drug:  rivaroxaban Take 20 mg by mouth daily (with dinner). * Notice: This list has 2 medication(s) that are the same as other medications prescribed for you. Read the directions carefully, and ask your doctor or other care provider to review them with you. Follow-up Instructions Return if symptoms worsen or fail to improve. To-Do List   
 09/19/2017 9:00 AM  
  Appointment with [facility] BONE DEXA RM 1 at [facility] RAD BONE DENSITY ([phone]) OUTSIDE FILMS  - Any outside films related to the study being scheduled should be brought with you on the day of the exam.  If this cannot be done there may be a delay in the reading of the study. MEDICATIONS  - Patient must bring a complete list of all medications currently taking to include prescriptions, over-the-counter meds, herbals, vitamins & any dietary supplements - Patient must discontinue use of calcium, vitamins, or calcium supplements including antacids (calcium based) 24 hours before scan. GENERAL INSTRUCTIONS  - [facility] cannot accommodate patients on stretchers, patient must be able to walk into the room and be able to sit up for a portion of the exam.  
  
  
Introducing [facility]! Dear Janes Seen: 
Thank you for requesting a [employer] account. Our records indicate that you already have an active [employer] account. You can access your account anytime at [email or web address] Did you know that you can access your hospital and ER discharge instructions at any time in [employer]? You can also review all of your test results from your hospital stay or ER visit. Additional Information If you have questions, please visit the Frequently Asked Questions section of the [employer] website at [email or web address] Remember, [employer] is NOT to be used for urgent needs. For medical emergencies, dial 911. Now available from your iPhone and Android! Please provide this summary of care documentation to your next provider. Your primary care clinician is listed as Jonathan Monroe. If you have any questions after today's visit, please call [phone].

## 2017-09-19 ENCOUNTER — HOSPITAL ENCOUNTER (OUTPATIENT)
Dept: BONE DENSITY | Age: 67
Discharge: HOME | End: 2017-09-19
Attending: OBSTETRICS & GYNECOLOGY
Payer: MEDICARE

## 2017-09-19 DIAGNOSIS — Z78.0 POSTMENOPAUSAL: ICD-10-CM

## 2017-09-19 PROCEDURE — 77080 DXA BONE DENSITY AXIAL: CPT

## 2017-10-04 ENCOUNTER — OFFICE VISIT (OUTPATIENT)
Dept: INTERNAL MEDICINE CLINIC | Age: 67
End: 2017-10-04

## 2017-10-04 VITALS
OXYGEN SATURATION: 98 % | BODY MASS INDEX: 20.99 KG/M2 | HEIGHT: 65 IN | RESPIRATION RATE: 20 BRPM | DIASTOLIC BLOOD PRESSURE: 71 MMHG | TEMPERATURE: 97.8 F | WEIGHT: 126 LBS | HEART RATE: 76 BPM | SYSTOLIC BLOOD PRESSURE: 103 MMHG

## 2017-10-04 DIAGNOSIS — M35.01 SJOGREN'S SYNDROME WITH KERATOCONJUNCTIVITIS SICCA (HCC): ICD-10-CM

## 2017-10-04 DIAGNOSIS — Z12.31 ENCOUNTER FOR SCREENING MAMMOGRAM FOR BREAST CANCER: ICD-10-CM

## 2017-10-04 DIAGNOSIS — Z00.00 INITIAL MEDICARE ANNUAL WELLNESS VISIT: ICD-10-CM

## 2017-10-04 DIAGNOSIS — G56.03 BILATERAL CARPAL TUNNEL SYNDROME: ICD-10-CM

## 2017-10-04 DIAGNOSIS — M85.89 OSTEOPENIA OF MULTIPLE SITES: ICD-10-CM

## 2017-10-04 DIAGNOSIS — Z13.220 SCREENING FOR HYPERLIPIDEMIA: ICD-10-CM

## 2017-10-04 DIAGNOSIS — E04.2 MULTIPLE THYROID NODULES: ICD-10-CM

## 2017-10-04 DIAGNOSIS — Z71.89 ADVANCE CARE PLANNING: ICD-10-CM

## 2017-10-04 DIAGNOSIS — M06.9 RHEUMATOID ARTHRITIS INVOLVING BOTH HANDS, UNSPECIFIED RHEUMATOID FACTOR PRESENCE: Primary | ICD-10-CM

## 2017-10-04 NOTE — PATIENT INSTRUCTIONS
Medicare Part B Preventive Services Limitations Recommendation Scheduled   Bone Mass Measurement  (age 72 & older, biennial) Requires diagnosis related to osteoporosis or estrogen deficiency. Biennial benefit unless patient has history of long-term glucocorticoid tx or baseline is needed because initial test was by other method This should be done at age 72 and again if on osteoporosis medication at 2-3 year intervals. Up to date, next one is due in [id number]   Cardiovascular Screening Blood Tests (every 5 years)  Total cholesterol, HDL, Triglycerides Order as a panel if possible We should check your cholesterol panel at least once every 5 years. Up to date   Colorectal Cancer Screening  -Fecal occult blood test (annual)  -Flexible sigmoidoscopy (5y)  -Screening colonoscopy (10y)  -Barium Enema  Due per your Gastroenterologist's recommendations. Due in 2019   Counseling to Prevent Tobacco Use (up to 8 sessions per year)  - Counseling greater than 3 and up to 10 minutes  - Counseling greater than 10 minutes Patients must be asymptomatic of tobacco-related conditions to receive as preventive service Continue with smoking cessation    Diabetes Screening Tests (at least every 3 years, Medicare covers annually or at 6-month intervals for prediabetic patients)    Fasting blood sugar (FBS) or glucose tolerance test (GTT) Patient must be diagnosed with one of the following:  -Hypertension, Dyslipidemia, obesity, previous impaired FBS or GTT  Or any two of the following: overweight, FH of diabetes, age ? 72, history of gestational diabetes, birth of baby weighing more than 9 pounds Should be done yearly Up to date   Diabetes Self-Management Training (DSMT) (no USPSTF recommendation) Requires referral by treating physician for patient with diabetes or renal disease. 10 hours of initial DSMT session of no less than 30 minutes each in a continuous 12-month period. 2 hours of follow-up DSMT in subsequent years.  Not applicable Glaucoma Screening (no USPSTF recommendation) Diabetes mellitus, family history, , age 48 or over,  American, age 72 or over Continue with annual eye exams. Due in 2018   Human Immunodeficiency Virus (HIV) Screening (annually for increased risk patients)  HIV-1 and HIV-2 by EIA, RONY, rapid antibody test, or oral mucosa transudate Patient must be at increased risk for HIV infection per USPSTF guidelines or pregnant. Tests covered annually for patients at increased risk. Pregnant patients may receive up to 3 test during pregnancy. Not applicable    Medical Nutrition Therapy (MNT) (for diabetes or renal disease not recommended schedule) Requires referral by treating physician for patient with diabetes or renal disease. Can be provided in same year as diabetes self-management training (DSMT), and CMS recommends medical nutrition therapy take place after DSMT. Up to 3 hours for initial year and 2 hours in subsequent years. Not applicable    Shingles Vaccination A shingles vaccine is also recommended once in a lifetime after age 61 Vaccination recommended for shingles vaccination. Up to date   Seasonal Influenza Vaccination (annually)  Continue with yearly \"flu\" shot annually Up to date   Pneumococcal Vaccination (once after 65)  Please receive this vaccination at age 72. Up to date   Hepatitis B Vaccinations (if medium/high risk) Medium/high risk factors:  End-stage renal disease,  Hemophiliacs who received Factor VIII or IX concentrates, Clients of institutions for the mentally retarded, Persons who live in the same house as a HepB virus carrier, Homosexual men, Illicit injectable drug abusers. Not applicable    Screening Mammography (biennial age 54-69) Annually (age 36 or over) You need a mammogram yearly to screen for breast cancer.  Up to date   Screening Pap Tests and Pelvic Examination (up to age 79 and after 79 if unknown history or abnormal study last 10 years) Every 24 months except high risk You need no Pap smear at this time. Not needed. Ultrasound Screening for Abdominal Aortic Aneurysm (AAA) (once) Patient must be referred through IPPE and not have had a screening for abdominal aortic aneurysm before under Medicare. Limited to patients who meet one of the following criteria:  - Men who are 73-68 years old and have smoked more than 100 cigarettes in their lifetime.  -Anyone with a FH of AAA  -Anyone recommended for screening by USPSTF Not applicable           Body Mass Index: Care Instructions  Your Care Instructions    Body mass index (BMI) can help you see if your weight is raising your risk for health problems. It uses a formula to compare how much you weigh with how tall you are. · A BMI lower than 18.5 is considered underweight. · A BMI between 18.5 and 24.9 is considered healthy. · A BMI between 25 and 29.9 is considered overweight. A BMI of 30 or higher is considered obese. If your BMI is in the normal range, it means that you have a lower risk for weight-related health problems. If your BMI is in the overweight or obese range, you may be at increased risk for weight-related health problems, such as high blood pressure, heart disease, stroke, arthritis or joint pain, and diabetes. If your BMI is in the underweight range, you may be at increased risk for health problems such as fatigue, lower protection (immunity) against illness, muscle loss, bone loss, hair loss, and hormone problems. BMI is just one measure of your risk for weight-related health problems. You may be at higher risk for health problems if you are not active, you eat an unhealthy diet, or you drink too much alcohol or use tobacco products. Follow-up care is a key part of your treatment and safety. Be sure to make and go to all appointments, and call your doctor if you are having problems. It's also a good idea to know your test results and keep a list of the medicines you take.   How can you care for yourself at home? · Practice healthy eating habits. This includes eating plenty of fruits, vegetables, whole grains, lean protein, and low-fat dairy. · If your doctor recommends it, get more exercise. Walking is a good choice. Bit by bit, increase the amount you walk every day. Try for at least 30 minutes on most days of the week. · Do not smoke. Smoking can increase your risk for health problems. If you need help quitting, talk to your doctor about stop-smoking programs and medicines. These can increase your chances of quitting for good. · Limit alcohol to 2 drinks a day for men and 1 drink a day for women. Too much alcohol can cause health problems. If you have a BMI higher than 25  · Your doctor may do other tests to check your risk for weight-related health problems. This may include measuring the distance around your waist. A waist measurement of more than 40 inches in men or 35 inches in women can increase the risk of weight-related health problems. · Talk with your doctor about steps you can take to stay healthy or improve your health. You may need to make lifestyle changes to lose weight and stay healthy, such as changing your diet and getting regular exercise. If you have a BMI lower than 18.5  · Your doctor may do other tests to check your risk for health problems. · Talk with your doctor about steps you can take to stay healthy or improve your health. You may need to make lifestyle changes to gain or maintain weight and stay healthy, such as getting more healthy foods in your diet and doing exercises to build muscle. Where can you learn more? Go to http://jinny-astrid.info/. Enter S176 in the search box to learn more about \"Body Mass Index: Care Instructions. \"  Current as of: January 23, 2017  Content Version: 11.3  © 5963-1337 [employer].  Care instructions adapted under license by [employer] (which disclaims liability or warranty for this information). If you have questions about a medical condition or this instruction, always ask your healthcare professional. [place] [address] any warranty or liability for your use of this information. Medicare Wellness Visit, Female    The best way to live healthy is to have a healthy lifestyle by eating a well-balanced diet, exercising regularly, limiting alcohol and stopping smoking. Regular physical exams and screening tests are another way to keep healthy. Preventive exams provided by your health care provider can find health problems before they become diseases or illnesses. Preventive services including immunizations, screening tests, monitoring and exams can help you take care of your own health. All people over age 72 should have a pneumovax  and and a prevnar shot to prevent pneumonia. These are once in a lifetime unless you and your provider decide differently. All people over 65 should have a yearly flu shot and a tetanus vaccine every 10 years. A bone mass density to screen for osteoporosis or thinning of the bones should be done every 2 years after 65. Screening for diabetes mellitus with a blood sugar test should be done every year. Glaucoma is a disease of the eye due to increased ocular pressure that can lead to blindness and it should be done every year by an eye professional.    Cardiovascular screening tests that check for elevated lipids (fatty part of blood) which can lead to heart disease and strokes should be done every 5 years. Colorectal screening that evaluates for blood or polyps in your colon should be done yearly as a stool test or every five years as a flexible sigmoidoscope or every 10 years as a colonoscopy up to age 76. Breast cancer screening with a mammogram is recommended biennially  for women age 54-69.     Screening for cervical cancer with a pap smear and pelvic exam is recommended for women after age 72 years every 2 years up to age 70 or when the provider and patient decide to stop. If there is a history of cervical abnormalities or other increased risk for cancer then the test is recommended yearly. Hepatitis C screening is also recommended for anyone born between 80 through [address]. A shingles vaccine is also recommended once in a lifetime after age 61. Your Medicare Wellness Exam is recommended annually.     Here is a list of your current Health Maintenance items with a due date:  Health Maintenance Due   Topic Date Due    Annual Well Visit  01/14/2016    Breast Cancer Screening  01/05/2018

## 2017-10-04 NOTE — PROGRESS NOTES
[facility]NISTS OF [facility]:  10/04/17, [id number]      The Initial Medicare Annual Wellness Exam PROGRESS NOTE    This is an Initial Medicare Annual Wellness Exam (AWV). I have reviewed the patient's medical history in detail and updated the computerized patient record. Lena Melton is a 78 y.o.  female and presents for an annual wellness exam.    SUBJECTIVE  The patient is a 50-year-old female with history of SIERRA, left foot bone spur, lichen sclerosis per biopsy in 2012, cervical disc disease, peripheral artery disease, varicose veins, Sjogren's syndrome, Keratoconjunctivitis sicca, dysphasia, rheumatoid arthritis, paroxysmal supraventricular tachycardia, mitral valve prolapse, multiple thyroid nodules, ?atrial fibrillation per EHR, plantar fasciitis, dysphasia. Health Maintenance History  Immunizations reviewed: Tdap up to date   Pneumovax: up to date   Flu: up to date  Zoster: up to date    Immunization History   Administered Date(s) Administered    Influenza High Dose Vaccine PF 09/26/2017    Influenza Vaccine 10/29/2013, 09/20/2014, 11/06/2015, 11/02/2016    Influenza Vaccine Split 11/01/2010, 09/23/2011, 10/03/2012    Pneumococcal Conjugate (PCV-13) 07/07/2016    Pneumococcal Polysaccharide (PPSV-23) 07/16/2017    TD Vaccine 01/01/2007    Tdap 09/26/2017    Zoster Vaccine, Live 02/14/2017       Colonoscopy:  No hematochezia. No melena. Up to date. Due in 2019. Eye exam: Up to date; next formal eye exam is due in 2018    Mammo: Up to date; no breast masses/pain    Dexascan: Up to date. Done earlier this year. +Osteopenia. Next one is due in 2-3 yrs. Pelvic/Pap: No vaginal bleeding.         Past Medical History:   Diagnosis Date    A-fib [facility])     Arthritis     Feet    Memory change     Menopause     Multiple thyroid nodules 2009    under care of Dr. Piter Brooks MVP (mitral valve prolapse)     under care of Dr. Mariela Doan Neuropathy     Plantar fasciitis     PSVT (paroxysmal supraventricular tachycardia) ([facility] [address] 2/8/2011    Right knee pain     Routine gynecological examination     done by Dr. Kristie Franklin S/P colonoscopy     done past 1-2 yrs? Dr. Mechelle Gloria Sjogren's syndrome [facility])     sees Dr. Aurora Francis    TIA (transient ischemic attack)     possible history of    Tibialis tendonitis     following with Dr Bradley Para ankle      Past Surgical History:   Procedure Laterality Date    DILATION AND CURETTAGE  1987    HX BREAST AUGMENTATION  1984    HX DILATION AND CURETTAGE  1/2013    HX TONSILLECTOMY      IMPLANT BREAST SILICONE/EQ      TARSAL TUNNEL RELEASE  1994    Right     Current Outpatient Prescriptions   Medication Sig Dispense Refill    gabapentin (NEURONTIN) 300 mg capsule Take 3 Caps by mouth nightly. 90 Cap 2    cholecalciferol, VITAMIN D3, (VITAMIN D3) 5,000 unit tab tablet Take  by mouth daily.  CARTIA  mg ER capsule Take 180 mg by mouth daily.  folic acid (FOLVITE) 1 mg tablet Take 1 mg by mouth daily.  methotrexate (RHEUMATREX) 2.5 mg tablet Take 2.5 mg by mouth every Wednesday. Take 6 tablets of 2.5 mg every Wednesday      cyanocobalamin 1,000 mcg tablet Take 1,000 mcg by mouth two (2) times a day.  diclofenac (VOLTAREN) 1 % gel Apply  to affected area four (4) times daily.  acetaminophen (TYLENOL) 650 mg CR tablet Take 650 mg by mouth three (3) times daily as needed for Pain.  XARELTO 20 mg tab tablet Take 20 mg by mouth daily (with dinner).  melatonin tab tablet Take 8 mg by mouth nightly.  ascorbic acid (VITAMIN C) 500 mg tablet Take 500 mg by mouth two (2) times a day.  calcium-vitamin D (CALCIUM 500+D) 500 mg(1,250mg) -200 unit per tablet Take 1 Tab by mouth two (2) times daily (with meals).        Allergies   Allergen Reactions    Plaquenil [Hydroxychloroquine] Nausea and Vomiting    Pcn [Penicillins] Itching    Prednisone Nausea and Vomiting     She reports a plaquenil/prednisone come causes vomiting    Zithromax [Azithromycin] Rash, Other (comments) and Hives     Deion edwardsour's records state skin peeling. High fever, peeling     Family History   Problem Relation Age of Onset    Hypertension Father     High Cholesterol Father     Diabetes Father     Stroke Father     Heart Attack Father     Cancer Father     Heart Disease Father     Depression Sister     Breast Cancer Maternal Grandmother      Social History   Substance Use Topics    Smoking status: Never Smoker    Smokeless tobacco: Never Used    Alcohol use 0.0 oz/week     0 Standard drinks or equivalent per week      Comment: rarely     Patient Active Problem List   Diagnosis Code    MVP (mitral valve prolapse) I34.1    TIA (transient ischemic attack) G45.9    Multiple thyroid nodules E04.2    Neuropathy G62.9    Dysphagia R13.10    PSVT (paroxysmal supraventricular tachycardia) ([facility] I47.1    Keratoconjunctivitis sicca ([facility]) M35.01    Sjogren's syndrome ([facility]) M35.00    PAD (peripheral artery disease) ([facility]) I73.9    Varicose veins of lower extremities with other complications V79.260    Cervical disc disease M50.90    SIERRA (nonalcoholic steatohepatitis) K75.81    Bone spur of left foot H75.26    Lichen sclerosus 41/07 L90.0    Bilateral carpal tunnel syndrome, per EMG testing 8/17 G56.03    Osteopenia of multiple sites, 9/17/17 M85.89    Rheumatoid arthritis ([facility] [address] M06.9       Review of Systems   Constitutional: Negative for chills and fever. HENT: Negative for ear pain, hearing loss and sore throat. Eyes: Negative for blurred vision and pain. Respiratory: Negative for cough and shortness of breath. Cardiovascular: Negative for chest pain. Gastrointestinal: Negative for abdominal pain, blood in stool and melena. Genitourinary: Negative for dysuria and hematuria. Musculoskeletal: Positive for joint pain. Negative for myalgias. Skin: Negative for rash. Neurological: Positive for tingling.  Negative for focal weakness and headaches. Endo/Heme/Allergies: Does not bruise/bleed easily. Psychiatric/Behavioral: Negative for substance abuse. Depression Risk Factor Screening:      Patient Health Questionnaire (PHQ-2)   Over the last 2 weeks, how often have you been bothered by any of the following problems? · Little interest or pleasure in doing things? · Not at all. [0]  · Feeling down, depressed, or hopeless? · Not at all. [0]    Total Score: 0/6  PHQ-2 Assessment Scoring:   A score of 2 or more requires further screening with the PHQ-9    Alcohol Risk Factor Screening:   Women: On any occasion during the past 3 months, have you had more than 3 drinks containing alcohol? no    Do you average more than 7 drinks per week? no    Tobacco Use Screening:     History   Smoking Status    Never Smoker   Smokeless Tobacco    Never Used       Hearing Loss    Hearing is good. Activities of Daily Living   Self-care. She has difficulty with chopping/cutting foods due to pain in hands from RA and from neuropathy in hands from carpal tunnel syndrome. She does not need assistance with ADLs or other IADLs. Requires assistance with: no ADLs    Fall Risk   No fall risk factors; no falls w/i the past year. Abuse Screen   Patient is not abused  None    Additional Examination Findings:  Vitals:    10/04/17 1312   BP: 103/71   Pulse: 76   Resp: 20   Temp: 97.8 °F (36.6 °C)   SpO2: 98%   Weight: 126 lb (57.2 kg)   Height: 5' 5\" (1.651 m)   PainSc:   9   PainLoc: Shoulder   LMP: 04/25/2016      Body mass index is 20.97 kg/(m^2). Evaluation of Cognitive Function:  Mood/affect: Euthymic  Appearance: Well-groomed  Family member/caregiver input: The pt is not accompanied by a family member today. General:   Well-nourished, well-groomed, pleasant, alert, in no acute distress.      Head:  Normocephalic, atraumatic  Ears:  External ears WNL  Eyes:  Clear sclera  Neuro:   Alert, conversant, appropriate, following commands, no sensory deficit   Skin:    No rashes noted  Psych:  Affect, mood and judgment appropriate      Dementia Screen (Mini-Cog): Three Item Recall: 3/3   Clock Drawing (ten past eleven) Exercise: unremarkable clock drawing exercise      LABS   Data Review:   Lab Results   Component Value Date/Time    Sodium 140 12/31/2016 08:09 AM    Potassium 3.9 12/31/2016 08:09 AM    Chloride 104 12/31/2016 08:09 AM    CO2 26 12/31/2016 08:09 AM    Anion gap 10 12/31/2016 08:09 AM    Glucose 75 12/31/2016 08:09 AM    BUN 15 12/31/2016 08:09 AM    Creatinine 0.78 12/31/2016 08:09 AM    BUN/Creatinine ratio 19 12/31/2016 08:09 AM    GFR est AA >60 12/31/2016 08:09 AM    GFR est non-AA >60 12/31/2016 08:09 AM    Calcium 8.9 12/31/2016 08:09 AM       Lab Results   Component Value Date/Time    WBC 5.6 12/31/2016 08:09 AM    HGB 14.8 12/31/2016 08:09 AM    HCT 44.1 12/31/2016 08:09 AM    PLATELET 386 89/98/3382 08:09 AM    MCV 92.1 12/31/2016 08:09 AM       Lab Results   Component Value Date/Time    Hemoglobin A1c 5.5 07/19/2016 09:10 AM       Lab Results   Component Value Date/Time    Cholesterol, total 175 12/31/2016 08:09 AM    HDL Cholesterol 75 12/31/2016 08:09 AM    LDL, calculated 89.8 12/31/2016 08:09 AM    VLDL, calculated 10.2 12/31/2016 08:09 AM    Triglyceride 51 12/31/2016 08:09 AM    CHOL/HDL Ratio 2.3 12/31/2016 08:09 AM       Patient Care Team:  Asia Ortiz MD as PCP - General (Family Practice)  Brian Ramirez MD as Physician (Rheumatology)  Susan Wilson DPM (Podiatry)  Wild Casey MD (Vascular Surgery)  Kyra Barba (Vascular Surgery)  Braulio Richardson MD (Orthopedic Surgery)  Waylon Borges MD as Physician (Rheumatology)  Andrew Hickman MD as Physician (Neurology)  Angel Baxter MD (Internal Medicine)    End-of-life planning  Advanced Directive in the case than an injury or illness causes the patient to be unable to make health care decisions was discussed with the patient.  We reviewed her scanned advanced directive. The content of her pre-existing advanced directive was confirmed. The patient has no changes to make the contact information for her power of . Meanwhile, it seems to be taken patient to her advanced directive was never scanned into the EHR. The patient was given a blank advance directive copy to complete with the second patient information so that he can be scanned into the EHR and updated. Advice/Referrals/Counselling/Plan:   Education and counseling provided:  Are appropriate based on today's review and evaluation  End-of-Life planning (with patient's consent)  Pneumococcal Vaccine  Influenza Vaccine  Screening Mammography  Screening Pap and pelvic (covered once every 2 years)  Colorectal cancer screening tests  Cardiovascular screening blood test  Bone mass measurement (DEXA)  Screening for glaucoma  Diabetes screening test  Include in education list (weight loss, physical activity, smoking cessation, fall prevention, and nutrition)    ICD-10-CM ICD-9-CM    1. Rheumatoid arthritis involving both hands, unspecified rheumatoid factor presence (HCC) M06.9 714.0    2. Encounter for screening mammogram for breast cancer Z12.31 V76.12 MANJINDER MAMMO BI SCREENING INCL CAD      CANCELED: MANJINDER MAMMO BI SCREENING INCL CAD   3. Multiple thyroid nodules E04.2 241.1 TSH AND FREE T4   4. Osteopenia of multiple sites, 9/17/17 M85.89 733.90    5. Bilateral carpal tunnel syndrome, per EMG testing 8/17 G56.03 354.0    6. Screening for hyperlipidemia Z13.220 V77.91 LIPID PANEL     reviewed diet, exercise and weight control  reviewed medications and side effects in detail.   Brief written plan, checklist    Assessment/Plan:    Health Maintenance:  -A mammogram was ordered for next year.  -Her next formal eye exam is due in 2018.  -Next colonoscopy is due in 2019.  -Her next DEXA scan is due in [id number].  -The patient is up-to-date on her vaccinations.  -We reviewed her advanced directive that has been scanned into the EHR. ORDERS:  - MANJINDER MAMMO BI SCREENING INCL CAD; Future      Lab review: labs are reviewed in the [address] [place] [address] (ACP) Provider Conversation     Date of ACP Conversation: 10/04/17  Persons included in Conversation:  patient    Authorized Decision Maker (if patient is incapable of making informed decisions): This person is:   Healthcare Agent/Medical Power of  under Advance Directive          For Patients with Decision Making Capacity:   Values/Goals: Exploration of values, goals, and preferences if recovery is not expected, even with continued medical treatment in the event of:  Imminent death  Severe, permanent brain injury    Conversation Outcomes / Follow-Up Plan:   Reviewed existing Advance Directive . Advanced Directive in the case than an injury or illness causes the patient to be unable to make health care decisions was discussed with the patient. We reviewed her scanned advanced directive. The content of her pre-existing advanced directive was confirmed. The patient has no changes to make the contact information for her power of . Meanwhile, it seems to be taken patient to her advanced directive was never scanned into the EHR. The patient was given a blank advance directive copy to complete with the second patient information so that he can be scanned into the EHR and updated. I have discussed the diagnosis with the patient and the intended plan as seen in the above orders. The patient has received an after-visit summary and questions were answered concerning future plans. I have discussed medication side effects and warnings with the patient as well. I have reviewed the plan of care with the patient, accepted their input and they are in agreement with the treatment goals. Follow-up Disposition:  Return in about 6 months (around 4/4/2018).

## 2017-10-04 NOTE — ACP (ADVANCE CARE PLANNING)
Advance Care Planning  Advance Care Planning (ACP) Provider Conversation      Date of ACP Conversation: 10/04/17  Persons included in Conversation:  patient     Authorized Decision Maker (if patient is incapable of making informed decisions): This person is:   Healthcare Agent/Medical Power of  under Advance Directive         For Patients with Decision Making Capacity:   Values/Goals: Exploration of values, goals, and preferences if recovery is not expected, even with continued medical treatment in the event of:  Imminent death  Severe, permanent brain injury     Conversation Outcomes / Follow-Up Plan:   Reviewed existing Advance Directive . Advanced Directive in the case than an injury or illness causes the patient to be unable to make health care decisions was discussed with the patient. We reviewed her scanned advanced directive. The content of her pre-existing advanced directive was confirmed. The patient has no changes to make the contact information for her power of . Meanwhile, it seems to be taken patient to her advanced directive was never scanned into the EHR.   The patient was given a blank advance directive copy to complete with the second patient information so that he can be scanned into the EHR and updated.      Dr. Alexandra Randle  [facility] [address].  Phone: [phone]  Fax: [phone]

## 2017-10-04 NOTE — PROGRESS NOTES
1. Have you been to the ER, urgent care clinic since your last visit? Hospitalized since your last visit? No    2. Have you seen or consulted any other health care providers outside of the [address] [place] [address] since your last visit? Include any pap smears or colon screening.  Yes Reason for visit: ortho

## 2017-10-04 NOTE — MR AVS SNAPSHOT
Visit Information Date & Time Provider Department Dept. Phone Encounter #  
 10/4/2017  1:30 PM David Harris MD Internist of [facility] [id number] Follow-up Instructions Return in about 6 months (around 4/4/2018). Your Appointments 4/5/2018 11:00 AM  
Office Visit with David Harris MD  
Internist of [place] [facility] [address] [facility]) Appt Note: 6 month f/u  
 [address] [facility], [address] [address] [address]  
  
   
 [address] [facility] Upcoming Health Maintenance Date Due  
 MEDICARE YEARLY EXAM 1/14/2016 BREAST CANCER SCRN MAMMOGRAM 1/5/2018 GLAUCOMA SCREENING Q2Y 6/28/2018 COLONOSCOPY 4/3/2019 DTaP/Tdap/Td series (2 - Td) 9/26/2027 Allergies as of 10/4/2017  Review Complete On: 10/4/2017 By: David Harris MD  
  
 Severity Noted Reaction Type Reactions Plaquenil [Hydroxychloroquine] High 04/25/2016    Nausea and Vomiting Pcn [Penicillins]  05/17/2010    Itching Prednisone  06/09/2017    Nausea and Vomiting She reports a plaquenil/prednisone come causes vomiting Zithromax [Azithromycin]  05/17/2010    Rash, Other (comments), Cosmo Gracia's records state skin peeling. High fever, peeling Current Immunizations  Reviewed on 12/1/2016 Name Date Influenza High Dose Vaccine PF 9/26/2017 Influenza Vaccine 11/2/2016, 11/6/2015, 9/20/2014, 10/29/2013 Influenza Vaccine Split 10/3/2012, 9/23/2011, 11/1/2010 Pneumococcal Conjugate (PCV-13) 7/7/2016 Pneumococcal Polysaccharide (PPSV-23) 7/16/2017 TD Vaccine 1/1/2007 Tdap 9/26/2017 Zoster Vaccine, Live 2/14/2017 Not reviewed this visit You Were Diagnosed With   
  
 Codes Comments Encounter for screening mammogram for breast cancer    -  Primary ICD-10-CM: Z12.31 
ICD-9-CM: V76.12 Multiple thyroid nodules     ICD-10-CM: E04.2 ICD-9-CM: 241.1 Osteopenia of multiple sites     ICD-10-CM: M85.89 ICD-9-CM: 733.90 Bilateral carpal tunnel syndrome     ICD-10-CM: G56.03 
ICD-9-CM: 354.0 Screening for hyperlipidemia     ICD-10-CM: Z13.220 ICD-9-CM: V77.91 Vitals BP Pulse Temp Resp Height(growth percentile) Weight(growth percentile) 103/71 76 97.8 °F (36.6 °C) 20 5' 5\" (1.651 m) 126 lb (57.2 kg) LMP SpO2 BMI OB Status Smoking Status 04/25/2016 98% 20.97 kg/m2 Postmenopausal Never Smoker Vitals History BMI and BSA Data Body Mass Index Body Surface Area  
 20.97 kg/m 2 1.62 m 2 Preferred Pharmacy Pharmacy Name Phone [facility] [address] [phone] Your Updated Medication List  
  
   
This list is accurate as of: 10/4/17  2:19 PM.  Always use your most recent med list.  
  
  
  
  
 acetaminophen 650 mg CR tablet Commonly known as:  TYLENOL Take 650 mg by mouth three (3) times daily as needed for Pain. CALCIUM 500+D 500 mg(1,250mg) -200 unit per tablet Generic drug:  calcium-vitamin D Take 1 Tab by mouth two (2) times daily (with meals). CARTIA  mg ER capsule Generic drug:  dilTIAZem CD Take 180 mg by mouth daily. cholecalciferol (VITAMIN D3) 5,000 unit Tab tablet Commonly known as:  VITAMIN D3 Take  by mouth daily. cyanocobalamin 1,000 mcg tablet Take 1,000 mcg by mouth two (2) times a day. diclofenac 1 % Gel Commonly known as:  VOLTAREN Apply  to affected area four (4) times daily. folic acid 1 mg tablet Commonly known as:  Google Take 1 mg by mouth daily. gabapentin 300 mg capsule Commonly known as:  NEURONTIN Take 3 Caps by mouth nightly. melatonin Tab tablet Take 8 mg by mouth nightly. methotrexate 2.5 mg tablet Commonly known as:  Dean Ace Take 2.5 mg by mouth every Wednesday. Take 6 tablets of 2.5 mg every Wednesday VITAMIN C 500 mg tablet Generic drug:  ascorbic acid (vitamin C) Take 500 mg by mouth two (2) times a day. XARELTO 20 mg Tab tablet Generic drug:  rivaroxaban Take 20 mg by mouth daily (with dinner). Follow-up Instructions Return in about 6 months (around 4/4/2018). To-Do List   
 12/04/2017 Lab:  LIPID PANEL Around 12/04/2017 Lab:  TSH AND FREE T4   
  
 01/31/2018 Imaging:  [facility] MAMMO BI SCREENING INCL CAD Patient Instructions Medicare Part B Preventive Services Limitations Recommendation Scheduled Bone Mass Measurement 
(age 72 & older, biennial) Requires diagnosis related to osteoporosis or estrogen deficiency. Biennial benefit unless patient has history of long-term glucocorticoid tx or baseline is needed because initial test was by other method This should be done at age 72 and again if on osteoporosis medication at 2-3 year intervals. Up to date, next one is due in [id number] Cardiovascular Screening Blood Tests (every 5 years) Total cholesterol, HDL, Triglycerides Order as a panel if possible We should check your cholesterol panel at least once every 5 years. Up to date Colorectal Cancer Screening 
-Fecal occult blood test (annual) -Flexible sigmoidoscopy (5y) 
-Screening colonoscopy (10y) -Barium Enema  Due per your Gastroenterologist's recommendations. Due in 2019 Counseling to Prevent Tobacco Use (up to 8 sessions per year) - Counseling greater than 3 and up to 10 minutes - Counseling greater than 10 minutes Patients must be asymptomatic of tobacco-related conditions to receive as preventive service Continue with smoking cessation Diabetes Screening Tests (at least every 3 years, Medicare covers annually or at 6-month intervals for prediabetic patients) Fasting blood sugar (FBS) or glucose tolerance test (GTT) Patient must be diagnosed with one of the following: -Hypertension, Dyslipidemia, obesity, previous impaired FBS or GTT 
Or any two of the following: overweight, FH of diabetes, age ? 72, history of gestational diabetes, birth of baby weighing more than 9 pounds Should be done yearly Up to date Diabetes Self-Management Training (DSMT) (no USPSTF recommendation) Requires referral by treating physician for patient with diabetes or renal disease. 10 hours of initial DSMT session of no less than 30 minutes each in a continuous 12-month period. 2 hours of follow-up DSMT in subsequent years. Not applicable Glaucoma Screening (no USPSTF recommendation) Diabetes mellitus, family history, , age 48 or over,  American, age 72 or over Continue with annual eye exams. Due in 2018 Human Immunodeficiency Virus (HIV) Screening (annually for increased risk patients) HIV-1 and HIV-2 by EIA, RONY, rapid antibody test, or oral mucosa transudate Patient must be at increased risk for HIV infection per USPSTF guidelines or pregnant. Tests covered annually for patients at increased risk. Pregnant patients may receive up to 3 test during pregnancy. Not applicable Medical Nutrition Therapy (MNT) (for diabetes or renal disease not recommended schedule) Requires referral by treating physician for patient with diabetes or renal disease. Can be provided in same year as diabetes self-management training (DSMT), and CMS recommends medical nutrition therapy take place after DSMT. Up to 3 hours for initial year and 2 hours in subsequent years. Not applicable Shingles Vaccination A shingles vaccine is also recommended once in a lifetime after age 61 Vaccination recommended for shingles vaccination. Up to date Seasonal Influenza Vaccination (annually)  Continue with yearly \"flu\" shot annually Up to date Pneumococcal Vaccination (once after 65)  Please receive this vaccination at age 72. Up to date Hepatitis B Vaccinations (if medium/high risk) Medium/high risk factors:  End-stage renal disease, Hemophiliacs who received Factor VIII or IX concentrates, Clients of institutions for the mentally retarded, Persons who live in the same house as a HepB virus carrier, Homosexual men, Illicit injectable drug abusers. Not applicable Screening Mammography (biennial age 54-69) Annually (age 36 or over) You need a mammogram yearly to screen for breast cancer. Up to date Screening Pap Tests and Pelvic Examination (up to age 79 and after 79 if unknown history or abnormal study last 10 years) Every 24 months except high risk You need no Pap smear at this time. Not needed. Ultrasound Screening for Abdominal Aortic Aneurysm (AAA) (once) Patient must be referred through IPPE and not have had a screening for abdominal aortic aneurysm before under Medicare. Limited to patients who meet one of the following criteria: 
- Men who are 73-68 years old and have smoked more than 100 cigarettes in their lifetime. 
-Anyone with a FH of AAA 
-Anyone recommended for screening by USPSTF Not applicable Body Mass Index: Care Instructions Your Care Instructions Body mass index (BMI) can help you see if your weight is raising your risk for health problems. It uses a formula to compare how much you weigh with how tall you are. · A BMI lower than 18.5 is considered underweight. · A BMI between 18.5 and 24.9 is considered healthy. · A BMI between 25 and 29.9 is considered overweight. A BMI of 30 or higher is considered obese. If your BMI is in the normal range, it means that you have a lower risk for weight-related health problems. If your BMI is in the overweight or obese range, you may be at increased risk for weight-related health problems, such as high blood pressure, heart disease, stroke, arthritis or joint pain, and diabetes.  If your BMI is in the underweight range, you may be at increased risk for health problems such as fatigue, lower protection (immunity) against illness, muscle loss, bone loss, hair loss, and hormone problems. BMI is just one measure of your risk for weight-related health problems. You may be at higher risk for health problems if you are not active, you eat an unhealthy diet, or you drink too much alcohol or use tobacco products. Follow-up care is a key part of your treatment and safety. Be sure to make and go to all appointments, and call your doctor if you are having problems. It's also a good idea to know your test results and keep a list of the medicines you take. How can you care for yourself at home? · Practice healthy eating habits. This includes eating plenty of fruits, vegetables, whole grains, lean protein, and low-fat dairy. · If your doctor recommends it, get more exercise. Walking is a good choice. Bit by bit, increase the amount you walk every day. Try for at least 30 minutes on most days of the week. · Do not smoke. Smoking can increase your risk for health problems. If you need help quitting, talk to your doctor about stop-smoking programs and medicines. These can increase your chances of quitting for good. · Limit alcohol to 2 drinks a day for men and 1 drink a day for women. Too much alcohol can cause health problems. If you have a BMI higher than 25 · Your doctor may do other tests to check your risk for weight-related health problems. This may include measuring the distance around your waist. A waist measurement of more than 40 inches in men or 35 inches in women can increase the risk of weight-related health problems. · Talk with your doctor about steps you can take to stay healthy or improve your health. You may need to make lifestyle changes to lose weight and stay healthy, such as changing your diet and getting regular exercise. If you have a BMI lower than 18.5 · Your doctor may do other tests to check your risk for health problems. · Talk with your doctor about steps you can take to stay healthy or improve your health. You may need to make lifestyle changes to gain or maintain weight and stay healthy, such as getting more healthy foods in your diet and doing exercises to build muscle. Where can you learn more? Go to http://jinny-astrid.info/. Enter S176 in the search box to learn more about \"Body Mass Index: Care Instructions. \" Current as of: January 23, 2017 Content Version: 11.3 © 5221-1161 [employer]. Care instructions adapted under license by [employer] (which disclaims liability or warranty for this information). If you have questions about a medical condition or this instruction, always ask your healthcare professional. [address] any warranty or liability for your use of this information. Introducing [facility]! Dear Judi Hubbard: 
Thank you for requesting a [employer] account. Our records indicate that you already have an active [employer] account. You can access your account anytime at [email or web address] Internet Did you know that you can access your hospital and ER discharge instructions at any time in [employer]? You can also review all of your test results from your hospital stay or ER visit. Additional Information If you have questions, please visit the Frequently Asked Questions section of the [employer] website at [email or web address] Remember, [employer] is NOT to be used for urgent needs. For medical emergencies, dial 911. Now available from your iPhone and Android! Please provide this summary of care documentation to your next provider. Your primary care clinician is listed as Michelle Montenegro. If you have any questions after today's visit, please call [phone].

## 2017-10-04 NOTE — PROGRESS NOTES
INTERNISTS OF [facility]:  10/4/2017, MRN: [id number]      Lena Melton is a 78 y.o. female and presents to clinic for Shoulder Pain (follow up ); Hand Pain; and Results (bone density)    Subjective: The patient is a 81-year-old female with history of SIERRA, left foot bone spur, Rheumatoid Arthritis,  lichen sclerosis per biopsy in 2012, cervical disc disease, peripheral artery disease, carpal tunnel syndrome b/l,  varicose veins, Sjogren's syndrome, Keratoconjunctivitis sicca, dysphasia, paroxysmal supraventricular tachycardia, mitral valve prolapse, multiple thyroid nodules, dysphasia, and neuropathy per EHR. 1.  Carpal tunnel syndrome: The pt was evaluated by the Orthopedics team. She was diagnosed with b/l carpal tunnel syndrome. The patient was recommended to have carpal tunnel syndrome on her right hand which seems to be more severe in her left hand. Patient states that symptoms are so severe, that she has been unable to  things properly. She is scheduled with the orthopedic surgery team later this month. She has no new symptoms. Symptoms have been present for over 4 weeks. Pain is sharp. 2.  Rheumatoid arthritis: The patient has underlying rheumatoid arthritis and Sjogren syndrome for over 3 months. She is followed by Dr. Analy Gan who just had labs drawn. On September 25, 2017, her uric acid level, hep B surface antigen, hep B core antibody, HCV antibody, calcium, phosphorus, creatinine, C4, urinalysis, protein to creatinine ratio (urine), CBC, C3, vitamin D level, anti-DNA double-stranded antibody, hepatic panel, and ESR were checked. The results are unremarkable. Her B12 level was checked and elevated at 1102. On that same day she also saw Dr. Polina Petersen in clinic. She brought in only the results of her labs but also his most recent note from the 25th. He had imaging done of both hands. There is concern for new developing erosions seen along her MCP joints.   As a result, methotrexate was started. She is scheduled to follow-up with him. 3.  Health maintenance: Patient is overdue for a lipid panel to screen for hyperlipidemia. 4.  Thyroid nodule: The patient has a history of multiple thyroid nodules. He has been roughly 1 year since she has had TFTs checked. 1/12/16 Thyroid ultrasound: Tiny nonspecific but generally benign-appearing nodules within the right and left thyroid lobes, similar to comparison studies. Some of the nodules noted previously are no longer seen. Patient Active Problem List    Diagnosis Date Noted    Osteopenia of multiple sites, 9/17/17 10/04/2017    Bilateral carpal tunnel syndrome, per EMG testing 8/17 08/17/2017    SIERRA (nonalcoholic steatohepatitis) 05/30/2017    Bone spur of left foot 96/02/3252    Lichen sclerosus 88/65 05/30/2017    Cervical disc disease 01/08/2016    PAD (peripheral artery disease) ([address] 05/02/2014    Varicose veins of lower extremities with other complications 62/52/9999    Sjogren's syndrome ([facility]     Keratoconjunctivitis sicca ([facility] [address] 03/14/2012    PSVT (paroxysmal supraventricular tachycardia) ([address] 02/08/2011    Dysphagia 11/24/2010    MVP (mitral valve prolapse)     TIA (transient ischemic attack)     Multiple thyroid nodules     Neuropathy        Current Outpatient Prescriptions   Medication Sig Dispense Refill    gabapentin (NEURONTIN) 300 mg capsule Take 3 Caps by mouth nightly. 90 Cap 2    cholecalciferol, VITAMIN D3, (VITAMIN D3) 5,000 unit tab tablet Take  by mouth daily.  CARTIA  mg ER capsule Take 180 mg by mouth daily.  folic acid (FOLVITE) 1 mg tablet Take 1 mg by mouth daily.  methotrexate (RHEUMATREX) 2.5 mg tablet Take 2.5 mg by mouth every Wednesday. Take 6 tablets of 2.5 mg every Wednesday      cyanocobalamin 1,000 mcg tablet Take 1,000 mcg by mouth two (2) times a day.  diclofenac (VOLTAREN) 1 % gel Apply  to affected area four (4) times daily.       acetaminophen (TYLENOL) 650 mg CR tablet Take 650 mg by mouth three (3) times daily as needed for Pain.  XARELTO 20 mg tab tablet Take 20 mg by mouth daily (with dinner).  melatonin tab tablet Take 8 mg by mouth nightly.  ascorbic acid (VITAMIN C) 500 mg tablet Take 500 mg by mouth two (2) times a day.  calcium-vitamin D (CALCIUM 500+D) 500 mg(1,250mg) -200 unit per tablet Take 1 Tab by mouth two (2) times daily (with meals). Allergies   Allergen Reactions    Plaquenil [Hydroxychloroquine] Nausea and Vomiting    Pcn [Penicillins] Itching    Prednisone Nausea and Vomiting     She reports a plaquenil/prednisone come causes vomiting    Zithromax [Azithromycin] Rash, Other (comments) and Hives     [facility] records state skin peeling. High fever, peeling       Past Medical History:   Diagnosis Date    A-fib ([facility]     Arthritis     Feet    Memory change     Menopause     Multiple thyroid nodules 2009    under care of Dr. Destinee Flores MVP (mitral valve prolapse)     under care of Dr. Leo Llanes Neuropathy     Plantar fasciitis     PSVT (paroxysmal supraventricular tachycardia) ([facility] 2/8/2011    Right knee pain     Routine gynecological examination     done by Dr. Brianna Purcell S/P colonoscopy     done past 1-2 yrs?   Dr. Belem Beltran Sjogren's syndrome [facility])     sees Dr. Yue Smith    TIA (transient ischemic attack)     possible history of    Tibialis tendonitis     following with Dr Sarah Dawkins ankle       Past Surgical History:   Procedure Laterality Date    DILATION AND CURETTAGE  1987    HX BREAST AUGMENTATION  1984    HX DILATION AND CURETTAGE  1/2013    HX TONSILLECTOMY      IMPLANT BREAST SILICONE/EQ      TARSAL TUNNEL RELEASE  1994    Right       Family History   Problem Relation Age of Onset    Hypertension Father     High Cholesterol Father     Diabetes Father     Stroke Father     Heart Attack Father     Cancer Father     Heart Disease Father     Depression Sister    [facility] Breast Cancer Maternal Grandmother        Social History   Substance Use Topics    Smoking status: Never Smoker    Smokeless tobacco: Never Used    Alcohol use 0.0 oz/week     0 Standard drinks or equivalent per week      Comment: rarely       ROS   Review of Systems   Constitutional: Negative for chills and fever. HENT: Negative for ear pain and sore throat. Eyes: Negative for blurred vision and pain. Respiratory: Negative for cough and shortness of breath. Cardiovascular: Negative for chest pain. Gastrointestinal: Negative for abdominal pain and blood in stool. Genitourinary: Negative for dysuria and hematuria. Musculoskeletal: Positive for joint pain. Negative for myalgias. Skin: Negative for rash. Neurological: Positive for tingling. Negative for focal weakness and headaches. Endo/Heme/Allergies: Does not bruise/bleed easily. Psychiatric/Behavioral: Negative for substance abuse. Objective     Vitals:    10/04/17 1312   BP: 103/71   Pulse: 76   Resp: 20   Temp: 97.8 °F (36.6 °C)   SpO2: 98%   Weight: 126 lb (57.2 kg)   Height: 5' 5\" (1.651 m)   PainSc:   9   PainLoc: Shoulder   LMP: 04/25/2016       Physical Exam   Constitutional: She is oriented to person, place, and time and well-developed, well-nourished, and in no distress. HENT:   Head: Normocephalic and atraumatic. Right Ear: External ear normal.   Left Ear: External ear normal.   Nose: Nose normal.   Mouth/Throat: Oropharynx is clear and moist. No oropharyngeal exudate. Clear TMs   Eyes: Conjunctivae and EOM are normal. Pupils are equal, round, and reactive to light. Right eye exhibits no discharge. Left eye exhibits no discharge. No scleral icterus. Neck: Neck supple. Cardiovascular: Normal rate, regular rhythm, normal heart sounds and intact distal pulses. Exam reveals no gallop and no friction rub. No murmur heard. Pulmonary/Chest: Effort normal and breath sounds normal. No respiratory distress.  She has no wheezes. She has no rales. Abdominal: Soft. Bowel sounds are normal. She exhibits no distension. There is no tenderness. There is no rebound and no guarding. Musculoskeletal: She exhibits no edema or tenderness (BUE are NTTP except along MCP and wrists). Lymphadenopathy:     She has no cervical adenopathy. Neurological: She is alert and oriented to person, place, and time. She exhibits normal muscle tone. Gait normal.   4/5 b/l  strength   Skin: Skin is warm and dry. No erythema. Psychiatric: Affect normal.   Nursing note and vitals reviewed. LABS   Data Review:   Lab Results   Component Value Date/Time    WBC 5.6 12/31/2016 08:09 AM    HGB 14.8 12/31/2016 08:09 AM    HCT 44.1 12/31/2016 08:09 AM    PLATELET 178 96/84/9907 08:09 AM    MCV 92.1 12/31/2016 08:09 AM       Lab Results   Component Value Date/Time    Sodium 140 12/31/2016 08:09 AM    Potassium 3.9 12/31/2016 08:09 AM    Chloride 104 12/31/2016 08:09 AM    CO2 26 12/31/2016 08:09 AM    Anion gap 10 12/31/2016 08:09 AM    Glucose 75 12/31/2016 08:09 AM    BUN 15 12/31/2016 08:09 AM    Creatinine 0.78 12/31/2016 08:09 AM    BUN/Creatinine ratio 19 12/31/2016 08:09 AM    GFR est AA >60 12/31/2016 08:09 AM    GFR est non-AA >60 12/31/2016 08:09 AM    Calcium 8.9 12/31/2016 08:09 AM       Lab Results   Component Value Date/Time    Cholesterol, total 175 12/31/2016 08:09 AM    HDL Cholesterol 75 12/31/2016 08:09 AM    LDL, calculated 89.8 12/31/2016 08:09 AM    VLDL, calculated 10.2 12/31/2016 08:09 AM    Triglyceride 51 12/31/2016 08:09 AM    CHOL/HDL Ratio 2.3 12/31/2016 08:09 AM       Lab Results   Component Value Date/Time    Hemoglobin A1c 5.5 07/19/2016 09:10 AM       Assessment/Plan:   1. Multiple thyroid nodules  -Checking TFTs. ORDERS:  - TSH AND FREE T4; Future    2. Bilateral carpal tunnel syndrome, per EMG testing 8/17  -We discussed treatment options for carpal tunnel syndrome. All questions were answered.   I encouraged patient to follow-up with the orthopedic surgery team.    3. Health Maintenance:  -Checking a lipid panel to screen for hyperlipidemia. ORDERS:  - LIPID PANEL; Future    4. Rheumatoid Arthritis and Sjogren's Syndrome:    - I encouraged the pt to f/u with Dr. Nancy Goyal. C/w rx per 's recommendations. Health Maintenance Due   Topic Date Due    MEDICARE YEARLY EXAM  01/14/2016    BREAST CANCER SCRN MAMMOGRAM  01/05/2018     Lab review: labs are reviewed in the EHR. I also reviewed Dr. Bushra Sweet lab results and his most recent note-the patient brought both of these documented. I have discussed the diagnosis with the patient and the intended plan as seen in the above orders. The patient has received an after-visit summary and questions were answered concerning future plans. I have discussed medication side effects and warnings with the patient as well. I have reviewed the plan of care with the patient, accepted their input and they are in agreement with the treatment goals. All questions were answered. The patient understands the plan of care. Handouts provided today with above information. Pt instructed if symptoms worsen to call the office or report to the ED for continued care. Greater than 50% of the visit time was spent in counseling and/or coordination of care. Follow-up Disposition:  Return in about 6 months (around 4/4/2018).     Wu Lewis MD

## 2017-10-18 ENCOUNTER — HOSPITAL ENCOUNTER (OUTPATIENT)
Dept: LAB | Age: 67
Discharge: HOME | End: 2017-10-18
Payer: MEDICARE

## 2017-10-18 LAB
ANION GAP SERPL CALC-SCNC: 7 MMOL/L (ref 3–18)
BUN SERPL-MCNC: 11 MG/DL (ref 7–18)
BUN/CREAT SERPL: 18 (ref 12–20)
CALCIUM SERPL-MCNC: 9.9 MG/DL (ref 8.5–10.1)
CHLORIDE SERPL-SCNC: 102 MMOL/L (ref 100–108)
CO2 SERPL-SCNC: 28 MMOL/L (ref 21–32)
CREAT SERPL-MCNC: 0.62 MG/DL (ref 0.6–1.3)
ERYTHROCYTE [DISTWIDTH] IN BLOOD BY AUTOMATED COUNT: 12.7 % (ref 11.6–14.5)
GLUCOSE SERPL-MCNC: 79 MG/DL (ref 74–99)
HCT VFR BLD AUTO: 43.5 % (ref 35–45)
HGB BLD-MCNC: 14.7 G/DL (ref 12–16)
MCH RBC QN AUTO: 30.9 PG (ref 24–34)
MCHC RBC AUTO-ENTMCNC: 33.8 G/DL (ref 31–37)
MCV RBC AUTO: 91.4 FL (ref 74–97)
PLATELET # BLD AUTO: 341 K/UL (ref 135–420)
PMV BLD AUTO: 9.4 FL (ref 9.2–11.8)
POTASSIUM SERPL-SCNC: 4.4 MMOL/L (ref 3.5–5.5)
RBC # BLD AUTO: 4.76 M/UL (ref 4.2–5.3)
SODIUM SERPL-SCNC: 137 MMOL/L (ref 136–145)
WBC # BLD AUTO: 8.1 K/UL (ref 4.6–13.2)

## 2017-10-18 PROCEDURE — 36415 COLL VENOUS BLD VENIPUNCTURE: CPT

## 2017-10-18 PROCEDURE — 85027 COMPLETE CBC AUTOMATED: CPT

## 2017-10-18 PROCEDURE — 80048 BASIC METABOLIC PNL TOTAL CA: CPT

## 2017-10-28 DIAGNOSIS — M79.18 MYOFASCIAL PAIN: ICD-10-CM

## 2017-10-28 DIAGNOSIS — G62.9 NEUROPATHY: ICD-10-CM

## 2017-10-30 RX ORDER — GABAPENTIN 300 MG/1
CAPSULE ORAL
Qty: 90 CAP | Refills: 1 | Status: SHIPPED | OUTPATIENT
Start: 2017-10-30 | End: 2018-06-14 | Stop reason: SDUPTHER

## 2017-11-02 ENCOUNTER — HOSPITAL ENCOUNTER (OUTPATIENT)
Dept: LAB | Age: 67
Discharge: HOME | End: 2017-11-02
Payer: MEDICARE

## 2017-11-02 DIAGNOSIS — E04.2 MULTIPLE THYROID NODULES: ICD-10-CM

## 2017-11-02 DIAGNOSIS — Z13.220 SCREENING FOR HYPERLIPIDEMIA: ICD-10-CM

## 2017-11-02 LAB
CHOLEST SERPL-MCNC: 168 MG/DL
HDLC SERPL-MCNC: 73 MG/DL (ref 40–60)
HDLC SERPL: 2.3 {RATIO} (ref 0–5)
LDLC SERPL CALC-MCNC: 84.4 MG/DL (ref 0–100)
LIPID PROFILE,FLP: ABNORMAL
T4 FREE SERPL-MCNC: 1.2 NG/DL (ref 0.7–1.5)
TRIGL SERPL-MCNC: 53 MG/DL (ref ?–150)
TSH SERPL DL<=0.05 MIU/L-ACNC: 1.72 UIU/ML (ref 0.36–3.74)
VLDLC SERPL CALC-MCNC: 10.6 MG/DL

## 2017-11-02 PROCEDURE — 36415 COLL VENOUS BLD VENIPUNCTURE: CPT | Performed by: INTERNAL MEDICINE

## 2017-11-02 PROCEDURE — 80061 LIPID PANEL: CPT | Performed by: INTERNAL MEDICINE

## 2017-11-02 PROCEDURE — 84439 ASSAY OF FREE THYROXINE: CPT | Performed by: INTERNAL MEDICINE

## 2017-11-08 NOTE — PROGRESS NOTES
Please let the pt know that her 10 year CVD risk per the ACC/AHA risk assessment is: 4.5%. Statin rx is thus not warranted at this time.  Her thyroid function is normal.    Dr. Jax Rai  Internists of [address].  Phone: [phone]  Fax: [phone]

## 2017-11-10 ENCOUNTER — OFFICE VISIT (OUTPATIENT)
Dept: INTERNAL MEDICINE CLINIC | Age: 67
End: 2017-11-10

## 2017-11-10 VITALS
HEIGHT: 65 IN | OXYGEN SATURATION: 97 % | HEART RATE: 84 BPM | TEMPERATURE: 96.2 F | DIASTOLIC BLOOD PRESSURE: 71 MMHG | BODY MASS INDEX: 21.13 KG/M2 | WEIGHT: 126.8 LBS | RESPIRATION RATE: 12 BRPM | SYSTOLIC BLOOD PRESSURE: 108 MMHG

## 2017-11-10 DIAGNOSIS — G56.03 BILATERAL CARPAL TUNNEL SYNDROME: ICD-10-CM

## 2017-11-10 DIAGNOSIS — M35.01 SJOGREN'S SYNDROME WITH KERATOCONJUNCTIVITIS SICCA (HCC): ICD-10-CM

## 2017-11-10 DIAGNOSIS — M06.9 RHEUMATOID ARTHRITIS INVOLVING BOTH HANDS, UNSPECIFIED RHEUMATOID FACTOR PRESENCE: ICD-10-CM

## 2017-11-10 DIAGNOSIS — I48.91 ATRIAL FIBRILLATION, UNSPECIFIED TYPE (HCC): Primary | ICD-10-CM

## 2017-11-10 RX ORDER — DILTIAZEM HYDROCHLORIDE 120 MG/1
120 CAPSULE, EXTENDED RELEASE ORAL DAILY
COMMUNITY
Start: 2017-10-28 | End: 2017-11-21 | Stop reason: ALTCHOICE

## 2017-11-10 RX ORDER — TETANUS TOXOID, REDUCED DIPHTHERIA TOXOID AND ACELLULAR PERTUSSIS VACCINE, ADSORBED 5; 2.5; 8; 8; 2.5 [IU]/.5ML; [IU]/.5ML; UG/.5ML; UG/.5ML; UG/.5ML
SUSPENSION INTRAMUSCULAR
COMMUNITY
Start: 2017-09-26 | End: 2018-03-16 | Stop reason: ALTCHOICE

## 2017-11-10 RX ORDER — SULFAMETHOXAZOLE AND TRIMETHOPRIM 800; 160 MG/1; MG/1
1 TABLET ORAL 2 TIMES DAILY
COMMUNITY
Start: 2017-11-02 | End: 2017-11-21 | Stop reason: ALTCHOICE

## 2017-11-10 NOTE — PROGRESS NOTES
INTERNISTS OF [facility]:  11/12/2017, MRN: [id number]      Tobin Corado is a 78 y.o. female and presents to clinic for Surgical Follow-up (Right Hand Carpal Tunnel done on 11-06-17)    Subjective: The patient is a 69-year-old female with history of SIERRA, left foot bone spur, lichen sclerosis per biopsy in 2012, cervical disc disease, peripheral artery disease, varicose veins, Sjogren's syndrome, dysphasia, rheumatoid arthritis, paroxysmal supraventricular tachycardia, mitral valve prolapse, multiple thyroid nodules, atrial fibrillation per EHR, plantar fasciitis, and dysphasia. 1.  Right hand carpal tunnel syndrome: The patient has had right hand carpal tunnel release surgery. She reports no pain today. She continues to have numbness and tingling along her right upper extremity. She has a cast on her right hand and wrist.  She had a follow-up appointment with the orthopedic team soon. 2.  Rheumatoid arthritis, Sjogren syndrome, and AF: The patient has a long-standing history of Sjogren's syndrome. She was started on methotrexate this year for presumed progression to rheumatoid arthritis. She has multiple joint pains. she reports no adverse side effects with taking methotrexate in the past.  Her methotrexate was stopped for her right hand carpal tunnel release syndrome surgery. Since then, the patient reports less joint pain. She has less low back pain as well. She is not sure if she is having arthralgias from methotrexate or Xarelto. Her Xarelto was also stopped due to #1. Her methotrexate and Xarelto were restarted postoperatively. So far, she has significant improvement in overall joint pain and low back pain. She is followed by Dr. Malgorzata Clemons with the rheumatology team.  She is unsure as to whether or not the methotrexate was helping to relieve arthralgias. Meanwhile, she is followed by the cardiology team for history of atrial fibrillation.   Although she restarted her Xarelto, she has questions regarding whether or not this medication may be contributing to her history of worsening arthralgias. She is on [place]. She denies chest pain or shortness of breath symptoms. No hematuria, hematochezia, melena, or vaginal bleeding. Patient Active Problem List    Diagnosis Date Noted    Osteopenia of multiple sites, 9/17/17 10/04/2017    Rheumatoid arthritis ([facility] 10/04/2017    Bilateral carpal tunnel syndrome, per EMG testing 8/17 08/17/2017    SIERRA (nonalcoholic steatohepatitis) 05/30/2017    Bone spur of left foot 63/48/2126    Lichen sclerosus 31/57 05/30/2017    Cervical disc disease 01/08/2016    Varicose veins of lower extremities with other complications 67/10/6177    [facility]     Dysphagia 11/24/2010    MVP (mitral valve prolapse)     TIA (transient ischemic attack)     Multiple thyroid nodules        Current Outpatient Prescriptions   Medication Sig Dispense Refill    CARTIA  mg ER capsule       trimethoprim-sulfamethoxazole (BACTRIM DS, SEPTRA DS) 160-800 mg per tablet Take 1 Tab by mouth two (2) times a day.  gabapentin (NEURONTIN) 300 mg capsule TAKE THREE CAPSULES BY MOUTH NIGHTLY 90 Cap 1    cholecalciferol, VITAMIN D3, (VITAMIN D3) 5,000 unit tab tablet Take 5,000 Units by mouth daily.  folic acid (FOLVITE) 1 mg tablet Take 1 mg by mouth daily.  cyanocobalamin 1,000 mcg tablet Take 1,000 mcg by mouth two (2) times a day.  diclofenac (VOLTAREN) 1 % gel Apply  to affected area four (4) times daily.  acetaminophen (TYLENOL) 650 mg CR tablet Take 650 mg by mouth three (3) times daily as needed for Pain.  XARELTO 20 mg tab tablet Take 20 mg by mouth daily (with dinner).  melatonin tab tablet Take 8 mg by mouth nightly.  ascorbic acid (VITAMIN C) 500 mg tablet Take 500 mg by mouth two (2) times a day.         calcium-vitamin D (CALCIUM 500+D) 500 mg(1,250mg) -200 unit per tablet Take 1 Tab by mouth two (2) times daily (with meals).  BOOSTRIX TDAP 2.5-8-5 Lf-mcg-Lf/0.5mL syrg       methotrexate (RHEUMATREX) 2.5 mg tablet Take 2.5 mg by mouth every Wednesday. Take 6 tablets of 2.5 mg every Wednesday         Allergies   Allergen Reactions    Plaquenil [Hydroxychloroquine] Nausea and Vomiting    Pcn [Penicillins] Itching    Prednisone Nausea and Vomiting     She reports a plaquenil/prednisone come causes vomiting    Zithromax [Azithromycin] Rash, Other (comments) and Hives     [facility] records state skin peeling. High fever, peeling       Past Medical History:   Diagnosis Date    A-[facility] ([facility] [address]     Arthritis     Feet    Memory change     Menopause     Multiple thyroid nodules 2009    under care of Dr. Anita Anand MVP (mitral valve prolapse)     under care of Dr. Lester Carlson Neuropathy     Plantar fasciitis     PS[facility]ox[facility] 2/8/2011    Right knee pain     Routine gynecological examination     done by Dr. Jens Amin S/P colonoscopy     done past 1-2 yrs? Dr. Dia Dawkins Sjogren's syndrome [facility])     sees Dr. Crystal Kapoor    TIA (transient ischemic attack)     possible history of    Tibialis tendonitis     following with Dr Navneet Marshall ankle       Past Surgical History:   Procedure Laterality Date    DILATION AND CURETTAGE  1987    HX BREAST AUGMENTATION  1984    HX DILATION AND CURETTAGE  1/2013    HX TONSILLECTOMY      IMPLANT BREAST SILICONE/EQ      TARSAL TUNNEL RELEASE  1994    Right       Family History   Problem Relation Age of Onset    Hypertension Father     High Cholesterol Father     Diabetes Father     Stroke Father     Heart Attack Father     Cancer Father     Heart Disease Father     Depression Sister     Breast Cancer Maternal Grandmother        Social History   Substance Use Topics    Smoking status: Never Smoker    Smokeless tobacco: Never Used    Alcohol use No       ROS   Review of Systems   Constitutional: Negative for chills and fever. HENT: Negative for ear pain and sore throat. Eyes: Negative for blurred vision and pain. Respiratory: Negative for cough and shortness of breath. Cardiovascular: Negative for chest pain. Gastrointestinal: Negative for abdominal pain, blood in stool and melena. Genitourinary: Negative for dysuria and hematuria. Musculoskeletal: Positive for back pain and joint pain. Negative for myalgias. Skin: Negative for rash. Neurological: Positive for tingling. Negative for focal weakness and headaches. Endo/Heme/Allergies: Does not bruise/bleed easily. Psychiatric/Behavioral: Negative for substance abuse. Objective     Vitals:    11/10/17 1232   BP: 108/71   Pulse: 84   Resp: 12   Temp: 96.2 °F (35.7 °C)   TempSrc: Oral   SpO2: 97%   Weight: 126 lb 12.8 oz (57.5 kg)   Height: 5' 5\" (1.651 m)   PainSc:   0 - No pain   PainLoc: Hand   LMP: 04/25/2016       Physical Exam   Constitutional: She is oriented to person, place, and time and well-developed, well-nourished, and in no distress. HENT:   Head: Normocephalic and atraumatic. Right Ear: External ear normal.   Left Ear: External ear normal.   Nose: Nose normal.   Mouth/Throat: Oropharynx is clear and moist. No oropharyngeal exudate. Clear TMs   Eyes: Conjunctivae and EOM are normal. Pupils are equal, round, and reactive to light. Right eye exhibits no discharge. Left eye exhibits no discharge. No scleral icterus. Neck: Neck supple. Cardiovascular: Normal rate, regular rhythm, normal heart sounds and intact distal pulses. Exam reveals no gallop and no friction rub. No murmur heard. Pulmonary/Chest: Effort normal and breath sounds normal. No respiratory distress. She has no wheezes. She has no rales. Abdominal: Soft. Bowel sounds are normal. She exhibits no distension. There is no tenderness. There is no rebound and no guarding. Musculoskeletal: She exhibits no edema or tenderness (LUE is NTTP). RUTYRONE has a cast/wrist splint. Lymphadenopathy:     She has no cervical adenopathy. Neurological: She is alert and oriented to person, place, and time. She exhibits normal muscle tone. Gait normal.   Skin: Skin is warm and dry. No erythema. Psychiatric: Affect normal.   Nursing note and vitals reviewed. LABS   Data Review:   Lab Results   Component Value Date/Time    WBC 8.1 10/18/2017 12:26 PM    HGB 14.7 10/18/2017 12:26 PM    HCT 43.5 10/18/2017 12:26 PM    PLATELET 002 39/48/6539 12:26 PM    MCV 91.4 10/18/2017 12:26 PM       Lab Results   Component Value Date/Time    Sodium 137 10/18/2017 12:26 PM    Potassium 4.4 10/18/2017 12:26 PM    Chloride 102 10/18/2017 12:26 PM    CO2 28 10/18/2017 12:26 PM    Anion gap 7 10/18/2017 12:26 PM    Glucose 79 10/18/2017 12:26 PM    BUN 11 10/18/2017 12:26 PM    Creatinine 0.62 10/18/2017 12:26 PM    BUN/Creatinine ratio 18 10/18/2017 12:26 PM    GFR est AA >60 10/18/2017 12:26 PM    GFR est non-AA >60 10/18/2017 12:26 PM    Calcium 9.9 10/18/2017 12:26 PM       Lab Results   Component Value Date/Time    Cholesterol, total 168 11/02/2017 10:00 AM    HDL Cholesterol 73 11/02/2017 10:00 AM    LDL, calculated 84.4 11/02/2017 10:00 AM    VLDL, calculated 10.6 11/02/2017 10:00 AM    Triglyceride 53 11/02/2017 10:00 AM    CHOL/HDL Ratio 2.3 11/02/2017 10:00 AM       Lab Results   Component Value Date/Time    Hemoglobin A1c 5.5 07/19/2016 09:10 AM       Assessment/Plan:   1. Atrial fibrillation:   -Continue with cardia. - Placing a referral to the cardiology team.  She would like to continue care with Dr. Deven Stokes since her previous Cardiologist retired.   -Although she is not having any worsening arthralgias since her Xarelto was restarted, she may want to transition to Eliquis which was discussed with her today. Per the drug label, Eliquis has not been associated with arthralgias. Per the drug label, Xarelto  can cause arthritis in 2-4% of patients.     ORDERS:  - CARTIA  mg ER capsule; - REFERRAL TO CARDIOLOGY    2.  Right carpal tunnel syndrome: Status post surgery.  -Symptomatic relief. He will follow-up with the orthopedic surgery team.    3.  Rheumatoid arthritis and Sjogren's disease: Stable. -Continue with medication per the rheumatology team.      Health Maintenance Due   Topic Date Due    BREAST CANCER SCRN MAMMOGRAM  01/05/2018     Lab review: labs are reviewed in the EHR    I have discussed the diagnosis with the patient and the intended plan as seen in the above orders. The patient has received an after-visit summary and questions were answered concerning future plans. I have discussed medication side effects and warnings with the patient as well. I have reviewed the plan of care with the patient, accepted their input and they are in agreement with the treatment goals. All questions were answered. The patient understands the plan of care. Handouts provided today with above information. Pt instructed if symptoms worsen to call the office or report to the ED for continued care. Greater than 50% of the visit time was spent in counseling and/or coordination of care. Follow-up Disposition:  Return if symptoms worsen or fail to improve.     Rosanna Sheridan MD

## 2017-11-10 NOTE — PATIENT INSTRUCTIONS
Health Maintenance Due   Topic Date Due    BREAST CANCER SCRN MAMMOGRAM  01/05/2018         Apixaban (By mouth)   Apixaban (a-PIX-a-ban)  Treats and prevents blood clots. This medicine is a blood thinner. Brand Name(s): Eliquis   There may be other brand names for this medicine. When This Medicine Should Not Be Used: This medicine is not right for everyone. Do not use it if you had an allergic reaction to apixaban or you have active bleeding. How to Use This Medicine:   Tablet  · Your doctor will tell you how much medicine to use. Do not use more than directed. · If you are not able to swallow the tablets whole, they may be crushed and mixed in water, 5% dextrose in water (D5W), apple juice, or applesauce. The crushed tablets may be mixed with 60 mL of water or D5W dose and given through a nasogastric tube (NGT). · This medicine should come with a Medication Guide. Ask your pharmacist for a copy if you do not have one. · Missed dose: Take a dose as soon as you remember. If it is almost time for your next dose, wait until then and take a regular dose. Do not take extra medicine to make up for a missed dose. · Store the medicine in a closed container at room temperature, away from heat, moisture, and direct light. Drugs and Foods to Avoid:   Ask your doctor or pharmacist before using any other medicine, including over-the-counter medicines, vitamins, and herbal products. · Some medicines can affect how apixaban works.  Tell your doctor if you are using any of the following:   ¨ Carbamazepine, clarithromycin, itraconazole, ketoconazole, phenytoin, rifampin, ritonavir, Samson's wort  ¨ Blood thinner (including clopidogrel, heparin, prasugrel, warfarin)  ¨ Medicine to treat depression  ¨ NSAID pain or arthritis medicine (including aspirin, celecoxib, diclofenac, ibuprofen, naproxen)  Warnings While Using This Medicine:   · Tell your doctor if you are pregnant or breastfeeding, or if you have kidney disease, liver disease, bleeding problems, or an artificial heart valve. · Do not stop using this medicine suddenly without asking your doctor. You might have a higher risk of stroke for a short time after you stop using this medicine. · This medicine increases your risk for bleeding that can become serious if not controlled. You may also bruise easily, and it may take longer than usual for bleeding to stop. · This medicine may increase your risk for blood clots in your spine or back if you undergo an epidural or spinal puncture. This could lead to paralysis. Tell your doctor if you ever had spine problems or back surgery. · Tell any doctor or dentist who treats you that you are using this medicine. With your doctor's supervision, you may need to stop using this medicine several days before you have surgery or medical tests. · Your doctor will do lab tests at regular visits to check on the effects of this medicine. Keep all appointments. · Keep all medicine out of the reach of children. Never share your medicine with anyone. Possible Side Effects While Using This Medicine:   Call your doctor right away if you notice any of these side effects:  · Allergic reaction: Itching or hives, swelling in your face or hands, swelling or tingling in your mouth or throat, chest tightness, trouble breathing  · Change in how much or how often you urinate, red or pink urine  · Chest pain, trouble breathing  · Coughing up blood, vomiting blood or material that looks like coffee grounds  · Numbness, tingling, or muscle weakness in your legs or feet  · Red or black, tarry stools  · Unusual bleeding, bruising, or weakness  If you notice other side effects that you think are caused by this medicine, tell your doctor. Call your doctor for medical advice about side effects.  You may report side effects to FDA at 7-656-FDA-7689  © 2017 [facility] Information is for End User's use only and may not be sold, redistributed or otherwise used for commercial purposes. The above information is an  only. It is not intended as medical advice for individual conditions or treatments. Talk to your doctor, nurse or pharmacist before following any medical regimen to see if it is safe and effective for you.

## 2017-11-10 NOTE — PROGRESS NOTES
Chief Complaint   Patient presents with    Surgical Follow-up     Right Hand Carpal Tunnel done on 11-06-17     1. Have you been to the ER, urgent care clinic since your last visit? Hospitalized since your last visit? No    2. Have you seen or consulted any other health care providers outside of the [address] [place] [address] since your last visit? Include any pap smears or colon screening.  No

## 2017-11-10 NOTE — MR AVS SNAPSHOT
Visit Information Date & Time Provider Department Dept. Phone Encounter #  
 11/10/2017 12:30 PM Mata Villarreal MD [facility] [address] [place] [address] Road [phone] [id number] Your Appointments 4/5/2018 11:00 AM  
Office Visit with Mata Villarreal MD  
[facility] [address] [place] [address] [address] [facility]) Appt Note: 6 month f/u  
 [address] [facility], [address] Tad Marcial [address] [place]  
  
   
 [address], [address] Upcoming Health Maintenance Date Due  
 BREAST CANCER SCRN MAMMOGRAM 1/5/2018 GLAUCOMA SCREENING Q2Y 6/28/2018 MEDICARE YEARLY EXAM 10/5/2018 COLONOSCOPY 4/3/2019 DTaP/Tdap/Td series (2 - Td) 9/26/2027 Allergies as of 11/10/2017  Review Complete On: 11/10/2017 By: Familia Watkins Severity Noted Reaction Type Reactions Plaquenil [Hydroxychloroquine] High 04/25/2016    Nausea and Vomiting Pcn [Penicillins]  05/17/2010    Itching Prednisone  06/09/2017    Nausea and Vomiting She reports a plaquenil/prednisone come causes vomiting Zithromax [Azithromycin]  05/17/2010    Rash, Other (comments), Cosmo Gracia's records state skin peeling. High fever, peeling Current Immunizations  Reviewed on 12/1/2016 Name Date Influenza High Dose Vaccine PF 9/26/2017 Influenza Vaccine 11/2/2016, 11/6/2015, 9/20/2014, 10/29/2013 Influenza Vaccine Split 10/3/2012, 9/23/2011, 11/1/2010 Pneumococcal Conjugate (PCV-13) 7/7/2016 Pneumococcal Polysaccharide (PPSV-23) 7/16/2017 TD Vaccine 1/1/2007 Tdap 9/26/2017 Zoster Vaccine, Live 2/14/2017 Not reviewed this visit You Were Diagnosed With   
  
 Codes Comments Atrial fibrillation, unspecified type ([facility] [address]    -  Primary ICD-10-CM: I48.91 
ICD-9-CM: 427.31 Vitals BP Pulse Temp Resp Height(growth percentile) Weight(growth percentile)  108/71 (BP 1 Location: Left arm, BP Patient Position: Sitting) 84 96.2 °F (35.7 °C) (Oral) 12 5' 5\" (1.651 m) 126 lb 12.8 oz (57.5 kg) LMP SpO2 BMI OB Status Smoking Status 04/25/2016 97% 21.1 kg/m2 Postmenopausal Never Smoker BMI and BSA Data Body Mass Index Body Surface Area  
 21.1 kg/m 2 1.62 m 2 Preferred Pharmacy Pharmacy Name Phone [facility] [address], [address] [facility] [phone] Your Updated Medication List  
  
   
This list is accurate as of: 11/10/17  1:15 PM.  Always use your most recent med list.  
  
  
  
  
 acetaminophen 650 mg Tber Commonly known as:  TYLENOL Take 650 mg by mouth three (3) times daily as needed for Pain. BOOSTRIX TDAP 2.5-8-5 Lf-mcg-Lf/0.5mL Syrg Generic drug:  Diphth, Pertus(Acell), Tetanus CALCIUM 500+D 500 mg(1,250mg) -200 unit per tablet Generic drug:  calcium-vitamin D Take 1 Tab by mouth two (2) times daily (with meals). CARTIA  mg ER capsule Generic drug:  dilTIAZem CD  
  
 cholecalciferol (VITAMIN D3) 5,000 unit Tab tablet Commonly known as:  VITAMIN D3 Take 5,000 Units by mouth daily. cyanocobalamin 1,000 mcg tablet Take 1,000 mcg by mouth two (2) times a day. diclofenac 1 % Gel Commonly known as:  VOLTAREN Apply  to affected area four (4) times daily. folic acid 1 mg tablet Commonly known as:  Google Take 1 mg by mouth daily. gabapentin 300 mg capsule Commonly known as:  NEURONTIN  
TAKE THREE CAPSULES BY MOUTH NIGHTLY  
  
 melatonin Tab tablet Take 8 mg by mouth nightly. methotrexate 2.5 mg tablet Commonly known as:  [facility] Sides Take 2.5 mg by mouth every Wednesday. Take 6 tablets of 2.5 mg every Wednesday  
  
 trimethoprim-sulfamethoxazole 160-800 mg per tablet Commonly known as:  BACTRIM DS, SEPTRA DS Take 1 Tab by mouth two (2) times a day. VITAMIN C 500 mg tablet Generic drug:  ascorbic acid (vitamin C) Take 500 mg by mouth two (2) times a day. XARELTO 20 mg Tab tablet Generic drug:  rivaroxaban Take 20 mg by mouth daily (with dinner). We Performed the Following REFERRAL TO CARDIOLOGY [JHL65 Custom] Referral Information Referral ID Referred By Referred To  
  
 [id number] Jigna ROSENBERG [address], [address] [place] Maria Antonialatisha  Phone: [phone] Fax: [phone] Visits Status Start Date End Date 1 Authorized 11/10/17 11/10/18 If your referral has a status of pending review or denied, additional information will be sent to support the outcome of this decision. Patient Instructions Health Maintenance Due Topic Date Due  
 BREAST CANCER SCRN MAMMOGRAM  01/05/2018 Apixaban (By mouth) Apixaban (a-PIX-a-ban) Treats and prevents blood clots. This medicine is a blood thinner. Brand Name(s): Eliquis There may be other brand names for this medicine. When This Medicine Should Not Be Used: This medicine is not right for everyone. Do not use it if you had an allergic reaction to apixaban or you have active bleeding. How to Use This Medicine:  
Tablet · Your doctor will tell you how much medicine to use. Do not use more than directed. · If you are not able to swallow the tablets whole, they may be crushed and mixed in water, 5% dextrose in water (D5W), apple juice, or applesauce. The crushed tablets may be mixed with 60 mL of water or D5W dose and given through a nasogastric tube (NGT). · This medicine should come with a Medication Guide. Ask your pharmacist for a copy if you do not have one. · Missed dose: Take a dose as soon as you remember. If it is almost time for your next dose, wait until then and take a regular dose. Do not take extra medicine to make up for a missed dose. · Store the medicine in a closed container at room temperature, away from heat, moisture, and direct light. Drugs and Foods to Avoid: Ask your doctor or pharmacist before using any other medicine, including over-the-counter medicines, vitamins, and herbal products. · Some medicines can affect how apixaban works. Tell your doctor if you are using any of the following: ¨ Carbamazepine, clarithromycin, itraconazole, ketoconazole, phenytoin, rifampin, ritonavir, Samson's wort ¨ Blood thinner (including clopidogrel, heparin, prasugrel, warfarin) ¨ Medicine to treat depression ¨ NSAID pain or arthritis medicine (including aspirin, celecoxib, diclofenac, ibuprofen, naproxen) Warnings While Using This Medicine: · Tell your doctor if you are pregnant or breastfeeding, or if you have kidney disease, liver disease, bleeding problems, or an artificial heart valve. · Do not stop using this medicine suddenly without asking your doctor. You might have a higher risk of stroke for a short time after you stop using this medicine. · This medicine increases your risk for bleeding that can become serious if not controlled. You may also bruise easily, and it may take longer than usual for bleeding to stop. · This medicine may increase your risk for blood clots in your spine or back if you undergo an epidural or spinal puncture. This could lead to paralysis. Tell your doctor if you ever had spine problems or back surgery. · Tell any doctor or dentist who treats you that you are using this medicine. With your doctor's supervision, you may need to stop using this medicine several days before you have surgery or medical tests. · Your doctor will do lab tests at regular visits to check on the effects of this medicine. Keep all appointments. · Keep all medicine out of the reach of children. Never share your medicine with anyone. Possible Side Effects While Using This Medicine:  
Call your doctor right away if you notice any of these side effects: · Allergic reaction: Itching or hives, swelling in your face or hands, swelling or tingling in your mouth or throat, chest tightness, trouble breathing · Change in how much or how often you urinate, red or pink urine · Chest pain, trouble breathing · Coughing up blood, vomiting blood or material that looks like coffee grounds · Numbness, tingling, or muscle weakness in your legs or feet · Red or black, tarry stools · Unusual bleeding, bruising, or weakness If you notice other side effects that you think are caused by this medicine, tell your doctor. Call your doctor for medical advice about side effects. You may report side effects to FDA at 8-617-FTO-2035 © 2017 [address] Information is for End User's use only and may not be sold, redistributed or otherwise used for commercial purposes. The above information is an  only. It is not intended as medical advice for individual conditions or treatments. Talk to your doctor, nurse or pharmacist before following any medical regimen to see if it is safe and effective for you. Introducing \[facility]\"" & HEALTH SERVICES! Dear Macho De Dios: 
Thank you for requesting a ICEX account. Our records indicate that you already have an active ICEX account. You can access your account anytime at [email or web address] Did you know that you can access your hospital and ER discharge instructions at any time in ICEX? You can also review all of your test results from your hospital stay or ER visit. Additional Information If you have questions, please visit the Frequently Asked Questions section of the ICEX website at [email or web address] Remember, ICEX is NOT to be used for urgent needs. For medical emergencies, dial 911. Now available from your iPhone and Android! Please provide this summary of care documentation to your next provider. Your primary care clinician is listed as Beatrice Evans.  If you have any questions after today's visit, please call [phone].

## 2017-11-14 ENCOUNTER — TELEPHONE (OUTPATIENT)
Dept: CARDIOLOGY CLINIC | Age: 67
End: 2017-11-14

## 2017-11-14 NOTE — TELEPHONE ENCOUNTER
Called and spoke with patient. Per Dr Lizette Daily to be seen for a fib.  Patient scheduled for 11/21/17

## 2017-11-21 ENCOUNTER — OFFICE VISIT (OUTPATIENT)
Dept: CARDIOLOGY CLINIC | Age: 67
End: 2017-11-21

## 2017-11-21 VITALS
HEART RATE: 79 BPM | HEIGHT: 65 IN | SYSTOLIC BLOOD PRESSURE: 103 MMHG | WEIGHT: 127 LBS | BODY MASS INDEX: 21.16 KG/M2 | DIASTOLIC BLOOD PRESSURE: 68 MMHG

## 2017-11-21 DIAGNOSIS — G45.9 TRANSIENT CEREBRAL ISCHEMIA, UNSPECIFIED TYPE: Primary | ICD-10-CM

## 2017-11-21 DIAGNOSIS — G56.03 BILATERAL CARPAL TUNNEL SYNDROME: ICD-10-CM

## 2017-11-21 DIAGNOSIS — I48.0 PAF (PAROXYSMAL ATRIAL FIBRILLATION) (HCC): ICD-10-CM

## 2017-11-21 RX ORDER — METOPROLOL TARTRATE 25 MG/1
12.5 TABLET, FILM COATED ORAL 2 TIMES DAILY
Qty: 40 TAB | Refills: 4 | Status: SHIPPED | OUTPATIENT
Start: 2017-11-21 | End: 2018-06-01 | Stop reason: SDUPTHER

## 2017-11-21 NOTE — MR AVS SNAPSHOT
Visit Information Date & Time Provider Department Dept. Phone Encounter #  
 11/21/2017 11:00 AM Tereso Martinez MD [facility] [address] [place] [address] [id number] Follow-up Instructions Return in about 2 months (around 1/21/2018). Your Appointments 1/23/2018  9:00 AM  
ESTABLISHED PATIENT with Tereso Martinez MD  
[facility]) Appt Note: 2 months [address] [facility], [address] [facility] [address]  
[phone]  
  
   
 [address] [facility], [address] [facility] [address]  
  
    
 4/5/2018 11:00 AM  
Office Visit with Sherin Rasheed MD  
[facility] [place] [address]) Appt Note: 6 month f/u  
 [address] [facility], [address] [address] [address] [facility]  
  
   
 [address], [address] Upcoming Health Maintenance Date Due  
 BREAST CANCER SCRN MAMMOGRAM 1/5/2018 GLAUCOMA SCREENING Q2Y 6/28/2018 MEDICARE YEARLY EXAM 10/5/2018 COLONOSCOPY 4/3/2019 DTaP/Tdap/Td series (2 - Td) 9/26/2027 Allergies as of 11/21/2017  Review Complete On: 11/21/2017 By: Tereso Martinez MD  
  
 Severity Noted Reaction Type Reactions Plaquenil [Hydroxychloroquine] High 04/25/2016    Nausea and Vomiting Pcn [Penicillins]  05/17/2010    Itching Prednisone  06/09/2017    Nausea and Vomiting She reports a plaquenil/prednisone come causes vomiting Zithromax [Azithromycin]  05/17/2010    Rash, Other (comments), Cosmo Gracia's records state skin peeling. High fever, peeling Current Immunizations  Reviewed on 12/1/2016 Name Date Influenza High Dose Vaccine PF 9/26/2017 Influenza Vaccine 11/2/2016, 11/6/2015, 9/20/2014, 10/29/2013 Influenza Vaccine Split 10/3/2012, 9/23/2011, 11/1/2010 Pneumococcal Conjugate (PCV-13) 7/7/2016 Pneumococcal Polysaccharide (PPSV-23) 7/16/2017 TD Vaccine 1/1/2007 Tdap 9/26/2017 Zoster Vaccine, Live 2/14/2017 Not reviewed this visit You Were Diagnosed With   
  
 Codes Comments Transient cerebral ischemia, unspecified type    -  Primary ICD-10-CM: G45.9 ICD-9-CM: 435.9 PAF (paroxysmal atrial fibrillation) (HCC)     ICD-10-CM: I48.0 ICD-9-CM: 427.31 Bilateral carpal tunnel syndrome     ICD-10-CM: G56.03 
ICD-9-CM: 354.0 Vitals BP Pulse Height(growth percentile) Weight(growth percentile) LMP BMI  
 103/68 79 5' 5\" (1.651 m) 127 lb (57.6 kg) 04/25/2016 21.13 kg/m2 OB Status Smoking Status Postmenopausal Never Smoker Vitals History BMI and BSA Data Body Mass Index Body Surface Area  
 21.13 kg/m 2 1.63 m 2 Preferred Pharmacy Pharmacy Name Phone [facility], [address] [facility] [phone] Your Updated Medication List  
  
   
This list is accurate as of: 11/21/17 12:04 PM.  Always use your most recent med list.  
  
  
  
  
 acetaminophen 650 mg Tber Commonly known as:  TYLENOL Take 650 mg by mouth three (3) times daily as needed for Pain. BOOSTRIX TDAP 2.5-8-5 Lf-mcg-Lf/0.5mL Syrg Generic drug:  Diphth, Pertus(Acell), Tetanus CALCIUM 500+D 500 mg(1,250mg) -200 unit per tablet Generic drug:  calcium-vitamin D Take 1 Tab by mouth two (2) times daily (with meals). cholecalciferol (VITAMIN D3) 5,000 unit Tab tablet Commonly known as:  VITAMIN D3 Take 5,000 Units by mouth daily. cyanocobalamin 1,000 mcg tablet Take 1,000 mcg by mouth two (2) times a day. diclofenac 1 % Gel Commonly known as:  VOLTAREN Apply  to affected area four (4) times daily. folic acid 1 mg tablet Commonly known as:  Google Take 1 mg by mouth daily. gabapentin 300 mg capsule Commonly known as:  NEURONTIN  
TAKE THREE CAPSULES BY MOUTH NIGHTLY  
  
 melatonin Tab tablet Take 8 mg by mouth nightly. methotrexate 2.5 mg tablet Commonly known as:  [facility] Sides Take 2.5 mg by mouth every Wednesday. Take 6 tablets of 2.5 mg every Wednesday  Stopped for surgery for on 11/6/2017  
  
 metoprolol tartrate 25 mg tablet Commonly known as:  LOPRESSOR Take 0.5 Tabs by mouth two (2) times a day. VITAMIN C 500 mg tablet Generic drug:  ascorbic acid (vitamin C) Take 500 mg by mouth two (2) times a day. XARELTO 20 mg Tab tablet Generic drug:  rivaroxaban Take 20 mg by mouth daily (with dinner). Prescriptions Sent to Pharmacy Refills  
 metoprolol tartrate (LOPRESSOR) 25 mg tablet 4 Sig: Take 0.5 Tabs by mouth two (2) times a day. Class: Normal  
 Pharmacy: [facility] at [address] [place] [address], [address] [place] [address] #: [phone] Route: Oral  
  
Follow-up Instructions Return in about 2 months (around 1/21/2018). Introducing [facility] & HEALTH SERVICES! Dear Swapnil Read: 
Thank you for requesting a Integr[employer]acking account. Our records indicate that you already have an active Integr[employer] Tracking account. You can access your account anytime at [email or web address] Did you know that you can access your hospital and ER discharge instructions at any time in Integr[employer] Tracking? You can also review all of your test results from your hospital stay or ER visit. Additional Information If you have questions, please visit the Frequently Asked Questions section of the Integrity Tracking website at [email or web address] Remember, [employer] Tracking is NOT to be used for urgent needs. For medical emergencies, dial 911. Now available from your iPhone and Android! Please provide this summary of care documentation to your next provider. Your primary care clinician is listed as Crow Alvarez. If you have any questions after today's visit, please call [phone].

## 2017-11-21 NOTE — PROGRESS NOTES
HISTORY OF PRESENT ILLNESS  Ty Barksdale is a 78 y.o. female. New Patient   The history is provided by the patient. This is a chronic problem. The current episode started more than 1 week ago. The problem occurs rarely. Pertinent negatives include no chest pain, no abdominal pain, no headaches and no shortness of breath. Palpitations    The history is provided by the patient. This is a chronic problem. The problem has not changed since onset. The problem occurs every several days. Pertinent negatives include no diaphoresis, no fever, no malaise/fatigue, no numbness, no chest pain, no claudication, no near-syncope, no orthopnea, no PND, no abdominal pain, no nausea, no vomiting, no headaches, no dizziness, no weakness, no cough, no hemoptysis, no shortness of breath and no sputum production. Her past medical history is significant for atrial fibrillation. Review of Systems   Constitutional: Negative for chills, diaphoresis, fever, malaise/fatigue and weight loss. HENT: Negative for congestion, ear discharge, ear pain, hearing loss, nosebleeds and tinnitus. Eyes: Negative for blurred vision. Respiratory: Negative for cough, hemoptysis, sputum production, shortness of breath, wheezing and stridor. Cardiovascular: Positive for palpitations. Negative for chest pain, orthopnea, claudication, leg swelling, PND and near-syncope. Gastrointestinal: Negative for abdominal pain, heartburn, nausea and vomiting. Musculoskeletal: Negative for myalgias and neck pain. Skin: Negative for itching and rash. Neurological: Negative for dizziness, tingling, tremors, focal weakness, loss of consciousness, weakness, numbness and headaches. Psychiatric/Behavioral: Negative for depression and suicidal ideas.      Family History   Problem Relation Age of Onset    Hypertension Father     High Cholesterol Father     Diabetes Father     Stroke Father     Heart Attack Father     Cancer Father     Heart Disease Father     Depression Sister     Breast Cancer Maternal Grandmother        Past Medical History:   Diagnosis Date    A-fib [facility])     Arthritis     Feet    Memory change     Menopause     Multiple thyroid nodules 2009    under care of Dr. Luisa Fatima MVP (mitral valve prolapse)     under care of Dr. Paulina Madrigal Neuropathy     Plantar fasciitis     PSVT (paroxysmal supraventricular tachycardia) ([facility] [address] 2/8/2011    Right knee pain     Routine gynecological examination     done by Dr. Yvonne Alberto S/P colonoscopy     done past 1-2 yrs? Dr. Bon Membreno Sjogren's syndrome [facility])     sees Dr. Edelmiro Dance    TIA (transient ischemic attack)     possible history of    Tibialis tendonitis     following with Dr Rivera Stafford ankle       Past Surgical History:   Procedure Laterality Date    DILATION AND CURETTAGE  1987    HX BREAST AUGMENTATION  1984    HX DILATION AND CURETTAGE  1/2013    HX TONSILLECTOMY      IMPLANT BREAST SILICONE/EQ      TARSAL TUNNEL RELEASE  1994    Right       Social History   Substance Use Topics    Smoking status: Never Smoker    Smokeless tobacco: Never Used    Alcohol use No       Allergies   Allergen Reactions    Plaquenil [Hydroxychloroquine] Nausea and Vomiting    Pcn [Penicillins] Itching    Prednisone Nausea and Vomiting     She reports a plaquenil/prednisone come causes vomiting    Zithromax [Azithromycin] Rash, Other (comments) and Hives     [facility] records state skin peeling. High fever, peeling       Outpatient Prescriptions Marked as Taking for the 11/21/17 encounter (Office Visit) with Kelsea Garner MD   Medication Sig Dispense Refill    metoprolol tartrate (LOPRESSOR) 25 mg tablet Take 0.5 Tabs by mouth two (2) times a day.  40 Tab 4    gabapentin (NEURONTIN) 300 mg capsule TAKE THREE CAPSULES BY MOUTH NIGHTLY (Patient taking differently: TAKE two  CAPSULES BY MOUTH NIGHTLY) 90 Cap 1    cholecalciferol, VITAMIN D3, (VITAMIN D3) 5,000 unit tab tablet Take 5,000 Units by mouth daily.  folic acid (FOLVITE) 1 mg tablet Take 1 mg by mouth daily.  cyanocobalamin 1,000 mcg tablet Take 1,000 mcg by mouth two (2) times a day.  acetaminophen (TYLENOL) 650 mg CR tablet Take 650 mg by mouth three (3) times daily as needed for Pain.  XARELTO 20 mg tab tablet Take 20 mg by mouth daily (with dinner).  melatonin tab tablet Take 8 mg by mouth nightly.  ascorbic acid (VITAMIN C) 500 mg tablet Take 500 mg by mouth two (2) times a day. Visit Vitals    /68    Pulse 79    Ht 5' 5\" (1.651 m)    Wt 57.6 kg (127 lb)    LMP 04/25/2016    BMI 21.13 kg/m2     Physical Exam   Constitutional: She is oriented to person, place, and time. She appears well-developed and well-nourished. No distress. HENT:   Head: Atraumatic. Mouth/Throat: No oropharyngeal exudate. Eyes: Conjunctivae are normal. Right eye exhibits no discharge. Left eye exhibits no discharge. No scleral icterus. Neck: Neck supple. No JVD present. No tracheal deviation present. No thyromegaly present. Cardiovascular: Normal rate and regular rhythm. Exam reveals no gallop. No murmur heard. Pulmonary/Chest: Effort normal and breath sounds normal. No stridor. She has no wheezes. She has no rales. Abdominal: Soft. There is no tenderness. There is no rebound and no guarding. Musculoskeletal: Normal range of motion. She exhibits no edema or tenderness. Lymphadenopathy:     She has no cervical adenopathy. Neurological: She is alert and oriented to person, place, and time. She exhibits normal muscle tone. Skin: Skin is warm. She is not diaphoretic. Psychiatric: She has a normal mood and affect. Her behavior is normal.     Echo 2016:  [address] SMALL LEFT VENTRICLE WITH MILD LEFT VENTRICULAR HYPERTROPHY PRESENT. NORMAL GLOBAL LEFT VENTRICULAR SYSTOLIC FUNCTION. NORMAL DIASTOLIC FUNCTION.    ESTIMATED EJECTION FRACTION OF 60% NORMAL RIGHT VENTRICULAR SIZE. NORMAL RIGHT VENTRICULAR GLOBAL SYSTOLIC FUNCTION. ESTIMATED PULMONARY ARTERY PRESSURE OF 21MMHG. NO HEMODYNAMICALLY SIGNIFICANT VALVULAR PATHOLOGY. NO PRIOR STUDY FOR COMPARISON. ASSESSMENT and PLAN    ICD-10-CM ICD-9-CM    1. Transient cerebral ischemia, unspecified type G45.9 435.9    2. PAF (paroxysmal atrial fibrillation) ([facility]) I48.0 427.31    3. Bilateral carpal tunnel syndrome, per EMG testing 8/17 G56.03 354.0      Orders Placed This Encounter    metoprolol tartrate (LOPRESSOR) 25 mg tablet     Sig: Take 0.5 Tabs by mouth two (2) times a day. Dispense:  40 Tab     Refill:  4     Follow-up Disposition:  Return in about 2 months (around 1/21/2018). current treatment plan is effective, no change in therapy. Patient with h/o ? ?? TIA vs transient amenesia in 2016 - also found to have atrial flutter- discharged home on xarelto and cartia. Cartia dose decreased from 240mg to 120mg daily. She has rare short lived palpitations. No syncope but c/o fatigue. Will discontinue cartia and add low dose lopressor 12.5mg bid. Continue xarelto for now.

## 2018-01-15 ENCOUNTER — HOSPITAL ENCOUNTER (OUTPATIENT)
Dept: MAMMOGRAPHY | Age: 68
Discharge: HOME | End: 2018-01-15
Attending: INTERNAL MEDICINE
Payer: MEDICARE

## 2018-01-15 DIAGNOSIS — Z12.31 VISIT FOR SCREENING MAMMOGRAM: ICD-10-CM

## 2018-01-15 PROCEDURE — 77063 BREAST TOMOSYNTHESIS BI: CPT

## 2018-01-16 NOTE — PROGRESS NOTES
Please let the pt know that her mammogram shows no suspicious findings. This study should be repeated in 1 year.     Dr. Arun Pascual  [facility], [address] [place] [address] Str.  Phone: [phone]  Fax: [phone]

## 2018-01-16 NOTE — PROGRESS NOTES
[facility]  [address]  [place], [address]  [phone] fax    Physical Therapy Discharge Summary  Patient name: Epi Graf Start of Care: 2017   Referral source: Asuncion Brown MD : 1950                         Medical Diagnosis: Polyneuropathy, unspecified [G62.9]  Myalgia [M79.1] Onset Date:2-3 months ago                         Treatment Diagnosis: Cervical radiculopathy L>R   Prior Hospitalization: see medical history Provider#: [id number]   Medications: Verified on Patient summary List    Comorbidities: arthritis, mitral valve prolapse, sjogren's syndrome, atrial fibrillation, carpal tunnel, rheumatoid arthritis   Prior Level of Function: Walking, gardening; sees an acupuncturist; able to sleep on both sides without pain    Visits from Start of Care: 8    Missed Visits: 0  Reporting Period : 2017 to 2017      Summary of Care:  Updated Goals to be accomplished in 2 weeks:              1.  Pt will demonstrate FOTO to 60 or greater to indicate improved functional task completion.                        0.  Pt will demonstrate L cervical rotation to 50 degrees or greater to improve driving ADLs. ASSESSMENT/RECOMMENDATIONS: Unable to further assess progress towards goals at this time due to non-compliance/lack of attendance. DC at this time with no further instructions to the patient.   Thank you for this referral.  [x]Discontinue therapy: []Patient has reached or is progressing toward set goals      [x]Patient is non-compliant or has abdicated      []Due to lack of appreciable progress towards set goals    Sheyla Holm, PT 2018 12:33 PM

## 2018-01-23 ENCOUNTER — OFFICE VISIT (OUTPATIENT)
Dept: CARDIOLOGY CLINIC | Age: 68
End: 2018-01-23

## 2018-01-23 VITALS
BODY MASS INDEX: 21.83 KG/M2 | HEART RATE: 80 BPM | DIASTOLIC BLOOD PRESSURE: 70 MMHG | SYSTOLIC BLOOD PRESSURE: 118 MMHG | HEIGHT: 65 IN | WEIGHT: 131 LBS

## 2018-01-23 DIAGNOSIS — G45.9 TRANSIENT CEREBRAL ISCHEMIA, UNSPECIFIED TYPE: ICD-10-CM

## 2018-01-23 DIAGNOSIS — I34.1 MVP (MITRAL VALVE PROLAPSE): ICD-10-CM

## 2018-01-23 DIAGNOSIS — I48.0 PAF (PAROXYSMAL ATRIAL FIBRILLATION) (HCC): Primary | ICD-10-CM

## 2018-01-23 NOTE — PROGRESS NOTES
1. Have you been to the ER, urgent care clinic since your last visit? Hospitalized since your last visit? No    2. Have you seen or consulted any other health care providers outside of the [address] [place] [address] since your last visit? Include any pap smears or colon screening.  Dr Chan Medley      Patient did not bring meds or list but states everything is the same

## 2018-01-23 NOTE — LETTER
Dear Pirya Anderson Re: Akua Dose : 1950 Ms. Greer is cleared with low risk for carpal tunnel surgery from a cardiac stand point. She will need to hold Xarelto 2 day's prior and restart ASAP post OP If you have any questions or any further assistance is needed please contact our office. Sincerely, 
 
  
 
 
 
 
cc:  Sylvia Rondon MD

## 2018-01-23 NOTE — LETTER
Tee Shelton 1950 1/23/2018 Dear Carley Forrest MD 
 
I had the pleasure of evaluating  Ms. Greer in office today. Below are the relevant portions of my assessment and plan of care. {No Diagnosis Found} Current Outpatient Prescriptions Medication Sig Dispense Refill  B.infantis-B.ani-B.long-B.bifi (PROBIOTIC 4X) 10-15 mg TbEC Take  by mouth.  metoprolol tartrate (LOPRESSOR) 25 mg tablet Take 0.5 Tabs by mouth two (2) times a day. 40 Tab 4  
 gabapentin (NEURONTIN) 300 mg capsule TAKE THREE CAPSULES BY MOUTH NIGHTLY (Patient taking differently: TAKE two  CAPSULES BY MOUTH NIGHTLY) 90 Cap 1  diclofenac (VOLTAREN) 1 % gel Apply  to affected area four (4) times daily.  acetaminophen (TYLENOL) 650 mg CR tablet Take 650 mg by mouth three (3) times daily as needed for Pain (taking 2 tid).  XARELTO 20 mg tab tablet Take 20 mg by mouth daily (with dinner).  melatonin tab tablet Take 8 mg by mouth nightly.  ascorbic acid (VITAMIN C) 500 mg tablet Take 500 mg by mouth two (2) times a day.  BOOSTRIX TDAP 2.5-8-5 Lf-mcg-Lf/0.5mL syrg  cholecalciferol, VITAMIN D3, (VITAMIN D3) 5,000 unit tab tablet Take 5,000 Units by mouth daily.  cyanocobalamin 1,000 mcg tablet Take 1,000 mcg by mouth two (2) times a day.  calcium-vitamin D (CALCIUM 500+D) 500 mg(1,250mg) -200 unit per tablet Take 1 Tab by mouth two (2) times daily (with meals). Orders Placed This Encounter  B.infantis-B.ani-B.long-B.bifi (PROBIOTIC 4X) 10-15 mg TbEC Sig: Take  by mouth. If you have questions, please do not hesitate to call me. I look forward to following Ms. Greer along with you.  
 
Sincerely, 
Edward Sparks MD

## 2018-01-23 NOTE — MR AVS SNAPSHOT
[address], [address] [facility] [address] 
[phone] Patient: Caitlyn Dawkins MRN: [id number] Visit Information Date & Time Provider Department Dept. Phone Encounter #  
 1/23/2018  9:00 AM Twyla Patel MD [facility] [address] [place] [address] [id number] Your Appointments 4/5/2018 11:00 AM  
Office Visit with Silvia Arana MD  
[facility] [address] [place] [address]) Appt Note: 6 month f/u  
 [address] [facility], [address] [id number] [address] [place]  
  
   
 [address], [address] Upcoming Health Maintenance Date Due  
 GLAUCOMA SCREENING Q2Y 6/28/2018 MEDICARE YEARLY EXAM 10/5/2018 BREAST CANCER SCRN MAMMOGRAM 1/15/2019 COLONOSCOPY 4/3/2019 DTaP/Tdap/Td series (2 - Td) 9/26/2027 Allergies as of 1/23/2018  Review Complete On: 1/23/2018 By: Denice Acosta LPN Severity Noted Reaction Type Reactions Plaquenil [Hydroxychloroquine] High 04/25/2016    Nausea and Vomiting Pcn [Penicillins]  05/17/2010    Itching Prednisone  06/09/2017    Nausea and Vomiting She reports a plaquenil/prednisone come causes vomiting Zithromax [Azithromycin]  05/17/2010    Rash, Other (comments), Hives [facility] records state skin peeling. High fever, peeling Current Immunizations  Reviewed on 12/1/2016 Name Date Influenza High Dose Vaccine PF 9/26/2017 Influenza Vaccine 11/2/2016, 11/6/2015, 9/20/2014, 10/29/2013 Influenza Vaccine Split 10/3/2012, 9/23/2011, 11/1/2010 Pneumococcal Conjugate (PCV-13) 7/7/2016 Pneumococcal Polysaccharide (PPSV-23) 7/16/2017 TD Vaccine 1/1/2007 Tdap 9/26/2017 Zoster Vaccine, Live 2/14/2017 Not reviewed this visit Vitals Height(growth percentile) Weight(growth percentile) LMP BMI OB Status Smoking Status 5' 5\" (1.651 m) 131 lb (59.4 kg) 04/25/2016 21.8 kg/m2 Postmenopausal Never Smoker BMI and BSA Data Body Mass Index Body Surface Area  
 21.8 kg/m 2 1.65 m 2 Preferred Pharmacy Pharmacy Name Phone [facility], [address] [phone] Your Updated Medication List  
  
   
This list is accurate as of: 1/23/18  9:56 AM.  Always use your most recent med list.  
  
  
  
  
 acetaminophen 650 mg Tber Commonly known as:  TYLENOL Take 650 mg by mouth three (3) times daily as needed for Pain (taking 2 tid). BOOSTRIX TDAP 2.5-8-5 Lf-mcg-Lf/0.5mL Syrg Generic drug:  Diphth, Pertus(Acell), Tetanus CALCIUM 500+D 500 mg(1,250mg) -200 unit per tablet Generic drug:  calcium-vitamin D Take 1 Tab by mouth two (2) times daily (with meals). cholecalciferol (VITAMIN D3) 5,000 unit Tab tablet Commonly known as:  VITAMIN D3 Take 5,000 Units by mouth daily. cyanocobalamin 1,000 mcg tablet Take 1,000 mcg by mouth two (2) times a day. diclofenac 1 % Gel Commonly known as:  VOLTAREN Apply  to affected area four (4) times daily. gabapentin 300 mg capsule Commonly known as:  NEURONTIN  
TAKE THREE CAPSULES BY MOUTH NIGHTLY  
  
 melatonin Tab tablet Take 8 mg by mouth nightly. metoprolol tartrate 25 mg tablet Commonly known as:  LOPRESSOR Take 0.5 Tabs by mouth two (2) times a day. PROBIOTIC 4X 10-15 mg Tbec Generic drug:  B.infantis-B.ani-B.long-B.bifi Take  by mouth. VITAMIN C 500 mg tablet Generic drug:  ascorbic acid (vitamin C) Take 500 mg by mouth two (2) times a day. XARELTO 20 mg Tab tablet Generic drug:  rivaroxaban Take 20 mg by mouth daily (with dinner). To-Do List   
 01/15/2019 1:00 PM  
  Appointment with [facility] KATELIN Bygget 9 RM 1 at [facility] ([phone]) PAYMENT  For Non-Medicare patients - $15.00 will be collected from you at the time of your exam.  You will be billed $35.00 from the reading [facility]. OUTSIDE FILMS  - Any outside films related to the study being scheduled should be brought with you on the day of the exam.  If this cannot be done there may be a delay in the reading of the study. MEDICATIONS  - Patient must bring a complete list of all medications currently taking to include prescriptions, over-the-counter meds, herbals, vitamins & any dietary supplements  GENERAL INSTRUCTIONS  - On the day of your exam do not use any bath powder, deodorant or lotions on the armpit area. -Tenderness of breasts may cause an increase of discomfort during procedure. If you are experiencing breast tenderness on the day of your appointment and would like to reschedule, please call [phone]. Introducing [facility] SERVICES! Dear Edman Mcardle: 
Thank you for requesting a [employer] account. Our records indicate that you already have an active [employer] account. You can access your account anytime at [email or web address] Did you know that you can access your hospital and ER discharge instructions at any time in [employer]? You can also review all of your test results from your hospital stay or ER visit. Additional Information If you have questions, please visit the Frequently Asked Questions section of the [employer] website at [email or web address] Remember, AchaLa is NOT to be used for urgent needs. For medical emergencies, dial 911. Now available from your iPhone and Android! Please provide this summary of care documentation to your next provider. Your primary care clinician is listed as Wilton Cushing. If you have any questions after today's visit, please call [phone].

## 2018-01-23 NOTE — PROGRESS NOTES
HISTORY OF PRESENT ILLNESS  David Cardona is a 80 y.o. female. Palpitations    The history is provided by the patient. This is a chronic problem. The problem has not changed since onset. The problem occurs rarely. Pertinent negatives include no diaphoresis, no fever, no malaise/fatigue, no numbness, no near-syncope, no orthopnea, no PND, no nausea, no vomiting, no dizziness, no weakness, no cough, no hemoptysis and no sputum production. Her past medical history is significant for atrial fibrillation. Review of Systems   Constitutional: Negative for chills, diaphoresis, fever, malaise/fatigue and weight loss. HENT: Negative for congestion, ear discharge, ear pain, hearing loss, nosebleeds and tinnitus. Eyes: Negative for blurred vision. Respiratory: Negative for cough, hemoptysis, sputum production, wheezing and stridor. Cardiovascular: Positive for palpitations. Negative for orthopnea, leg swelling, PND and near-syncope. Gastrointestinal: Negative for heartburn, nausea and vomiting. Musculoskeletal: Negative for myalgias and neck pain. Skin: Negative for itching and rash. Neurological: Negative for dizziness, tingling, tremors, focal weakness, loss of consciousness, weakness and numbness. Psychiatric/Behavioral: Negative for depression and suicidal ideas.      Family History   Problem Relation Age of Onset    Hypertension Father     High Cholesterol Father     Diabetes Father     Stroke Father     Heart Attack Father     Cancer Father     Heart Disease Father     Depression Sister     Breast Cancer Maternal Grandmother        Past Medical History:   Diagnosis Date    [facility]-fib [facility])     Arthritis     Feet    Memory change     Menopause     Multiple thyroid nodules 2009    under care of Dr. Martina Bui MVP (mitral valve prolapse)     under care of Dr. Belinda Nieves Neuropathy     Plantar fasciitis     PSVT (paroxysmal supraventricular tachycardia) ([facility] 2/8/2011    Right knee pain     Routine gynecological examination     done by Dr. Naveen Richter S/P colonoscopy     done past 1-2 yrs? Dr. [place] Walsh Sjogren's syndrome [facility])     sees Dr. Dinora Elizabeth    TIA (transient ischemic attack)     possible history of    Tibialis tendonitis     following with Dr Stephanie Easley ankle       Past Surgical History:   Procedure Laterality Date    DILATION AND CURETTAGE  1987    HX BREAST AUGMENTATION  1984    HX DILATION AND CURETTAGE  1/2013    HX TONSILLECTOMY      IMPLANT BREAST SILICONE/EQ      TARSAL TUNNEL RELEASE  1994    Right       Social History   Substance Use Topics    Smoking status: Never Smoker    Smokeless tobacco: Never Used    Alcohol use No       Allergies   Allergen Reactions    Plaquenil [Hydroxychloroquine] Nausea and Vomiting    Pcn [Penicillins] Itching    Prednisone Nausea and Vomiting     She reports a plaquenil/prednisone come causes vomiting    Zithromax [Azithromycin] Rash, Other (comments) and [facility]'s records state skin peeling. High fever, peeling       Outpatient Prescriptions Marked as Taking for the 1/23/18 encounter (Office Visit) with Cristel Lux MD   Medication Sig Dispense Refill    B.infantis-B.ani-B.long-B.bifi (PROBIOTIC 4X) 10-15 mg TbEC Take  by mouth.  metoprolol tartrate (LOPRESSOR) 25 mg tablet Take 0.5 Tabs by mouth two (2) times a day. 40 Tab 4    gabapentin (NEURONTIN) 300 mg capsule TAKE THREE CAPSULES BY MOUTH NIGHTLY (Patient taking differently: TAKE two  CAPSULES BY MOUTH NIGHTLY) 90 Cap 1    diclofenac (VOLTAREN) 1 % gel Apply  to affected area four (4) times daily.  acetaminophen (TYLENOL) 650 mg CR tablet Take 650 mg by mouth three (3) times daily as needed for Pain (taking 2 tid).  XARELTO 20 mg tab tablet Take 20 mg by mouth daily (with dinner).  melatonin tab tablet Take 8 mg by mouth nightly.  ascorbic acid (VITAMIN C) 500 mg tablet Take 500 mg by mouth two (2) times a day. Visit Vitals    /70    Pulse 80    Ht 5' 5\" (1.651 m)    Wt 59.4 kg (131 lb)    LMP 04/25/2016    BMI 21.8 kg/m2     Physical Exam   Constitutional: She is oriented to person, place, and time. She appears well-developed and well-nourished. No distress. HENT:   Head: Atraumatic. Mouth/Throat: No oropharyngeal exudate. Eyes: Conjunctivae are normal. Right eye exhibits no discharge. Left eye exhibits no discharge. No scleral icterus. Neck: Neck supple. No JVD present. No tracheal deviation present. No thyromegaly present. Cardiovascular: Normal rate and regular rhythm. Exam reveals no gallop. No murmur heard. Pulmonary/Chest: Effort normal and breath sounds normal. No stridor. She has no wheezes. She has no rales. Abdominal: Soft. There is no tenderness. There is no rebound and no guarding. Musculoskeletal: Normal range of motion. She exhibits no edema or tenderness. Lymphadenopathy:     She has no cervical adenopathy. Neurological: She is alert and oriented to person, place, and time. She exhibits normal muscle tone. Skin: Skin is warm. She is not diaphoretic. Psychiatric: She has a normal mood and affect. Her behavior is normal.     Echo 2016:  [address]. SMALL LEFT VENTRICLE WITH MILD LEFT VENTRICULAR HYPERTROPHY PRESENT. NORMAL GLOBAL LEFT VENTRICULAR SYSTOLIC FUNCTION. NORMAL DIASTOLIC FUNCTION. ESTIMATED EJECTION FRACTION OF 60%   NORMAL RIGHT VENTRICULAR SIZE. NORMAL RIGHT VENTRICULAR GLOBAL SYSTOLIC FUNCTION. ESTIMATED PULMONARY ARTERY PRESSURE OF 21MMHG. NO HEMODYNAMICALLY SIGNIFICANT VALVULAR PATHOLOGY. NO PRIOR STUDY FOR COMPARISON. ASSESSMENT and PLAN    ICD-10-CM ICD-9-CM    1. PAF (paroxysmal atrial fibrillation) (HCC) I48.0 427.31    2. MVP (mitral valve prolapse) I34.1 424.0    3. Transient cerebral ischemia, unspecified type G45.9 435.9      No orders of the defined types were placed in this encounter.     Follow-up Disposition:  Return in about 6 months (around 7/23/2018). current treatment plan is effective, no change in therapy. Patient with h/o ? ?? TIA vs transient amenesia in 2016 - also found to have atrial flutter- discharged home on xarelto and cartia. Cartia dose decreased from 240mg to 120mg daily but continued to have fatigue  She has rare short lived palpitations. She is doing better on lopressor. Continue xarelto for now.   Can proceed to planned carpel tunnel surgery with low cardiac risk

## 2018-02-06 ENCOUNTER — HOSPITAL ENCOUNTER (OUTPATIENT)
Dept: LAB | Age: 68
Discharge: HOME | End: 2018-02-06
Payer: MEDICARE

## 2018-02-06 DIAGNOSIS — Z01.812 BLOOD TESTS PRIOR TO TREATMENT OR PROCEDURE: ICD-10-CM

## 2018-02-06 LAB
ALBUMIN SERPL-MCNC: 3.8 G/DL (ref 3.4–5)
ALBUMIN/GLOB SERPL: 1.1 {RATIO} (ref 0.8–1.7)
ALP SERPL-CCNC: 76 U/L (ref 45–117)
ALT SERPL-CCNC: 22 U/L (ref 13–56)
ANION GAP SERPL CALC-SCNC: 4 MMOL/L (ref 3–18)
APTT PPP: 34.6 SEC (ref 23–36.4)
AST SERPL-CCNC: 19 U/L (ref 15–37)
ATRIAL RATE: 69 BPM
BILIRUB SERPL-MCNC: 0.4 MG/DL (ref 0.2–1)
BUN SERPL-MCNC: 13 MG/DL (ref 7–18)
BUN/CREAT SERPL: 22 (ref 12–20)
CALCIUM SERPL-MCNC: 8.9 MG/DL (ref 8.5–10.1)
CALCULATED P AXIS, ECG09: 54 DEGREES
CALCULATED R AXIS, ECG10: 47 DEGREES
CALCULATED T AXIS, ECG11: 45 DEGREES
CHLORIDE SERPL-SCNC: 106 MMOL/L (ref 100–108)
CO2 SERPL-SCNC: 29 MMOL/L (ref 21–32)
CREAT SERPL-MCNC: 0.6 MG/DL (ref 0.6–1.3)
DIAGNOSIS, 93000: NORMAL
ERYTHROCYTE [DISTWIDTH] IN BLOOD BY AUTOMATED COUNT: 12.9 % (ref 11.6–14.5)
GLOBULIN SER CALC-MCNC: 3.4 G/DL (ref 2–4)
GLUCOSE SERPL-MCNC: 87 MG/DL (ref 74–99)
HCT VFR BLD AUTO: 43.7 % (ref 35–45)
HGB BLD-MCNC: 14.8 G/DL (ref 12–16)
INR PPP: 1.2 (ref 0.8–1.2)
MCH RBC QN AUTO: 30.8 PG (ref 24–34)
MCHC RBC AUTO-ENTMCNC: 33.9 G/DL (ref 31–37)
MCV RBC AUTO: 91 FL (ref 74–97)
P-R INTERVAL, ECG05: 148 MS
PLATELET # BLD AUTO: 334 K/UL (ref 135–420)
PMV BLD AUTO: 9.2 FL (ref 9.2–11.8)
POTASSIUM SERPL-SCNC: 4.4 MMOL/L (ref 3.5–5.5)
PROT SERPL-MCNC: 7.2 G/DL (ref 6.4–8.2)
PROTHROMBIN TIME: 15 SEC (ref 11.5–15.2)
Q-T INTERVAL, ECG07: 392 MS
QRS DURATION, ECG06: 74 MS
QTC CALCULATION (BEZET), ECG08: 420 MS
RBC # BLD AUTO: 4.8 M/UL (ref 4.2–5.3)
SODIUM SERPL-SCNC: 139 MMOL/L (ref 136–145)
VENTRICULAR RATE, ECG03: 69 BPM
WBC # BLD AUTO: 5.2 K/UL (ref 4.6–13.2)

## 2018-02-06 PROCEDURE — 85730 THROMBOPLASTIN TIME PARTIAL: CPT

## 2018-02-06 PROCEDURE — 80053 COMPREHEN METABOLIC PANEL: CPT

## 2018-02-06 PROCEDURE — 93005 ELECTROCARDIOGRAM TRACING: CPT

## 2018-02-06 PROCEDURE — 85610 PROTHROMBIN TIME: CPT

## 2018-02-06 PROCEDURE — 36415 COLL VENOUS BLD VENIPUNCTURE: CPT

## 2018-02-06 PROCEDURE — 85027 COMPLETE CBC AUTOMATED: CPT

## 2018-02-19 ENCOUNTER — TELEPHONE (OUTPATIENT)
Dept: CARDIOLOGY CLINIC | Age: 68
End: 2018-02-19

## 2018-02-19 NOTE — TELEPHONE ENCOUNTER
Patient had EKG 2/6/18 that showed new changes and she would like to speak to you regarding results.

## 2018-03-16 ENCOUNTER — OFFICE VISIT (OUTPATIENT)
Dept: INTERNAL MEDICINE CLINIC | Age: 68
End: 2018-03-16

## 2018-03-16 VITALS
HEIGHT: 65 IN | RESPIRATION RATE: 12 BRPM | WEIGHT: 134.2 LBS | DIASTOLIC BLOOD PRESSURE: 70 MMHG | HEART RATE: 102 BPM | OXYGEN SATURATION: 93 % | SYSTOLIC BLOOD PRESSURE: 99 MMHG | TEMPERATURE: 98 F | BODY MASS INDEX: 22.36 KG/M2

## 2018-03-16 DIAGNOSIS — M06.9 RHEUMATOID ARTHRITIS INVOLVING BOTH HANDS, UNSPECIFIED RHEUMATOID FACTOR PRESENCE: Primary | ICD-10-CM

## 2018-03-16 DIAGNOSIS — M06.30 RHEUMATOID NODULE (HCC): ICD-10-CM

## 2018-03-16 RX ORDER — METHYLPREDNISOLONE 4 MG/1
TABLET ORAL
Qty: 1 DOSE PACK | Refills: 0 | Status: SHIPPED | OUTPATIENT
Start: 2018-03-16 | End: 2018-07-17 | Stop reason: ALTCHOICE

## 2018-03-16 RX ORDER — SULFAMETHOXAZOLE AND TRIMETHOPRIM 800; 160 MG/1; MG/1
TABLET ORAL
COMMUNITY
Start: 2018-02-19 | End: 2018-03-16 | Stop reason: ALTCHOICE

## 2018-03-16 NOTE — PROGRESS NOTES
INTERNISTS OF [facility]:  3/16/2018, MRN: [id number]      Pete Calvo is a 80 y.o. female and presents to clinic for Arm swelling (back of Right Forearm a small swollen area just below the right elbow) and Shoulder Pain (bilateral pain x 3 weeks )    Subjective: The patient is a 59-year-old female with history of SIERRA, left foot bone spur, lichen sclerosis per biopsy in 2012, cervical disc disease, peripheral artery disease, varicose veins, Sjogren's syndrome, dysphasia, rheumatoid arthritis (), paroxysmal supraventricular tachycardia, mitral valve prolapse, multiple thyroid nodules, atrial fibrillation per EHR, plantar fasciitis, and dysphasia. Shoulder Pain: Present x 3 wks. She sees her Rheumatologist on 3/26/18. She has underlying RA and Sjogren's syndrome for yrs. She was unable to tolerate plaquenil. She is not using voltaren topical rx. The back of her right forearm is swollen since 12/17. This swollen area is not associated with redness or pain. Her shoulders are so painful that she has cried over the past few wks. Lying flat makes her shoulder jts hurt significantly. The patient endorses stiffness in her knees and bilateral upper extremity's. Due to carpal tunnel surgery, her methotrexate was held.     Patient Active Problem List    Diagnosis Date Noted    PAF (paroxysmal atrial fibrillation) ([address] 11/21/2017    Osteopenia of multiple sites, 9/17/17 10/04/2017    Rheumatoid arthritis ([address] 10/04/2017    Bilateral carpal tunnel syndrome, per EMG testing 8/17 08/17/2017    SIERRA (nonalcoholic steatohepatitis) 05/30/2017    Bone spur of left foot 09/50/0276    Lichen sclerosus 44/58 05/30/2017    Cervical disc disease 01/08/2016    Varicose veins of lower extremities with other complications 17/04/4009    Sjogren's syndrome ([address]     Dysphagia 11/24/2010    MVP (mitral valve prolapse)     TIA (transient ischemic attack)     Multiple thyroid nodules        Current Outpatient Prescriptions   Medication Sig Dispense Refill    methylPREDNISolone (MEDROL DOSEPACK) 4 mg tablet Take as directed by the package instructions. 1 Dose Pack 0    B.infantis-B.ani-B.long-B.bifi (PROBIOTIC 4X) 10-15 mg TbEC Take  by mouth.  metoprolol tartrate (LOPRESSOR) 25 mg tablet Take 0.5 Tabs by mouth two (2) times a day. 40 Tab 4    gabapentin (NEURONTIN) 300 mg capsule TAKE THREE CAPSULES BY MOUTH NIGHTLY (Patient taking differently: TAKE two  CAPSULES BY MOUTH NIGHTLY) 90 Cap 1    cholecalciferol, VITAMIN D3, (VITAMIN D3) 5,000 unit tab tablet Take 5,000 Units by mouth daily.  cyanocobalamin 1,000 mcg tablet Take 1,000 mcg by mouth two (2) times a day.  diclofenac (VOLTAREN) 1 % gel Apply  to affected area four (4) times daily.  acetaminophen (TYLENOL) 650 mg CR tablet Take 650 mg by mouth three (3) times daily as needed for Pain (taking 2 tid).  XARELTO 20 mg tab tablet Take 20 mg by mouth daily (with dinner).  melatonin tab tablet Take 8 mg by mouth nightly.  ascorbic acid (VITAMIN C) 500 mg tablet Take 500 mg by mouth two (2) times a day.  calcium-vitamin D (CALCIUM 500+D) 500 mg(1,250mg) -200 unit per tablet Take 1 Tab by mouth two (2) times daily (with meals). Allergies   Allergen Reactions    Plaquenil [Hydroxychloroquine] Nausea and Vomiting    Pcn [Penicillins] Itching and Rash    Prednisone Nausea and Vomiting     She reports a plaquenil/prednisone come causes vomiting    Zithromax [Azithromycin] Rash, Other (comments) and Hives     [facility]'s records state skin peeling.   High fever, peeling       Past Medical History:   Diagnosis Date    [facility]     Arthritis     Feet    Memory change     Menopause     Multiple thyroid nodules 2009    under care of Dr. Rubia Horowitz MVP (mitral valve prolapse)     under care of Dr. Scott Kathleen Neuropathy     Plantar fasciitis     PSVT ([facility]oxysmal supraventricular tachycardia) [facility] 2/8/2011    Right knee pain     Routine gynecological examination     done by Dr. Rancho Chamberlain S/P colonoscopy     done past 1-2 yrs? Dr. Jos Moncada Sjogren's syndrome [facility])     sees Dr. Miky Gold    TIA (transient ischemic attack)     possible history of    Tibialis tendonitis     following with Dr Douglas Skiff ankle       Past Surgical History:   Procedure Laterality Date    DILATION AND CURETTAGE  1987    HX BREAST AUGMENTATION  1984    HX DILATION AND CURETTAGE  1/2013    HX TONSILLECTOMY      IMPLANT BREAST SILICONE/EQ      TARSAL TUNNEL RELEASE  1994    Right       Family History   Problem Relation Age of Onset    Hypertension Father     High Cholesterol Father     Diabetes Father     Stroke Father     Heart Attack Father     Cancer Father     Heart Disease Father     Depression Sister     Breast Cancer Maternal Grandmother        Social History   Substance Use Topics    Smoking status: Never Smoker    Smokeless tobacco: Never Used    Alcohol use No       ROS   Review of Systems   Constitutional: Negative for chills and fever. HENT: Negative for ear pain and sore throat. Eyes: Negative for blurred vision and pain. Respiratory: Negative for cough and shortness of breath. Cardiovascular: Negative for chest pain. Gastrointestinal: Negative for abdominal pain and blood in stool. Genitourinary: Negative for dysuria and hematuria. Musculoskeletal: Positive for joint pain. Negative for myalgias. Skin: Negative for rash. Neurological: Negative for tingling, focal weakness and headaches. Endo/Heme/Allergies: Does not bruise/bleed easily. Psychiatric/Behavioral: Negative for substance abuse.        Objective     Vitals:    03/16/18 1440   BP: 99/70   Pulse: (!) 102   Resp: 12   Temp: 98 °F (36.7 °C)   TempSrc: Oral   SpO2: 93%   Weight: 134 lb 3.2 oz (60.9 kg)   Height: 5' 5\" (1.651 m)   PainSc:   9   PainLoc: Shoulder   LMP: 04/25/2016       Physical Exam   Constitutional: She is oriented to person, place, and time and well-developed, well-nourished, and in no distress. HENT:   Head: Normocephalic and atraumatic. Right Ear: External ear normal.   Left Ear: External ear normal.   Nose: Nose normal.   Mouth/Throat: Oropharynx is clear and moist. No oropharyngeal exudate. Eyes: Conjunctivae and EOM are normal. Right eye exhibits no discharge. Left eye exhibits no discharge. No scleral icterus. Neck: Neck supple. Cardiovascular: Normal rate, regular rhythm, normal heart sounds and intact distal pulses. Exam reveals no gallop and no friction rub. No murmur heard. Pulmonary/Chest: Effort normal and breath sounds normal. No respiratory distress. She has no wheezes. She has no rales. Abdominal: Soft. Bowel sounds are normal. She exhibits no distension. There is no tenderness. There is no rebound and no guarding. Musculoskeletal: She exhibits no edema (BUE) or tenderness (BUE are NTTP except b/l shoulder jts). No effusions are present. Lymphadenopathy:     She has no cervical adenopathy. Neurological: She is alert and oriented to person, place, and time. She exhibits normal muscle tone. Gait normal.   Skin: Skin is warm and dry. No erythema. Soft nonerythematous nodule (circular) is present along her right forearm, NTTP and easily movable. Psychiatric: Affect normal.   Nursing note and vitals reviewed.       LABS   Data Review:   Lab Results   Component Value Date/Time    WBC 5.2 02/06/2018 10:12 AM    HGB 14.8 02/06/2018 10:12 AM    HCT 43.7 02/06/2018 10:12 AM    PLATELET 314 84/94/6761 10:12 AM    MCV 91.0 02/06/2018 10:12 AM       Lab Results   Component Value Date/Time    Sodium 139 02/06/2018 10:12 AM    Potassium 4.4 02/06/2018 10:12 AM    Chloride 106 02/06/2018 10:12 AM    CO2 29 02/06/2018 10:12 AM    Anion gap 4 02/06/2018 10:12 AM    Glucose 87 02/06/2018 10:12 AM    BUN 13 02/06/2018 10:12 AM    Creatinine 0.60 02/06/2018 10:12 AM    BUN/Creatinine ratio 22 (H) 02/06/2018 10:12 AM    GFR est AA >60 02/06/2018 10:12 AM    GFR est non-AA >60 02/06/2018 10:12 AM    Calcium 8.9 02/06/2018 10:12 AM       Lab Results   Component Value Date/Time    Cholesterol, total 168 11/02/2017 10:00 AM    HDL Cholesterol 73 (H) 11/02/2017 10:00 AM    LDL, calculated 84.4 11/02/2017 10:00 AM    VLDL, calculated 10.6 11/02/2017 10:00 AM    Triglyceride 53 11/02/2017 10:00 AM    CHOL/HDL Ratio 2.3 11/02/2017 10:00 AM       Lab Results   Component Value Date/Time    Hemoglobin A1c 5.5 07/19/2016 09:10 AM       Assessment/Plan:   Rheumatoid arthritis involving both hands: B/l shoulder pain is likely from RA. Her right forearm nodule is likely a RA nodule.  -I encouraged the patient to follow-up with her rheumatology team.  -We discussed the likely diagnosis of a rheumatoid nodule on her right forearm.  -I am prescribing a Medrol Dosepak for acute symptomatic relief.  -We discussed how she needs to restart medication for her RA like methotrexate. Will defer management to her rheumatologist.  -I encouraged the patient to use Voltaren gel. I discouraged her from using any p.o. NSAIDs since she is on Xarelto. ORDERS:  - methylPREDNISolone (MEDROL DOSEPACK) 4 mg tablet; Take as directed by the package instructions. Dispense: 1 Dose Pack; Refill: 0      There are no preventive care reminders to display for this patient. Lab review: labs are reviewed in the EHR    I have discussed the diagnosis with the patient and the intended plan as seen in the above orders. The patient has received an after-visit summary and questions were answered concerning future plans. I have discussed medication side effects and warnings with the patient as well. I have reviewed the plan of care with the patient, accepted their input and they are in agreement with the treatment goals. All questions were answered. The patient understands the plan of care. Handouts provided today with above information.  Pt instructed if symptoms worsen to call the office or report to the ED for continued care. Greater than 50% of the visit time was spent in counseling and/or coordination of care. Follow-up Disposition:  Return in about 6 months (around 9/16/2018) for BP check.     Sylvia Rondon MD

## 2018-03-16 NOTE — MR AVS SNAPSHOT
[address] [facility] 
 
 
 [address], [address] [facility] 
[phone] Patient: Tee Godfrey MRN: [id number] DMM:[birth year] Visit Information Date & Time Provider Department Dept. Phone Encounter #  
 3/16/2018  2:30 PM Marcus Cline MD [facility] [id number] Follow-up Instructions Return in about 6 months (around 9/16/2018) for BP check. Your Appointments 4/4/2018 12:15 PM  
Office Visit with Jorge Rebolledo MD  
[facility]) Appt Note: has questions about taking some natural supplements for RA  
 [address] [facility] [address], [address] [facility] [address]  
[phone]  
  
   
 [address], [address] [address]  
  
    
 4/5/2018 11:00 AM  
Office Visit with Marcus Cline MD  
[facility]) Appt Note: 6 month f/u  
 [address] [facility], [address] [address] [address] [facility]  
  
   
 [address], [address] 7/17/2018  8:45 AM  
ESTABLISHED PATIENT with Jorge Rebolledo MD  
[facility]) Appt Note: 6 months [address], [address] [facility] [address]  
[address], [address] [place] [id number] [facility] 9/18/2018 10:30 AM  
Office Visit with Marcus Cline MD  
[facility]) Appt Note: ov 6mos. [facility] [address], [address] [facility] [address]  
[phone] Upcoming Health Maintenance Date Due  
 GLAUCOMA SCREENING Q2Y 6/28/2018 MEDICARE YEARLY EXAM 10/5/2018 BREAST CANCER SCRN MAMMOGRAM 1/15/2019 COLONOSCOPY 4/3/2019 DTaP/Tdap/Td series (2 - Td) 9/26/2027 Allergies as of 3/16/2018  Review Complete On: 3/16/2018 By: Yandel Thompson Severity Noted Reaction Type Reactions Plaquenil [Hydroxychloroquine] High 04/25/2016    Nausea and Vomiting Pcn [Penicillins]  05/08/2010    Itching, Rash Prednisone  06/09/2017    Nausea and Vomiting She reports a plaquenil/prednisone come causes vomiting Zithromax [Azithromycin]  05/17/2010    Rash, Other (comments), Hives [facility] lanny's records state skin peeling. High fever, peeling Current Immunizations  Reviewed on 12/1/2016 Name Date Influenza High Dose Vaccine PF 9/26/2017 Influenza Vaccine 11/2/2016, 11/6/2015, 9/20/2014, 10/29/2013 Influenza Vaccine Split 10/3/2012, 9/23/2011, 11/1/2010 Pneumococcal Conjugate (PCV-13) 7/7/2016 Pneumococcal Polysaccharide (PPSV-23) 7/16/2017 TD Vaccine 1/1/2007 Tdap 9/26/2017 Zoster Vaccine, Live 2/14/2017 Not reviewed this visit You Were Diagnosed With   
  
 Codes Comments Rheumatoid arthritis involving both hands, unspecified rheumatoid factor presence ([facility]    -  Primary ICD-10-CM: M06.9 ICD-9-CM: 714.0 Vitals BP Pulse Temp Resp Height(growth percentile) Weight(growth percentile) 99/70 (BP 1 Location: Left arm, BP Patient Position: Sitting) (!) 102 98 °F (36.7 °C) (Oral) 12 5' 5\" (1.651 m) 134 lb 3.2 oz (60.9 kg) LMP SpO2 BMI OB Status Smoking Status 04/25/2016 93% 22.33 kg/m2 Postmenopausal Never Smoker BMI and BSA Data Body Mass Index Body Surface Area  
 22.33 kg/m 2 1.67 m 2 Preferred Pharmacy Pharmacy Name Phone [facility] [address], [address] [facility] [phone] Your Updated Medication List  
  
   
This list is accurate as of 3/16/18  3:10 PM.  Always use your most recent med list.  
  
  
  
  
 acetaminophen 650 mg Tber Commonly known as:  TYLENOL Take 650 mg by mouth three (3) times daily as needed for Pain (taking 2 tid). CALCIUM 500+D 500 mg(1,250mg) -200 unit per tablet Generic drug:  calcium-vitamin D  
 Take 1 Tab by mouth two (2) times daily (with meals). cholecalciferol (VITAMIN D3) 5,000 unit Tab tablet Commonly known as:  VITAMIN D3 Take 5,000 Units by mouth daily. cyanocobalamin 1,000 mcg tablet Take 1,000 mcg by mouth two (2) times a day. diclofenac 1 % Gel Commonly known as:  VOLTAREN Apply  to affected area four (4) times daily. gabapentin 300 mg capsule Commonly known as:  NEURONTIN  
TAKE THREE CAPSULES BY MOUTH NIGHTLY  
  
 melatonin Tab tablet Take 8 mg by mouth nightly. methylPREDNISolone 4 mg tablet Commonly known as:  Mardell Layman Take as directed by the package instructions. metoprolol tartrate 25 mg tablet Commonly known as:  LOPRESSOR Take 0.5 Tabs by mouth two (2) times a day. PROBIOTIC 4X 10-15 mg Tbec Generic drug:  B.infantis-B.ani-B.long-B.bifi Take  by mouth. VITAMIN C 500 mg tablet Generic drug:  ascorbic acid (vitamin C) Take 500 mg by mouth two (2) times a day. XARELTO 20 mg Tab tablet Generic drug:  rivaroxaban Take 20 mg by mouth daily (with dinner). Prescriptions Sent to Pharmacy Refills  
 methylPREDNISolone (MEDROL DOSEPACK) 4 mg tablet 0 Sig: Take as directed by the package instructions. Class: Normal  
 Pharmacy: [facility] Benitez at [address] [place] [address], [address] [place] [address] #: [phone] Follow-up Instructions Return in about 6 months (around 9/16/2018) for BP check. To-Do List   
 01/15/2019 1:00 PM  
  Appointment with [facility] MANJINDER James 9 RM 1 at [facility] ([phone]) PAYMENT  For Non-Medicare patients - $15.00 will be collected from you at the time of your exam.  You will be billed $35.00 from the reading [facility].   OUTSIDE FILMS  - Any outside films related to the study being scheduled should be brought with you on the day of the exam. If this cannot be done there may be a delay in the reading of the study. MEDICATIONS  - Patient must bring a complete list of all medications currently taking to include prescriptions, over-the-counter meds, herbals, vitamins & any dietary supplements  GENERAL INSTRUCTIONS  - On the day of your exam do not use any bath powder, deodorant or lotions on the armpit area. -Tenderness of breasts may cause an increase of discomfort during procedure. If you are experiencing breast tenderness on the day of your appointment and would like to reschedule, please call [phone]. Patient Instructions Shoulder Pain: Care Instructions Your Care Instructions You can hurt your shoulder by using it too much during an activity, such as fishing or baseball. It can also happen as part of the everyday wear and tear of getting older. Shoulder injuries can be slow to heal, but your shoulder should get better with time. Your doctor may recommend a sling to rest your shoulder. If you have injured your shoulder, you may need testing and treatment. Follow-up care is a key part of your treatment and safety. Be sure to make and go to all appointments, and call your doctor if you are having problems. It's also a good idea to know your test results and keep a list of the medicines you take. How can you care for yourself at home? · Take pain medicines exactly as directed. ¨ If the doctor gave you a prescription medicine for pain, take it as prescribed. ¨ If you are not taking a prescription pain medicine, ask your doctor if you can take an over-the-counter medicine. ¨ Do not take two or more pain medicines at the same time unless the doctor told you to. Many pain medicines contain acetaminophen, which is Tylenol. Too much acetaminophen (Tylenol) can be harmful. · If your doctor recommends that you wear a sling, use it as directed. Do not take it off before your doctor tells you to. · Put ice or a cold pack on the sore area for 10 to 20 minutes at a time. Put a thin cloth between the ice and your skin. · If there is no swelling, you can put moist heat, a heating pad, or a warm cloth on your shoulder. Some doctors suggest alternating between hot and cold. · Rest your shoulder for a few days. If your doctor recommends it, you can then begin gentle exercise of the shoulder, but do not lift anything heavy. When should you call for help? Call 911 anytime you think you may need emergency care. For example, call if: 
? · You have chest pain or pressure. This may occur with: ¨ Sweating. ¨ Shortness of breath. ¨ Nausea or vomiting. ¨ Pain that spreads from the chest to the neck, jaw, or one or both shoulders or arms. ¨ Dizziness or lightheadedness. ¨ A fast or uneven pulse. After calling 911, chew 1 adult-strength aspirin. Wait for an ambulance. Do not try to drive yourself. ? · Your arm or hand is cool or pale or changes color. ?Call your doctor now or seek immediate medical care if: 
? · You have signs of infection, such as: 
¨ Increased pain, swelling, warmth, or redness in your shoulder. ¨ Red streaks leading from a place on your shoulder. ¨ Pus draining from an area of your shoulder. ¨ Swollen lymph nodes in your neck, armpits, or groin. ¨ A fever. ? Watch closely for changes in your health, and be sure to contact your doctor if: 
? · You cannot use your shoulder. ? · Your shoulder does not get better as expected. Where can you learn more? Go to http://jinny-astrid.info/. Enter Q361 in the search box to learn more about \"Shoulder Pain: Care Instructions. \" Current as of: March 21, 2017 Content Version: 11.4 © 3619-0038 [employer]. Care instructions adapted under license by [employer] (which disclaims liability or warranty for this information).  If you have questions about a medical condition or this instruction, always ask your healthcare professional. [address] any warranty or liability for your use of this information. There are no preventive care reminders to display for this patient. Introducing [facility]! Dear Pepe Matthews: 
Thank you for requesting a Inspire account. Our records indicate that you already have an active Inspire account. You can access your account anytime at [email or web address] Did you know that you can access your hospital and ER discharge instructions at any time in Inspire? You can also review all of your test results from your hospital stay or ER visit. Additional Information If you have questions, please visit the Frequently Asked Questions section of the Inspire website at [email or web address] Remember, Inspire is NOT to be used for urgent needs. For medical emergencies, dial 911. Now available from your iPhone and Android! Please provide this summary of care documentation to your next provider. Your primary care clinician is listed as Arun Pascual. If you have any questions after today's visit, please call [phone].

## 2018-03-16 NOTE — PATIENT INSTRUCTIONS
Shoulder Pain: Care Instructions  Your Care Instructions    You can hurt your shoulder by using it too much during an activity, such as fishing or baseball. It can also happen as part of the everyday wear and tear of getting older. Shoulder injuries can be slow to heal, but your shoulder should get better with time. Your doctor may recommend a sling to rest your shoulder. If you have injured your shoulder, you may need testing and treatment. Follow-up care is a key part of your treatment and safety. Be sure to make and go to all appointments, and call your doctor if you are having problems. It's also a good idea to know your test results and keep a list of the medicines you take. How can you care for yourself at home? · Take pain medicines exactly as directed. ¨ If the doctor gave you a prescription medicine for pain, take it as prescribed. ¨ If you are not taking a prescription pain medicine, ask your doctor if you can take an over-the-counter medicine. ¨ Do not take two or more pain medicines at the same time unless the doctor told you to. Many pain medicines contain acetaminophen, which is Tylenol. Too much acetaminophen (Tylenol) can be harmful. · If your doctor recommends that you wear a sling, use it as directed. Do not take it off before your doctor tells you to. · Put ice or a cold pack on the sore area for 10 to 20 minutes at a time. Put a thin cloth between the ice and your skin. · If there is no swelling, you can put moist heat, a heating pad, or a warm cloth on your shoulder. Some doctors suggest alternating between hot and cold. · Rest your shoulder for a few days. If your doctor recommends it, you can then begin gentle exercise of the shoulder, but do not lift anything heavy. When should you call for help? Call 911 anytime you think you may need emergency care. For example, call if:  ? · You have chest pain or pressure. This may occur with:  ¨ Sweating.   ¨ Shortness of breath. ¨ Nausea or vomiting. ¨ Pain that spreads from the chest to the neck, jaw, or one or both shoulders or arms. ¨ Dizziness or lightheadedness. ¨ A fast or uneven pulse. After calling 911, chew 1 adult-strength aspirin. Wait for an ambulance. Do not try to drive yourself. ? · Your arm or hand is cool or pale or changes color. ?Call your doctor now or seek immediate medical care if:  ? · You have signs of infection, such as:  ¨ Increased pain, swelling, warmth, or redness in your shoulder. ¨ Red streaks leading from a place on your shoulder. ¨ Pus draining from an area of your shoulder. ¨ Swollen lymph nodes in your neck, armpits, or groin. ¨ A fever. ? Watch closely for changes in your health, and be sure to contact your doctor if:  ? · You cannot use your shoulder. ? · Your shoulder does not get better as expected. Where can you learn more? Go to http://jinny-astrid.info/. Enter W656 in the search box to learn more about \"Shoulder Pain: Care Instructions. \"  Current as of: March 21, 2017  Content Version: 11.4  © 6554-1349 [employer]. Care instructions adapted under license by [employer] (which disclaims liability or warranty for this information). If you have questions about a medical condition or this instruction, always ask your healthcare professional. [place] [address] any warranty or liability for your use of this information. There are no preventive care reminders to display for this patient.

## 2018-03-16 NOTE — PROGRESS NOTES
Chief Complaint   Patient presents with    Arm swelling     back of Right Forearm a small swollen area just below the right elbow    Shoulder Pain     bilateral pain x 3 weeks      1. Have you been to the ER, urgent care clinic since your last visit? Hospitalized since your last visit? No    2. Have you seen or consulted any other health care providers outside of the [address] [place] [address] since your last visit? Include any pap smears or colon screening.  No

## 2018-04-04 ENCOUNTER — OFFICE VISIT (OUTPATIENT)
Dept: CARDIOLOGY CLINIC | Age: 68
End: 2018-04-04

## 2018-04-25 ENCOUNTER — TELEPHONE (OUTPATIENT)
Dept: CARDIOLOGY CLINIC | Age: 68
End: 2018-04-25

## 2018-05-29 LAB — CREATININE, EXTERNAL: 0.64

## 2018-06-01 RX ORDER — METOPROLOL TARTRATE 25 MG/1
12.5 TABLET, FILM COATED ORAL 2 TIMES DAILY
Qty: 40 TAB | Refills: 4 | Status: SHIPPED | OUTPATIENT
Start: 2018-06-01 | End: 2018-12-21 | Stop reason: SDUPTHER

## 2018-06-11 ENCOUNTER — HOSPITAL ENCOUNTER (OUTPATIENT)
Dept: PHYSICAL THERAPY | Age: 68
Discharge: HOME | End: 2018-06-11
Payer: MEDICARE

## 2018-06-11 PROCEDURE — 97166 OT EVAL MOD COMPLEX 45 MIN: CPT

## 2018-06-11 PROCEDURE — G8985 CARRY GOAL STATUS: HCPCS

## 2018-06-11 PROCEDURE — G8984 CARRY CURRENT STATUS: HCPCS

## 2018-06-11 NOTE — PROGRESS NOTES
[facility]  [address]  [place], [address]  [phone] fax    Plan of Care/Statement of Necessity for Occupational Therapy Services    Patient name: Afshin Nearing Start of Care: 2018   Referral source: Bev Becker MD : 1950    Medical Diagnosis: Hand pain, left [M79.642]  Hand pain, right [M79.641]   Onset Date:12/15   Treatment Diagnosis: bilateral hand pain and weakness   Prior Hospitalization: see medical history Provider#: [id number]   Medications: Verified on Patient summary List    Comorbidities: Arthritis, mitral valve prolapse, Sjogren's syndrome   Prior Level of Function: Independent without limitations in ADL and IADL activities. The Plan of Care and following information is based on the information from the initial evaluation. Assessment/ key information: Patient is a 67y.o. female with a chief complaint of  generalized pain and stiffness in bilateral hands.  Patient has a history of arthritis and Sjogren's syndrome.  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 Patient received an initial evaluation today followed by education as to diagnosis, precautions and treatment plan. Evaluation Complexity: History MEDIUM Complexity : Expanded review of history including physical, cognitive and psychosocial  history  Examination MEDIUM Complexity : 3-5 performance deficits relating to physical, cognitive , or psychosocial skils that result in activity limitations and / or participation restrictions Clinical Decision Making MEDIUM Complexity : Patient may present with comorbidities that affect occupational performnce.  Miniml to moderate modification of tasks or assistance (eg, physical or verbal ) with assesment(s) is necessary to enable patient to complete evaluation   Overall Complexity Rating: MEDIUM    Problem List: Pain effecting function, Decreased range of motion, Decreased strength, Decreased coordination/prehension and Decreased ADL/functional abilities      Treatment Plan may include any combination of the following: Therapeutic exercise, Physical agent/modality, Manual therapy and Patient education    Patient / Family readiness to learn indicated by: asking questions and interest    Persons(s) to be included in education:   patient (P)    Barriers to Learning/Limitations: None    Patient Goal (s): improve strenght    Patient Self Reported Health Status: fair    Rehabilitation Potential: fair    Short Term Goals: To be accomplished in 2  weeks:  Goal:* Patient will be compliant with initial home exercise program to take an active role in their rehabilitation process. Goal:* Patient will demonstrate a good understanding of their condition and strategies for self-management. Long Term Goals: To be accomplished in 5 weeks:              Goal:* Patient will regain 200 degrees total arc of motion of the digits for bilateral hands to enable grasp of cylindrical objects such as a glass, handle or toothbrush. Status at eval: not assessed       Goal:* Patient will have 70 degrees of bilateral wrist extension in order to increase ease with ADL's such as bathing, eating and dressing and to eventually bear weight thru both of her upper extremities as in pushing up from a chair.      Goal:* Patient will have 55 degrees of bilateral wrist flexion in order to perform hygiene tasks and /or work with tools such as a screwdriver.      Goal:* Pt will have 20 pounds of  in bilateal hands to allow for functional grasp for all ADL activities including dressing, bathing and self care.   Status at eval: 15 bilaterally     Goal:* Patient will have improved bilateral lateral pinch strength of  10 pounds in order to perform fine motor tasks such as turning pages, turning ignition and opening containers        Goal:* Patient will have improved bilateral tip pinch strength of 5 pounds in order to perform fine motor tasks such as zippiing up zippers       Goal:* Patient will have improved bilateral lindsey pinch strength of 8 pounds in order to perform fine motor tasks such as writing    Frequency / Duration: Patient to be seen 1-2 times per week for 4 weeks:    Patient/ Caregiver education and instruction: Diagnosis, prognosis, self care and activity modification  [x]  Plan of care has been reviewed with KATHY Chavis   Current  CK= 40-59%    Goal  CJ= 20-39%  The severity rating is based on clinical judgment and the FOTO score. Certification Period: 6/11/18-9/4/18    Rubio Powers OT 6/11/2018 10:45 AM    ________________________________________________________________________    I certify that the above Therapy Services are being furnished while the patient is under my care. I agree with the treatment plan and certify that this therapy is necessary.     Physician's Signature:____________________  Date:____________Time:__________    Please sign and return to [facility] [address]  [address]  [place], [address]  [phone] fax

## 2018-06-11 NOTE — PROGRESS NOTES
Hand Therapy Evaluation and Daily Note    Patient Name: Epi Graf  Date:2018  : 1950  Age: 80 y.o.y/o  [x]  Patient  Verified  Payor: Julian Purvis / Plan: [place] MED[place] PART [place] & B / Product Type: Medicare /    Referring Provider: MD Trav Leiva MD Visit:  2018  Onset Date:  ongoing      In time:945  Out time:  Total Treatment Time (min): 45  Total Timed Codes (min): 0  1:1 Treatment Time ( [address] only): 45   Visit #: 1 of 8    Treatment Area: Hand pain, left [M79.642]  Hand pain, right [M79.641]    Precautions: NA    Hand Dominance: right handed   Hand Involved: bilateral    Total Evaluation Time:  45 min  History of Present Condition:  Patient is a 80 y.o. female with a chief complaint of pain and stiffness in bilateral UE. Pain Rating:   Current: (0-no pain 10-debilitating pain) 0 / 10   At best: (0-no pain 10-debilitating pain) 0 / 10  At worst: (0-no pain 10-debilitating pain) 10 / 10  Location: bilateral arm  Type:  mild, moderate and generalized   Better with: Worse with:     Medications/Allergies/Past Medical History:  See chart; reviewed with patient.  Rheumatoid arthritis, a-fib, osteoporosis,     Diagnostic Tests: NA    Prior Level of Function: limitations secondary to joint changes    Current Level of Function:  Limited secondary to joint changes and pain    Social History: , lives with     Occupation/Job Requirements: retired    Observation: joint changes, limited AROM  Scar/incision:   NA    Palpation: NA    Range of Motion:   Wrist Active/Passive ROM  Wrist 18  R   L      Flex 40 54      Ext 49 60      UD 30 35      RD 15 20                Strength:   Measurements: Taken with Gregg Dynamometer, in Lbs   Level 2 18 Date Date Date Date Date Date   Right 15, 16         Left 13, 16         Deficit          Change                Pinch Measurements: Taken with Pinch Gauge, in Lbs   (hand)  Date Date Date Date Date   Lateral Right 8        Left  6        Deficit         Change         Pad         Right 3        Left 3        Deficit         Change         Bryce         Right 5        Left 5        Deficit         Change           Sensation:    Normal, recent surgical release of the bilateral median nerves      Edema: NA    Special Tests:  NA    ADLs  Feeding:        []MaxA   []ModA   []Inna   [] CGA   []SBA   []Chandler   [x]Independent  UE Dressing:       []MaxA   []ModA   []Inna   [] CGA   []SBA   []Chandler   [x]Independent  LE Dressing:       []MaxA   []ModA   []Inna   [] CGA   []SBA   []Chandler   [x]Independent  Grooming:       []MaxA   []ModA   []Inna   [] CGA   []SBA   []Chandler   [x]Independent  Toileting:       []MaxA   []ModA   []Inna   [] CGA   []SBA   []Chandler   [x]Independent  Bathing:       []MaxA   []ModA   []Inna   [] CGA   []SBA   []Chandler   [x]Independent  Light Meal Prep:    []MaxA   []ModA   []Inna   [] CGA   []SBA   []Chandler   [x]Independent  Household/Other: []MaxA   []ModA   []Inna   [] CGA   []SBA   []Chandler   [x]Independent  Adaptive Equip:     []MaxA   []ModA   []Inna   [] CGA   []SBA   []Chandler   []Independent  Driving:       []MaxA   []ModA   []Inna   [] CGA   []SBA   []Chandler   [x]Independent      Todays Treatment:  Patient received an initial evaluation today followed by education as to diagnosis, precautions and treatment plan. Patient was provided with a basic home exercise program including . OBJECTIVE    5 min Self Care/Home Management: activity modification   Rationale: education  to improve the patients ability to improve functional use of the hands secondary to limitations.     With   [] TE   [] TA   [] neuro   [] other: Patient Education: [x] Review HEP    [] Progressed/Changed HEP based on:   [] positioning   [] body mechanics   [] transfers   [] heat/ice application   [] Splint wear/care   [] Sensory re-education   [] scar management      [] other:      Pain Level (0-10 scale) post treatment: 0/`0    Patient will continue to benefit from skilled OT services to address ROM deficits, address strength deficits and analyze and address soft tissue restrictions to attain goals. Assessment: Patient is a 67y.o. female with a chief complaint of  generalized pain and stiffness in bilateral hands.  Patient has a history of arthritis and Sjogren's syndrome.  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     Patient received an initial evaluation today followed by education as to diagnosis, precautions and treatment plan.      Evaluation Complexity: History MEDIUM Complexity : Expanded review of history including physical, cognitive and psychosocial  history  Examination MEDIUM Complexity : 3-5 performance deficits relating to physical, cognitive , or psychosocial skils that result in activity limitations and / or participation restrictions Clinical Decision Making MEDIUM Complexity : Patient may present with comorbidities that affect occupational performnce. Miniml to moderate modification of tasks or assistance (eg, physical or verbal ) with assesment(s) is necessary to enable patient to complete evaluation   Overall Complexity Rating: MEDIUM     Problem List: Pain effecting function, Decreased range of motion, Decreased strength, Decreased coordination/prehension and Decreased ADL/functional abilities                        Treatment Plan may include any combination of the following: Therapeutic exercise, Physical agent/modality, Manual therapy and Patient education     Patient / Family readiness to learn indicated by: asking questions and interest     Persons(s) to be included in education:   patient (P)     Barriers to Learning/Limitations: None     Patient Goal (s): improve strenght     Patient Self Reported Health Status: fair     Rehabilitation Potential: fair     Short Term Goals:  To be accomplished in 2  weeks:  Goal:* Patient will be compliant with initial home exercise program to take an active role in their rehabilitation process.     Goal:* Patient will demonstrate a good understanding of their condition and strategies for self-management.     Long Term Goals: To be accomplished in 5 weeks:              Goal:* Patient will regain 200 degrees total arc of motion of the digits for bilateral hands to enable grasp of cylindrical objects such as a glass, handle or toothbrush. Status at eval: not assessed         Goal:* Patient will have 70 degrees of bilateral wrist extension in order to increase ease with ADL's such as bathing, eating and dressing and to eventually bear weight thru both of her upper extremities as in pushing up from a chair.      Goal:* Patient will have 55 degrees of bilateral wrist flexion in order to perform hygiene tasks and /or work with tools such as a screwdriver.      Goal:* Pt will have 20 pounds of  in bilateal hands to allow for functional grasp for all ADL activities including dressing, bathing and self care.   Status at eval: 15 bilaterally      Goal:* Patient will have improved bilateral lateral pinch strength of  10 pounds in order to perform fine motor tasks such as turning pages, turning ignition and opening containers          Goal:* Patient will have improved bilateral tip pinch strength of 5 pounds in order to perform fine motor tasks such as zippiing up zippers         Goal:* Patient will have improved bilateral lindsey pinch strength of 8 pounds in order to perform fine motor tasks such as writing     Frequency / Duration: Patient to be seen 1-2 times per week for 4 weeks:     Patient/ Caregiver education and instruction: Diagnosis, prognosis, self care and activity modification  [x]  Plan of care has been reviewed with KATHY Chavis   Current  CK= 40-59%    Goal  CJ= 20-39%  The severity rating is based on clinical judgment and the FOTO score.     Certification Period: 6/11/18-9/4/18  Reg Blount OT, CHT, CLT 6/11/2018 10:00 AM

## 2018-06-14 DIAGNOSIS — G62.9 NEUROPATHY: ICD-10-CM

## 2018-06-14 DIAGNOSIS — M79.18 MYOFASCIAL PAIN: ICD-10-CM

## 2018-06-14 RX ORDER — GABAPENTIN 300 MG/1
600 CAPSULE ORAL
Qty: 180 CAP | Refills: 1 | Status: SHIPPED | OUTPATIENT
Start: 2018-06-14

## 2018-06-14 NOTE — TELEPHONE ENCOUNTER
The patient has been instructed by Dr. Asya Olsen team to requests further refills of this medication through her PCP. Last Visit: 03/16/2018 with MD Sharonda Tafoya    Next Appointment: 09/18/2018 with MD Sharonda Tafoya     Requested Prescriptions     Pending Prescriptions Disp Refills    gabapentin (NEURONTIN) 300 mg capsule 180 Cap 1     Sig: Take 2 Caps by mouth nightly.

## 2018-06-15 ENCOUNTER — HOSPITAL ENCOUNTER (OUTPATIENT)
Dept: PHYSICAL THERAPY | Age: 68
Discharge: HOME | End: 2018-06-15
Payer: MEDICARE

## 2018-06-15 PROCEDURE — 97110 THERAPEUTIC EXERCISES: CPT

## 2018-06-18 NOTE — PROGRESS NOTES
OT DAILY TREATMENT NOTE - [facility]     Patient Name: Alexandra Schneider  Date:6/15/2018  : 1950  [x]  Patient  Verified  Payor: [place] MEDICARE / Plan: [place] MEDICARE PART A & B / Product Type: Medicare /    In time:1100 Out time:1145  Total Treatment Time (min): 45  Total Timed Codes (min): 30  1:1 Treatment Time ( only): 30   Visit #: 2 of 8    Treatment Area: Hand pain, left [M79.642]  Hand pain, right [M79.641]    SUBJECTIVE  Pain Level (0-10 scale):0/10  Any medication changes, allergies to medications, adverse drug reactions, diagnosis change, or new procedure performed?: [x] No    [] Yes (see summary sheet for update)  Subjective functional status/changes:   [] No changes reported  Increased complaint of discomfort with PROM    OBJECTIVE    Modality rationale: decrease pain and increase tissue extensibility to improve the patients ability to make composite fists   Min Type Additional Details    [] Estim:  []Unatt       []IFC  []Premod                        []Other:  []w/ice   []w/heat  Position:  Location:    [] Estim: []Att    []TENS instruct  []NMES                    []Other:  []w/US   []w/ice   []w/heat  Position:  Location:    []  Traction: [] Cervical       []Lumbar                       [] Prone          []Supine                       []Intermittent   []Continuous Lbs:  [] before manual  [] after manual    []  Ultrasound: []Continuous   [] Pulsed                           []1MHz   []3MHz W/cm2:  Location:    []  Iontophoresis with dexamethasone         Location: [] Take home patch   [] In clinic   15 []  Ice     [x]  heat  []  Ice massage  []  Laser   []  Anodyne Position: resting  Location: bilateral hands    []  Laser with stim  []  Other:  Position:  Location:    []  Vasopneumatic Device Pressure:       [] lo [] med [] hi   Temperature: [] lo [] med [] hi   [x] Skin assessment post-treatment:  [x]intact []redness- no adverse reaction    []redness  adverse reaction:     30 min Therapeutic Exercise:  [x] See flow sheet :   Rationale: increase ROM and improve coordination to improve the patients ability to make composite fists with bilateral hands    With   [] TE   [] TA   [] neuro   [] other: Patient Education: [x] Review HEP    [] Progressed/Changed HEP based on:   [] positioning   [] body mechanics   [] transfers   [] heat/ice application   [] Splint wear/care   [] Sensory re-education   [] scar management      [] other:            Other Objective/Functional Measures:   Wrist Active/Passive ROM  Wrist 6/11/18  R    L         Flex 40 54         Ext 49 60         UD 30 35         RD 15 20                          Strength:   Measurements: Taken with Gregg Dynamometer, in Lbs   Level 2 6/11/18 Date Date Date Date Date Date   Right 14, 12               Left 13, 16               Deficit                 Change                      Pinch Measurements: Taken with Pinch Gauge, in Lbs   (hand) 6/1/118 Date Date Date Date Date   Lateral                Right 8             Left  6             Deficit               Change               Pad               Right 3             Left 3             Deficit               Change               Bryce               Right 5             Left 5             Deficit               Change                  Sensation:    Normal, recent surgical release of the bilateral median nerves       Pain Level (0-10 scale) post treatment: 0/10    ASSESSMENT/Changes in Function: poor pull through with FDS and FDP for flexion of digits with activities, patient using an intrinsic plus     Patient will continue to benefit from skilled OT services to address ROM deficits, address strength deficits and analyze and address soft tissue restrictions to attain remaining goals. [x]  See Plan of Care  []  See progress note/recertification  []  See Discharge Summary         Progress towards goals / Updated goals:  Short Term Goals:  To be accomplished in 2  weeks:  Goal:* Patient will be compliant with initial home exercise program to take an active role in their rehabilitation process.     Goal:* Patient will demonstrate a good understanding of their condition and strategies for self-management.     Long Term Goals: To be accomplished in 5 weeks:              Goal:* Patient will regain 200 degrees total arc of motion of the digits for bilateral hands to enable grasp of cylindrical objects such as a glass, handle or toothbrush. Status at eval: not assessed         Goal:* Patient will have 70 degrees of bilateral wrist extension in order to increase ease with ADL's such as bathing, eating and dressing and to eventually bear weight thru both of her upper extremities as in pushing up from a chair.      Goal:* Patient will have 55 degrees of bilateral wrist flexion in order to perform hygiene tasks and /or work with tools such as a screwdriver.      Goal:* Pt will have 20 pounds of  in bilateal hands to allow for functional grasp for all ADL activities including dressing, bathing and self care.   Status at eval: 15 bilaterally      Goal:* Patient will have improved bilateral lateral pinch strength of  10 pounds in order to perform fine motor tasks such as turning pages, turning ignition and opening containers          Goal:* Patient will have improved bilateral tip pinch strength of 5 pounds in order to perform fine motor tasks such as zippiing up zippers         Goal:* Patient will have improved bilateral lindsey pinch strength of 8 pounds in order to perform fine motor tasks such as writing    PLAN  []  Upgrade activities as tolerated     [x]  Continue plan of care  []  Update interventions per flow sheet       []  Discharge due to:_  []  Other:_      Yolande Mcgrath OT 6/15/2018  8:06 AM    Future Appointments  Date Time Provider Odin Rivera   6/20/2018 10:30 AM Yolande Mcgrath OT [facility]   6/22/2018 10:30 AM Yolande Mcgrath OT [facility]   6/27/2018 10:30 AM Srini Zavala [place], OT [facility]   6/29/2018 10:30 AM Linda Ohs, OT [facility]   7/2/2018 10:30 AM Linda Ohs, OT [facility]   7/6/2018 10:30 AM Linda Ohs, OT [facility]   7/17/2018 8:45 AM Danna Dorsey MD [facility]   9/18/2018 10:30 AM Ramin Cabral MD [address]   1/15/2019 1:00 PM [facility] MANJINDER STEREO [facility]

## 2018-06-20 ENCOUNTER — HOSPITAL ENCOUNTER (OUTPATIENT)
Dept: PHYSICAL THERAPY | Age: 68
Discharge: HOME | End: 2018-06-20
Payer: MEDICARE

## 2018-06-20 PROCEDURE — 97110 THERAPEUTIC EXERCISES: CPT

## 2018-06-20 NOTE — PROGRESS NOTES
OT DAILY TREATMENT NOTE - [facility]     Patient Name: Meenu Bonilla  Date:2018  : 1950  [x]  Patient  Verified  [place]or: [place] MED[place]ARE / Plan: [place] MEDICARE PART A & B / Product Type: Medicare /    In time:1030  Out time:1115  Total Treatment Time (min): 45  Total Timed Codes (min): 25  1:1 Treatment Time ( only): 25   Visit #: 3 of 8    Treatment Area: Hand pain, left [M79.642]  Hand pain, right [M79.641]    SUBJECTIVE  Pain Level (0-10 scale): 0/10  Any medication changes, allergies to medications, adverse drug reactions, diagnosis change, or new procedure performed?: [x] No    [] Yes (see summary sheet for update)  Subjective functional status/changes:   [x] No changes reported      OBJECTIVE  Modality rationale: decrease pain and increase tissue extensibility to improve the patients ability to make composite fists   Min Type Additional Details     [] Estim:  []Unatt       []IFC  []Premod                        []Other:  []w/ice   []w/heat  Position:  Location:     [] Estim: []Att    []TENS instruct  []NMES                    []Other:  []w/US   []w/ice   []w/heat  Position:  Location:     []  Traction: [] Cervical       []Lumbar                       [] Prone          []Supine                       []Intermittent   []Continuous Lbs:  [] before manual  [] after manual     []  Ultrasound: []Continuous   [] Pulsed                           []1MHz   []3MHz W/cm2:  Location:     []  Iontophoresis with dexamethasone         Location: [] Take home patch   [] In clinic   15 []  Ice     [x]  heat  []  Ice massage  []  Laser   []  Anodyne Position: resting  Location: bilateral hands     []  Laser with stim  []  Other:  Position:  Location:     []  Vasopneumatic Device Pressure:       [] lo [] med [] hi   Temperature: [] lo [] med [] hi   [x] Skin assessment post-treatment:  [x]intact []redness- no adverse reaction    []redness  adverse reaction:      30 min Therapeutic Exercise:  [x] See flow sheet : Rationale: increase ROM and improve coordination to improve the patients ability to make composite fists with bilateral hands     With   [] TE   [] TA   [] neuro   [] other: Patient Education: [x] Review HEP    [] Progressed/Changed HEP based on:   [] positioning   [] body mechanics   [] transfers   [] heat/ice application   [] Splint wear/care   [] Sensory re-education   [] scar management      [] other:           Other Objective/Functional Measures:   Wrist Active/Passive ROM  Wrist 6/11/18  R     L            Flex 40 54            Ext 49 60            UD 30 35            RD 15 20                                    Strength:   Measurements: Taken with Gregg Dynamometer, in Lbs  Madrano 2 6/11/18 Date Date Date Date Date Date   Right 14, 16                     Left 13, 16                     Deficit                        Change                             Pinch Measurements: Taken with Pinch Gauge, in Lbs   (hand) 6/1/118 Date Date Date Date Date   Lateral                      Right 8                  Left  6                  Deficit                     Change                     Pad                     Right 3                  Left 3                  Deficit    [de-identified]            Change                     Bryce                     Right 5                  Left 5                  Deficit                     Change                         Sensation:    Normal, recent surgical release of the bilateral median nerves                             Pain Level (0-10 scale) post treatment: 0-2/10     ASSESSMENT/Changes in Function: poor follow thought  With AROM exercises during prehension activities.     Patient will continue to benefit from skilled OT services to address ROM deficits, address strength deficits and analyze and address soft tissue restrictions to attain remaining goals.      [x]  See Plan of Care  []  See progress note/recertification  []  See Discharge Summary      Progress towards goals / Updated goals:  Short Term Goals: To be accomplished in 2  weeks:  Goal:* Patient will be compliant with initial home exercise program to take an active role in their rehabilitation process.      Goal:* Patient will demonstrate a good understanding of their condition and strategies for self-management.      Long Term Goals: To be accomplished in 5 weeks:              Goal:* Patient will regain 200 degrees total arc of motion of the digits for bilateral hands to enable grasp of cylindrical objects such as a glass, handle or toothbrush. Status at eval: not assessed          Goal:* Patient will have 70 degrees of bilateral wrist extension in order to increase ease with ADL's such as bathing, eating and dressing and to eventually bear weight thru both of her upper extremities as in pushing up from a chair.      Goal:* Patient will have 55 degrees of bilateral wrist flexion in order to perform hygiene tasks and /or work with tools such as a screwdriver.      Goal:* Pt will have 20 pounds of  in bilateal hands to allow for functional grasp for all ADL activities including dressing, bathing and self care.   Status at eval: 15 bilaterally      Goal:* Patient will have improved bilateral lateral pinch strength of  10 pounds in order to perform fine motor tasks such as turning pages, turning ignition and opening containers           Goal:* Patient will have improved bilateral tip pinch strength of 5 pounds in order to perform fine motor tasks such as zippiing up zippers          Goal:* Patient will have improved bilateral lindsey pinch strength of 8 pounds in order to perform fine motor tasks such as writing       PLAN  []  Upgrade activities as tolerated     []  Continue plan of care  []  Update interventions per flow sheet       []  Discharge due to:_  []  Other:_      Maury George OT 6/20/2018  11:23 AM    Future Appointments  Date Time Provider Odin Rivera   6/22/2018 10:30 AM Maury George OT [facility] 6/25/2018 11:30 AM Jack Jeong, OT [facility]   6/29/2018 10:30 AM Jack Jeong, OT [facility]   7/2/2018 10:30 AM Jack Jeong, OT [facility]   7/6/2018 10:30 AM Jack Jeong, OT [facility]   7/17/2018 8:45 AM Dixon Baird MD [facility]   9/18/2018 10:30 AM Ari De Los Santos MD [address]   1/15/2019 1:00 PM [facility] MANJINDER STEREO BX RM 1 [facility]

## 2018-06-22 ENCOUNTER — HOSPITAL ENCOUNTER (OUTPATIENT)
Dept: PHYSICAL THERAPY | Age: 68
Discharge: HOME | End: 2018-06-22
Payer: MEDICARE

## 2018-06-22 PROCEDURE — 97110 THERAPEUTIC EXERCISES: CPT

## 2018-06-22 NOTE — PROGRESS NOTES
OT DAILY TREATMENT NOTE - [facility]     Patient Name: Eileen Lockhart  Date:2018  : 1950  [x]  Patient  Verified  Payor: [place] MEDICARE / Plan: [place] MEDICARE PART A & B / Product Type: Medicare /    In time: 56  Out time:1130  Total Treatment Time (min): 60  Total Timed Codes (min): 35  1:1 Treatment Time ( only): 35   Visit #: 4 of 8    Treatment Area: Hand pain, left [M79.642]  Hand pain, right [M79.641]    SUBJECTIVE  Pain Level (0-10 scale):  0/10  Any medication changes, allergies to medications, adverse drug reactions, diagnosis change, or new procedure performed?: [x] No    [] Yes (see summary sheet for update)  Subjective functional status/changes:   [x] No changes reported      OBJECTIVE  Modality rationale: decrease pain and increase tissue extensibility to improve the patients ability to make composite fists   Min Type Additional Details      [] Estim:  []Unatt       []IFC  []Premod                        []Other:  []w/ice   []w/heat  Position:  Location:      [] Estim: []Att    []TENS instruct  []NMES                    []Other:  []w/US   []w/ice   []w/heat  Position:  Location:      []  Traction: [] Cervical       []Lumbar                       [] Prone          []Supine                       []Intermittent   []Continuous Lbs:  [] before manual  [] after manual      []  Ultrasound: []Continuous   [] Pulsed                           []1MHz   []3MHz W/cm2:  Location:      []  Iontophoresis with dexamethasone         Location: [] Take home patch   [] In clinic   15 []  Ice     [x]  heat  []  Ice massage  []  Laser   []  Anodyne Position: resting  Location: bilateral hands      []  Laser with stim  []  Other:  Position:  Location:      []  Vasopneumatic Device Pressure:       [] lo [] med [] hi   Temperature: [] lo [] med [] hi   [x] Skin assessment post-treatment:  [x]intact []redness- no adverse reaction    []redness  adverse reaction:       35 min Therapeutic Exercise:  [x] See flow sheet :   Rationale: increase ROM and improve coordination to improve the patients ability to make composite fists with bilateral hands      With   [] TE   [] TA   [] neuro   [] other: Patient Education: [x] Review HEP    [] Progressed/Changed HEP based on:   [] positioning   [] body mechanics   [] transfers   [] heat/ice application   [] Splint wear/care   [] Sensory re-education   [] scar management      [] other:            Other Objective/Functional Measures:   Wrist Active/Passive ROM  Wrist 6/11/18  R     L            Flex 40 54            Ext 49 60            UD 30 35            RD 15 20                                    Strength:   Measurements: Taken with Gregg Dynamometer, in Lbs  Madrano 2 6/11/18 Date Date Date Date Date Date   Right 14, 16                     Left 13, 16                     Deficit                        Change                             Pinch Measurements: Taken with Pinch Gauge, in Lbs   (hand) 6/1/118 Date Date Date Date Date   Lateral                      Right 8                  Left  6                  Deficit                     Change                     Pad                     Right 3                  Left 3                  Deficit                     Change                     Bryce                     Right 5                  Left 5                  Deficit                     Change                         Sensation:    Normal, recent surgical release of the bilateral median nerves         Pain Level (0-10 scale) post treatment: 0-2/10      ASSESSMENT/Changes in Function: continues to require verbal cues for correct use of the hands with FM tasks.     Patient will continue to benefit from skilled OT services to address ROM deficits, address strength deficits and analyze and address soft tissue restrictions to attain remaining goals.      [x]  See Plan of Care  []  See progress note/recertification  []  See Discharge Summary      Progress towards goals / Updated goals:  Short Term Goals: To be accomplished in 2  weeks:  Goal:* Patient will be compliant with initial home exercise program to take an active role in their rehabilitation process. Mod A with technique for movement with HEP      Goal:* Patient will demonstrate a good understanding of their condition and strategies for self-management. Decreased awareness      Long Term Goals: To be accomplished in 5 weeks:              Goal:* Patient will regain 200 degrees total arc of motion of the digits for bilateral hands to enable grasp of cylindrical objects such as a glass, handle or toothbrush. Status at eval: not assessed        Goal:* Patient will have 70 degrees of bilateral wrist extension in order to increase ease with ADL's such as bathing, eating and dressing and to eventually bear weight thru both of her upper extremities as in pushing up from a chair.      Goal:* Patient will have 55 degrees of bilateral wrist flexion in order to perform hygiene tasks and /or work with tools such as a screwdriver.      Goal:* Pt will have 20 pounds of  in bilateal hands to allow for functional grasp for all ADL activities including dressing, bathing and self care.   Status at eval: 15 bilaterally      Goal:* Patient will have improved bilateral lateral pinch strength of  10 pounds in order to perform fine motor tasks such as turning pages, turning ignition and opening containers         Goal:* Patient will have improved bilateral tip pinch strength of 5 pounds in order to perform fine motor tasks such as zippiing up zippers        Goal:* Patient will have improved bilateral lindsey pinch strength of 8 pounds in order to perform fine motor tasks such as writing    PLAN  []  Upgrade activities as tolerated     [x]  Continue plan of care  []  Update interventions per flow sheet       []  Discharge due to:_  []  Other:_      Aron Garcia OT 6/22/2018  10:52 AM    Future Appointments  Date Time Provider Odin Rivera 6/25/2018 11:30 AM Dony Perera, OT [facility]   6/29/2018 10:30 AM Dony Perera, OT [facility]   7/2/2018 10:30 AM Dony Perera, OT [facility]   7/6/2018 10:30 AM Dony Perera, OT [facility]   [facility]2018 [facility]   9/18/2018 10:30 AM Jazmine Betts MD [address]   1/15/2019 1:00 PM [facility] MANJINDER STEREO BX RM 1 [facility]

## 2018-06-25 ENCOUNTER — HOSPITAL ENCOUNTER (OUTPATIENT)
Dept: PHYSICAL THERAPY | Age: 68
Discharge: HOME | End: 2018-06-25
Payer: MEDICARE

## 2018-06-25 PROCEDURE — 97110 THERAPEUTIC EXERCISES: CPT

## 2018-06-25 NOTE — PROGRESS NOTES
OT DAILY TREATMENT NOTE - [facility]     Patient Name: Americo Phelps  Date:2018  : 1950  [x]  Patient  Verified  Payor: [place] MEDICARE / Plan: [place] MEDICARE PART A & B / Product Type: Medicare /    In time:1140  Out time:1230  Total Treatment Time (min): 50  Total Timed Codes (min): 35  1:1 Treatment Time ( only): 30   Visit #: 5 of 8    Treatment Area: Hand pain, left [M79.642]  Hand pain, right [M79.641]    SUBJECTIVE  Pain Level (0-10 scale): 0/10  Any medication changes, allergies to medications, adverse drug reactions, diagnosis change, or new procedure performed?: [x] No    [] Yes (see summary sheet for update)  Subjective functional status/changes:   [x] No changes reported  No changes reported    OBJECTIVE    Modality rationale: increase tissue extensibility to improve the patients ability to make a composite fist.   Min Type Additional Details    [] Estim:  []Unatt       []IFC  []Premod                        []Other:  []w/ice   []w/heat  Position:  Location:    [] Estim: []Att    []TENS instruct  []NMES                    []Other:  []w/US   []w/ice   []w/heat  Position:  Location:    []  Traction: [] Cervical       []Lumbar                       [] Prone          []Supine                       []Intermittent   []Continuous Lbs:  [] before manual  [] after manual    []  Ultrasound: []Continuous   [] Pulsed                           []1MHz   []3MHz W/cm2:  Location:    []  Iontophoresis with dexamethasone         Location: [] Take home patch   [] In clinic   15 []  Ice     [x]  heat  []  Ice massage  []  Laser   []  Anodyne Position: resting  Location: bilateral UE    []  Laser with stim  []  Other:  Position:  Location:    []  Vasopneumatic Device Pressure:       [] lo [] med [] hi   Temperature: [] lo [] med [] hi   [] Skin assessment post-treatment:  []intact []redness- no adverse reaction    []redness  adverse reaction:     35 min Therapeutic Exercise:  [x] See flow sheet :   Rationale: increase ROM and increase strength to improve the patients ability to bilateral UE      With   [] TE   [] TA   [] neuro   [] other: Patient Education: [x] Review HEP    [] Progressed/Changed HEP based on:   [] positioning   [] body mechanics   [] transfers   [] heat/ice application   [] Splint wear/care   [] Sensory re-education   [] scar management      [] other:            Other Objective/Functional Measures: Wrist Active/Passive ROM  Wrist 6/11/18  R     L            Flex 40 54            Ext 49 60            UD 30 35            RD 15 20                                    Strength:   Measurements: Taken with Gregg Dynamometer, in Lbs  Madrano 2 6/11/18 Date Date Date Date Date Date   Right 14, 16                     Left 13, 16                     Deficit                        Change                             Pinch Measurements: Taken with Pinch Gauge, in Lbs   (hand) 6/1/118 Date Date Date Date Date   Lateral                      Right 8                  Left  6                  Deficit                     Change                     Pad                     Right 3                  Left 3                  Deficit                     Change                     Bryce                     Right 5                  Left 5                  Deficit                     Change                         Sensation:    Normal, recent surgical release of the bilateral median nerves          Pain Level (0-10 scale) post treatment: 0/10      ASSESSMENT/Changes in Function:  patient continues to require encouragement to set up paraffin for home.      Patient will continue to benefit from skilled OT services to address ROM deficits, address strength deficits and analyze and address soft tissue restrictions to attain remaining goals.      [x]  See Plan of Care  []  See progress note/recertification  []  See Discharge Summary      Progress towards goals / Updated goals:  Short Term Goals:  To be accomplished in 2  weeks:  Goal:* Patient will be compliant with initial home exercise program to take an active role in their rehabilitation process. Mod A with technique for movement with HEP      Goal:* Patient will demonstrate a good understanding of their condition and strategies for self-management. Decreased awareness      Long Term Goals: To be accomplished in 5 weeks:              Goal:* Patient will regain 200 degrees total arc of motion of the digits for bilateral hands to enable grasp of cylindrical objects such as a glass, handle or toothbrush. Status at eval: not assessed      Goal:* Patient will have 70 degrees of bilateral wrist extension in order to increase ease with ADL's such as bathing, eating and dressing and to eventually bear weight thru both of her upper extremities as in pushing up from a chair.      Goal:* Patient will have 55 degrees of bilateral wrist flexion in order to perform hygiene tasks and /or work with tools such as a screwdriver.      Goal:* Pt will have 20 pounds of  in bilateal hands to allow for functional grasp for all ADL activities including dressing, bathing and self care.   Status at eval: 15 bilaterally      Goal:* Patient will have improved bilateral lateral pinch strength of  10 pounds in order to perform fine motor tasks such as turning pages, turning ignition and opening containers       Goal:* Patient will have improved bilateral tip pinch strength of 5 pounds in order to perform fine motor tasks such as zippiing up zippers      Goal:* Patient will have improved bilateral lindsey pinch strength of 8 pounds in order to perform fine motor tasks such as writing    PLAN  []  Upgrade activities as tolerated     [x]  Continue plan of care  []  Update interventions per flow sheet       []  Discharge due to:_  []  Other:_      Jayme Rivera OT 6/25/2018  12:17 PM    Future Appointments  Date Time Provider Odin Rivera   6/29/2018 10:30 AM Jayme Rivera OT [facility]   7/2/2018 10:30 AM Yolande Mcgrath, OT [facility]   7/6/2018 10:30 AM Yolande Mcgrath OT [facility]   7/17/2018 8:45 AM Anai Barajas MD [facility]   9/18/2018 10:30 AM Shana Trejo MD [address]   1/15/2019 1:00 PM [facility] MANJINDER THOMASON BX RM 1 [facility]

## 2018-06-27 ENCOUNTER — APPOINTMENT (OUTPATIENT)
Dept: PHYSICAL THERAPY | Age: 68
End: 2018-06-27
Payer: MEDICARE

## 2018-06-29 ENCOUNTER — HOSPITAL ENCOUNTER (OUTPATIENT)
Dept: PHYSICAL THERAPY | Age: 68
Discharge: HOME | End: 2018-06-29
Payer: MEDICARE

## 2018-06-29 PROCEDURE — 97110 THERAPEUTIC EXERCISES: CPT

## 2018-06-29 NOTE — PROGRESS NOTES
OT DAILY TREATMENT NOTE  3-16    Patient Name: Tobin Corado  Date:2018  : 1950  [x]  Patient  Verified  Payor: [place] MEDICARE / Plan: [place] MEDICARE PART A & B / Product Type: Medicare /    In time:1050  Out time:1130  Total Treatment Time (min): 40  Total Timed Codes (min): 25  1:1 Treatment Time ( [address] only): 25   Visit #: 6 of 8    Treatment Area: Hand pain, left [M79.642]  Hand pain, right [M79.641]    SUBJECTIVE  Pain Level (0-10 scale): 0/10  Any medication changes, allergies to medications, adverse drug reactions, diagnosis change, or new procedure performed?: [x] No    [] Yes (see summary sheet for update)  Subjective functional status/changes:   [x] No changes reported      OBJECTIVE  Modality rationale: increase tissue extensibility to improve the patients ability to make a composite fist   Min Type Additional Details     [] Estim:  []Unatt       []IFC  []Premod                        []Other:  []w/ice   []w/heat  Position:  Location:     [] Estim: []Att    []TENS instruct  []NMES                    []Other:  []w/US   []w/ice   []w/heat  Position:  Location:     []  Traction: [] Cervical       []Lumbar                       [] Prone          []Supine                       []Intermittent   []Continuous Lbs:  [] before manual  [] after manual     []  Ultrasound: []Continuous   [] Pulsed                           []1MHz   []3MHz W/cm2:  Location:     []  Iontophoresis with dexamethasone         Location: [] Take home patch   [] In clinic   15 []  Ice     [x]  heat  []  Ice massage  []  Laser   []  Anodyne Position:resting  Location: bilateral hands     []  Laser with stim  []  Other:  Position:  Location:     []  Vasopneumatic Device Pressure:       [] lo [] med [] hi   Temperature: [] lo [] med [] hi   [x] Skin assessment post-treatment:  []intact []redness- no adverse reaction    []redness  adverse reaction:      25 min Therapeutic Exercise:  [x] See flow sheet :   Rationale: increase ROM and increase strength to improve the patients ability to manipulate with bilateral hands        With   [] TE   [] TA   [] neuro   [] other: Patient Education: [x] Review HEP    [] Progressed/Changed HEP based on:   [] positioning   [] body mechanics   [] transfers   [] heat/ice application   [] Splint wear/care   [] Sensory re-education   [] scar management      [] other:           Other Objective/Functional Measures: Wrist Active/Passive ROM  Wrist 6/11/18  R     L            Flex 40 54            Ext 49 60            UD 30 35            RD 15 20                                    Strength:   Measurements: Taken with Gregg Dynamometer, in Lbs  Madrano 2 6/11/18 Date Date Date Date Date Date   Right 14, 16                     Left 13, 16                     Deficit                        Change                             Pinch Measurements: Taken with Pinch Gauge, in Lbs   (hand) 6/1/118 Date Date Date Date Date   Lateral                      Right 8                  Left  6                  Deficit                     Change                     Pad                     Right 3                  Left 3                  Deficit                     Change                     Bryce                     Right 5                  Left 5                  Deficit                     Change                         Sensation:    Normal, recent surgical release of the bilateral median nerves          Pain Level (0-10 scale) post treatment: 0/10      ASSESSMENT/Changes in Function: progressing      Patient will continue to benefit from skilled OT services to address ROM deficits, address strength deficits and analyze and address soft tissue restrictions to attain remaining goals.      [x]  See Plan of Care  []  See progress note/recertification  []  See Discharge Summary      Progress towards goals / Updated goals:  Short Term Goals:  To be accomplished in 2  weeks:  Goal:* Patient will be compliant with initial home exercise program to take an active role in their rehabilitation process.  Mod A with technique for movement with HEP      Goal:* Patient will demonstrate a good understanding of their condition and strategies for self-management.  Decreased awareness      Long Term Goals: To be accomplished in 5 weeks:              Goal:* Patient will regain 200 degrees total arc of motion of the digits for bilateral hands to enable grasp of cylindrical objects such as a glass, handle or toothbrush. Status at eval: not assessed      Goal:* Patient will have 70 degrees of bilateral wrist extension in order to increase ease with ADL's such as bathing, eating and dressing and to eventually bear weight thru both of her upper extremities as in pushing up from a chair.      Goal:* Patient will have 55 degrees of bilateral wrist flexion in order to perform hygiene tasks and /or work with tools such as a screwdriver.      Goal:* Pt will have 20 pounds of  in bilateal hands to allow for functional grasp for all ADL activities including dressing, bathing and self care.   Status at eval: 15 bilaterally      Goal:* Patient will have improved bilateral lateral pinch strength of  10 pounds in order to perform fine motor tasks such as turning pages, turning ignition and opening containers       Goal:* Patient will have improved bilateral tip pinch strength of 5 pounds in order to perform fine motor tasks such as zippiing up zippers      Goal:* Patient will have improved bilateral lindsey pinch strength of 8 pounds in order to perform fine motor tasks such as writing        PLAN  []  Upgrade activities as tolerated     [x]  Continue plan of care  []  Update interventions per flow sheet       []  Discharge due to:_  []  Other:_      Bela Golden OT 6/29/2018  10:33 AM    Future Appointments  Date Time Provider Odin Rivera   7/2/2018 10:30 AM Bela Golden OT [facility]   7/6/2018 10:30 AM Bela Golden OT [facility]   7/17/2018 8:45 AM Minus MD MARTINE Serrano SCHED   9/18/2018 10:30 AM Ahmet Diez MD [address]   1/15/2019 1:00 PM [facility] MANJINDER THOMASON BX RM 1 [facility]

## 2018-07-02 ENCOUNTER — HOSPITAL ENCOUNTER (OUTPATIENT)
Dept: PHYSICAL THERAPY | Age: 68
Discharge: HOME | End: 2018-07-02
Payer: MEDICARE

## 2018-07-02 PROCEDURE — 97535 SELF CARE MNGMENT TRAINING: CPT

## 2018-07-02 PROCEDURE — 97110 THERAPEUTIC EXERCISES: CPT

## 2018-07-02 NOTE — PROGRESS NOTES
OT DAILY TREATMENT NOTE - [employer]     Patient Name: Desire Cardona  Date:2018  : 1950  [x]  Patient  Verified  Payor: [place] MEDICARE / Plan: VA MEDICARE PART A & B / Product Type: Medicare /    In time:1030  Out time:1100  Total Treatment Time (min): 30  Total Timed Codes (min): 15  1:1 Treatment Time ( [address] only): 10  Visit #: 7 of 8    Treatment Area: Hand pain, left [M79.642]  Hand pain, right [M79.641]    SUBJECTIVE  Pain Level (0-10 scale): 0  Any medication changes, allergies to medications, adverse drug reactions, diagnosis change, or new procedure performed?: [x] No    [] Yes (see summary sheet for update)  Subjective functional status/changes:   [] No changes reported  *\"I set up the paraffin but I only used it Friday. \"    OBJECTIVE    Modality rationale: increase tissue extensibility to improve the patients ability to make a composite fist   Min Type Additional Details    [] Estim:  []Unatt       []IFC  []Premod                        []Other:  []w/ice   []w/heat  Position:  Location:    [] Estim: []Att    []TENS instruct  []NMES                    []Other:  []w/US   []w/ice   []w/heat  Position:  Location:    []  Traction: [] Cervical       []Lumbar                       [] Prone          []Supine                       []Intermittent   []Continuous Lbs:  [] before manual  [] after manual    []  Ultrasound: []Continuous   [] Pulsed                           []1MHz   []3MHz W/cm2:  Location:    []  Iontophoresis with dexamethasone         Location: [] Take home patch   [] In clinic   15 []  Ice     [x]  heat  []  Ice massage  []  Laser   []  Anodyne Position:resting  Location: bilateral hands    []  Laser with stim  []  Other:  Position:  Location:    []  Vasopneumatic Device Pressure:       [] lo [] med [] hi   Temperature: [] lo [] med [] hi   [x] Skin assessment post-treatment:  []intact []redness- no adverse reaction    []redness  adverse reaction:     15 min Therapeutic Exercise:  [x] See flow sheet :   Rationale: increase ROM and increase strength to improve the patients ability to manipulate with bilateral hands      With   [] TE   [] TA   [] neuro   [] other: Patient Education: [x] Review HEP    [] Progressed/Changed HEP based on:   [] positioning   [] body mechanics   [] transfers   [] heat/ice application   [] Splint wear/care   [] Sensory re-education   [] scar management      [] other:            Other Objective/Functional Measures: Wrist Active/Passive ROM  Wrist 6/11/18  R     L            Flex 40 54            Ext 49 60            UD 30 35            RD 15 20                                    Strength:   Measurements: Taken with Gregg Dynamometer, in Lbs  Madrano 2 6/11/18 Date Date Date Date Date Date   Right 15, 16                     Left 13, 16                     Deficit                        Change                             Pinch Measurements: Taken with Pinch Gauge, in Lbs   (hand) 6/1/118 Date Date Date Date Date   Lateral                      Right 8                  Left  6                  Deficit                     Change                     Pad                     Right 3                  Left 3                  Deficit                     Change                     Bryce                     Right 5                  Left 5                  Deficit                     Change                         Sensation:    Normal, recent surgical release of the bilateral median nerves          Pain Level (0-10 scale) post treatment: 0-2/10      ASSESSMENT/Changes in Stu Newberry has finally set up home paraffin for use daily. she was not using it and also didn't understand the benefit of use on a daily basis.       Patient will continue to benefit from skilled OT services to address ROM deficits, address strength deficits and analyze and address soft tissue restrictions to attain remaining goals.      [x]  See Plan of Care  []  See progress note/recertification  []  See Discharge Summary      Progress towards goals / Updated goals:  Short Term Goals: To be accomplished in 2  weeks:  Goal:* Patient will be compliant with initial home exercise program to take an active role in their rehabilitation process.  Mod A with technique for movement with HEP      Goal:* Patient will demonstrate a good understanding of their condition and strategies for self-management.  Decreased awareness      Long Term Goals: To be accomplished in 5 weeks:              Goal:* Patient will regain 200 degrees total arc of motion of the digits for bilateral hands to enable grasp of cylindrical objects such as a glass, handle or toothbrush. Status at eval: not assessed      Goal:* Patient will have 70 degrees of bilateral wrist extension in order to increase ease with ADL's such as bathing, eating and dressing and to eventually bear weight thru both of her upper extremities as in pushing up from a chair.      Goal:* Patient will have 55 degrees of bilateral wrist flexion in order to perform hygiene tasks and /or work with tools such as a screwdriver.      Goal:* Pt will have 20 pounds of  in bilateal hands to allow for functional grasp for all ADL activities including dressing, bathing and self care.   Status at eval: 15 bilaterally      Goal:* Patient will have improved bilateral lateral pinch strength of  10 pounds in order to perform fine motor tasks such as turning pages, turning ignition and opening containers       Goal:* Patient will have improved bilateral tip pinch strength of 5 pounds in order to perform fine motor tasks such as zippiing up zippers      Goal:* Patient will have improved bilateral lindsey pinch strength of 8 pounds in order to perform fine motor tasks such as writing       PLAN  []  Upgrade activities as tolerated     []  Continue plan of care  []  Update interventions per flow sheet       []  Discharge due to:_  []  Other:_      Amanda Oliva OT 7/2/2018  11:04 AM    Future Appointments  Date Time Provider Odin Nicole   7/6/2018 10:30 AM Pedrito Villalta OT [facility]   7/17/2018 8:45 AM Vi Ferguson MD [facility] SCHED   9/18/2018 10:30 AM Edilia Whitney MD [address]   1/15/2019 1:00 PM [facility] MANJINDER STEREO BX RM 1 [facility]

## 2018-07-06 ENCOUNTER — HOSPITAL ENCOUNTER (OUTPATIENT)
Dept: PHYSICAL THERAPY | Age: 68
Discharge: HOME | End: 2018-07-06
Payer: MEDICARE

## 2018-07-06 PROCEDURE — 97110 THERAPEUTIC EXERCISES: CPT

## 2018-07-06 NOTE — PROGRESS NOTES
OT DISCHARGE DAILY NOTE AND SUMMARY- [facility]     Date:2018  Patient name: Tobin Corado Start of Care: 18   Referral source: Phillip Becker MD : 1950   Medical/Treatment Diagnosis: Hand pain, left [M79.642]  Hand pain, right [M79.641] Onset Date:12/15     Prior Hospitalization: see medical history Provider#: [id number]   Medications: Verified on Patient Summary List    Comorbidities: Arthritis, mitral valve prolapse, Sjogren's syndrome   Prior Level of Function: Independent without limitations in ADL and IADL activities. Visits from Start of Care:8   Missed Visits: 0    Reporting Period : 18 to 18  [x]  Patient  Verified  Payor: Yoli Stallings / Plan: [place] MEDICARE PART A & B / Product Type: Medicare /    In time:1030  Out time:1120  Total Treatment Time (min): 50  Total Timed Codes (min): 50  1:1 Treatment Time ( [address]  only): 50  Visit #: 8 of 8*    Treatment Area: Hand pain, left [M79.642]  Hand pain, right [M79.641]    SUBJECTIVE  Pain Level (0-10 scale): 0  Any medication changes, allergies to medications, adverse drug reactions, diagnosis change, or new procedure performed?: [x] No    [] Yes (see summary sheet for update)  Subjective functional status/changes:   [x] No changes reported      OBJECTIVE    50 min Therapeutic Exercise:  [x] See flow sheet :   Rationale: increase ROM and increase strength to improve the patients ability to use bilateral hands with ADL and IADL tasks.     With   [x] TE   [] TA   [] neuro   [] other: Patient Education: [x] Review HEP    [] Progressed/Changed HEP based on:   [] positioning   [] body mechanics   [] transfers   [] heat/ice application   [] Splint wear/care   [] Sensory re-education   [] scar management      [] other:            Other Objective/Functional Measures: Wrist Active/Passive ROM  Wrist 18  R     L  18  R    L        Flex 40 47   40 54        Ext 49 60 50    65       UD 30 35            RD 15 20                                  Strength:   Measurements: Taken with Gregg Dynamometer, in Lbs   Level 2 6/11/18 7/6/18 Date Date Date Date Date   Right 14, 16   15                  Left 13, 16   15                  Deficit                        Change                             Pinch Measurements: Taken with Pinch Gauge, in Lbs   (hand) 6/1/118 7/6/18 Date Date Date Date   Lateral                      Right 8 9                 Left  6 9                 Deficit                     Change    +3                 Pad                     Right 3 4                 Left 3 5                 Deficit                     Change     +2                Bryce                     Right 5 5                 Left 5 6                 Deficit                     Change    +1                     Sensation:    Normal, recent surgical release of the bilateral median nerves          Pain Level (0-10 scale) post treatment: 0-2/10      Progress towards goals / Updated goals:  Short Term Goals: To be accomplished in 2  weeks:  Goal:* Patient will be compliant with initial home exercise program to take an active role in their rehabilitation process.  Goal met      Goal:* Patient will demonstrate a good understanding of their condition and strategies for self-management. Progressing with positioning      Long Term Goals: To be accomplished in 5 weeks:              Goal:* Patient will regain 200 degrees total arc of motion of the digits for bilateral hands to enable grasp of cylindrical objects such as a glass, handle or toothbrush. Status at [facility]: not assessed      Goal:* Patient will have 70 degrees of bilateral wrist extension in order to increase ease with ADL's such as bathing, eating and dressing and to eventually bear weight thru both of her upper extremities as in pushing up from a chair. Not met      Goal:* Patient will have 55 degrees of bilateral wrist flexion in order to perform hygiene tasks and /or work with tools such as a screwdriver.   Not met      Goal:* Pt will have 20 pounds of  in bilateal hands to allow for functional grasp for all ADL activities including dressing, bathing and self care. Status at eval: 15 bilaterally, no change      Goal:* Patient will have improved bilateral lateral pinch strength of  10 pounds in order to perform fine motor tasks such as turning pages, turning ignition and opening containers. Progressing      Goal:* Patient will have improved bilateral tip pinch strength of 5 pounds in order to perform fine motor tasks such as zippiing up zippers. Goal met      Goal:* Patient will have improved bilateral lindsey pinch strength of 8 pounds in order to perform fine motor tasks such as writing. Not met    ASSESSMENT/Changes in Function: Limitations in functional grasp and weakness are limiting progress. Patient set up HEP for home strengthening. G-Codes (GO)  Carry   Current  CK= 40-59%    Goal  CK= 40-59%   D/C  CK= 40-59%  The severity rating is based on clinical judgment and the FOTO score.     PLAN:  [x]Discontinue therapy: []Patient has reached or is progressing toward set goals      []Patient is non-compliant or has abdicated      [x]Due to lack of appreciable progress towards set goals    Thank you for this referral!    Duane Michaels OT 7/6/2018 11:03 AM

## 2018-07-17 ENCOUNTER — OFFICE VISIT (OUTPATIENT)
Dept: CARDIOLOGY CLINIC | Age: 68
End: 2018-07-17

## 2018-07-17 VITALS
SYSTOLIC BLOOD PRESSURE: 102 MMHG | BODY MASS INDEX: 21.83 KG/M2 | HEART RATE: 65 BPM | WEIGHT: 131 LBS | DIASTOLIC BLOOD PRESSURE: 64 MMHG | HEIGHT: 65 IN

## 2018-07-17 DIAGNOSIS — G45.9 TRANSIENT CEREBRAL ISCHEMIA, UNSPECIFIED TYPE: ICD-10-CM

## 2018-07-17 DIAGNOSIS — I34.1 MVP (MITRAL VALVE PROLAPSE): ICD-10-CM

## 2018-07-17 DIAGNOSIS — I48.0 PAF (PAROXYSMAL ATRIAL FIBRILLATION) (HCC): Primary | ICD-10-CM

## 2018-07-17 NOTE — MR AVS SNAPSHOT
[address] [facility] 
 
 
 [address], [address] [facility] [address] 
[phone] Patient: Paradise Bruce MRN: [id number] CLA:[id number] Visit Information Date & Time Provider Department Dept. Phone Encounter #  
 7/17/2018 10:30 AM Marcela Costa MD Cardiology [facility] [address] DIGNA [phone] [id number] Follow-up Instructions Return in about 6 months (around 1/17/2019). Follow-up and Disposition History Your Appointments 9/18/2018 10:30 AM  
Office Visit with Dean Proctor MD  
[facility] [address] [place] [address]) Appt Note: ov 6mos. [facility] [address], [address]  
  
   
 [address] [place], [facility]  
  
    
 1/4/2019  9:00 AM  
Office Visit with Marcela Costa MD  
[facility]) Appt Note: 6 months [address], [address] [facility] [address]  
[address], [address] [facility] [address] Kelly [address]on Upcoming Health Maintenance Date Due  
 GLAUCOMA SCREENING Q2Y 6/28/2018 Influenza Age 5 to Adult 8/1/2018 MEDICARE YEARLY EXAM 10/5/2018 BREAST CANCER SCRN MAMMOGRAM 1/15/2019 COLONOSCOPY 4/3/2019 DTaP/Tdap/Td series (2 - Td) 9/26/2027 Allergies as of 7/17/2018  Review Complete On: 7/17/2018 By: Tera Rinne Severity Noted Reaction Type Reactions Plaquenil [Hydroxychloroquine] High 04/25/2016    Nausea and Vomiting Pcn [Penicillins]  05/08/2010    Itching, Rash Prednisone  06/09/2017    Nausea and Vomiting She reports a plaquenil/prednisone come causes vomiting Zithromax [Azithromycin]  05/17/2010    Rash, Other (comments), Hivkrishan Gracia's records state skin peeling. High fever, peeling Current Immunizations  Reviewed on 12/1/2016 Name Date Influenza High Dose Vaccine PF 9/26/2017 Influenza Vaccine 11/2/2016, 11/6/2015, 9/20/2014, 10/29/2013 Influenza Vaccine Split 10/3/2012, 9/23/2011, 11/1/2010 Pneumococcal Conjugate (PCV-13) 7/7/2016 Pneumococcal Polysaccharide (PPSV-23) 7/16/2017 TD Vaccine 1/1/2007 Tdap 9/26/2017 Zoster Vaccine, Live 2/14/2017 Not reviewed this visit You Were Diagnosed With   
  
 Codes Comments PAF [facility]oxysmal [facility]) ([facility]    -  Primary ICD-10-CM: I48.0 ICD-9-CM: 427.31   
 MVP (mitral valve prolapse)     ICD-10-CM: I34.1 ICD-9-CM: 424.0 Transient cerebral ischemia, unspecified type     ICD-10-CM: G45.9 ICD-9-CM: 435.9 Vitals BP Pulse Height(growth percentile) Weight(growth percentile) LMP BMI  
 102/64 65 5' 5\" (1.651 m) 131 lb (59.4 kg) 04/25/2016 21.8 kg/m2 OB Status Smoking Status Postmenopausal Never Smoker Vitals History BMI and BSA Data Body Mass Index Body Surface Area  
 21.8 kg/m 2 1.65 m 2 Preferred Pharmacy Pharmacy Name Phone [facility], [address] [phone] Your Updated Medication List  
  
   
This list is accurate as of 7/17/18 10:38 AM.  Always use your most recent med list.  
  
  
  
  
 acetaminophen 650 mg Tber Commonly known as:  TYLENOL Take 650 mg by mouth three (3) times daily as needed for Pain (taking 2 tid). CALCIUM 500+D 500 mg(1,250mg) -200 unit per tablet Generic drug:  calcium-vitamin D Take 1 Tab by mouth two (2) times daily (with meals). cholecalciferol (VITAMIN D3) 5,000 unit Tab tablet Commonly known as:  VITAMIN D3 Take 5,000 Units by mouth daily. cyanocobalamin 1,000 mcg tablet Take 1,000 mcg by mouth two (2) times a day. diclofenac 1 % Gel Commonly known as:  VOLTAREN Apply  to affected area four (4) times daily. gabapentin 300 mg capsule Commonly known as:  NEURONTIN Take 2 Caps by mouth nightly. melatonin Tab tablet Take 8 mg by mouth nightly. metoprolol tartrate 25 mg tablet Commonly known as:  LOPRESSOR Take 0.5 Tabs by mouth two (2) times a day. PROBIOTIC 4X 10-15 mg Tbec Generic drug:  B.infantis-B.ani-B.long-B.bifi Take  by mouth. VITAMIN C 500 mg tablet Generic drug:  ascorbic acid (vitamin C) Take 500 mg by mouth two (2) times a day. XARELTO 20 mg Tab tablet Generic drug:  rivaroxaban Take 20 mg by mouth daily (with dinner). Follow-up Instructions Return in about 6 months (around 1/17/2019). To-Do List   
 01/15/2019 1:00 PM  
  Appointment with [facility] MANJINDER James 9 RM 1 at [facility] ([phone]) PAYMENT  For Non-Medicare patients - $15.00 will be collected from you at the time of your exam.  You will be billed $35.00 from the reading [facility]. OUTSIDE FILMS  - Any outside films related to the study being scheduled should be brought with you on the day of the exam.  If this cannot be done there may be a delay in the reading of the study. MEDICATIONS  - Patient must bring a complete list of all medications currently taking to include prescriptions, over-the-counter meds, herbals, vitamins & any dietary supplements  GENERAL INSTRUCTIONS  - On the day of your exam do not use any bath powder, deodorant or lotions on the armpit area. -Tenderness of breasts may cause an increase of discomfort during procedure. If you are experiencing breast tenderness on the day of your appointment and would like to reschedule, please call [phone]. Introducing [facility]! Dear Albino Hurtado: 
Thank you for requesting a [employer] account. Our records indicate that you already have an active [employer] account. You can access your account anytime at [email or web address]" Did you know that you can access your hospital and ER discharge instructions at any time in [employer]? You can also review all of your test results from your hospital stay or ER visit. Additional Information If you have questions, please visit the Frequently Asked Questions section of the [employer] website at [email or web address] Remember, [employer]Cell is NOT to be used for urgent needs. For medical emergencies, dial 911. Now available from your iPhone and Android! Please provide this summary of care documentation to your next provider. Your primary care clinician is listed as Lori Dunn. If you have any questions after today's visit, please call [phone].

## 2018-07-17 NOTE — PROGRESS NOTES
HISTORY OF PRESENT ILLNESS  Reddy Chavez is a 80 y.o. female. Palpitations    The history is provided by the patient. This is a chronic problem. The problem has not changed since onset. The problem occurs rarely. Pertinent negatives include no diaphoresis, no fever, no malaise/fatigue, no numbness, no near-syncope, no orthopnea, no PND, no nausea, no vomiting, no dizziness, no weakness, no cough, no hemoptysis and no sputum production. Her past medical history is significant for atrial fibrillation. Review of Systems   Constitutional: Negative for chills, diaphoresis, fever, malaise/fatigue and weight loss. HENT: Negative for congestion, ear discharge, ear pain, hearing loss, nosebleeds and tinnitus. Eyes: Negative for blurred vision. Respiratory: Negative for cough, hemoptysis, sputum production, wheezing and stridor. Cardiovascular: Positive for palpitations. Negative for orthopnea, leg swelling, PND and near-syncope. Gastrointestinal: Negative for heartburn, nausea and vomiting. Musculoskeletal: Negative for myalgias and neck pain. Skin: Negative for itching and rash. Neurological: Negative for dizziness, tingling, tremors, focal weakness, loss of consciousness, weakness and numbness. Psychiatric/Behavioral: Negative for depression and suicidal ideas.      Family History   Problem Relation Age of Onset    Hypertension Father     High Cholesterol Father     Diabetes Father     Stroke Father     Heart Attack Father     Cancer Father     Heart Disease Father     Depression Sister     Breast Cancer Maternal Grandmother        Past Medical History:   Diagnosis Date    A-fib [facility])     Arthritis     Feet    Memory change     Menopause     Multiple thyroid nodules 2009    under care of Dr. Tania Gonzalez MVP (mitral valve prolapse)     under care of Dr. Rodriguez Bernard Neuropathy     Plantar fasciitis     PSVT (paroxysmal supraventricular tachycardia) ([facility] 2/8/2011    Right knee pain     Routine gynecological examination     done by Dr. Yvonne Alberto S/P colonoscopy     done past 1-2 yrs? Dr. Bon Membreno Sjogren's syndrome [facility])     sees Dr. Edelmiro Dance    TIA (transient ischemic attack)     possible history of    Tibialis tendonitis     following with Dr Rivera Stafford ankle       Past Surgical History:   Procedure Laterality Date    DILATION AND CURETTAGE  1987    HX BREAST AUGMENTATION  1984    HX DILATION AND CURETTAGE  1/2013    HX TONSILLECTOMY      IMPLANT BREAST SILICONE/EQ      TARSAL TUNNEL RELEASE  1994    Right       Social History   Substance Use Topics    Smoking status: Never Smoker    Smokeless tobacco: Never Used    Alcohol use No       Allergies   Allergen Reactions    Plaquenil [Hydroxychloroquine] Nausea and Vomiting    Pcn [Penicillins] Itching and Rash    Prednisone Nausea and Vomiting     She reports a plaquenil/prednisone come causes vomiting    Zithromax [Azithromycin] Rash, Other (comments) and Hives     [facility] records state skin peeling. High fever, peeling       Outpatient Prescriptions Marked as Taking for the 7/17/18 encounter (Office Visit) with Kelsea Garner MD   Medication Sig Dispense Refill    gabapentin (NEURONTIN) 300 mg capsule Take 2 Caps by mouth nightly. 180 Cap 1    metoprolol tartrate (LOPRESSOR) 25 mg tablet Take 0.5 Tabs by mouth two (2) times a day. 40 Tab 4    B.infantis-B.ani-B.long-B.bifi (PROBIOTIC 4X) 10-15 mg TbEC Take  by mouth.  cholecalciferol, VITAMIN D3, (VITAMIN D3) 5,000 unit tab tablet Take 5,000 Units by mouth daily.  cyanocobalamin 1,000 mcg tablet Take 1,000 mcg by mouth two (2) times a day.  diclofenac (VOLTAREN) 1 % gel Apply  to affected area four (4) times daily.  acetaminophen (TYLENOL) 650 mg CR tablet Take 650 mg by mouth three (3) times daily as needed for Pain (taking 2 tid).  XARELTO 20 mg tab tablet Take 20 mg by mouth daily (with dinner).       ascorbic acid (VITAMIN C) 500 mg tablet Take 500 mg by mouth two (2) times a day. Visit Vitals    /64    Pulse 65    Ht 5' 5\" (1.651 m)    Wt 59.4 kg (131 lb)    LMP 04/25/2016    BMI 21.8 kg/m2     Physical Exam   Constitutional: She is oriented to person, place, and time. She appears well-developed and well-nourished. No distress. HENT:   Head: Atraumatic. Mouth/Throat: No oropharyngeal exudate. Eyes: Conjunctivae are normal. Right eye exhibits no discharge. Left eye exhibits no discharge. No scleral icterus. Neck: Neck supple. No JVD present. No tracheal deviation present. No thyromegaly present. Cardiovascular: Normal rate and regular rhythm. Exam reveals no gallop. No murmur heard. Pulmonary/Chest: Effort normal and breath sounds normal. No stridor. She has no wheezes. She has no rales. Abdominal: Soft. There is no tenderness. There is no rebound and no guarding. Musculoskeletal: Normal range of motion. She exhibits no edema or tenderness. Lymphadenopathy:     She has no cervical adenopathy. Neurological: She is alert and oriented to person, place, and time. She exhibits normal muscle tone. Skin: Skin is warm. She is not diaphoretic. Psychiatric: She has a normal mood and affect. Her behavior is normal.     Echo 2016:  [address] SMALL LEFT VENTRICLE WITH MILD LEFT VENTRICULAR HYPERTROPHY PRESENT. NORMAL GLOBAL LEFT VENTRICULAR SYSTOLIC FUNCTION. NORMAL DIASTOLIC FUNCTION. ESTIMATED EJECTION FRACTION OF 60%   NORMAL RIGHT VENTRICULAR SIZE. NORMAL RIGHT VENTRICULAR GLOBAL SYSTOLIC FUNCTION. ESTIMATED PULMONARY ARTERY PRESSURE OF 21MMHG. NO HEMODYNAMICALLY SIGNIFICANT VALVULAR PATHOLOGY. NO PRIOR STUDY FOR COMPARISON. ASSESSMENT and PLAN    ICD-10-CM ICD-9-CM    1. PAF (paroxysmal atrial fibrillation) ([facility]) I48.0 427.31    2. MVP (mitral valve prolapse) I34.1 424.0    3.  Transient cerebral ischemia, unspecified type G45.9 435.9      No orders of the defined types were placed in this encounter. Follow-up Disposition:  Return in about 6 months (around 1/17/2019). current treatment plan is effective, no change in therapy. Patient with h/o ? ?? TIA vs transient amenesia in 2016 - also found to have atrial flutter- discharged home on xarelto and cartia. Cartia dose decreased from 240mg to 120mg daily but continued to have fatigue  She continue to have rare short lived palpitations. Doing better on lopressor. Continue xarelto for now.

## 2018-07-17 NOTE — PROGRESS NOTES
Patient didn't bring medications, verbally reviewed    1. Have you been to the ER, urgent care clinic since your last visit? Hospitalized since your last visit? No    2. Have you seen or consulted any other health care providers outside of the [facility] since your last visit? Include any pap smears or colon screening.  No

## 2018-09-25 NOTE — PROGRESS NOTES
In [address]  [address] [place], [address] 
[phone] fax Patient Name: Mildred Pena Date:2018 : 1950 [x]  Patient  Verified Hand Therapy/ Occupational Therapy Discharge Instructions Continue with home exercise program for 1 times a day as needed/patient discretion. Continue to apply heat as needed Follow up with MD: as needed Recommendations: __x__ Return to activity with home program 
          ____ Return to activity with the following modifcations 
                     ____ Post Rehab Program 
                                ____Return to/Join local gym Additional comments: 
 
 
 
 
Please call with any questions or concerns. Thank you for your participation.   
 
 
 
Yaquelin Van OT 2018  3:41 PM

## 2018-10-04 ENCOUNTER — HOSPITAL ENCOUNTER (OUTPATIENT)
Dept: GENERAL RADIOLOGY | Age: 68
Discharge: HOME | End: 2018-10-04
Payer: MEDICARE

## 2018-10-04 DIAGNOSIS — M05.79 RHEUMATOID ARTHRITIS WITH RHEUMATOID FACTOR OF MULTIPLE SITES WITHOUT ORGAN OR SYSTEMS INVOLVEMENT (HCC): ICD-10-CM

## 2018-10-04 PROCEDURE — 71046 X-RAY EXAM CHEST 2 VIEWS: CPT

## 2018-10-05 RX ORDER — RIVAROXABAN 20 MG/1
20 TABLET, FILM COATED ORAL
Qty: 30 TAB | Refills: 6 | Status: SHIPPED | OUTPATIENT
Start: 2018-10-05 | End: 2019-10-22 | Stop reason: SDUPTHER

## 2018-12-20 ENCOUNTER — OFFICE VISIT (OUTPATIENT)
Dept: INTERNAL MEDICINE CLINIC | Age: 68
End: 2018-12-20

## 2018-12-20 VITALS
HEIGHT: 65 IN | BODY MASS INDEX: 22.33 KG/M2 | DIASTOLIC BLOOD PRESSURE: 78 MMHG | OXYGEN SATURATION: 97 % | HEART RATE: 75 BPM | WEIGHT: 134 LBS | TEMPERATURE: 97.1 F | SYSTOLIC BLOOD PRESSURE: 109 MMHG | RESPIRATION RATE: 14 BRPM

## 2018-12-20 DIAGNOSIS — Z13.220 SCREENING FOR HYPERLIPIDEMIA: ICD-10-CM

## 2018-12-20 DIAGNOSIS — E04.2 MULTIPLE THYROID NODULES: ICD-10-CM

## 2018-12-20 DIAGNOSIS — Z12.11 SCREENING FOR COLON CANCER: ICD-10-CM

## 2018-12-20 DIAGNOSIS — K75.81 NASH (NONALCOHOLIC STEATOHEPATITIS): Primary | ICD-10-CM

## 2018-12-20 DIAGNOSIS — M06.9 RHEUMATOID ARTHRITIS INVOLVING BOTH HANDS, UNSPECIFIED RHEUMATOID FACTOR PRESENCE: ICD-10-CM

## 2018-12-20 DIAGNOSIS — R10.30 LOWER ABDOMINAL PAIN: ICD-10-CM

## 2018-12-20 DIAGNOSIS — I48.0 PAF (PAROXYSMAL ATRIAL FIBRILLATION) (HCC): ICD-10-CM

## 2018-12-20 DIAGNOSIS — Z00.00 MEDICARE ANNUAL WELLNESS VISIT, SUBSEQUENT: ICD-10-CM

## 2018-12-20 DIAGNOSIS — Z13.0 SCREENING FOR DEFICIENCY ANEMIA: ICD-10-CM

## 2018-12-20 RX ORDER — METHOTREXATE 2.5 MG/1
TABLET ORAL
COMMUNITY
Start: 2018-12-05 | End: 2021-02-18

## 2018-12-20 RX ORDER — FOLIC ACID 1 MG/1
TABLET ORAL DAILY
COMMUNITY
End: 2022-08-25

## 2018-12-20 NOTE — PROGRESS NOTES
Lucero Sanchez presents today for   Chief Complaint   Patient presents with    Abdominal Pain     RLQ x 1.5 months daily. patient denies any constipation, or diarrhea. Patient denies any pain currently. Patient denies any urinary symtpoms.  Flank Pain     right side x 1.5 months. Depression Screening:  PHQ over the last two weeks 12/20/2018   Little interest or pleasure in doing things Not at all   Feeling down, depressed, irritable, or hopeless Not at all   Total Score PHQ 2 0       Learning Assessment:  Learning Assessment 1/7/2016   PRIMARY LEARNER Patient   HIGHEST LEVEL OF EDUCATION - PRIMARY LEARNER  > 4 YEARS [facility] PRIMARY LEARNER NONE   CO-LEARNER CAREGIVER No   PRIMARY LANGUAGE ENGLISH   LEARNER PREFERENCE PRIMARY DEMONSTRATION     -     -   ANSWERED BY patient   RELATIONSHIP SELF       Abuse Screening:  Abuse Screening Questionnaire 12/20/2018   Do you ever feel afraid of your partner? N   Are you in a relationship with someone who physically or mentally threatens you? N   Is it safe for you to go home? Y       Fall Risk  Fall Risk Assessment, last 12 mths 12/20/2018   Able to walk? Yes   Fall in past 12 months? No   Fall with injury? -   Number of falls in past 12 months -   Fall Risk Score -           Coordination of Care:  1. Have you been to the ER, urgent care clinic since your last visit? Hospitalized since your last visit? no    2. Have you seen or consulted any other health care providers outside of the [address] [place] [address] since your last visit? Include any pap smears or colon screening. yes      Advance Directive:  1. Do you have an advance directive in place? Patient Reply:yes          Per patient no changes to their ACP contact no.

## 2018-12-20 NOTE — PATIENT INSTRUCTIONS
Atrial Fibrillation: Care Instructions  Your Care Instructions    Atrial fibrillation is an irregular and often fast heartbeat. Treating this condition is important for several reasons. It can cause blood clots, which can travel from your heart to your brain and cause a stroke. If you have a fast heartbeat, you may feel lightheaded, dizzy, and weak. An irregular heartbeat can also increase your risk for heart failure. Atrial fibrillation is often the result of another heart condition, such as high blood pressure or coronary artery disease. Making changes to improve your heart condition will help you stay healthy and active. Follow-up care is a key part of your treatment and safety. Be sure to make and go to all appointments, and call your doctor if you are having problems. It's also a good idea to know your test results and keep a list of the medicines you take. How can you care for yourself at home? Medicines    · Take your medicines exactly as prescribed. Call your doctor if you think you are having a problem with your medicine. You will get more details on the specific medicines your doctor prescribes.     · If your doctor has given you a blood thinner to prevent a stroke, be sure you get instructions about how to take your medicine safely. Blood thinners can cause serious bleeding problems.     · Do not take any vitamins, over-the-counter drugs, or herbal products without talking to your doctor first.    Lifestyle changes    · Do not smoke. Smoking can increase your chance of a stroke and heart attack. If you need help quitting, talk to your doctor about stop-smoking programs and medicines. These can increase your chances of quitting for good.     · Eat a heart-healthy diet.     · Stay at a healthy weight. Lose weight if you need to.     · Limit alcohol to 2 drinks a day for men and 1 drink a day for women. Too much alcohol can cause health problems.     · Avoid colds and flu.  Get a pneumococcal vaccine shot. If you have had one before, ask your doctor whether you need another dose. Get a flu shot every year. If you must be around people with colds or flu, wash your hands often. Activity    · If your doctor recommends it, get more exercise. Walking is a good choice. Bit by bit, increase the amount you walk every day. Try for at least 30 minutes on most days of the week. You also may want to swim, bike, or do other activities. Your doctor may suggest that you join a cardiac rehabilitation program so that you can have help increasing your physical activity safely.     · Start light exercise if your doctor says it is okay. Even a small amount will help you get stronger, have more energy, and manage stress. Walking is an easy way to get exercise. Start out by walking a little more than you did in the hospital. Gradually increase the amount you walk.     · When you exercise, watch for signs that your heart is working too hard. You are pushing too hard if you cannot talk while you are exercising. If you become short of breath or dizzy or have chest pain, sit down and rest immediately.     · Check your pulse regularly. Place two fingers on the artery at the palm side of your wrist, in line with your thumb. If your heartbeat seems uneven or fast, talk to your doctor. When should you call for help? Call 911 anytime you think you may need emergency care. For example, call if:    · You have symptoms of a heart attack. These may include:  ? Chest pain or pressure, or a strange feeling in the chest.  ? Sweating. ? Shortness of breath. ? Nausea or vomiting. ? Pain, pressure, or a strange feeling in the back, neck, jaw, or upper belly or in one or both shoulders or arms. ? Lightheadedness or sudden weakness. ? A fast or irregular heartbeat. After you call 911, the  may tell you to chew 1 adult-strength or 2 to 4 low-dose aspirin. Wait for an ambulance.  Do not try to drive yourself.     · You have symptoms of a stroke. These may include:  ? Sudden numbness, tingling, weakness, or loss of movement in your face, arm, or leg, especially on only one side of your body. ? Sudden vision changes. ? Sudden trouble speaking. ? Sudden confusion or trouble understanding simple statements. ? Sudden problems with walking or balance. ? A sudden, severe headache that is different from past headaches.     · You passed out (lost consciousness).    Call your doctor now or seek immediate medical care if:    · You have new or increased shortness of breath.     · You feel dizzy or lightheaded, or you feel like you may faint.     · Your heart rate becomes irregular.     · You can feel your heart flutter in your chest or skip heartbeats. Tell your doctor if these symptoms are new or worse.    Watch closely for changes in your health, and be sure to contact your doctor if you have any problems. Where can you learn more? Go to http://jinny-astrid.info/. Enter U020 in the search box to learn more about \"Atrial Fibrillation: Care Instructions. \"  Current as of: December 6, 2017  Content Version: 11.8  © 0895-9885 [employer]. Care instructions adapted under license by [employer] (which disclaims liability or warranty for this information). If you have questions about a medical condition or this instruction, always ask your healthcare professional. [place] [address] any warranty or liability for your use of this information. Medicare Part B Preventive Services Limitations Recommendation Scheduled   Bone Mass Measurement  (age 72 & older, biennial) Requires diagnosis related to osteoporosis or estrogen deficiency. Biennial benefit unless patient has history of long-term glucocorticoid tx or baseline is needed because initial test was by other method This should be done at age 72 and again if on osteoporosis medication at 2-3 year intervals.   Up to date   Cardiovascular Screening Blood Tests (every 5 years)  Total cholesterol, HDL, Triglycerides Order as a panel if possible We should check your cholesterol panel at least once every 5 years. Up to date   Colorectal Cancer Screening  -Fecal occult blood test (annual)  -Flexible sigmoidoscopy (5y)  -Screening colonoscopy (10y)  -Barium Enema  Due per your Gastroenterologist's recommendations. Up to date   Counseling to Prevent Tobacco Use (up to 8 sessions per year)  - Counseling greater than 3 and up to 10 minutes  - Counseling greater than 10 minutes Patients must be asymptomatic of tobacco-related conditions to receive as preventive service Continue with smoking cessation    Diabetes Screening Tests (at least every 3 years, Medicare covers annually or at 6-month intervals for prediabetic patients)    Fasting blood sugar (FBS) or glucose tolerance test (GTT) Patient must be diagnosed with one of the following:  -Hypertension, Dyslipidemia, obesity, previous impaired FBS or GTT  Or any two of the following: overweight, FH of diabetes, age ? 72, history of gestational diabetes, birth of baby weighing more than 9 pounds Should be done yearly Up to date   Diabetes Self-Management Training (DSMT) (no USPSTF recommendation) Requires referral by treating physician for patient with diabetes or renal disease. 10 hours of initial DSMT session of no less than 30 minutes each in a continuous 12-month period. 2 hours of follow-up DSMT in subsequent years. Not applicable Not applicable   Glaucoma Screening (no USPSTF recommendation) Diabetes mellitus, family history, , age 48 or over,  American, age 72 or over Continue with annual eye exams. Up to date   Human Immunodeficiency Virus (HIV) Screening (annually for increased risk patients)  HIV-1 and HIV-2 by EIA, RONY, rapid antibody test, or oral mucosa transudate Patient must be at increased risk for HIV infection per USPSTF guidelines or pregnant.   Tests covered annually for patients at increased risk. Pregnant patients may receive up to 3 test during pregnancy. Not applicable Not applicable   Medical Nutrition Therapy (MNT) (for diabetes or renal disease not recommended schedule) Requires referral by treating physician for patient with diabetes or renal disease. Can be provided in same year as diabetes self-management training (DSMT), and CMS recommends medical nutrition therapy take place after DSMT. Up to 3 hours for initial year and 2 hours in subsequent years. Not applicable Not applicable   Shingles Vaccination A shingles vaccine is also recommended once in a lifetime after age 61 Vaccination recommended for shingles vaccination. Up to date   Seasonal Influenza Vaccination (annually)  Continue with yearly \"flu\" shot annually Up to date   Pneumococcal Vaccination (once after 65)  Please receive this vaccination at age 72. Up to date   Hepatitis B Vaccinations (if medium/high risk) Medium/high risk factors:  End-stage renal disease,  Hemophiliacs who received Factor VIII or IX concentrates, Clients of institutions for the mentally retarded, Persons who live in the same house as a HepB virus carrier, Homosexual men, Illicit injectable drug abusers. Not applicable Not applicable   Screening Mammography (biennial age 54-69) Annually (age 36 or over) You need a mammogram yearly to screen for breast cancer. Up to date   Screening Pap Tests and Pelvic Examination (up to age 79 and after 79 if unknown history or abnormal study last 10 years) Every 24 months except high risk You need no Pap smear at this time. Not needed   Ultrasound Screening for Abdominal Aortic Aneurysm (AAA) (once) Patient must be referred through IPPE and not have had a screening for abdominal aortic aneurysm before under Medicare.   Limited to patients who meet one of the following criteria:  - Men who are 73-68 years old and have smoked more than 100 cigarettes in their lifetime.  -Anyone with a FH of AAA  -Anyone recommended for screening by USPSTF Not applicable Not applicable       Medicare Wellness Visit, Female     The best way to live healthy is to have a lifestyle where you eat a well-balanced diet, exercise regularly, limit alcohol use, and quit all forms of tobacco/nicotine, if applicable. Regular preventive services are another way to keep healthy. Preventive services (vaccines, screening tests, monitoring & exams) can help personalize your care plan, which helps you manage your own care. Screening tests can find health problems at the earliest stages, when they are easiest to treat. [facility] follows the current, evidence-based guidelines published by the [facility] (USPSTF) when recommending preventive services for our patients. Because we follow these guidelines, sometimes recommendations change over time as research supports it. (For example, mammograms used to be recommended annually. Even though Medicare will still pay for an annual mammogram, the newer guidelines recommend a mammogram every two years for women of average risk.)  Of course, you and your doctor may decide to screen more often for some diseases, based on your risk and your health status. Preventive services for you include:  - Medicare offers their members a free annual wellness visit, which is time for you and your primary care provider to discuss and plan for your preventive service needs. Take advantage of this benefit every year!  -All adults over the age of 72 should receive the recommended pneumonia vaccines. Current USPSTF guidelines recommend a series of two vaccines for the best pneumonia protection.   -All adults should have a flu vaccine yearly and a tetanus vaccine every 10 years. All adults age 61 and older should receive a shingles vaccine once in their lifetime.    -A bone mass density test is recommended when a woman turns 65 to screen for osteoporosis.  This test is only recommended one time, as a screening. Some providers will use this same test as a disease monitoring tool if you already have osteoporosis. -All adults age 38-68 who are overweight should have a diabetes screening test once every three years.   -Other screening tests and preventive services for persons with diabetes include: an eye exam to screen for diabetic retinopathy, a kidney function test, a foot exam, and stricter control over your cholesterol.   -Cardiovascular screening for adults with routine risk involves an electrocardiogram (ECG) at intervals determined by your doctor.   -Colorectal cancer screenings should be done for adults age 54-65 with no increased risk factors for colorectal cancer. There are a number of acceptable methods of screening for this type of cancer. Each test has its own benefits and drawbacks. Discuss with your doctor what is most appropriate for you during your annual wellness visit. The different tests include: colonoscopy (considered the best screening method), a fecal occult blood test, a fecal DNA test, and sigmoidoscopy. -Breast cancer screenings are recommended every other year for women of normal risk, age 54-69.  -Cervical cancer screenings for women over age 72 are only recommended with certain risk factors.   -All adults born between [facility] should be screened once for Hepatitis C.      Here is a list of your current Health Maintenance items (your personalized list of preventive services) with a due date:  Health Maintenance Due   Topic Date Due    Glaucoma Screening   06/28/2018    Annual Well Visit  10/05/2018    Breast Cancer Screening  01/15/2019    Colonoscopy  04/03/2019

## 2018-12-20 NOTE — PROGRESS NOTES
[facility] OF [facility]:  12/20/2018, MRN: [id number]      Cesar Donovan is a 80 y.o. female and presents to clinic for Abdominal Pain (RLQ x 1.5 months daily. patient denies any constipation, or diarrhea. Patient denies any pain currently. Patient denies any urinary symtpoms. ) and Flank Pain (right side x 1.5 months. )    Subjective: The patient is a 50-year-old female with history of SIERRA, left foot bone spur, lichen sclerosis per biopsy in 2012, cervical disc disease, peripheral artery disease, varicose veins, Sjogren's syndrome, dysphasia, rheumatoid arthritis (), paroxysmal supraventricular tachycardia, mitral valve prolapse, multiple thyroid nodules, atrial fibrillation per EHR, plantar fasciitis, and dysphasia. 1. RA: She is back on MTX since October 2018. She is followed by  and is scheduled to see him next month. Her jts hurt mostly at night (shoulders, knees, elbows). Pain is associated with stiffness. No dry eyes/mouth sx despite having a h/o Sjogren's syndrome    2. Abdominal Pain: She has a 1.5 month h/o right lower abdominal pain and right flank pain. Sx are off/on. No triggers are known. Sx resolve with tylenol. No N/V, diarrhea, or hematochezia/melena. She had similar cramping abdominal pain yrs ago, that responded to UTI abx and went away. She denies dysuria, hematuria, or hematochezia. 3. HTN/AF: Present >3 months. On xarelto and metoprolol. No adverse side effects from these rx. No CP or SOB. 4. SIERRA: Present >3 months. No excessive alcoholic beverage consumption. 5. Thyroid Nodule Hx: Present for years. She is asymptomatic. 1/12/16 Thyroid U/S: Tiny nonspecific but generally benign-appearing nodules within the right and left thyroid lobes, similar to comparison studies. Some of the nodules noted previously are no longer seen.       Patient Active Problem List    Diagnosis Date Noted    PAF (paroxysmal atrial fibrillation) ([facility] [address] 11/21/2017    Osteopenia of multiple sites, 9/17/17 10/04/2017    Rheumatoid arthritis ([facility] [address] 10/04/2017    Bilateral carpal tunnel syndrome, per EMG testing 8/17 08/17/2017    SIERRA (nonalcoholic steatohepatitis) 05/30/2017    Bone spur of left foot 70/94/4420    Lichen sclerosus 14/07 05/30/2017    Cervical disc disease 01/08/2016    Varicose veins of lower extremities with other complications 96/86/7690    Sjogren's syndrome ([facility] [address]     Dysphagia 11/24/2010    MVP (mitral valve prolapse)     TIA (transient ischemic attack)     Multiple thyroid nodules        Current Outpatient Medications   Medication Sig Dispense Refill    folic acid (FOLVITE) 1 mg tablet Take  by mouth daily.  XARELTO 20 mg tab tablet Take 1 Tab by mouth daily (with dinner). 30 Tab 6    gabapentin (NEURONTIN) 300 mg capsule Take 2 Caps by mouth nightly. 180 Cap 1    metoprolol tartrate (LOPRESSOR) 25 mg tablet Take 0.5 Tabs by mouth two (2) times a day. 40 Tab 4    cholecalciferol, VITAMIN D3, (VITAMIN D3) 5,000 unit tab tablet Take 5,000 Units by mouth daily.  cyanocobalamin 1,000 mcg tablet Take 1,000 mcg by mouth two (2) times a day.  diclofenac (VOLTAREN) 1 % gel Apply  to affected area four (4) times daily.  acetaminophen (TYLENOL) 650 mg CR tablet Take 650 mg by mouth three (3) times daily as needed for Pain (taking 2 tid).  melatonin tab tablet Take 8 mg by mouth nightly.  methotrexate (RHEUMATREX) 2.5 mg tablet       B.infantis-B.ani-B.long-B.bifi (PROBIOTIC 4X) 10-15 mg TbEC Take  by mouth.  ascorbic acid (VITAMIN C) 500 mg tablet Take 500 mg by mouth two (2) times a day.  calcium-vitamin D (CALCIUM 500+D) 500 mg(1,250mg) -200 unit per tablet Take 1 Tab by mouth two (2) times daily (with meals).          Allergies   Allergen Reactions    Plaquenil [Hydroxychloroquine] Nausea and Vomiting    Pcn [Penicillins] Itching and Rash    Prednisone Nausea and Vomiting     She reports a plaquenil/prednisone come causes vomiting    Zithromax [Azithromycin] Rash, Other (comments) and Hives     [facility] records state skin peeling. High fever, peeling       Past Medical History:   Diagnosis Date    A-fib ([facility] [address]     Arthritis     Feet    Memory change     Menopause     Multiple thyroid nodules 2009    under care of Dr. Akila Jordan MVP (mitral valve prolapse)     under care of Dr. Jennifer Keita Neuropathy     Plantar fasciitis     PSVT (paroxysmal supraventricular tachycardia) ([facility] [address] 2/8/2011    Right knee pain     Routine gynecological examination     done by Dr. Moni Juarez S/P colonoscopy     done past 1-2 yrs? Dr. Teresa Trevino Sjogren's syndrome [facility])     sees Dr. Bonnie Ellington    TIA (transient ischemic attack)     possible history of    Tibialis tendonitis     following with Dr Luiz Ghosh ankle       Past Surgical History:   Procedure Laterality Date    DILATION AND CURETTAGE  1987    HX BREAST AUGMENTATION  1984    HX DILATION AND CURETTAGE  1/2013    HX TONSILLECTOMY      IMPLANT BREAST SILICONE/EQ      TARSAL TUNNEL RELEASE  1994    Right       Family History   Problem Relation Age of Onset    Hypertension Father     High Cholesterol Father     Diabetes Father     Stroke Father     Heart Attack Father     Cancer Father     Heart Disease Father     Depression Sister     Breast Cancer Maternal Grandmother        Social History     Tobacco Use    Smoking status: Never Smoker    Smokeless tobacco: Never Used   Substance Use Topics    Alcohol use: No     Alcohol/week: 0.0 oz       ROS   Review of Systems   Constitutional: Negative for chills and fever. HENT: Negative for ear pain and sore throat. Eyes: Negative for blurred vision and pain. Respiratory: Negative for cough and shortness of breath. Cardiovascular: Negative for chest pain. Gastrointestinal: Positive for abdominal pain (asymptomatic today though - please see HPI). Negative for blood in stool and melena.    Genitourinary: Negative for dysuria and hematuria. Musculoskeletal: Positive for joint pain (chronic - see HPI). Negative for myalgias. Skin: Negative for rash. Neurological: Negative for tingling, focal weakness and headaches. Endo/Heme/Allergies: Does not bruise/bleed easily. Psychiatric/Behavioral: Negative for depression. Objective     Vitals:    12/20/18 1237   BP: 109/78   Pulse: 75   Resp: 14   Temp: 97.1 °F (36.2 °C)   TempSrc: Oral   SpO2: 97%   Weight: 134 lb (60.8 kg)   Height: 5' 5\" (1.651 m)   PainSc:   0 - No pain   LMP: 04/25/2016       Physical Exam   Constitutional: She is oriented to person, place, and time and well-developed, well-nourished, and in no distress. HENT:   Head: Normocephalic and atraumatic. Right Ear: External ear normal.   Left Ear: External ear normal.   Nose: Nose normal.   Mouth/Throat: Oropharynx is clear and moist. No oropharyngeal exudate. Eyes: Conjunctivae and EOM are normal. Pupils are equal, round, and reactive to light. Right eye exhibits no discharge. Left eye exhibits no discharge. No scleral icterus. Neck: Neck supple. Cardiovascular: Normal rate, regular rhythm and intact distal pulses. Exam reveals no gallop and no friction rub. No murmur heard. Pulmonary/Chest: Effort normal and breath sounds normal. No respiratory distress. She has no wheezes. She has no rales. Abdominal: Soft. Bowel sounds are normal. She exhibits no distension. There is no tenderness. There is no rebound and no guarding. Musculoskeletal: She exhibits no edema or tenderness (BUE). Lymphadenopathy:     She has no cervical adenopathy. Neurological: She is alert and oriented to person, place, and time. She exhibits normal muscle tone. Gait normal.   Skin: Skin is warm and dry. No erythema. Psychiatric: Affect normal.   Nursing note and vitals reviewed.       LABS   Data Review:   Lab Results   Component Value Date/Time    WBC 5.2 02/06/2018 10:12 AM    HGB 14.8 02/06/2018 10:12 AM    HCT 43.7 02/06/2018 10:12 AM    PLATELET 367 70/75/8122 10:12 AM    MCV 91.0 02/06/2018 10:12 AM       Lab Results   Component Value Date/Time    Sodium 139 02/06/2018 10:12 AM    Potassium 4.4 02/06/2018 10:12 AM    Chloride 106 02/06/2018 10:12 AM    CO2 29 02/06/2018 10:12 AM    Anion gap 4 02/06/2018 10:12 AM    Glucose 87 02/06/2018 10:12 AM    BUN 13 02/06/2018 10:12 AM    Creatinine 0.60 02/06/2018 10:12 AM    BUN/Creatinine ratio 22 (H) 02/06/2018 10:12 AM    GFR est AA >60 02/06/2018 10:12 AM    GFR est non-AA >60 02/06/2018 10:12 AM    Calcium 8.9 02/06/2018 10:12 AM       Lab Results   Component Value Date/Time    Cholesterol, total 168 11/02/2017 10:00 AM    HDL Cholesterol 73 (H) 11/02/2017 10:00 AM    LDL, calculated 84.4 11/02/2017 10:00 AM    VLDL, calculated 10.6 11/02/2017 10:00 AM    Triglyceride 53 11/02/2017 10:00 AM    CHOL/HDL Ratio 2.3 11/02/2017 10:00 AM       Lab Results   Component Value Date/Time    Hemoglobin A1c 5.5 07/19/2016 09:10 AM       Assessment/Plan:   1. SIERRA (nonalcoholic steatohepatitis):   -Checking a CMP, CBC, and a lipid panel. ORDERS:  - METABOLIC PANEL, COMPREHENSIVE; Future  - CBC WITH AUTOMATED DIFF; Future  - LIPID PANEL; Future    2. PAF (paroxysmal atrial fibrillation): Stable. -Continue with medication as prescribed.  -Checking a urine protein screen, CMP, and a lipid panel. ORDERS:  - METABOLIC PANEL, COMPREHENSIVE; Future  - MICROALBUMIN, UR, RAND W/ MICROALB/CREAT RATIO; Future    3. Health Maintenance:  -Screening for anemia with a CBC. -Screening for hyperlipidemia with a lipid panel.  -Requesting her last formal eye exam.    ORDERS:  - CBC WITH AUTOMATED DIFF; Future  - LIPID PANEL; Future    4. Lower Abdominal Pain: Etiology is unknown. She is asymptomatic today.   -Checking a CMP, CBC, and urinalysis.   -Since the patient reports pelvic cramping/right lower quadrant abdominal pain, I encouraged her to schedule an appointment with her gynecologist.  -I encouraged her to follow-up with her GI team for her anticipated colonoscopy in 2019. ORDERS:  - METABOLIC PANEL, COMPREHENSIVE; Future  - CBC WITH AUTOMATED DIFF; Future  - URINALYSIS W/ RFLX MICROSCOPIC; Future    5. RA:  -Continue to follow-up with Dr. Ac[facility] with medication as prescribed.  -We will defer testing/labs for her RA to Dr. Rochelle Berg. 6. Thyroid Nodule Hx:  - Checking TFTs     Health Maintenance Due   Topic Date Due    GLAUCOMA SCREENING Q2Y  06/28/2018    MEDICARE YEARLY EXAM  10/05/2018    BREAST CANCER SCRN MAMMOGRAM  01/15/2019    COLONOSCOPY  04/03/2019     Lab review: labs are reviewed in the EHR and ordered as mentioned above    I have discussed the diagnosis with the patient and the intended plan as seen in the above orders. The patient has received an after-visit summary and questions were answered concerning future plans. I have discussed medication side effects and warnings with the patient as well. I have reviewed the plan of care with the patient, accepted their input and they are in agreement with the treatment goals. All questions were answered. The patient understands the plan of care. Handouts provided today with above information. Pt instructed if symptoms worsen to call the office or report to the ED for continued care. Greater than 50% of the visit time was spent in counseling and/or coordination of care. Voice recognition was used to generate this report, which may have resulted in some phonetic based errors in grammar and contents. Even though attempts were made to correct all the mistakes, some may have been missed, and remained in the body of the document. Follow-up Disposition:  Return in about 6 months (around 6/20/2019) for BP check.     Bev Yates MD

## 2018-12-20 NOTE — ACP (ADVANCE CARE PLANNING)
Advance Care Planning  Advance Care Planning (ACP) Provider Conversation     Date of ACP Conversation: 12/20/18  Persons included in Conversation:  patient    Authorized Decision Maker (if patient is incapable of making informed decisions): This person is:   Healthcare Agent/Medical Power of  under Advance Directive     For Patients with Decision Making Capacity:   Values/Goals: Exploration of values, goals, and preferences if recovery is not expected, even with continued medical treatment in the event of:  Imminent death  Severe, permanent brain injury    Conversation Outcomes / Follow-Up Plan:   Reviewed existing Advance Directive . She has no changes to make to her pre-existing advanced directive. I verified the contact information for her POA.     Dr. Jaguar Ogden  [facility], [address] [place] [address], [address] [place] [address] Str.  Phone: [phone]  Fax: [phone]

## 2018-12-20 NOTE — PROGRESS NOTES
INTERNISTS OF [facility]:  12/20/18, [id number]      The Subsequent Medicare Annual Wellness Exam PROGRESS NOTE    This is a Subsequent Medicare Annual Wellness Exam (AWV). I have reviewed the patient's medical history in detail and updated the computerized patient record. Eleni Eugene is a 80 y.o.  female and presents for an annual wellness exam.    SUBJECTIVE  Past Medical History:   Diagnosis Date    A-fib [facility])     Arthritis     Feet    Memory change     Menopause     Multiple thyroid nodules 2009    under care of Dr. Kavitha Nuñez MVP (mitral valve prolapse)     under care of Dr. Gibson Serum Neuropathy     Plantar fasciitis     PSVT (paroxysmal supraventricular tachycardia) ([facility] [address] 2/8/2011    Right knee pain     Routine gynecological examination     done by Dr. Taniya Tavarez S/P colonoscopy     done past 1-2 yrs? Dr. Javan Jacques Sjogren's syndrome [facility])     sees Dr. Anamika Pond    TIA (transient ischemic attack)     possible history of    Tibialis tendonitis     following with Dr Aura Lewis ankle      Past Surgical History:   Procedure Laterality Date    DILATION AND CURETTAGE  1987    HX BREAST AUGMENTATION  1984    HX DILATION AND CURETTAGE  1/2013    HX TONSILLECTOMY      IMPLANT BREAST SILICONE/EQ      TARSAL TUNNEL RELEASE  1994    Right     Current Outpatient Medications   Medication Sig Dispense Refill    folic acid (FOLVITE) 1 mg tablet Take  by mouth daily.  XARELTO 20 mg tab tablet Take 1 Tab by mouth daily (with dinner). 30 Tab 6    gabapentin (NEURONTIN) 300 mg capsule Take 2 Caps by mouth nightly. 180 Cap 1    metoprolol tartrate (LOPRESSOR) 25 mg tablet Take 0.5 Tabs by mouth two (2) times a day. 40 Tab 4    cholecalciferol, VITAMIN D3, (VITAMIN D3) 5,000 unit tab tablet Take 5,000 Units by mouth daily.  cyanocobalamin 1,000 mcg tablet Take 1,000 mcg by mouth two (2) times a day.       diclofenac (VOLTAREN) 1 % gel Apply  to affected area four (4) times daily.      acetaminophen (TYLENOL) 650 mg CR tablet Take 650 mg by mouth three (3) times daily as needed for Pain (taking 2 tid).  melatonin tab tablet Take 8 mg by mouth nightly.  methotrexate (RHEUMATREX) 2.5 mg tablet       B.infantis-B.ani-B.long-B.bifi (PROBIOTIC 4X) 10-15 mg TbEC Take  by mouth.  ascorbic acid (VITAMIN C) 500 mg tablet Take 500 mg by mouth two (2) times a day.  calcium-vitamin D (CALCIUM 500+D) 500 mg(1,250mg) -200 unit per tablet Take 1 Tab by mouth two (2) times daily (with meals). Allergies   Allergen Reactions    Plaquenil [Hydroxychloroquine] Nausea and Vomiting    Pcn [Penicillins] Itching and Rash    Prednisone Nausea and Vomiting     She reports a plaquenil/prednisone come causes vomiting    Zithromax [Azithromycin] Rash, Other (comments) and Hives     [facility] records state skin peeling.   High fever, peeling     Family History   Problem Relation Age of Onset    Hypertension Father     High Cholesterol Father     Diabetes Father     Stroke Father     Heart Attack Father     Cancer Father     Heart Disease Father     Depression Sister     Breast Cancer Maternal Grandmother      Social History     Tobacco Use    Smoking status: Never Smoker    Smokeless tobacco: Never Used   Substance Use Topics    Alcohol use: No     Alcohol/week: 0.0 oz     Patient Active Problem List   Diagnosis Code    MVP (mitral valve prolapse) I34.1    TIA (transient ischemic attack) G45.9    Multiple thyroid nodules E04.2    Dysphagia R13.10    Sjogren's syndrome ([facility] [address] M35.00    Varicose veins of lower extremities with other complications P48.918    Cervical disc disease M50.90    SIERRA (nonalcoholic steatohepatitis) K75.81    Bone spur of left foot C53.24    Lichen sclerosus 65/18 L90.0    Bilateral carpal tunnel syndrome, per EMG testing 8/17 G56.03    Osteopenia of multiple sites, 9/17/17 M85.89    Rheumatoid arthritis ([facility] [address] M06.9    PAF (paroxysmal atrial fibrillation) ([facility]) I48.0     The patient is a 52-year-old female with history of SIERRA, left foot bone spur, lichen sclerosis per biopsy in 2012, cervical disc disease, peripheral artery disease, varicose veins, Sjogren's syndrome, dysphasia, rheumatoid arthritis (), paroxysmal supraventricular tachycardia, mitral valve prolapse, multiple thyroid nodules, atrial fibrillation per EHR, plantar fasciitis, and dysphasia. Health Maintenance History  Immunizations reviewed: Tdap up to date   Pneumovax: up to date   Flu: up to date  Zoster: up to date    Immunization History   Administered Date(s) Administered    Influenza High Dose Vaccine PF 09/26/2017, 10/01/2018    Influenza Vaccine 10/29/2013, 09/20/2014, 11/06/2015, 11/02/2016    Influenza Vaccine Split 11/01/2010, 09/23/2011, 10/03/2012    Pneumococcal Conjugate (PCV-13) 07/07/2016    Pneumococcal Polysaccharide (PPSV-23) 07/16/2017    TD Vaccine 01/01/2007    Tdap 09/26/2017    Zoster Recombinant 09/27/2018, 12/07/2018    Zoster Vaccine, Live 02/14/2017       Colonoscopy: Up to date. Due in 2019. No hematochezia/melena     Eye exam: Up to date. She had an exam earlier this year that was unremarkable per her hx. Mammo: Up to date. Dexascan: Up to date    Pelvic/Pap: No vaginal bleeding. Review of Systems   Constitutional: Negative for chills and fever. HENT: Negative for ear pain and sore throat. Eyes: Negative for blurred vision and pain. Respiratory: Negative for cough and shortness of breath. Cardiovascular: Negative for chest pain. Gastrointestinal: Positive for abdominal pain (but asymptomatic today). Negative for blood in stool and melena. Genitourinary: Negative for dysuria and hematuria. Musculoskeletal: Positive for joint pain (chronic). Negative for myalgias. Skin: Negative for rash. Neurological: Negative for tingling, focal weakness and headaches.    Endo/Heme/Allergies: Does not bruise/bleed easily. Psychiatric/Behavioral: Negative for depression. Using CBD oil, placing it under her tongue for aches/pains       Depression Risk Factor Screening:      Patient Health Questionnaire (PHQ-2)   Over the last 2 weeks, how often have you been bothered by any of the following problems? · Little interest or pleasure in doing things? · Not at all. [0]  · Feeling down, depressed, or hopeless? · Not at all. [0]    Total Score: 0/6  PHQ-2 Assessment Scoring:   A score of 2 or more requires further screening with the PHQ-9    Alcohol Risk Factor Screening:   Women: On any occasion during the past 3 months, have you had more than 3 drinks containing alcohol? no    Do you average more than 7 drinks per week? no    Tobacco Use Screening:     Social History     Tobacco Use   Smoking Status Never Smoker   Smokeless Tobacco Never Used       Hearing Loss    Hearing is good. Activities of Daily Living   Self-care. Requires assistance with: no ADLs  She does not require assistance with ADLs/IADLs. Fall Risk   No falls within the past year    Abuse Screen   Patient is not abused  None    Additional Examination Findings:  Vitals:    12/20/18 1237   BP: 109/78   Pulse: 75   Resp: 14   Temp: 97.1 °F (36.2 °C)   TempSrc: Oral   SpO2: 97%   Weight: 134 lb (60.8 kg)   Height: 5' 5\" (1.651 m)   PainSc:   0 - No pain   LMP: 04/25/2016      Body mass index is 22.3 kg/m². Evaluation of Cognitive Function:  Mood/affect: Euthymic  Appearance: Well-groomed  Family member/caregiver input: The pt is not accompanied by a family member      General:   Well-nourished, well-groomed, pleasant, alert, in no acute distress. Head:  Normocephalic, atraumatic  Ears:  External ears WNL  Eyes:  Clear sclera  Neuro:   Alert, conversant, appropriate, following commands, no sensory deficit   Skin:    No rashes noted  Psych:  Affect, mood and judgment appropriate      Dementia Screen (Mini-Cog):   Three Item Recall: 3/3   Clock Drawing (ten past eleven) Exercise: Unremarkable      LABS   Data Review:   Lab Results   Component Value Date/Time    Sodium 139 02/06/2018 10:12 AM    Potassium 4.4 02/06/2018 10:12 AM    Chloride 106 02/06/2018 10:12 AM    CO2 29 02/06/2018 10:12 AM    Anion gap 4 02/06/2018 10:12 AM    Glucose 87 02/06/2018 10:12 AM    BUN 13 02/06/2018 10:12 AM    Creatinine 0.60 02/06/2018 10:12 AM    BUN/Creatinine ratio 22 (H) 02/06/2018 10:12 AM    GFR est AA >60 02/06/2018 10:12 AM    GFR est non-AA >60 02/06/2018 10:12 AM    Calcium 8.9 02/06/2018 10:12 AM       Lab Results   Component Value Date/Time    WBC 5.2 02/06/2018 10:12 AM    HGB 14.8 02/06/2018 10:12 AM    HCT 43.7 02/06/2018 10:12 AM    PLATELET 948 95/80/8569 10:12 AM    MCV 91.0 02/06/2018 10:12 AM       Lab Results   Component Value Date/Time    Hemoglobin A1c 5.5 07/19/2016 09:10 AM       Lab Results   Component Value Date/Time    Cholesterol, total 168 11/02/2017 10:00 AM    HDL Cholesterol 73 (H) 11/02/2017 10:00 AM    LDL, calculated 84.4 11/02/2017 10:00 AM    VLDL, calculated 10.6 11/02/2017 10:00 AM    Triglyceride 53 11/02/2017 10:00 AM    CHOL/HDL Ratio 2.3 11/02/2017 10:00 AM       Patient Care Team:  Edie Mcpherson MD as PCP - General (Family Practice)  Lee Hadley MD as Physician (Rheumatology)  Sofie Ferro DPM (Podiatry)  Yeison Dove MD (Vascular Surgery)  [facility] Meghna Chase (Vascular Surgery)  Don Gamez MD (Orthopedic Surgery)  Jo Ann Becker MD as Physician (Rheumatology)  Enmanuel Blankenship MD as Physician (Neurology)  Malgorzata Gandhi MD (Internal Medicine)  Kim Lee MD (Plastic Surgery)    End-of-life planning  Advanced Directive in the case than an injury or illness causes the patient to be unable to make health care decisions was discussed with the patient.      Advice/Referrals/Counselling/Plan:   Education and counseling provided:  Are appropriate based on today's review and evaluation  End-of-Life planning (with patient's consent)  Pneumococcal Vaccine  Influenza Vaccine  Screening Mammography  Screening Pap and pelvic (covered once every 2 years)  Colorectal cancer screening tests  Cardiovascular screening blood test  Bone mass measurement (DEXA)  Screening for glaucoma  Diabetes screening test     Include in education list (weight loss, physical activity, smoking cessation, fall prevention, and nutrition)    ICD-10-CM ICD-9-CM    1. SIERRA (nonalcoholic steatohepatitis) G26.63 164.0 METABOLIC PANEL, COMPREHENSIVE      CBC WITH AUTOMATED DIFF      LIPID PANEL   2. PAF (paroxysmal atrial fibrillation) (HCC) D85.2 928.36 METABOLIC PANEL, COMPREHENSIVE      MICROALBUMIN, UR, RAND W/ MICROALB/CREAT RATIO   3. Screening for deficiency anemia Z13.0 V78.1 CBC WITH AUTOMATED DIFF   4. Screening for hyperlipidemia Z13.220 V77.91 LIPID PANEL   5. Screening for colon cancer Z12.11 V76.51 REFERRAL TO GASTROENTEROLOGY   6. Lower abdominal pain U33.50 237.30 METABOLIC PANEL, COMPREHENSIVE      CBC WITH AUTOMATED DIFF      URINALYSIS W/ RFLX MICROSCOPIC   7. Rheumatoid arthritis involving both hands, unspecified rheumatoid factor presence (HCC) M06.9 714.0    8. Multiple thyroid nodules E04.2 241.1    9. Thyroid nodule E04.1 241.0 TSH AND FREE T4   . Brief written plan, checklist    Assessment/Plan:    Health Maintenance:  -Placing a referral to GI for colon cancer screening for next year.  -I discussed the dangers of use of cannabis based products as they are not regulated by the government at this time. ORDERS:  - REFERRAL TO GASTROENTEROLOGY    Lab review: labs are reviewed in the [address] [place] [address] (ACP) Provider Conversation     Date of ACP Conversation: 12/20/18  Persons included in Conversation:  patient    Authorized Decision Maker (if patient is incapable of making informed decisions):    This person is:   Healthcare Agent/Medical Power of  under Advance Directive     For Patients with Decision Making Capacity:   Values/Goals: Exploration of values, goals, and preferences if recovery is not expected, even with continued medical treatment in the event of:  Imminent death  Severe, permanent brain injury    Conversation Outcomes / Follow-Up Plan:   Reviewed existing Advance Directive . She has no changes to make to her pre-existing advanced directive. I verified the contact information for her POA. I have discussed the diagnosis with the patient and the intended plan as seen in the above orders. The patient has received an after-visit summary and questions were answered concerning future plans. I have discussed medication side effects and warnings with the patient as well. I have reviewed the plan of care with the patient, accepted their input and they are in agreement with the treatment goals. Follow-up Disposition:  Return in about 6 months (around 6/20/2019) for BP check.

## 2018-12-21 RX ORDER — METOPROLOL TARTRATE 25 MG/1
TABLET, FILM COATED ORAL
Qty: 40 TAB | Refills: 3 | Status: SHIPPED | OUTPATIENT
Start: 2018-12-21 | End: 2019-03-23 | Stop reason: SDUPTHER

## 2018-12-26 ENCOUNTER — TELEPHONE (OUTPATIENT)
Dept: INTERNAL MEDICINE CLINIC | Age: 68
End: 2018-12-26

## 2018-12-26 ENCOUNTER — HOSPITAL ENCOUNTER (OUTPATIENT)
Dept: LAB | Age: 68
Discharge: HOME | End: 2018-12-26
Payer: MEDICARE

## 2018-12-26 DIAGNOSIS — Z13.220 SCREENING FOR HYPERLIPIDEMIA: ICD-10-CM

## 2018-12-26 DIAGNOSIS — R10.30 LOWER ABDOMINAL PAIN: ICD-10-CM

## 2018-12-26 DIAGNOSIS — E04.2 MULTIPLE THYROID NODULES: ICD-10-CM

## 2018-12-26 DIAGNOSIS — K75.81 NASH (NONALCOHOLIC STEATOHEPATITIS): ICD-10-CM

## 2018-12-26 DIAGNOSIS — I48.0 PAF (PAROXYSMAL ATRIAL FIBRILLATION) (HCC): ICD-10-CM

## 2018-12-26 DIAGNOSIS — Z13.0 SCREENING FOR DEFICIENCY ANEMIA: ICD-10-CM

## 2018-12-26 LAB
ALBUMIN SERPL-MCNC: 4 G/DL (ref 3.4–5)
ALBUMIN/GLOB SERPL: 1.1 {RATIO} (ref 0.8–1.7)
ALP SERPL-CCNC: 82 U/L (ref 45–117)
ALT SERPL-CCNC: 18 U/L (ref 13–56)
ANION GAP SERPL CALC-SCNC: 3 MMOL/L (ref 3–18)
APPEARANCE UR: CLEAR
AST SERPL-CCNC: 18 U/L (ref 15–37)
BASOPHILS # BLD: 0 K/UL (ref 0–0.1)
BASOPHILS NFR BLD: 1 % (ref 0–2)
BILIRUB SERPL-MCNC: 0.4 MG/DL (ref 0.2–1)
BILIRUB UR QL: NEGATIVE
BUN SERPL-MCNC: 10 MG/DL (ref 7–18)
BUN/CREAT SERPL: 17 (ref 12–20)
CALCIUM SERPL-MCNC: 9 MG/DL (ref 8.5–10.1)
CHLORIDE SERPL-SCNC: 105 MMOL/L (ref 100–108)
CHOLEST SERPL-MCNC: 177 MG/DL
CO2 SERPL-SCNC: 31 MMOL/L (ref 21–32)
COLOR UR: YELLOW
CREAT SERPL-MCNC: 0.59 MG/DL (ref 0.6–1.3)
CREAT UR-MCNC: 34.8 MG/DL (ref 30–125)
DIFFERENTIAL METHOD BLD: ABNORMAL
EOSINOPHIL # BLD: 0.1 K/UL (ref 0–0.4)
EOSINOPHIL NFR BLD: 2 % (ref 0–5)
ERYTHROCYTE [DISTWIDTH] IN BLOOD BY AUTOMATED COUNT: 14.1 % (ref 11.6–14.5)
GLOBULIN SER CALC-MCNC: 3.5 G/DL (ref 2–4)
GLUCOSE SERPL-MCNC: 77 MG/DL (ref 74–99)
GLUCOSE UR STRIP.AUTO-MCNC: NEGATIVE MG/DL
HCT VFR BLD AUTO: 45.3 % (ref 35–45)
HDLC SERPL-MCNC: 73 MG/DL (ref 40–60)
HDLC SERPL: 2.4 {RATIO} (ref 0–5)
HGB BLD-MCNC: 15.4 G/DL (ref 12–16)
HGB UR QL STRIP: NEGATIVE
KETONES UR QL STRIP.AUTO: NEGATIVE MG/DL
LDLC SERPL CALC-MCNC: 92.4 MG/DL (ref 0–100)
LEUKOCYTE ESTERASE UR QL STRIP.AUTO: NEGATIVE
LIPID PROFILE,FLP: ABNORMAL
LYMPHOCYTES # BLD: 1.4 K/UL (ref 0.9–3.6)
LYMPHOCYTES NFR BLD: 24 % (ref 21–52)
MCH RBC QN AUTO: 30.4 PG (ref 24–34)
MCHC RBC AUTO-ENTMCNC: 34 G/DL (ref 31–37)
MCV RBC AUTO: 89.3 FL (ref 74–97)
MICROALBUMIN UR-MCNC: 0.62 MG/DL (ref 0–3)
MICROALBUMIN/CREAT UR-RTO: 18 MG/G (ref 0–30)
MONOCYTES # BLD: 0.6 K/UL (ref 0.05–1.2)
MONOCYTES NFR BLD: 10 % (ref 3–10)
NEUTS SEG # BLD: 3.6 K/UL (ref 1.8–8)
NEUTS SEG NFR BLD: 63 % (ref 40–73)
NITRITE UR QL STRIP.AUTO: NEGATIVE
PH UR STRIP: 7.5 [PH] (ref 5–8)
PLATELET # BLD AUTO: 334 K/UL (ref 135–420)
PMV BLD AUTO: 9.4 FL (ref 9.2–11.8)
POTASSIUM SERPL-SCNC: 4.1 MMOL/L (ref 3.5–5.5)
PROT SERPL-MCNC: 7.5 G/DL (ref 6.4–8.2)
PROT UR STRIP-MCNC: NEGATIVE MG/DL
RBC # BLD AUTO: 5.07 M/UL (ref 4.2–5.3)
SODIUM SERPL-SCNC: 139 MMOL/L (ref 136–145)
SP GR UR REFRACTOMETRY: 1.01 (ref 1–1.03)
T4 FREE SERPL-MCNC: 1 NG/DL (ref 0.7–1.5)
TRIGL SERPL-MCNC: 58 MG/DL (ref ?–150)
TSH SERPL DL<=0.05 MIU/L-ACNC: 2.76 UIU/ML (ref 0.36–3.74)
UROBILINOGEN UR QL STRIP.AUTO: 0.2 EU/DL (ref 0.2–1)
VLDLC SERPL CALC-MCNC: 11.6 MG/DL
WBC # BLD AUTO: 5.7 K/UL (ref 4.6–13.2)

## 2018-12-26 PROCEDURE — 82043 UR ALBUMIN QUANTITATIVE: CPT

## 2018-12-26 PROCEDURE — 80061 LIPID PANEL: CPT

## 2018-12-26 PROCEDURE — 85025 COMPLETE CBC W/AUTO DIFF WBC: CPT

## 2018-12-26 PROCEDURE — 36415 COLL VENOUS BLD VENIPUNCTURE: CPT

## 2018-12-26 PROCEDURE — 84439 ASSAY OF FREE THYROXINE: CPT

## 2018-12-26 PROCEDURE — 80053 COMPREHEN METABOLIC PANEL: CPT

## 2018-12-26 PROCEDURE — 81003 URINALYSIS AUTO W/O SCOPE: CPT

## 2018-12-26 RX ORDER — METOPROLOL TARTRATE 25 MG/1
TABLET, FILM COATED ORAL
Qty: 40 TAB | Refills: 3 | Status: SHIPPED | OUTPATIENT
Start: 2018-12-26 | End: 2019-02-12 | Stop reason: SDUPTHER

## 2018-12-26 NOTE — PROGRESS NOTES
Please let her know that her renal and liver function labs are normal. Her urinalysis is normal. Her thyroid labs are normal. Her total cholesterol is 177. Her triglycerides are 58. Her HDL is 73. Her LDL is 92. No changes need to be made to her medications at this time.     Dr. Isi Vigil  [facility], [address] [place] [address], [address] [place], [place] [address]  Phone: [phone]  Fax: [phone]

## 2018-12-26 NOTE — TELEPHONE ENCOUNTER
----- Message from Vaishali Damon MD sent at 12/26/2018  3:51 PM EST -----  Please let her know that her renal and liver function labs are normal. Her urinalysis is normal. Her thyroid labs are normal. Her total cholesterol is 177. Her triglycerides are 58. Her HDL is 73. Her LDL is 92. No changes need to be made to her medications at this time.     Dr. Liliana Leonard  [facility], [address] [place] [address] Str.  Phone: [phone]  Fax: [phone]

## 2018-12-26 NOTE — TELEPHONE ENCOUNTER
Chief Complaint   Patient presents with    Labs     done today per Dr Marcy Scherers     Per Dr Marcy Dawkins the patient was reached and informed of lab results. Patient understands all, and has no questions at this time.

## 2019-01-15 ENCOUNTER — HOSPITAL ENCOUNTER (OUTPATIENT)
Dept: MAMMOGRAPHY | Age: 69
Discharge: HOME | End: 2019-01-15
Attending: INTERNAL MEDICINE
Payer: MEDICARE

## 2019-01-15 DIAGNOSIS — Z12.31 ENCOUNTER FOR SCREENING MAMMOGRAM FOR BREAST CANCER: ICD-10-CM

## 2019-01-15 PROCEDURE — 77063 BREAST TOMOSYNTHESIS BI: CPT

## 2019-01-16 ENCOUNTER — TELEPHONE (OUTPATIENT)
Dept: INTERNAL MEDICINE CLINIC | Age: 69
End: 2019-01-16

## 2019-01-16 NOTE — TELEPHONE ENCOUNTER
Chief Complaint   Patient presents with    Results     done 01-15-19 Mammogram 3D per Dr Tiffanie Mayer     01-16-19 Patient reached and 2 identifiers were used: Full Name, and Date of Birth. Patient informed of results, and understands all.

## 2019-01-16 NOTE — PROGRESS NOTES
Please let her know that her mammogram shows no suspicious findings for breast cancer.     Dr. Roca  [facility], [address] [facility], [address]  Phone: [phone]  Fax: [phone]

## 2019-01-16 NOTE — TELEPHONE ENCOUNTER
----- Message from Igor Encarnacion MD sent at 1/16/2019 11:18 AM EST -----  Please let her know that her mammogram shows no suspicious findings for breast cancer.     Dr. Nacho Syed  [facility] of [address], [address] [place] [address] Str.  Phone: [phone]  Fax: [phone]

## 2019-02-12 ENCOUNTER — OFFICE VISIT (OUTPATIENT)
Dept: CARDIOLOGY CLINIC | Age: 69
End: 2019-02-12

## 2019-02-12 VITALS
HEART RATE: 77 BPM | BODY MASS INDEX: 22.82 KG/M2 | SYSTOLIC BLOOD PRESSURE: 92 MMHG | HEIGHT: 65 IN | DIASTOLIC BLOOD PRESSURE: 65 MMHG | WEIGHT: 137 LBS

## 2019-02-12 DIAGNOSIS — I48.0 PAF (PAROXYSMAL ATRIAL FIBRILLATION) (HCC): ICD-10-CM

## 2019-02-12 DIAGNOSIS — G45.9 TRANSIENT CEREBRAL ISCHEMIA, UNSPECIFIED TYPE: ICD-10-CM

## 2019-02-12 DIAGNOSIS — I34.1 MVP (MITRAL VALVE PROLAPSE): Primary | ICD-10-CM

## 2019-02-12 NOTE — PROGRESS NOTES
HISTORY OF PRESENT ILLNESS  Davis Mohr is a 77 y.o. female. Irregular Heart Beat    The history is provided by the patient. This is a chronic problem. The problem has not changed since onset. The problem occurs rarely. Pertinent negatives include no diaphoresis, no fever, no malaise/fatigue, no numbness, no near-syncope, no orthopnea, no PND, no nausea, no vomiting, no dizziness, no weakness, no cough, no hemoptysis and no sputum production. Her past medical history is significant for atrial fibrillation. Review of Systems   Constitutional: Negative for chills, diaphoresis, fever, malaise/fatigue and weight loss. HENT: Negative for congestion, ear discharge, ear pain, hearing loss, nosebleeds and tinnitus. Eyes: Negative for blurred vision. Respiratory: Negative for cough, hemoptysis, sputum production, wheezing and stridor. Cardiovascular: Positive for palpitations. Negative for orthopnea, leg swelling, PND and near-syncope. Gastrointestinal: Negative for heartburn, nausea and vomiting. Musculoskeletal: Negative for myalgias and neck pain. Skin: Negative for itching and rash. Neurological: Negative for dizziness, tingling, tremors, focal weakness, loss of consciousness, weakness and numbness. Psychiatric/Behavioral: Negative for depression and suicidal ideas.      Family History   Problem Relation Age of Onset    Hypertension Father     High Cholesterol Father     Diabetes Father     Stroke Father     Heart Attack Father     Cancer Father     Heart Disease Father     Depression Sister     Breast Cancer Maternal Grandmother        Past Medical History:   Diagnosis Date    A-fib [facility])     Arthritis     Feet    Memory change     Menopause     Multiple thyroid nodules 2009    under care of Dr. Anaya Contreras MVP (mitral valve prolapse)     under care of Dr. Jamari Au Neuropathy     Plantar fasciitis     PSVT (paroxysmal supraventricular tachycardia) ([facility] 2/8/2011    Right knee pain     Routine gynecological examination     done by Dr. Mayte Hayes S/P colonoscopy     done past 1-2 yrs? Dr. Rosanne Schilling Sjogren's syndrome [facility])     sees Dr. Deidra Baker    TIA (transient ischemic attack)     possible history of    Tibialis tendonitis     following with Dr Sussy Cristina ankle       Past Surgical History:   Procedure Laterality Date    DILATION AND CURETTAGE  1987    HX BREAST AUGMENTATION  1984    HX DILATION AND CURETTAGE  1/2013    HX TONSILLECTOMY      IMPLANT BREAST SILICONE/EQ      TARSAL TUNNEL RELEASE  1994    Right       Social History     Tobacco Use    Smoking status: Never Smoker    Smokeless tobacco: Never Used   Substance Use Topics    Alcohol use: No     Alcohol/week: 0.0 oz       Allergies   Allergen Reactions    Plaquenil [Hydroxychloroquine] Nausea and Vomiting    Pcn [Penicillins] Itching and Rash    Prednisone Nausea and Vomiting     She reports a plaquenil/prednisone come causes vomiting    Zithromax [Azithromycin] Rash, Other (comments) and Hives     [facility] records state skin peeling. High fever, peeling       Outpatient Medications Marked as Taking for the 2/12/19 encounter (Office Visit) with Aida Kaminski MD   Medication Sig Dispense Refill    metoprolol tartrate (LOPRESSOR) 25 mg tablet TAKE ONE-HALF TABLET BY MOUTH TWICE A DAY 40 Tab 3    methotrexate (RHEUMATREX) 2.5 mg tablet       folic acid (FOLVITE) 1 mg tablet Take  by mouth daily.  XARELTO 20 mg tab tablet Take 1 Tab by mouth daily (with dinner). 30 Tab 6    gabapentin (NEURONTIN) 300 mg capsule Take 2 Caps by mouth nightly. 180 Cap 1    B.infantis-B.ani-B.long-B.bifi (PROBIOTIC 4X) 10-15 mg TbEC Take  by mouth.  cholecalciferol, VITAMIN D3, (VITAMIN D3) 5,000 unit tab tablet Take 5,000 Units by mouth daily.  cyanocobalamin 1,000 mcg tablet Take 1,000 mcg by mouth two (2) times a day.       diclofenac (VOLTAREN) 1 % gel Apply  to affected area four (4) times daily.  acetaminophen (TYLENOL) 650 mg CR tablet Take 650 mg by mouth three (3) times daily as needed for Pain (taking 2 tid).  melatonin tab tablet Take 8 mg by mouth nightly.  ascorbic acid (VITAMIN C) 500 mg tablet Take 500 mg by mouth two (2) times a day. Visit Vitals  BP 92/65   Pulse 77   Ht 5' 5\" (1.651 m)   Wt 62.1 kg (137 lb)   LMP 04/25/2016   BMI 22.80 kg/m²     Physical Exam   Constitutional: She is oriented to person, place, and time. She appears well-developed and well-nourished. No distress. HENT:   Head: Atraumatic. Mouth/Throat: No oropharyngeal exudate. Eyes: Conjunctivae are normal. Right eye exhibits no discharge. Left eye exhibits no discharge. No scleral icterus. Neck: Neck supple. No JVD present. No tracheal deviation present. No thyromegaly present. Cardiovascular: Normal rate and regular rhythm. Exam reveals no gallop. No murmur heard. Pulmonary/Chest: Effort normal and breath sounds normal. No stridor. She has no wheezes. She has no rales. Abdominal: Soft. There is no tenderness. There is no rebound and no guarding. Musculoskeletal: Normal range of motion. She exhibits no edema or tenderness. Lymphadenopathy:     She has no cervical adenopathy. Neurological: She is alert and oriented to person, place, and time. She exhibits normal muscle tone. Skin: Skin is warm. She is not diaphoretic. Psychiatric: She has a normal mood and affect. Her behavior is normal.     Echo 2016:  [address] SMALL LEFT VENTRICLE WITH MILD LEFT VENTRICULAR HYPERTROPHY PRESENT. NORMAL GLOBAL LEFT VENTRICULAR SYSTOLIC FUNCTION. NORMAL DIASTOLIC FUNCTION. ESTIMATED EJECTION FRACTION OF 60%   NORMAL RIGHT VENTRICULAR SIZE. NORMAL RIGHT VENTRICULAR GLOBAL SYSTOLIC FUNCTION. ESTIMATED PULMONARY ARTERY PRESSURE OF 21MMHG. NO HEMODYNAMICALLY SIGNIFICANT VALVULAR PATHOLOGY. NO PRIOR STUDY FOR COMPARISON.     ASSESSMENT and PLAN ICD-10-CM ICD-9-CM    1. MVP (mitral valve prolapse) I34.1 424.0    2. PAF (paroxysmal atrial fibrillation) (HCC) I48.0 427.31 EXERCISE CARDIAC STRESS TEST   3. Transient cerebral ischemia, unspecified type G45.9 435.9      No orders of the defined types were placed in this encounter. Follow-up Disposition:  Return in about 2 weeks (around 2/26/2019). current treatment plan is effective, no change in therapy. Patient with h/o ? ?? TIA vs transient amenesia in 2016 - also found to have atrial flutter- discharged home on xarelto and cartia. Cartia discontinued due to fatigue. Doing better on lopressor. Will proceed with ETT due to family h/o CAD  Continue xarelto for now.

## 2019-02-12 NOTE — PROGRESS NOTES
Patient didn't bring medications, verbally reviewed. 1. Have you been to the ER, urgent care clinic since your last visit? Hospitalized since your last visit? No    2. Have you seen or consulted any other health care providers outside of the [address] [place] [address] since your last visit? Include any pap smears or colon screening.  Yes Where: Rheumatology Reason for visit: Routine

## 2019-02-26 RX ORDER — DIGOXIN 125 MCG
0.12 TABLET ORAL DAILY
Qty: 30 TAB | Refills: 3 | Status: SHIPPED | OUTPATIENT
Start: 2019-02-26 | End: 2021-02-18

## 2019-02-26 NOTE — TELEPHONE ENCOUNTER
Requested Prescriptions     Pending Prescriptions Disp Refills    digoxin (LANOXIN) 0.125 mg tablet 30 Tab 3     Sig: Take 1 Tab by mouth daily.   '

## 2019-02-28 ENCOUNTER — CLINICAL SUPPORT (OUTPATIENT)
Dept: CARDIOLOGY CLINIC | Age: 69
End: 2019-02-28

## 2019-02-28 DIAGNOSIS — I48.0 PAF (PAROXYSMAL ATRIAL FIBRILLATION) (HCC): Primary | ICD-10-CM

## 2019-03-15 ENCOUNTER — OFFICE VISIT (OUTPATIENT)
Dept: CARDIOLOGY CLINIC | Age: 69
End: 2019-03-15

## 2019-03-15 VITALS
DIASTOLIC BLOOD PRESSURE: 65 MMHG | BODY MASS INDEX: 22.99 KG/M2 | SYSTOLIC BLOOD PRESSURE: 95 MMHG | WEIGHT: 138 LBS | HEIGHT: 65 IN | HEART RATE: 63 BPM

## 2019-03-15 DIAGNOSIS — I34.1 MVP (MITRAL VALVE PROLAPSE): ICD-10-CM

## 2019-03-15 DIAGNOSIS — I48.0 PAF (PAROXYSMAL ATRIAL FIBRILLATION) (HCC): Primary | ICD-10-CM

## 2019-03-15 DIAGNOSIS — G45.9 TRANSIENT CEREBRAL ISCHEMIA, UNSPECIFIED TYPE: ICD-10-CM

## 2019-03-15 NOTE — PROGRESS NOTES
HISTORY OF PRESENT ILLNESS  Janett Brown is a 77 y.o. female. Irregular Heart Beat    The history is provided by the patient. This is a chronic problem. The problem has not changed since onset. The problem occurs rarely. Pertinent negatives include no diaphoresis, no fever, no malaise/fatigue, no numbness, no near-syncope, no orthopnea, no PND, no nausea, no vomiting, no dizziness, no weakness, no cough, no hemoptysis and no sputum production. Her past medical history is significant for atrial fibrillation. Review of Systems   Constitutional: Negative for chills, diaphoresis, fever, malaise/fatigue and weight loss. HENT: Negative for congestion, ear discharge, ear pain, hearing loss, nosebleeds and tinnitus. Eyes: Negative for blurred vision. Respiratory: Negative for cough, hemoptysis, sputum production, wheezing and stridor. Cardiovascular: Positive for palpitations. Negative for orthopnea, leg swelling, PND and near-syncope. Gastrointestinal: Negative for heartburn, nausea and vomiting. Musculoskeletal: Negative for myalgias and neck pain. Skin: Negative for itching and rash. Neurological: Negative for dizziness, tingling, tremors, focal weakness, loss of consciousness, weakness and numbness. Psychiatric/Behavioral: Negative for depression and suicidal ideas.      Family History   Problem Relation Age of Onset    Hypertension Father     High Cholesterol Father     Diabetes Father     Stroke Father     Heart Attack Father     Cancer Father     Heart Disease Father     Depression Sister     Breast Cancer Maternal Grandmother        Past Medical History:   Diagnosis Date    A-fib [facility])     Arthritis     Feet    Memory change     Menopause     Multiple thyroid nodules 2009    under care of Dr. Dale Wilkins MVP (mitral valve prolapse)     under care of Dr. Elizalde Mater Neuropathy     Plantar fasciitis     PSVT (paroxysmal supraventricular tachycardia) ([facility] 2/8/2011    Right knee pain     Routine gynecological examination     done by Dr. Blayne Turk S/P colonoscopy     done past 1-2 yrs? Dr. Hollie Tsang Sjogren's syndrome [facility])     sees Dr. Kris Fox    TIA (transient ischemic attack)     possible history of    Tibialis tendonitis     following with Dr Jaqueline Tsangie ankle       Past Surgical History:   Procedure Laterality Date    DILATION AND CURETTAGE  1987    HX BREAST AUGMENTATION  1984    HX DILATION AND CURETTAGE  1/2013    HX TONSILLECTOMY      IMPLANT BREAST SILICONE/EQ      TARSAL TUNNEL RELEASE  1994    Right       Social History     Tobacco Use    Smoking status: Never Smoker    Smokeless tobacco: Never Used   Substance Use Topics    Alcohol use: No     Alcohol/week: 0.0 oz       Allergies   Allergen Reactions    Plaquenil [Hydroxychloroquine] Nausea and Vomiting    Pcn [Penicillins] Itching and Rash    Prednisone Nausea and Vomiting     She reports a plaquenil/prednisone come causes vomiting    Zithromax [Azithromycin] Rash, Other (comments) and Hives     [facility] records state skin peeling. High fever, peeling       Outpatient Medications Marked as Taking for the 3/15/19 encounter (Office Visit) with Georgia Holman MD   Medication Sig Dispense Refill    digoxin (LANOXIN) 0.125 mg tablet Take 1 Tab by mouth daily. 30 Tab 3    metoprolol tartrate (LOPRESSOR) 25 mg tablet TAKE ONE-HALF TABLET BY MOUTH TWICE A DAY 40 Tab 3    methotrexate (RHEUMATREX) 2.5 mg tablet       folic acid (FOLVITE) 1 mg tablet Take  by mouth daily.  XARELTO 20 mg tab tablet Take 1 Tab by mouth daily (with dinner). 30 Tab 6    gabapentin (NEURONTIN) 300 mg capsule Take 2 Caps by mouth nightly. 180 Cap 1    B.infantis-B.ani-B.long-B.bifi (PROBIOTIC 4X) 10-15 mg TbEC Take  by mouth.  cholecalciferol, VITAMIN D3, (VITAMIN D3) 5,000 unit tab tablet Take 5,000 Units by mouth daily.       cyanocobalamin 1,000 mcg tablet Take 1,000 mcg by mouth two (2) times a day.      diclofenac (VOLTAREN) 1 % gel Apply  to affected area four (4) times daily.  acetaminophen (TYLENOL) 650 mg CR tablet Take 650 mg by mouth three (3) times daily as needed for Pain (taking 2 tid).  melatonin tab tablet Take 8 mg by mouth nightly.  ascorbic acid (VITAMIN C) 500 mg tablet Take 500 mg by mouth two (2) times a day. Visit Vitals  BP 95/65   Pulse 63   Ht 5' 5\" (1.651 m)   Wt 62.6 kg (138 lb)   LMP 04/25/2016   BMI 22.96 kg/m²     Physical Exam   Constitutional: She is oriented to person, place, and time. She appears well-developed and well-nourished. No distress. HENT:   Head: Atraumatic. Mouth/Throat: No oropharyngeal exudate. Eyes: Conjunctivae are normal. Right eye exhibits no discharge. Left eye exhibits no discharge. No scleral icterus. Neck: Neck supple. No JVD present. No tracheal deviation present. No thyromegaly present. Cardiovascular: Normal rate and regular rhythm. Exam reveals no gallop. No murmur heard. Pulmonary/Chest: Effort normal and breath sounds normal. No stridor. She has no wheezes. She has no rales. Abdominal: Soft. There is no tenderness. There is no rebound and no guarding. Musculoskeletal: Normal range of motion. She exhibits no edema or tenderness. Lymphadenopathy:     She has no cervical adenopathy. Neurological: She is alert and oriented to person, place, and time. She exhibits normal muscle tone. Skin: Skin is warm. She is not diaphoretic. Psychiatric: She has a normal mood and affect. Her behavior is normal.     Echo 2016:  [address] SMALL LEFT VENTRICLE WITH MILD LEFT VENTRICULAR HYPERTROPHY PRESENT. NORMAL GLOBAL LEFT VENTRICULAR SYSTOLIC FUNCTION. NORMAL DIASTOLIC FUNCTION. ESTIMATED EJECTION FRACTION OF 60%   NORMAL RIGHT VENTRICULAR SIZE. NORMAL RIGHT VENTRICULAR GLOBAL SYSTOLIC FUNCTION. ESTIMATED PULMONARY ARTERY PRESSURE OF 21MMHG.    NO HEMODYNAMICALLY SIGNIFICANT VALVULAR PATHOLOGY. NO PRIOR STUDY FOR COMPARISON. ASSESSMENT and PLAN    ICD-10-CM ICD-9-CM    1. PAF (paroxysmal atrial fibrillation) (HCC) I48.0 427.31    2. MVP (mitral valve prolapse) I34.1 424.0    3. Transient cerebral ischemia, unspecified type G45.9 435.9      No orders of the defined types were placed in this encounter. Follow-up Disposition:  Return in about 6 months (around 9/15/2019). current treatment plan is effective, no change in therapy. Patient with h/o ? ?? TIA vs transient amenesia in 2016 - also found to have atrial flutter- discharged home on xarelto and cartia. Cartia discontinued due to fatigue. Doing better on lopressor. Underwent ETT-- developed Afib - started on digoxin in addition to BB. Now in NSR. Change digoxin to every other day. Continue low dose BB and xarelto.   F/u in 6 months

## 2019-03-15 NOTE — PROGRESS NOTES
Patient didn't bring medications, verbally reviewed    1. Have you been to the ER, urgent care clinic since your last visit? Hospitalized since your last visit? No    2. Have you seen or consulted any other health care providers outside of the [address] [place] [address] since your last visit? Include any pap smears or colon screening.  Yes Where: Rhuematology Reason for visit: Routine Visit

## 2019-03-25 RX ORDER — METOPROLOL TARTRATE 25 MG/1
TABLET, FILM COATED ORAL
Qty: 90 TAB | Refills: 2 | Status: SHIPPED | OUTPATIENT
Start: 2019-03-25 | End: 2020-03-16

## 2019-04-08 ENCOUNTER — TELEPHONE (OUTPATIENT)
Dept: INTERNAL MEDICINE CLINIC | Age: 69
End: 2019-04-08

## 2019-04-08 NOTE — TELEPHONE ENCOUNTER
Pt calling, says she keeps getting my chart message to get glaucoma screening. Says this has already been done. I sent fax to Dr. Celeste Mancilla to get copy of the note so we can update chart.      Phone number [phone] fax [phone]

## 2019-05-11 ENCOUNTER — HOSPITAL ENCOUNTER (OUTPATIENT)
Dept: GENERAL RADIOLOGY | Age: 69
Discharge: HOME | End: 2019-05-11
Payer: MEDICARE

## 2019-05-11 DIAGNOSIS — M05.79 RHEUMATOID ARTHRITIS OF MULTIPLE SITES WITHOUT ORGAN OR SYSTEM INVOLVEMENT WITH POSITIVE RHEUMATOID FACTOR (HCC): ICD-10-CM

## 2019-05-11 PROCEDURE — 71046 X-RAY EXAM CHEST 2 VIEWS: CPT

## 2019-06-04 ENCOUNTER — HOSPITAL ENCOUNTER (OUTPATIENT)
Dept: PHYSICAL THERAPY | Age: 69
Discharge: HOME | End: 2019-06-04
Payer: MEDICARE

## 2019-06-04 PROCEDURE — 97535 SELF CARE MNGMENT TRAINING: CPT

## 2019-06-04 PROCEDURE — 97165 OT EVAL LOW COMPLEX 30 MIN: CPT

## 2019-06-04 PROCEDURE — 97110 THERAPEUTIC EXERCISES: CPT

## 2019-06-04 NOTE — PROGRESS NOTES
Hand Therapy Evaluation and Daily Note    Patient Name: Barbie Andino  Date:2019  : 1950  Age: 77 y.o.y/o  [x]  Patient  Verified  Payor: [employer] / Plan: [place] MEDICARE PART A & B / Product Type: Medicare /    Referring Provider: MD Trav Branltey MD Visit:  2019  Onset Date:    Surgical Date: n/a  Surgical Procedure: n/a    In time:3:30 PM  Out time:4:30 PM  Total Treatment Time (min): 60  Total Timed Codes (min): 30  1:1 Treatment Time ( [address] only): 60   Visit #: 1 of 8    Treatment Area: Bilateral hand pain [M79.641, M79.642]    Precautions:    Hand Dominance: right handed   Hand Involved: bilateral hands    Total Evaluation Time:  30    History of Present Condition:  Patient is a right hand dominant 77 y.o. female with a chief complaint  of bilateral hand pain secondary to rheumatoid arthritis diagnosis in . Patient reports increased stiffness and ulnar deviation of bilateral hand digits. She has received skilled OT services before back in 2017 for her hands that she said helped. Patient presents today with slight ulnar deviation of bilateral hands. Pt was educated in night splint and demonstrated wear of pre-fabricated splint to reduce ulnar deviation. Pt demonstrated inability to make a composite fist with bilateral hands. Pain Rating:   Current: (0-no pain 10-debilitating pain) moderate   At best: (0-no pain 10-debilitating pain) no  At worst: (0-no pain 10-debilitating pain) moderate  Location: bilateral hand  Type:  moderate   Better with: Worse with:     Medications/Allergies/Past Medical History:  See chart; reviewed with patient. Rheumatoid arthritis, bilateral CTR (2017, 2018), Sjogren's syndrome    Diagnostic Tests: n/a    Prior Level of Function: walking, watching grandkids, yard work    Current Level of Function:  Decreased ADL efficiency,     Social History: Pt lives with  in 2 story home.     Occupation/Job Requirements: retired school teacher    Observation: slight ulnar deviation  Scar/incision:   n/a  Location:  Bilateral hands     Palpation:  No tenderness with palpation over MP joints    Range of Motion:   Hand ROM RIGHT 6/4/2019    2nd 3rd 4th 5th Thumb   MP 90 85 88 88    PIP 77 84 76 77    DIP 40 55 34 30    PABALYSSA HENSLEY        COLIN 207 224 198 195      Hand ROM lEFT 6/4/2019    2nd 3rd 4th 5th Thumb   MP 90 80 78 88    PIP 75 78 83 62    DIP 55 60 46 50    PABALYSSA        [facility]        COLIN 220 218 207 200      Strength: DNT     Sensation:    intact    Edema: dnt    Special Tests:   Nine-Hole Peg Test:  Left= __24___seconds  Right=__24__seconds    ADLs  Feeding:        []MaxA   []ModA   []Inna   [] CGA   []SBA   [x]Chandler   []Independent  UE Dressing:       []MaxA   []ModA   [x]Inna   [] CGA   []SBA   []Chandler   []Independent  LE Dressing:       []MaxA   []ModA   [x]Inna   [] CGA   []SBA   []Chandler   []Independent  Grooming:       []MaxA   []ModA   [x]Inna   [] CGA   []SBA   []Chandler   []Independent  Toileting:       []MaxA   []ModA   []Inna   [] CGA   []SBA   [x]Chandler   []Independent  Bathing:       []MaxA   []ModA   []Inna   [] CGA   []SBA   [x]Chandler   []Independent  Light Meal Prep:    []MaxA   []ModA   [x]Inna   [] CGA   []SBA   []Chandler   []Independent  Household/Other: []MaxA   []ModA   [x]Inna   [] CGA   []SBA   []Chandler   []Independent  Adaptive Equip:     []MaxA   []ModA   []Inna   [] CGA   []SBA   []Chandler   []Independent  Driving:       []MaxA   []ModA   []Inna   [] CGA   []SBA   [x]Chandler   []Independent      Todays Treatment:  Patient received an initial evaluation today followed by education as to diagnosis, precautions and treatment plan. Patient was provided with a basic home exercise program including digit AROM.       OBJECTIVE  10 min Therapeutic Exercise:  [] See flow sheet :   Rationale: increase ROM to improve the patients ability to make a composite fist.    20 min Self Care/Home Management: activity modifications, joint protection strategies, ulnar deviation splint wear   Rationale: education  to improve the patients ability to prevent further joint damage of bilateral hands    With   [] TE   [] TA   [] neuro   [] other: Patient Education: [x] Review HEP    [] Progressed/Changed HEP based on:   [] positioning   [] body mechanics   [] transfers   [] heat/ice application   [] Splint wear/care   [] Sensory re-education   [] scar management      [] other:      Pain Level (0-10 scale) post treatment: 0/10, stiffness    Patient will continue to benefit from skilled OT services to modify and progress therapeutic interventions, address ROM deficits, address strength deficits and instruct in home and community integration to attain goals. Assessment: unable to make a composite fist with bilateral hands. Short Term Goals: To be accomplished in 2  weeks:  1. Patient will be compliant with initial home exercise program to take an active role in their rehabilitation process. Status at Eval: pt provided and instructed in digit AROM  2. Patient will demonstrate a good understanding of their condition and strategies for self-management. Status at Eval: pt educated on activity modifications, joint protection strategies, ulnar deviation splint wear    Long Term Goals: To be accomplished in 6 weeks:   1. Patient will be able to state 4 joint protection techniques to reduce pain in bilateral hands. Status at eval: pt provided information for joint protection strategies  2. Patient will report a decrease in pain in bilateral hands after 10 minutes of constant activities. Status at eval: DNT  3. Patient will regain 220 degrees total arc of motion of the right IF in bilateral hands to enable grasp of cylindrical objects such as a glass, handle or toothbrush. Status at eval:  Right IF COLIN - 207 COLIN  4. Patient will improve functional  strength in bilateral hands to 45# to increase ability to open containers, drinks and sealed bags.   Status at eval: DNT   5. Patient will improve FOTO outcome measure score by 15 points in order to demonstrate improved functional performance. Status at eval: 42   Frequency / Duration: Patient to be seen 2 times per week for 4 weeks:    Patient/ Caregiver education and instruction: Diagnosis, prognosis, self care, activity modification, brace/ splint application and exercises    Functional Status Measure:  Patient's:42  FOTO Benchmark: 57  Expected Change: 15  FOTO score based on 0 - 100 point scale, with 100 being no impairment.  These scores are determined by patient reported functional assessments compared against national benchmarked data.     Certification Period: 6/4/2019 - 7/4/2019    Lavern Lemus OT, 6/4/2019 3:40 PM

## 2019-06-04 NOTE — PROGRESS NOTES
[facility]  [address]  [place], [address]  [phone] fax    Plan of Care/Statement of Necessity for Occupational Therapy Services    Patient name: Silvia Vallejo Start of Care: 2019   Referral source: Mansoor, Doy Baumgarten, MD : 1950    Medical Diagnosis: Bilateral hand pain [M79.641, M79.642]  Payor: [employer] / Plan: [place] MEDICARE PART A & B / Product Type: Medicare /  Onset Date:    Treatment Diagnosis: Bilateral hand pain   Prior Hospitalization: see medical history Provider#: [id number]   Medications: Verified on Patient summary List    Comorbidities: Rheumatoid arthritis, bilateral CTR (2017, 2018), Sjogren's syndrome   Prior Level of Function: walking, watching grandkids, yard work  [employer] of Care and following information is based on the information from the initial evaluation. Assessment/ key information: Patient is a right hand dominant 77 y.o. female with a chief complaint  of bilateral hand pain secondary to rheumatoid arthritis diagnosis in . Patient reports increased stiffness and ulnar deviation of bilateral hand digits. She has received skilled OT services before back in 2017 for her hands that she said helped. Patient presents today with slight ulnar deviation of bilateral hands. Pt was educated on night splinting and demonstrated wear of pre-fabricated splint to reduce ulnar deviation. Pt demonstrated inability to make a composite fist with bilateral hands and was provided and instructed in digit AROM. She reports reduced ability to perform gripping tasks. Patient received an initial evaluation today followed by education as to diagnosis, precautions and treatment plan. Skilled OT services are necessary to address deficits and improve quality of life.   Evaluation Complexity: History LOW Complexity : Brief history review  Examination LOW Complexity : 1-3 performance deficits relating to physical, cognitive , or psychosocial skils that result in activity limitations and / or participation restrictions  Clinical Decision Making LOW Complexity : No comorbidities that affect functional and no verbal or physical assistance needed to complete eval tasks   Overall Complexity Rating: LOW   Problem List: Pain effecting function, Decreased range of motion, Decreased strength, Decreased coordination/prehension, Decreased ADL/functional abilities  and Decreased flexibility/joint mobility   Treatment Plan may include any combination of the following: Therapeutic exercise, Therapeutic activities, Physical agent/modality, Manual therapy, Splinting/orthoses, Patient education and ADLs/IADLs  Patient / Family readiness to learn indicated by: asking questions, trying to perform skills and interest  Persons(s) to be included in education:   patient (P)  Barriers to Learning/Limitations: None  Patient Goal (s): relieve stiffness, joint pain. \"  Patient Self Reported Health Status: fair  Rehabilitation Potential: good  Short Term Goals: To be accomplished in 2  weeks:  1. Patient will be compliant with initial home exercise program to take an active role in their rehabilitation process. Status at Eval: pt provided and instructed in digit AROM  2. Patient will demonstrate a good understanding of their condition and strategies for self-management. Status at Eval: pt educated on activity modifications, joint protection strategies, ulnar deviation splint wear    Long Term Goals: To be accomplished in 6 weeks:              1. Patient will be able to state 4 joint protection techniques to reduce pain in bilateral hands. Status at eval: pt provided information for joint protection strategies  2. Patient will report a decrease in pain in bilateral hands after 10 minutes of constant activities. Status at eval: DNT  3.  Patient will regain 220 degrees total arc of motion of the right IF in bilateral hands to enable grasp of cylindrical objects such as a glass, handle or toothbrush. Status at eval:  Right IF COLIN - 207 COLIN  4. Patient will improve functional  strength in bilateral hands to 45# to increase ability to open containers, drinks and sealed bags. Status at eval: DNT   5. Patient will improve FOTO outcome measure score by 15 points in order to demonstrate improved functional performance. Status at eval: 42   Frequency / Duration: Patient to be seen 2 times per week for 4 weeks:   Patient/ Caregiver education and instruction: Diagnosis, prognosis, self care, activity modification, brace/ splint application and exercises  [x]  Plan of care has been reviewed with CHAVEZ    Certification Period: 6/4/2019 - 7/4/2019    China Ponce OT 6/4/2019 4:47 PM    ________________________________________________________________________    I certify that the above Therapy Services are being furnished while the patient is under my care. I agree with the treatment plan and certify that this therapy is necessary.     Physician's Signature:____________Date:_________TIME:________    ** Signature, Date and Time must be completed for valid certification **    Please sign and return to In [address]  [facility] [address]  [place], [address]  [phone] fax

## 2019-06-06 ENCOUNTER — HOSPITAL ENCOUNTER (OUTPATIENT)
Dept: PHYSICAL THERAPY | Age: 69
Discharge: HOME | End: 2019-06-06
Payer: MEDICARE

## 2019-06-06 PROCEDURE — 97162 PT EVAL MOD COMPLEX 30 MIN: CPT

## 2019-06-06 PROCEDURE — 97112 NEUROMUSCULAR REEDUCATION: CPT

## 2019-06-06 PROCEDURE — 97110 THERAPEUTIC EXERCISES: CPT

## 2019-06-06 NOTE — PROGRESS NOTES
PT DAILY TREATMENT NOTE 10-18    Patient Name: hSi Magaña  Date:2019  : 1950  [x]  Patient  Verified  Payor: VA MEDICARE / Plan: VA MEDICARE PART A & B / Product Type: Medicare /    In time:0900  Out [id number]  Total Treatment Time (min): 48  Visit #: 1 of 8    Medicare/BCBS Only   Total Timed Codes (min):  25 1:1 Treatment Time:  48       Treatment Area: Gait abnormality [R26.9]    SUBJECTIVE  Pain Level (0-10 scale):  2  Any medication changes, allergies to medications, adverse drug reactions, diagnosis change, or new procedure performed?: [x] No    [] Yes (see summary sheet for update)  Subjective functional status/changes:   [] No changes reported       OBJECTIVE    Modality rationale:    Min Type Additional Details    [] Estim:  []Unatt       []IFC  []Premod                        []Other:  []w/ice   []w/heat  Position:  Location:    [] Estim: []Att    []TENS instruct  []NMES                    []Other:  []w/US   []w/ice   []w/heat  Position:  Location:    []  Traction: [] Cervical       []Lumbar                       [] Prone          []Supine                       []Intermittent   []Continuous Lbs:  [] before manual  [] after manual    []  Ultrasound: []Continuous   [] Pulsed                           []1MHz   []3MHz W/cm2:  Location:    []  Iontophoresis with dexamethasone         Location: [] Take home patch   [] In clinic    []  Ice     []  heat  []  Ice massage  []  Laser   []  Anodyne Position:  Location:    []  Laser with stim  []  Other:  Position:  Location:    []  Vasopneumatic Device Pressure:       [] lo [] med [] hi   Temperature: [] lo [] med [] hi   [] Skin assessment post-treatment:  []intact []redness- no adverse reaction    []redness  adverse reaction:     23 min [x]Eval                  []Re-Eval       17 min Therapeutic Exercise:  [] See flow sheet :   Rationale: increase ROM and increase strength to improve the patients ability to perform functional tasks    8 min Neuromuscular Re-education:  []  See flow sheet : side steps, rhomberg EC/EO and on airex EO and EC, gait with head turns    Rationale: improve coordination and improve balance  to improve the patients ability to perform functional tasks. With   [] TE   [] TA   [] neuro   [] other: Patient Education: [x] Review HEP    [] Progressed/Changed HEP based on:   [] positioning   [] body mechanics   [] transfers   [] heat/ice application    [] other:      Other Objective/Functional Measures:       Pain Level (0-10 scale) post treatment: 2    ASSESSMENT/Changes in Function:      Patient will continue to benefit from skilled PT services to modify and progress therapeutic interventions, address functional mobility deficits, address ROM deficits, address strength deficits, analyze and cue movement patterns, analyze and modify body mechanics/ergonomics and assess and modify postural abnormalities to attain remaining goals.      [x]  See Plan of Care  []  See progress note/recertification  []  See Discharge Summary         Progress towards goals / Updated goals:  Per POC    PLAN  []  Upgrade activities as tolerated     []  Continue plan of care  []  Update interventions per flow sheet       []  Discharge due to:_  []  Other:_      Cornel Bruno, PT 6/6/2019  9:06 AM    Future Appointments   Date Time Provider Odin Fowleri   6/7/2019 10:30 AM Wolfgang Merlin, OT [facility]   6/11/2019 10:00 AM [facility]   6/27/2019 10:00 AM Calvin Miller MD [facility]   9/10/2019 10:45 AM Glenis Garcia MD [facility] 10.

## 2019-06-07 ENCOUNTER — HOSPITAL ENCOUNTER (OUTPATIENT)
Dept: PHYSICAL THERAPY | Age: 69
Discharge: HOME | End: 2019-06-07
Payer: MEDICARE

## 2019-06-07 PROCEDURE — 97140 MANUAL THERAPY 1/> REGIONS: CPT

## 2019-06-07 PROCEDURE — 97018 PARAFFIN BATH THERAPY: CPT

## 2019-06-07 PROCEDURE — 97110 THERAPEUTIC EXERCISES: CPT

## 2019-06-07 NOTE — PROGRESS NOTES
OT DAILY TREATMENT NOTE 10-18    Patient Name: Nemo Amato  Date:2019  : 1950  [x]  Patient  Verified  Payor: [place] MEDICARE / Plan: [place] MEDICARE PART A & B / Product Type: Medicare /    In time:10:31 AM  Out time:11:20 AM  Total Treatment Time (min): 49  Visit #: 2 of 8    Medicare/BCBS Only   Total Timed Codes (min):  39 1:1 Treatment Time:  44     Treatment Area: Bilateral hand pain [M79.641, M79.642]    SUBJECTIVE  Pain Level (0-10 scale): 5/10  Any medication changes, allergies to medications, adverse drug reactions, diagnosis change, or new procedure performed?: [x] No    [] Yes (see summary sheet for update)  Subjective functional status/changes:   [] No changes reported  \"I'm just used to the pain so I don't really know how I would rate it because I don't think about it. Mariangel Gearing Mariangel Gearing I ordered the velcro splint for my hand. \"    OBJECTIVE    Modality rationale: decrease inflammation, decrease pain and increase tissue extensibility to improve the patients ability to use bilateral hands   Min Type Additional Details    [] Estim:  []Unatt       []IFC  []Premod                        []Other:  []w/ice   []w/heat  Position:  Location:    [] Estim: []Att    []TENS instruct  []NMES                    []Other:  []w/US   []w/ice   []w/heat  Position:  Location:    []  Traction: [] Cervical       []Lumbar                       [] Prone          []Supine                       []Intermittent   []Continuous Lbs:  [] before manual  [] after manual    []  Ultrasound: []Continuous   [] Pulsed                           []1MHz   []3MHz W/cm2:  Location:    []  Iontophoresis with dexamethasone         Location: [] Take home patch   [] In clinic   10 []  Ice     [x]  Heat paraffin   []  Ice massage  []  Laser   []  Anodyne Position:resting  Location:bilateral hands    []  Laser with stim  []  Other:  Position:  Location:    []  Vasopneumatic Device Pressure:       [] lo [] med [] hi   Temperature: [] lo [] med [] hi   [x] Skin assessment post-treatment:  [x]intact []redness- no adverse reaction    []redness  adverse reaction:     29 min Therapeutic Exercise:  [x] See flow sheet :   Rationale: increase ROM to improve the patients ability to perform FM tasks    10 min Manual Therapy:  IASTM using tool #6 using sweeping technique   Rationale: increase ROM, increase tissue extensibility and decrease edema  to bilateral hand index fingers. With   [] TE   [] TA   [] neuro   [] other: Patient Education: [x] Review HEP    [] Progressed/Changed HEP based on:   [] positioning   [] body mechanics   [] transfers   [] heat/ice application   [] Splint wear/care   [] Sensory re-education   [] scar management      [] other:            Other Objective/Functional Measures: ulnar drift with hook fist observed on bilateral hands during tendon gliding     Pain Level (0-10 scale) post treatment: 0/10    ASSESSMENT/Changes in Function: Initiated Paraffin and IASTM during this treatment session. Reduced pain noted at end of treatment. Patient will continue to benefit from skilled OT services to modify and progress therapeutic interventions, address ROM deficits and address strength deficits to attain remaining goals. []  See Plan of Care  []  See progress note/recertification  []  See Discharge Summary         Progress towards goals / Updated goals:  Short Term Goals: To be accomplished in 2  weeks:  1. Patient will be compliant with initial home exercise program to take an active role in their rehabilitation process. Status at Eval: pt provided and instructed in digit AROM 6/7/19 - pt verbalized compliance with digit AROM HEP Goal Met  2. Patient will demonstrate a good understanding of their condition and strategies for self-management.   Status at Eval: pt educated on activity modifications, joint protection strategies, ulnar deviation splint wear    Long Term Goals: To be accomplished in 6 weeks:              1. Patient will be able to state 4 joint protection techniques to reduce pain in bilateral hands. Status at eval: pt provided information for joint protection strategies  2. Patient will report a decrease in pain in bilateral hands after 10 minutes of constant activities. Status at eval: DNT  3. Patient will regain 220 degrees total arc of motion of the right IF in bilateral hands to enable grasp of cylindrical objects such as a glass, handle or toothbrush. Status at eval:  Right IF COLIN - 207 COLIN  4. Patient will improve functional  strength in bilateral hands to 45# to increase ability to open containers, drinks and sealed bags. Status at eval: DNT   5. Patient will improve FOTO outcome measure score by 15 points in order to demonstrate improved functional performance.   Status at eval: 42     PLAN  []  Upgrade activities as tolerated     [x]  Continue plan of care  []  Update interventions per flow sheet       []  Discharge due to:_  []  Other:_      Beatriz Gary OT 6/7/2019  10:43 AM    Future Appointments   Date Time Provider [facility]   6/11/2019 10:00 AM Saravanan Rojas OT [facility]   6/11/2019 11:30 AM Sudheer Skinner, PT [facility]   6/13/2019  8:00 [facility]   6/27/2019 10:00 AM Karen Murray MD [facility]   9/10/2019 10:45 AM Herber Baltazar MD [facility] 10.

## 2019-06-11 ENCOUNTER — HOSPITAL ENCOUNTER (OUTPATIENT)
Dept: PHYSICAL THERAPY | Age: 69
Discharge: HOME | End: 2019-06-11
Payer: MEDICARE

## 2019-06-11 PROCEDURE — 97018 PARAFFIN BATH THERAPY: CPT

## 2019-06-11 PROCEDURE — 97112 NEUROMUSCULAR REEDUCATION: CPT

## 2019-06-11 PROCEDURE — 97110 THERAPEUTIC EXERCISES: CPT

## 2019-06-11 NOTE — PROGRESS NOTES
OT DAILY TREATMENT NOTE 10-18    Patient Name: Emily Oro  Date:2019  : 1950  [x]  Patient  Verified  Payor: [place] MEDICARE / Plan: [place] MEDICARE PART A & B / Product Type: Medicare /    In time:10:00 AM  Out time:10:54 AM  Total Treatment Time (min): 54  Visit #: 3 of 8    Medicare/BCBS Only   Total Timed Codes (min):  44 1:1 Treatment Time:  20     Treatment Area: Bilateral hand pain [M79.641, M79.642]    SUBJECTIVE  Pain Level (0-10 scale): 5/10  Any medication changes, allergies to medications, adverse drug reactions, diagnosis change, or new procedure performed?: [x] No    [] Yes (see summary sheet for update)  Subjective functional status/changes:   [] No changes reported  \"It's the same. I have weakness in the hands and I shake. \"    OBJECTIVE    Modality rationale: decrease inflammation, decrease pain and increase tissue extensibility to improve the patients ability to make a fist with bilateral hands.    Min Type Additional Details    [] Estim:  []Unatt       []IFC  []Premod                        []Other:  []w/ice   []w/heat  Position:  Location:    [] Estim: []Att    []TENS instruct  []NMES                    []Other:  []w/US   []w/ice   []w/heat  Position:  Location:    []  Traction: [] Cervical       []Lumbar                       [] Prone          []Supine                       []Intermittent   []Continuous Lbs:  [] before manual  [] after manual    []  Ultrasound: []Continuous   [] Pulsed                           []1MHz   []3MHz W/cm2:  Location:    []  Iontophoresis with dexamethasone         Location: [] Take home patch   [] In clinic   10 []  Ice     [x]  Heat Paraffin   []  Ice massage  []  Laser   []  Anodyne Position:resting  Location:left hand    []  Laser with stim  []  Other:  Position:  Location:    []  Vasopneumatic Device Pressure:       [] lo [] med [] hi   Temperature: [] lo [] med [] hi   [x] Skin assessment post-treatment:  [x]intact []redness- no adverse reaction []redness  adverse reaction:     44 min Therapeutic Exercise:  [x] See flow sheet :   Rationale: increase ROM and increase strength to improve the patients ability to perform FM tasks    With   [] TE   [] TA   [] neuro   [] other: Patient Education: [x] Review HEP    [] Progressed/Changed HEP based on:   [] positioning   [] body mechanics   [] transfers   [] heat/ice application   [] Splint wear/care   [] Sensory re-education   [] scar management      [] other:          Other Objective/Functional Measures: ulnar deviation with hook fist in bilateral hands. No difficulty with in hand manipulation of marbles     Pain Level (0-10 scale) post treatment: 0/10    ASSESSMENT/Changes in Function: Pt provided and instructed in intrinsic stretches for bilateral hands to be performed with HEP. Implementation of joint protection strategies per patient reports. Patient will continue to benefit from skilled OT services to modify and progress therapeutic interventions, address ROM deficits and address strength deficits to attain remaining goals. []  See Plan of Care  []  See progress note/recertification  []  See Discharge Summary         Progress towards goals / Updated goals:  Short Term Goals: To be accomplished in 2  weeks:  1. Patient will be compliant with initial home exercise program to take an active role in their rehabilitation process. Status at Eval: pt provided and instructed in digit AROM 6/7/19 - pt verbalized compliance with digit AROM HEP Goal Met  2. Patient will demonstrate a good understanding of their condition and strategies for self-management.   Status at Eval: pt educated on activity modifications, joint protection strategies, ulnar deviation splint wear    6/11/19 - Pt reports holding bags in elbow crease to reduce gripping bag handles with hands  Long Term Goals: To be accomplished in 6 weeks:              1. Patient will be able to state 4 joint protection techniques to reduce pain in bilateral hands. Status at eval: pt provided information for joint protection strategies  2. Patient will report a decrease in pain in bilateral hands after 10 minutes of constant activities. Status at eval: DNT  3. Patient will regain 220 degrees total arc of motion of the right IF in bilateral hands to enable grasp of cylindrical objects such as a glass, handle or toothbrush. Status at eval:  Right IF COLIN - 207 COLIN  4. Patient will improve functional  strength in bilateral hands to 45# to increase ability to open containers, drinks and sealed bags. Status at eval: DNT   5. Patient will improve FOTO outcome measure score by 15 points in order to demonstrate improved functional performance.   Status at eval: 42     PLAN  []  Upgrade activities as tolerated     [x]  Continue plan of care  []  Update interventions per flow sheet       []  Discharge due to:_  []  Other:_      Belinda Mendoza OT 6/11/2019  10:04 AM    Future Appointments   Date Time Provider [facility]   6/11/2019 11:30 AM [facility]   6/13/2019  8:00 AM Savannah Lawrence [facility]   6/27/2019 10:00 AM Wilber Ortiz MD [facility]   9/10/2019 10:45 AM Mendez Ricks MD [facility] 10.

## 2019-06-11 NOTE — PROGRESS NOTES
PT DAILY TREATMENT NOTE 10-18    Patient Name: Blanca Fuchs  Date:2019  : 1950  [x]  Patient  Verified  Payor: VA MEDICARE / Plan: [place] MEDICARE PART A & B / Product Type: Medicare /    In time:1135  Out time:1225  Total Treatment Time (min): 50  Visit #: 2 of 8    Medicare/BCBS Only   Total Timed Codes (min):  50 1:1 Treatment Time:  43       Treatment Area: Gait abnormality [R26.9]    SUBJECTIVE  Pain Level (0-10 scale): 0 at rest;   Any medication changes, allergies to medications, adverse drug reactions, diagnosis change, or new procedure performed?: [x] No    [] Yes (see summary sheet for update)  Subjective functional status/changes:   [] No changes reported  I'm just so stiff. OBJECTIVE    40 min Therapeutic Exercise:  [] See flow sheet :   Rationale: increase ROM and increase strength to improve the patients ability to perform daily activities    10 min Neuromuscular Re-education:  []  See flow sheet :   Rationale: increase strength, improve coordination, improve balance and increase proprioception  to improve the patients ability to to     With   [] TE   [] TA   [] neuro   [] other: Patient Education: [x] Review HEP    [] Progressed/Changed HEP based on:   [] positioning   [] body mechanics   [] transfers   [] heat/ice application    [] other:      Other Objective/Functional Measures:      Pain Level (0-10 scale) post treatment: 0    ASSESSMENT/Changes in Function: No LOB with dynamic gait. Able to perform sit<>stands from table without difficulty; will lower the table next visit. Patient will continue to benefit from skilled PT services to modify and progress therapeutic interventions, address functional mobility deficits, address strength deficits, analyze and address soft tissue restrictions, analyze and cue movement patterns and address imbalance/dizziness to attain remaining goals.      []  See Plan of Care  []  See progress note/recertification  []  See Discharge Summary Progress towards goals / Updated goals:  Short Term Goals: To be accomplished in 1 weeks:              1. Pt will be I and compliant with HEP   Long Term Goals: To be accomplished in 4-8 weeks:              1. Improve FOTO to 55 to improve ability for daily tasks              2. Pt will perform rhomberg on airex x 30 sec without LOB to improve ability for daily tasks              3. Pt will report being able to climb several flight of stairs with a little difficulty or less. 4. Pt will demonstrates ability to transfer from floor to stand indepedently.           PLAN  []  Upgrade activities as tolerated     [x]  Continue plan of care  []  Update interventions per flow sheet       []  Discharge due to:_  []  Other:_      Sofia Rubio, PT 6/11/2019  10:50 AM    Future Appointments   Date Time Provider Odin Fowleri   6/11/2019 11:30 AM Eva Moreno, PT [facility]   6/13/2019  8:00 AM Kimberly Hayes PTA [facility]   6/27/2019 10:00 AM Frank Chávez MD [facility]   9/10/2019 10:45 AM Ramez Corea MD [facility] 10.

## 2019-06-13 ENCOUNTER — HOSPITAL ENCOUNTER (OUTPATIENT)
Dept: PHYSICAL THERAPY | Age: 69
Discharge: HOME | End: 2019-06-13
Payer: MEDICARE

## 2019-06-13 PROCEDURE — 97110 THERAPEUTIC EXERCISES: CPT

## 2019-06-13 PROCEDURE — 97112 NEUROMUSCULAR REEDUCATION: CPT

## 2019-06-13 NOTE — PROGRESS NOTES
PT DAILY TREATMENT NOTE 10-18    Patient Name: Yunior Ortiz  Date:2019  : 1950  [x]  Patient  Verified  Payor: VA MEDICARE / Plan: VA MEDICARE PART A & B / Product Type: Medicare /    In time:800  Out time:849  Total Treatment Time (min): 49  Visit #: 3 of 8    Medicare/BCBS Only   Total Timed Codes (min):  49 1:1 Treatment Time:  49       Treatment Area: Gait abnormality [R26.9]    SUBJECTIVE  Pain Level (0-10 scale): 4  Any medication changes, allergies to medications, adverse drug reactions, diagnosis change, or new procedure performed?: [x] No    [] Yes (see summary sheet for update)  Subjective functional status/changes:   [] No changes reported  Patient reported turned left ankle helping in [employer] yesterday. She noted having trouble walking today. OBJECTIVE    39 min Therapeutic Exercise:  [] See flow sheet :   Rationale: increase ROM and increase strength to improve the patients ability to perform ADLs   10 min Neuromuscular Re-education:  []  See flow sheet :   Rationale: increase strength and improve coordination  to improve the patients ability to   Perform ADLs          With   [] TE   [] TA   [] neuro   [] other: Patient Education: [x] Review HEP    [] Progressed/Changed HEP based on:   [] positioning   [] body mechanics   [] transfers   [] heat/ice application    [] other:      Other Objective/Functional Measures: limited standing therex 2' left ankle pain    Minimal wavering with MSR and rhomberg EO    Pain Level (0-10 scale) post treatment: 0 rest, 4/10 left ankle ambulating    ASSESSMENT/Changes in Function: Patient presented wearing brace for slightly swollen lateral left ankle after tripping in hole in [employer] yesterday. Held dyn gait and stairs today due to ankle pain but pain was able to tolerate standing static balance with minimal wavering and no pain reported.     Patient will continue to benefit from skilled PT services to modify and progress therapeutic interventions, address functional mobility deficits, address ROM deficits, address strength deficits, analyze and address soft tissue restrictions, analyze and cue movement patterns and analyze and modify body mechanics/ergonomics to attain remaining goals. [x]  See Plan of Care  []  See progress note/recertification  []  See Discharge Summary         Progress towards goals / Updated goals:  Short Term Goals: To be accomplished in 1 weeks:              1. Pt will be I and compliant with HEP met (6/13/19)  Long Term Goals: To be accomplished in 4-8 weeks:              1.  Improve FOTO to 55 to improve ability for daily tasks              2. Pt will perform rhomberg on airex x 30 sec without LOB to improve ability for daily tasks              3. Pt will report being able to climb several flight of stairs with a little difficulty or less.               4. Pt will demonstrates ability to transfer from floor to stand indepedently.             PLAN  []  Upgrade activities as tolerated     [x]  Continue plan of care  []  Update interventions per flow sheet       []  Discharge due to:_  []  Other:_      Janalyn Cornea, PTA 6/13/2019  8:12 AM    Future Appointments   Date Time Provider Odin Rivera   6/27/2019 10:00 AM Navya Gabriel MD [facility]   9/10/2019 10:45 AM Lashae Graves  [facility]

## 2019-06-17 ENCOUNTER — HOSPITAL ENCOUNTER (OUTPATIENT)
Dept: PHYSICAL THERAPY | Age: 69
Discharge: HOME | End: 2019-06-17
Payer: MEDICARE

## 2019-06-17 PROCEDURE — 97110 THERAPEUTIC EXERCISES: CPT

## 2019-06-17 PROCEDURE — 97112 NEUROMUSCULAR REEDUCATION: CPT

## 2019-06-17 NOTE — PROGRESS NOTES
PT DAILY TREATMENT NOTE 10-18    Patient Name: Williams Thomas  Date:2019  : 1950  [x]  Patient  Verified  Payor: VA MEDICARE / Plan: VA MEDICARE PART A & B / Product Type: Medicare /    In time:300  Out time:347  Total Treatment Time (min): 52  Visit #: 4 of 8    Medicare/BCBS Only   Total Timed Codes (min):  47 1:1 Treatment Time:  38       Treatment Area: Unsteadiness on feet [R26.81]    SUBJECTIVE  Pain Level (0-10 scale): 5  Any medication changes, allergies to medications, adverse drug reactions, diagnosis change, or new procedure performed?: [x] No    [] Yes (see summary sheet for update)  Subjective functional status/changes:   [] No changes reported  Patient reported improve hip sxs after last visit    OBJECTIVE        37 min Therapeutic Exercise:  [] See flow sheet :   Rationale: increase ROM and increase strength to improve the patients ability to perform ADLs     10 min Neuromuscular Re-education:  []  See flow sheet :   Rationale: increase strength and improve coordination  to improve the patients ability to perform ADLs              With   [] TE   [] TA   [] neuro   [] other: Patient Education: [x] Review HEP    [] Progressed/Changed HEP based on:   [] positioning   [] body mechanics   [] transfers   [] heat/ice application    [] other:      Other Objective/Functional Measures: initiated dyn balance training - patient tolerated without LOB but noted increased pain low back and B hips     Pain Level (0-10 scale) post treatment: 0    ASSESSMENT/Changes in Function: patient tolerated addition of balance activities today without LOB but mild increase in low back pain.     Patient will continue to benefit from skilled PT services to modify and progress therapeutic interventions, address functional mobility deficits, address ROM deficits, address strength deficits, analyze and address soft tissue restrictions, analyze and cue movement patterns, analyze and modify body mechanics/ergonomics and assess and modify postural abnormalities to attain remaining goals. []  See Plan of Care  []  See progress note/recertification  []  See Discharge Summary         Progress towards goals / Updated goals:  Short Term Goals: To be accomplished in 1 weeks:              1. Pt will be I and compliant with HEP met (6/13/19)  Long Term Goals: To be accomplished in 4-8 weeks:              1.  Improve FOTO to 55 to improve ability for daily tasks              2. Pt will perform rhomberg on airex x 30 sec without LOB to improve ability for daily tasks              3. Pt will report being able to climb several flight of stairs with a little difficulty or less.               4. Pt will demonstrates ability to transfer from floor to stand indepedently.          PLAN  []  Upgrade activities as tolerated     [x]  Continue plan of care  []  Update interventions per flow sheet       []  Discharge due to:_  []  Other:_      Zaid Mcghee PTA 6/17/2019  3:16 PM    Future Appointments   Date Time Provider [facility]   6/19/2019  2:00 PM Bobby Corado OT [facility]   6/19/2019  3:00 PM Savannah Lawrence PTA [facility]   6/21/2019  3:00 PM Desirae Bach OT [facility]   6/24/2019 12:00 PM Bobby Corado OT [facility]   6/24/2019  1:00 PM Cirilo Johnson, PT [facility]   6/27/2019 10:00 AM Wilber Ortiz MD [facility]   6/27/2019  1:00 PM Savannah Lawrence PTA [facility]   6/27/2019  2:00 PM Nu Salinas OT [facility]   7/2/2019 10:30 AM Cirilo Johnson PT [facility]   7/2/2019 11:30 AM Bobby Corado OT [facility]   9/10/2019 10:45 AM Mendez Ricks MD [address] [place] [address]

## 2019-06-19 ENCOUNTER — APPOINTMENT (OUTPATIENT)
Dept: PHYSICAL THERAPY | Age: 69
End: 2019-06-19
Payer: MEDICARE

## 2019-06-21 ENCOUNTER — APPOINTMENT (OUTPATIENT)
Dept: PHYSICAL THERAPY | Age: 69
End: 2019-06-21
Payer: MEDICARE

## 2019-06-24 ENCOUNTER — HOSPITAL ENCOUNTER (OUTPATIENT)
Dept: PHYSICAL THERAPY | Age: 69
Discharge: HOME | End: 2019-06-24
Payer: MEDICARE

## 2019-06-24 PROCEDURE — 97112 NEUROMUSCULAR REEDUCATION: CPT

## 2019-06-24 PROCEDURE — 97110 THERAPEUTIC EXERCISES: CPT

## 2019-06-24 PROCEDURE — 97022 WHIRLPOOL THERAPY: CPT

## 2019-06-24 PROCEDURE — 97018 PARAFFIN BATH THERAPY: CPT

## 2019-06-24 PROCEDURE — 97530 THERAPEUTIC ACTIVITIES: CPT

## 2019-06-24 NOTE — PROGRESS NOTES
OT DAILY TREATMENT NOTE 10-18    Patient Name: Elsa Hill  Date:2019  : 1950  [x]  Patient  Verified  Payor: [place] MEDICARE / Plan: [place] MEDICARE PART A & B / Product Type: Medicare /    In time:12:00 PM  Out time:12:58 PM  Total Treatment Time (min): 58  Visit #: 4 of 8    Medicare/BCBS Only   Total Timed Codes (min):  43 1:1 Treatment Time:  58     Treatment Area: Bilateral hand pain [M79.641, M79.642]    SUBJECTIVE  Pain Level (0-10 scale): 5/10  Any medication changes, allergies to medications, adverse drug reactions, diagnosis change, or new procedure performed?: [x] No    [] Yes (see summary sheet for update)  Subjective functional status/changes:   [] No changes reported  \"I ordered the wrong size brace so I sent it back. I was using my hand a lot for painting so maybe that is why it is moving better. \"    OBJECTIVE    Modality rationale: decrease inflammation, decrease pain and increase tissue extensibility to improve the patients ability to increase use of bilateral hands for functional tasks.    Min Type Additional Details    [] Estim:  []Unatt       []IFC  []Premod                        []Other:  []w/ice   []w/heat  Position:  Location:    [] Estim: []Att    []TENS instruct  []NMES                    []Other:  []w/US   []w/ice   []w/heat  Position:  Location:    []  Traction: [] Cervical       []Lumbar                       [] Prone          []Supine                       []Intermittent   []Continuous Lbs:  [] before manual  [] after manual    []  Ultrasound: []Continuous   [] Pulsed                           []1MHz   []3MHz W/cm2:  Location:    []  Iontophoresis with dexamethasone         Location: [] Take home patch   [] In clinic   15 []  Ice     [x]  Heat Paraffin  []  Ice massage  []  Laser   []  Anodyne Position: resting  Location:bilateral hands    []  Laser with stim  []  Other:  Position:  Location:    []  Vasopneumatic Device Pressure:       [] lo [] med [] hi   Temperature: [] lo [] med [] hi   [x] Skin assessment post-treatment:  [x]intact [x]redness- no adverse reaction    []redness  adverse reaction:     43 min Therapeutic Exercise:  [x] See flow sheet :   Rationale: increase ROM and increase strength to improve the patients ability to increase use of bilateral hands    With   [] TE   [] TA   [] neuro   [] other: Patient Education: [x] Review HEP    [] Progressed/Changed HEP based on:   [] positioning   [] body mechanics   [] transfers   [] heat/ice application   [] Splint wear/care   [] Sensory re-education   [] scar management      [] other:            Other Objective/Functional Measures: Single Digit ROM CHART as measured in degrees  Digit  A/P 6/24/2019   Right IF Date  Side    MP 90     PIP 92     DIP 50     COLIN 232       Pain Level (0-10 scale) post treatment: 0/10    ASSESSMENT/Changes in Function: Improved right index finger COLIN during this treatment session. Pt educated to focus on keeping digits in neutral when performing hook fist exercises, to avoid ulnar deviation of digits. Patient will continue to benefit from skilled OT services to address ROM deficits, address strength deficits and instruct in home and community integration to attain remaining goals. []  See Plan of Care  []  See progress note/recertification  []  See Discharge Summary         Progress towards goals / Updated goals:  Short Term Goals: To be accomplished in 2  weeks:  1. Patient will be compliant with initial home exercise program to take an active role in their rehabilitation process. Status at Eval: pt provided and instructed in digit AROM 6/7/19 - pt verbalized compliance with digit AROM HEP Goal Met  2. Patient will demonstrate a good understanding of their condition and strategies for self-management.   Status at Eval: pt educated on activity modifications, joint protection strategies, ulnar deviation splint wear    6/11/19 - Pt reports holding bags in elbow crease to reduce gripping bag handles with hands; 6/24/19 - pt reports utilizing joint protection strategies, ordering splint to reduce ulnar deviation of digits   Long Term Goals: To be accomplished in 6 weeks:              1. Patient will be able to state 4 joint protection techniques to reduce pain in bilateral hands. Status at eval: pt provided information for joint protection strategies  6/24/19 - pt reports use of electric appliances, using AE for opening bottles; using two hands to perform jobs, carrying bags on shoulders. Goal Met  2. Patient will report a decrease in pain in bilateral hands after 10 minutes of constant activities. Status at eval: DNT; 6/24/19 - progressing with in clinic activities, 43 minutes of therex  3. Patient will regain 220 degrees total arc of motion of the right IF in bilateral hands to enable grasp of cylindrical objects such as a glass, handle or toothbrush. Status at eval:  Right IF COLIN - 207 COLIN  6/24/19 - Right IF COLIN - 232 degrees goal met during this treatment session. 4. Patient will improve functional  strength in bilateral hands to 45# to increase ability to open containers, drinks and sealed bags. Status at eval: DNT   5. Patient will improve FOTO outcome measure score by 15 points in order to demonstrate improved functional performance.   Status at eval: 42     PLAN  []  Upgrade activities as tolerated     [x]  Continue plan of care  []  Update interventions per flow sheet       []  Discharge due to:_  []  Other:_      Yaya Rankin OT 6/24/2019  12:09 PM    Future Appointments   Date Time Provider Odin Nicole   6/24/2019  1:00 PM Nubia Ozuna [facility]   6/27/2019  1:00 PM Tutu Roca PTA [facility]   6/27/2019  2:00 PM Paradise Mejía, OT [facility]   7/2/2019 10:30 AM Jonathan Coronel, PT [facility]   7/2/2019 11:30 AM Dalila Bennett, OT [facility]   9/10/2019 10:45 AM Red Duval MD [address] [place] [address]

## 2019-06-24 NOTE — PROGRESS NOTES
PT DAILY TREATMENT NOTE 10-18    Patient Name: Geneva Mejia  Date:2019  : 1950  [x]  Patient  Verified  Payor: [place] MEDICARE / Plan: [place] MEDICARE PART A & B / Product Type: Medicare /    In time:1:00  Out time: 1:54  Total Treatment Time (min): 54  Visit #: 5 of 8    Medicare/BCBS Only   Total Timed Codes (min):  54 1:1 Treatment Time:  54       Treatment Area: Unsteadiness on feet [R26.81]    SUBJECTIVE  Pain Level (0-10 scale): 0  Any medication changes, allergies to medications, adverse drug reactions, diagnosis change, or new procedure performed?: [x] No    [] Yes (see summary sheet for update)  Subjective functional status/changes:   [x] No changes reported      OBJECTIVE    36 min Therapeutic Exercise:  [x] See flow sheet :   Rationale: increase ROM and increase strength to improve the patients ability to perform ADLs     10 min Neuromuscular Re-education:  [x]  See flow sheet :   Rationale: increase strength and improve coordination  to improve the patients ability to perform ADLs     8 min Therapeutic Activity:  [x]  See flow sheet :   Rationale: increase strength  to improve the patients ability to perform transfers                With   [] TE   [] TA   [] neuro   [] other: Patient Education: [x] Review HEP    [] Progressed/Changed HEP based on:   [] positioning   [] body mechanics   [] transfers   [] heat/ice application    [] other:      Other Objective/Functional Measures: performed floor to stand transfers with UE assistance. Pain Level (0-10 scale) post treatment: 0    ASSESSMENT/Changes in Function: pt challenged with floor to stand and requires UE assistance. Improvement noted with sit to stands.     Patient will continue to benefit from skilled PT services to modify and progress therapeutic interventions, address functional mobility deficits, address ROM deficits, address strength deficits, analyze and address soft tissue restrictions, analyze and cue movement patterns, analyze and modify body mechanics/ergonomics and assess and modify postural abnormalities to attain remaining goals. []  See Plan of Care  []  See progress note/recertification  []  See Discharge Summary         Progress towards goals / Updated goals:  Short Term Goals: To be accomplished in 1 weeks:              1. Pt will be I and compliant with HEP met (6/13/19)  Long Term Goals: To be accomplished in 4-8 weeks:              1.  Improve FOTO to 55 to improve ability for daily tasks              2. Pt will perform rhomberg on airex x 30 sec without LOB to improve ability for daily tasks- MET on 6-24-19              3. Pt will report being able to climb several flight of stairs with a little difficulty or less.               4. Pt will demonstrates ability to transfer from floor to stand indepedently. SBA on 6-24-19         PLAN  []  Upgrade activities as tolerated     [x]  Continue plan of care  []  Update interventions per flow sheet       []  Discharge due to:_  []  Other:_      Pierce Officer, PT 6/24/2019  1:00 PM    Future Appointments   Date Time Provider Odin Fowleri   6/24/2019  1:00 PM [facility]   6/27/2019  1:00 PM Chelsea Oliva PTA [facility]   6/27/2019  2:00 PM Prince Tommy Jesus, OT [facility]   7/2/2019 10:30 AM Jayne Perera PT [facility]   7/2/2019 11:30 AM Daryn Moore OT [facility]   9/10/2019 10:45 AM Justen Mao  [facility]

## 2019-06-27 ENCOUNTER — HOSPITAL ENCOUNTER (OUTPATIENT)
Dept: PHYSICAL THERAPY | Age: 69
Discharge: HOME | End: 2019-06-27
Payer: MEDICARE

## 2019-06-27 PROCEDURE — 97110 THERAPEUTIC EXERCISES: CPT

## 2019-06-27 PROCEDURE — 97530 THERAPEUTIC ACTIVITIES: CPT

## 2019-06-27 NOTE — PROGRESS NOTES
OT DAILY TREATMENT NOTE 10-18    Patient Name: Eli Chávez  Date:2019  : 1950  [x]  Patient  Verified  Payor: VA MEDICARE / Plan: VA MEDICARE PART A & B / Product Type: Medicare /    In time:1407  Out time:1457  Total Treatment Time (min): 50  Visit #: 5 of 8    Medicare/BCBS Only   Total Timed Codes (min):  50 1:1 Treatment Time:  50     Treatment Area: Bilateral hand pain [M79.641, M79.642]    SUBJECTIVE  Pain Level (0-10 scale): 0-5  Any medication changes, allergies to medications, adverse drug reactions, diagnosis change, or new procedure performed?: [x] No    [] Yes (see summary sheet for update)  Subjective functional status/changes:   [x] No changes reported      OBJECTIVE    50 min Therapeutic Exercise:  [x] See flow sheet :   Rationale: increase ROM and increase strength to improve the patients ability to use hands without significant limitations    With   [] TE   [] TA   [] neuro   [] other: Patient Education: [x] Review HEP    [] Progressed/Changed HEP based on:   [] positioning   [] body mechanics   [] transfers   [] heat/ice application   [] Splint wear/care   [] Sensory re-education   [] scar management      [] other:            Other Objective/Functional Measures: NA     Pain Level (0-10 scale) post treatment: 0/10    ASSESSMENT/Changes in Function: strengthening with intrinsics to assist with reduction of ulnar drift in the digits. Patient will continue to benefit from skilled OT services to address ROM deficits and address strength deficits to attain remaining goals. [x]  See Plan of Care  []  See progress note/recertification  []  See Discharge Summary         Progress towards goals / Updated goals:  Short Term Goals: To be accomplished in 2  weeks:  1. Patient will be compliant with initial home exercise program to take an active role in their rehabilitation process.   Status at Eval: pt provided and instructed in digit AROM 19 - pt verbalized compliance with digit AROM HEP Goal Met  2. Patient will demonstrate a good understanding of their condition and strategies for self-management. Status at Eval: pt educated on activity modifications, joint protection strategies, ulnar deviation splint wear    6/11/19 - Pt reports holding bags in elbow crease to reduce gripping bag handles with hands; 6/24/19 - pt reports utilizing joint protection strategies, ordering splint to reduce ulnar deviation of digits   Long Term Goals: To be accomplished in 6 weeks:              1. Patient will be able to state 4 joint protection techniques to reduce pain in bilateral hands. Status at eval: pt provided information for joint protection strategies  6/24/19 - pt reports use of electric appliances, using AE for opening bottles; using two hands to perform jobs, carrying bags on shoulders. Goal Met  2. Patient will report a decrease in pain in bilateral hands after 10 minutes of constant activities. Status at eval: DNT; 6/24/19 - progressing with in clinic activities, 43 minutes of therex  3. Patient will regain 220 degrees total arc of motion of the right IF in bilateral hands to enable grasp of cylindrical objects such as a glass, handle or toothbrush. Status at eval:  Right IF COLIN - 207 COLIN  6/24/19 - Right IF COLIN - 232 degrees goal met during this treatment session. 4. Patient will improve functional  strength in bilateral hands to 45# to increase ability to open containers, drinks and sealed bags. Status at eval: DNT   5. Patient will improve FOTO outcome measure score by 15 points in order to demonstrate improved functional performance.   Status at eval: 42     PLAN  []  Upgrade activities as tolerated     [x]  Continue plan of care  []  Update interventions per flow sheet       []  Discharge due to:_  []  Other:_      Lucila Padilla OT 6/27/2019  2:30 PM    Future Appointments   Date Time Provider Odin Rivera   7/2/2019 10:30 AM Pat Monor, PT [facility] 7/2/2019 11:30 AM Lidia Ha OT [facility]   9/10/2019 10:45 AM Yenifer Erwin MD [address] [place] [address]

## 2019-06-27 NOTE — PROGRESS NOTES
PT DAILY TREATMENT NOTE 10-18    Patient Name: Yissel Espinoza  Date:2019  : 1950  [x]  Patient  Verified  Payor: Louis Enriquez / Plan: [place] MEDICARE PART A & B / Product Type: Medicare /    In time:1:28  Out time:2:03  Total Treatment Time (min): 35  Visit #: 6 of 8    Medicare/BCBS Only   Total Timed Codes (min):  35 1:1 Treatment Time:  35       Treatment Area: Unsteadiness on feet [R26.81]    SUBJECTIVE  Pain Level (0-10 scale): 0/10  Any medication changes, allergies to medications, adverse drug reactions, diagnosis change, or new procedure performed?: [x] No    [] Yes (see summary sheet for update)  Subjective functional status/changes:   [] No changes reported  The patient states that she continues to have difficulty getting on the floor and out of the tub and has been working on sit to stands from lowered surface. OBJECTIVE  25 min Therapeutic Exercise:  [x] See flow sheet :   Rationale: increase ROM and increase strength to improve the patients ability to improve ADL ease. 10 min Therapeutic Activity:  []  See flow sheet : To improve ease of getting up from floor as well as get into and out of the tub. Rationale: education  to improve the patients ability to improve ease of getting up    With   [] TE   [] TA   [] neuro   [] other: Patient Education: [x] Review HEP    [] Progressed/Changed HEP based on:   [] positioning   [] body mechanics   [] transfers   [] heat/ice application    [] other:      Other Objective/Functional Measures: The patient demonstrated ability to get up from floor. Added Pilates chair step ups to strength through furthering degrees of hip extension strength with hip in flexion position to replicate positions of getting up from the floor as well as the tub. Added lunge reach backs from 6\" step as well as two stacked airexes.      Pain Level (0-10 scale) post treatment: 0/10    ASSESSMENT/Changes in Function: Overall, patient did well with supine to prone to stand transitions requiring few cues. She states she will bring in a picture of her tub for further problem solving. PT offered possibility of using a stool with rubber bottom for placement beneath her to assisting in and out of the tub. Patient will continue to benefit from skilled PT services to modify and progress therapeutic interventions, address functional mobility deficits, address ROM deficits, address strength deficits, analyze and address soft tissue restrictions, analyze and cue movement patterns, analyze and modify body mechanics/ergonomics, assess and modify postural abnormalities and instruct in home and community integration to attain remaining goals. []  See Plan of Care  []  See progress note/recertification  []  See Discharge Summary         Progress towards goals / Updated goals:  Short Term Goals: To be accomplished in 1 weeks:              1. Pt will be I and compliant with HEP met (6/13/19)  Long Term Goals: To be accomplished in 4-8 weeks:              1.  Improve FOTO to 55 to improve ability for daily tasks              2. Pt will perform rhomberg on airex x 30 sec without LOB to improve ability for daily tasks- MET on 6-24-19              3. Pt will report being able to climb several flight of stairs with a little difficulty or less.               4. Pt will demonstrates ability to transfer from floor to stand indepedently. SBA on 6-24-19; Met - performed independently 6/27/2019    PLAN  []  Upgrade activities as tolerated     [x]  Continue plan of care  []  Update interventions per flow sheet       []  Discharge due to:_  []  Other:_      [place], PT 6/27/2019  1:44 PM    Future Appointments   Date Time Provider Odin Fowleri   6/27/2019  2:00 PM Zaid Hoffmann, OT [facility]   7/2/2019 10:30 AM Noam Serrano, PT [facility]   7/2/2019 11:30 AM Genesis Watters, OT [facility]   9/10/2019 10:45 AM Cornelia Nissen, MD [address] [place] [address]

## 2019-07-02 ENCOUNTER — HOSPITAL ENCOUNTER (OUTPATIENT)
Dept: PHYSICAL THERAPY | Age: 69
Discharge: HOME | End: 2019-07-02
Payer: MEDICARE

## 2019-07-02 PROCEDURE — 97110 THERAPEUTIC EXERCISES: CPT

## 2019-07-02 PROCEDURE — 97112 NEUROMUSCULAR REEDUCATION: CPT

## 2019-07-02 PROCEDURE — 97535 SELF CARE MNGMENT TRAINING: CPT

## 2019-07-02 NOTE — PROGRESS NOTES
PT DAILY TREATMENT NOTE 10-18    Patient Name: Donita Espinosa  Date:2019  : 1950  [x]  Patient  Verified  Payor: [place] MEDICARE / Plan: VA MEDICARE PART A & B / Product Type: Medicare /    In time:10:35  Out time: 11:27  Total Treatment Time (min): 52  Visit #: 7 of 8    Medicare/BCBS Only   Total Timed Codes (min):  52 1:1 Treatment Time: 52        Treatment Area: Unsteadiness on feet [R26.81]    SUBJECTIVE  Pain Level (0-10 scale): 0/10  Any medication changes, allergies to medications, adverse drug reactions, diagnosis change, or new procedure performed?: [x] No    [] Yes (see summary sheet for update)  Subjective functional status/changes:   [] No changes reported      OBJECTIVE  42 min Therapeutic Exercise:  [x] See flow sheet :   Rationale: increase ROM and increase strength to improve the patients ability to improve ADL ease. 10 min Neuromuscular Re-education:  [x]  See flow sheet :   Rationale: improve coordination, improve balance and increase proprioception  to improve the patients ability to perform functional tasks       With   [] TE   [] TA   [] neuro   [] other: Patient Education: [x] Review HEP    [] Progressed/Changed HEP based on:   [] positioning   [] body mechanics   [] transfers   [] heat/ice application    [] other:      Other Objective/Functional Measures: Added [place] assisted lunge to 4\" step with airex on top    Pain Level (0-10 scale) post treatment: 0/10    ASSESSMENT/Changes in Function:  Pt making gradual progress with PT. She will benefit from continuation to continue her progress.      Patient will continue to benefit from skilled PT services to modify and progress therapeutic interventions, address functional mobility deficits, address ROM deficits, address strength deficits, analyze and address soft tissue restrictions, analyze and cue movement patterns, analyze and modify body mechanics/ergonomics, assess and modify postural abnormalities and instruct in home and community integration to attain remaining goals. []  See Plan of Care  [x]  See progress note/recertification  []  See Discharge Summary         Progress towards goals / Updated goals:  Short Term Goals: To be accomplished in 1 weeks:              1. Pt will be I and compliant with HEP met (6/13/19)  Long Term Goals: To be accomplished in 4-8 weeks:              1. Improve FOTO to 55 to improve ability for daily tasks              2. Pt will perform rhomberg on airex x 30 sec without LOB to improve ability for daily tasks- MET on 6-24-19              3. Pt will report being able to climb several flight of stairs with a little difficulty or less.  - not yet met 7-1-19              4. Pt will demonstrates ability to transfer from floor to stand indepedently. SBA on 6-24-19; Met - performed independently 6/27/2019    PLAN  []  Upgrade activities as tolerated     [x]  Continue plan of care  []  Update interventions per flow sheet       []  Discharge due to:_  []  Other:_      Jorge Hale, PT 7/2/2019  10:40 AM    Future Appointments   Date Time Provider Odin Rivera   7/2/2019 11:30 AM [facility]   7/9/2019  9:00 AM Ellis Dong PT [facility]   7/9/2019 11:30 AM Ishan Greene [facility]   9/10/2019 10:45 AM Karthik Zavala MD [address] [place] [address]

## 2019-07-02 NOTE — PROGRESS NOTES
[facility]  [address]  [place], [address]  [phone] fax    Medicare Progress Report    Patient name: Fidel Spears Start of Care: 2019   Referral source: Syeda Becker MD : 1950   Medical/Treatment Diagnosis: Bilateral hand pain [M79.641, M79.642]  Payor: Fernando De La Fuente / Plan: [place] MEDICARE PART A & B / Product Type: Medicare /  Onset Date:     Prior Hospitalization: see medical history Provider#: [id number]   Medications: Verified on Patient Summary List     Comorbidities: Rheumatoid arthritis, bilateral CTR (2017, 2018), Sjogren's syndrome   Prior Level of Function: walking, watching grandkids, yard work  Visits from [facility] Care: 6    Missed Visits: 2    Reporting Period: 2019 to 2019  Subjective Reports: \"I'm just stiff. \"  Short Term Goals: To be accomplished in 2  weeks:  1. Patient will be compliant with initial home exercise program to take an active role in their rehabilitation process. Status at Eval: pt provided and instructed in digit AROM 19 - pt verbalized compliance with digit AROM HEP Goal Met  2. Patient will demonstrate a good understanding of their condition and strategies for self-management. Status at Eval: pt educated on activity modifications, joint protection strategies, ulnar deviation splint wear    19 - Pt reports holding bags in elbow crease to reduce gripping bag handles with hands; 19 - pt reports utilizing joint protection strategies, ordering splint to reduce ulnar deviation of digits, goal met    Long Term Goals: To be accomplished in 6 weeks:              1. Patient will be able to state 4 joint protection techniques to reduce pain in bilateral hands.   Status at eval: pt provided information for joint protection strategies  19 - pt reports use of electric appliances, using AE for opening bottles; using two hands to perform jobs, carrying bags on shoulders. Goal Met  2. Patient will report a decrease in pain in bilateral hands after 10 minutes of constant activities. Status at eval: DNT; 6/24/19 - progressing with in clinic activities, 43 minutes of therex  Status at note: goal met  3. Patient will regain 220 degrees total arc of motion of the right IF in bilateral hands to enable grasp of cylindrical objects such as a glass, handle or toothbrush. Status at eval:  Right IF COLIN - 207 COLIN  6/24/19 - Right IF COLIN - 232 degrees goal met during this treatment session. Status at note: Right IF - 220 degrees COLIN, Left IF - 231 degrees COLIN, goal met  4. Patient will improve functional  strength in bilateral hands to 45# to increase ability to open containers, drinks and sealed bags. Status at eval: DNT  Status at note: 24# right, 23#; progressing - goal not met   5. Patient will improve FOTO outcome measure score by 15 points in order to demonstrate improved functional performance. Status at eval: 42   Status at note: 46, progressing - goal not met  Key functional changes: Improved digit AROM, increased awareness of mgmt of arthritis through joint protection, activity modifications, and AE use. Problems/ barriers to goal attainment: none   Assessment / Recommendations:progressing well towards goals. She has ordered ulnar deviation splint but has not received it. Will make appointment with therapist upon receiving splint in order for therapist to assist with wear. Problem List: Decreased range of motion   Treatment Plan: Therapeutic exercise, Therapeutic activities, Manual therapy, Splinting/orthoses, Patient education and ADLs/IADLs  Patient Goal (s) has been updated and includes: \"Teach me how to wear the splint. \"   Updated Goals to be accomplished in 1 treatments:  1. Patient will demonstrate proper donning/doffing of PF ulnar deviation splint in order to reduce ulnar deviation of bilateral hand digits.   Frequency / Duration: Patient to be seen 1 times per week for 1 weeks:    Ramos Berg OT 7/2/2019 1:03 PM

## 2019-07-02 NOTE — PROGRESS NOTES
[facility]  [address]  [place], [address]  [phone] fax    Medicare Progress Report      Patient name: Alec Graham Start of Care: 2019   Referral source: Kimberley Becker MD : 1950               Medical Diagnosis: Gait abnormality [R26.9]  Payor: VA MEDICARE / Plan: VA MEDICARE PART A & B / Product Type: Medicare /  Onset Date:2-3 years               Treatment Diagnosis: unsteady gait, RA    Prior Hospitalization: see medical history Provider#: [id number]   Medications: Verified on Patient summary List    Comorbidities: RA, carpal tunnel release   Prior Level of Function: functionally I with activities    Visits from Start of Care: 7    Missed Visits: 0    Reporting Period: 19 to 19    Subjective Reports:  Pt reports she feels like she is making some progress with PT. Progress towards goals:  Short Term Goals: To be accomplished in 1 weeks:              1. Pt will be I and compliant with HEP met (19)  Long Term Goals: To be accomplished in 4-8 weeks:              1. Improve FOTO to 55 to improve ability for daily tasks              2. Pt will perform rhomberg on airex x 30 sec without LOB to improve ability for daily tasks- MET on 19              3. Pt will report being able to climb several flight of stairs with a little difficulty or less. - not yet met 19              4. Pt will demonstrates ability to transfer from floor to stand indepedently. SBA on 19; Met - performed independently 2019         Key functional changes: pt with improved FOTO score, indicating improved function     Problems/ barriers to goal attainment: none     Assessment / Recommendations:pt is making progress with PT as noted above with goals met. She demonstrates limited B quad strength and decreased balance. She will benefit from continuation of PT to further her current progress.      Problem List: pain affecting function, decrease strength, impaired gait/ balance, decrease ADL/ functional abilitiies, decrease activity tolerance, decrease flexibility/ joint mobility and decrease transfer abilities   Treatment Plan: Therapeutic exercise, Therapeutic activities, Neuromuscular re-education, Physical agent/modality, Gait/balance training, Manual therapy, Patient education and Self Care training    Patient Goal (s) has been updated and includes: \"to get up and down out of my tub\"     Updated Goals to be accomplished in 4 weeks:              1. Improve FOTO to 55 to improve ability for daily tasks              2. Pt will report being able to climb several flight of stairs with a little difficulty or less. - not yet met 7-1-19              3. Pt will be able to squat with \"a little difficulty or less\" for improved ability for daily activities.      Frequency / Duration: Patient to be seen 2 times per week for 4 weeks:      Keeley Meier, PT 7/2/2019 11:53 AM

## 2019-07-02 NOTE — PROGRESS NOTES
OT DAILY TREATMENT NOTE 10-18    Patient Name: Yissel Espinoza  Date:2019  : 1950  [x]  Patient  Verified  Payor: VA MEDICARE / Plan: VA MEDICARE PART A & B / Product Type: Medicare /    In time:11:29 AM  Out time:12:12 PM  Total Treatment Time (min): 43  Visit #: 6 of 8  Medicare/BCBS Only   Total Timed Codes (min):  43 1:1 Treatment Time:  43     Treatment Area: Bilateral hand pain [M79.641, M79.642]    SUBJECTIVE  Pain Level (0-10 scale): 0-5/10  Any medication changes, allergies to medications, adverse drug reactions, diagnosis change, or new procedure performed?: [x] No    [] Yes (see summary sheet for update)  Subjective functional status/changes:   [] No changes reported  \"I don't have pain today, just stiff but I do the exercises and that helps. \"    OBJECTIVE  33 min Therapeutic Exercise:  [x] See flow sheet :   Rationale: increase ROM, increase strength and improve coordination to improve the patients ability to return to normal daily tasks  10 min Self Care/Home Management: researching adaptive equipment   Rationale: education  to improve the patients ability to safely transfer into and out of claw foot tub. With   [] TE   [] TA   [] neuro   [] other: Patient Education: [x] Review HEP    [] Progressed/Changed HEP based on:   [] positioning   [] body mechanics   [] transfers   [] heat/ice application   [] Splint wear/care   [] Sensory re-education   [] scar management      [] other:            Other Objective/Functional Measures: Single Digit ROM CHART as measured in degrees  Digit  A/P 2019   Right IF, Left IF Date  Side     MP 92/85       PIP 72/81       DIP 56/65       COLIN 220/231                    Pain Level (0-10 scale) post treatment: 0/10    ASSESSMENT/Changes in Function: progressing well towards goals. She has ordered ulnar deviation splint but has not received it. Will make appointment with therapist upon receiving splint in order for therapist to assist with wear. Discussed AE in order to assist patient to transfer into and out of claw foot tub. Patient will continue to benefit from skilled OT services to address ROM deficits to attain remaining goals. []  See Plan of Care  [x]  See progress note/recertification  []  See Discharge Summary         Progress towards goals / Updated goals:  Short Term Goals: To be accomplished in 2  weeks:  1. Patient will be compliant with initial home exercise program to take an active role in their rehabilitation process. Status at Eval: pt provided and instructed in digit AROM 6/7/19 - pt verbalized compliance with digit AROM HEP Goal Met  2. Patient will demonstrate a good understanding of their condition and strategies for self-management. Status at Eval: pt educated on activity modifications, joint protection strategies, ulnar deviation splint wear    6/11/19 - Pt reports holding bags in elbow crease to reduce gripping bag handles with hands; 6/24/19 - pt reports utilizing joint protection strategies, ordering splint to reduce ulnar deviation of digits, goal met    Long Term Goals: To be accomplished in 6 weeks:              1. Patient will be able to state 4 joint protection techniques to reduce pain in bilateral hands. Status at eval: pt provided information for joint protection strategies  6/24/19 - pt reports use of electric appliances, using AE for opening bottles; using two hands to perform jobs, carrying bags on shoulders. Goal Met  2. Patient will report a decrease in pain in bilateral hands after 10 minutes of constant activities. Status at eval: DNT; 6/24/19 - progressing with in clinic activities, 43 minutes of therex  Status at note: goal met  3. Patient will regain 220 degrees total arc of motion of the right IF in bilateral hands to enable grasp of cylindrical objects such as a glass, handle or toothbrush.   Status at eval:  Right IF COILN - 207 COLIN  6/24/19 - Right IF COLIN - 232 degrees goal met during this treatment session. Status at note: Right IF - 220 degrees COLIN, Left IF - 231 degrees COLIN, goal met  4. Patient will improve functional  strength in bilateral hands to 45# to increase ability to open containers, drinks and sealed bags. Status at eval: DNT  Status at note: 24# right, 23#; progressing - goal not met   5. Patient will improve FOTO outcome measure score by 15 points in order to demonstrate improved functional performance.   Status at eval: 42   Status at note: 55, progressing - goal not met    PLAN  []  Upgrade activities as tolerated     [x]  Continue plan of care  []  Update interventions per flow sheet       []  Discharge due to:_  []  Other:_      Eleno Keys OT 7/2/2019  12:49 PM    Future Appointments   Date Time Provider Odin Rivera   7/9/2019  9:00 AM Luis Enrique Harvey PT [facility]   7/9/2019 11:30 AM Manjit Ramirez OT [facility]   9/10/2019 10:45 AM Rodrigo Che MD [address] [place] [address]

## 2019-07-09 ENCOUNTER — HOSPITAL ENCOUNTER (OUTPATIENT)
Dept: PHYSICAL THERAPY | Age: 69
Discharge: HOME | End: 2019-07-09
Payer: MEDICARE

## 2019-07-09 ENCOUNTER — APPOINTMENT (OUTPATIENT)
Dept: PHYSICAL THERAPY | Age: 69
End: 2019-07-09
Payer: MEDICARE

## 2019-07-09 PROCEDURE — 97110 THERAPEUTIC EXERCISES: CPT

## 2019-07-09 PROCEDURE — 97112 NEUROMUSCULAR REEDUCATION: CPT

## 2019-07-09 NOTE — PROGRESS NOTES
PT DAILY TREATMENT NOTE 10-18    Patient Name: Laura Giraldo  Date:2019  : 1950  [x]  Patient  Verified  Payor: VA MEDICARE / Plan: VA MEDICARE PART A & B / Product Type: Medicare /    In time:9:02 Out time:9:40  Total Treatment Time (min): 38  Visit #: 1 of 8    Medicare/BCBS Only   Total Timed Codes (min):  38 1:1 Treatment Time:  38       Treatment Area: Unsteadiness on feet [R26.81]    SUBJECTIVE  Pain Level (0-10 scale): 0/10  Any medication changes, allergies to medications, adverse drug reactions, diagnosis change, or new procedure performed?: [x] No    [] Yes (see summary sheet for update)  Subjective functional status/changes:   [] No changes reported  The patient states that she has been able to get out of her tub, but has to do so quite awkwardly, but is feeling encouraged, because she is doing better with standing from a sitting position as well as performing skills that were not possible prior to starting therapy. OBJECTIVE  10 min Therapeutic Exercise:  [x] See flow sheet :   Rationale: increase ROM and increase strength to improve the patients ability to improve ADL ease. 28 min Neuromuscular Re-education:  [x]  See flow sheet :   Rationale: improve coordination, improve balance and increase proprioception  to improve the patients ability to improve ADL ease. With   [] TE   [] TA   [] neuro   [] other: Patient Education: [x] Review HEP    [] Progressed/Changed HEP based on:   [] positioning   [] body mechanics   [] transfers   [] heat/ice application    [] other:      Other Objective/Functional Measures:   Pt able to perform sit to stand from 8\" box with effective anterior weight shift. Notable greater degree of weakness of left hip extensor and quad in further degrees of hip flexion during closed chain activity.       Pain Level (0-10 scale) post treatment: 0/10    ASSESSMENT/Changes in Function: Improved ease of sitting to standing with progression to single leg sit to stand. Reduce assist with chair step ups noting improved hip extension strength. Patient will continue to benefit from skilled PT services to modify and progress therapeutic interventions, address functional mobility deficits, address ROM deficits, address strength deficits, analyze and address soft tissue restrictions, analyze and cue movement patterns, analyze and modify body mechanics/ergonomics, assess and modify postural abnormalities and instruct in home and community integration to attain remaining goals. []  See Plan of Care  []  See progress note/recertification  []  See Discharge Summary         Progress towards goals / Updated goals:  Updated Goals to be accomplished in 4 weeks:              4. Improve FOTO to 55 to improve ability for daily tasks              2. Pt will report being able to climb several flight of stairs with a little difficulty or less. - not yet met 7-1-19              3. Pt will be able to squat with \"a little difficulty or less\" for improved ability for daily activities.      PLAN  []  Upgrade activities as tolerated     [x]  Continue plan of care  []  Update interventions per flow sheet       []  Discharge due to:_  []  Other:_      Jovi Glez PT 7/9/2019  9:57 AM    Future Appointments   Date Time Provider Odin Rivera   7/12/2019 10:30 AM Matt Mustafa PT [facility]   7/18/2019 10:30 AM Matt Mustafa PT [facility]   9/10/2019 10:45 AM London Shepherd  [facility]

## 2019-07-12 ENCOUNTER — APPOINTMENT (OUTPATIENT)
Dept: PHYSICAL THERAPY | Age: 69
End: 2019-07-12
Payer: MEDICARE

## 2019-07-18 ENCOUNTER — HOSPITAL ENCOUNTER (OUTPATIENT)
Dept: PHYSICAL THERAPY | Age: 69
Discharge: HOME | End: 2019-07-18
Payer: MEDICARE

## 2019-07-18 PROCEDURE — 97530 THERAPEUTIC ACTIVITIES: CPT

## 2019-07-18 PROCEDURE — 97110 THERAPEUTIC EXERCISES: CPT

## 2019-07-18 PROCEDURE — 97112 NEUROMUSCULAR REEDUCATION: CPT

## 2019-07-18 NOTE — PROGRESS NOTES
OT DISCHARGE DAILY NOTE AND SUMMARY- [facility]     Date:2019  Patient name: Alec Graham Start of Care: 2019   Referral source: Kimberley Becker MD : 1950   Medical/Treatment Diagnosis: Bilateral hand pain [M79.641, M79.642] Onset Date:     Prior Hospitalization: see medical history Provider#: [id number]   Medications: Verified on Patient Summary List     Comorbidities: Rheumatoid arthritis, bilateral CTR (2017, ), Sjogren's syndrome   Prior Level of Function: walking, watching grandkids, yard work  Visits from [facility] Care: 7    Missed Visits: 2    Reporting Period : 2019 to 2019  [x]  Patient  Verified  Payor: VA MEDICARE / Plan: VA MEDICARE PART A & B / Product Type: Medicare /    In time:11:15 AM  Out time:11:45 AM  Total Treatment Time (min): 45  Total Timed Codes (min): 45  1:1 Treatment Time ([address] only): 39   Visit #: 1 of 1    Treatment Area: Bilateral hand pain [M79.641, M79.642]    SUBJECTIVE  Pain Level (0-10 scale): 0/10  Any medication changes, allergies to medications, adverse drug reactions, diagnosis change, or new procedure performed?: [x] No    [] Yes (see summary sheet for update)  Subjective functional status/changes:   [] No changes reported  \"I finally received with splints in the mail. \"    OBJECTIVE  10 min Therapeutic Exercise:  [] See flow sheet :   Rationale: increase ROM and increase strength to improve the patients ability to perform  and pinch tasks. 35 min Therapeutic Activity:  []  See flow sheet :   Rationale: increase ROM  to improve the patients ability to reduce ulnar deviation of bilateral hand joints.     With   [] TE   [] TA   [] neuro   [] other: Patient Education: [x] Review HEP    [] Progressed/Changed HEP based on:   [] positioning   [] body mechanics   [] transfers   [] heat/ice application   [] Splint wear/care   [] Sensory re-education   [] scar management      [] other:            Other Objective/Functional Measures: pt demonstrated proper donning and doffing of ulnar deviation night splints for bilateral hands     Pain Level (0-10 scale) post treatment: 0/10    Summary of Care:  1. Patient will demonstrate proper donning/doffing of PF ulnar deviation splint in order to reduce ulnar deviation of bilateral hand digits. 7/18/19 - pt arrived to appt with splint, practiced donning/doffing [place] splint on bilateral hands, goal met    ASSESSMENT/Changes in Function: Pt presented to skilled OT today with her PF ulnar deviation splints she ordered. Pt was instructed in proper donning and doffing of splint with bilateral hands. Pt was able to myrna/doff splints appropriately during this treatment session. Patient was also provided and instructed in  and pinch strengthening HEP using graded theraputty. Pt has met goals. Will d/c at this time.     PLAN:  [x]Discontinue therapy: [x]Patient has reached or is progressing toward set goals      []Patient is non-compliant or has abdicated      []Due to lack of appreciable progress towards set goals    Thank you for this referral!    Dale Rodriguez OT 7/18/2019 1:14 PM

## 2019-07-18 NOTE — PROGRESS NOTES
PT DAILY TREATMENT NOTE 10-18    Patient Name: Eli Chávez  Date:2019  : 1950  [x]  Patient  Verified  Payor: VA MEDICARE / Plan: VA MEDICARE PART A & B / Product Type: Medicare /    In time:10:29 Out time:11:12  Total Treatment Time (min): 43  Visit #: 2 of 8    Medicare/BCBS Only   Total Timed Codes (min):  43 1:1 Treatment Time:  25       Treatment Area: Unsteadiness on feet [R26.81]    SUBJECTIVE  Pain Level (0-10 scale): 0/10  Any medication changes, allergies to medications, adverse drug reactions, diagnosis change, or new procedure performed?: [x] No    [] Yes (see summary sheet for update)  Subjective functional status/changes:   [] No changes reported  Pt reports improved     OBJECTIVE  13 min Therapeutic Exercise:  [x] See flow sheet :   Rationale: increase ROM and increase strength to improve the patients ability to improve ADL ease. 30 min Neuromuscular Re-education:  [x]  See flow sheet :   Rationale: improve coordination, improve balance and increase proprioception  to improve the patients ability to improve ADL ease. With   [] TE   [] TA   [] neuro   [] other: Patient Education: [x] Review HEP    [] Progressed/Changed HEP based on:   [] positioning   [] body mechanics   [] transfers   [] heat/ice application    [] other:      Other Objective/Functional Measures: no changes to therx    Pain Level (0-10 scale) post treatment: 0/10    ASSESSMENT/Changes in Function: pt with much improved sit to stand. She is also progressing with ability for stairs.      Patient will continue to benefit from skilled PT services to modify and progress therapeutic interventions, address functional mobility deficits, address ROM deficits, address strength deficits, analyze and address soft tissue restrictions, analyze and cue movement patterns, analyze and modify body mechanics/ergonomics, assess and modify postural abnormalities and instruct in home and community integration to attain remaining goals. []  See Plan of Care  []  See progress note/recertification  []  See Discharge Summary         Progress towards goals / Updated goals:  Updated Goals to be accomplished in 4 weeks:              6. Improve FOTO to 55 to improve ability for daily tasks              2. Pt will report being able to climb several flight of stairs with a little difficulty or less. - not yet met 7-1-19              3. Pt will be able to squat with \"a little difficulty or less\" for improved ability for daily activities.      PLAN  []  Upgrade activities as tolerated     [x]  Continue plan of care  []  Update interventions per flow sheet       []  Discharge due to:_  []  Other:_      Hazel Mcdaniel, PT 7/18/2019  9:57 AM    Future Appointments   Date Time Provider Odin Rivera   9/10/2019 10:45 AM Karla Cordoba MD [facility] [address]

## 2019-07-29 ENCOUNTER — HOSPITAL ENCOUNTER (OUTPATIENT)
Dept: PHYSICAL THERAPY | Age: 69
Discharge: HOME | End: 2019-07-29
Payer: MEDICARE

## 2019-07-29 PROCEDURE — 97112 NEUROMUSCULAR REEDUCATION: CPT

## 2019-07-29 PROCEDURE — 97110 THERAPEUTIC EXERCISES: CPT

## 2019-07-29 NOTE — PROGRESS NOTES
PT DAILY TREATMENT NOTE 10-18    Patient Name: Herminia Etienne  Date:2019  : 1950  [x]  Patient  Verified  Payor: Jann Kimball / Plan: [place] MED[place] & B / Product Type: Medicare /    In time:10:01 Out time: 10:45  Total Treatment Time (min): 44  Visit #: 3 of 8    Medicare/BCBS Only   Total Timed Codes (min):  44 1:1 Treatment Time:  44       Treatment Area: Unsteadiness on feet [R26.81]    SUBJECTIVE  Pain Level (0-10 scale): 0/10  Any medication changes, allergies to medications, adverse drug reactions, diagnosis change, or new procedure performed?: [x] No    [] Yes (see summary sheet for update)  Subjective functional status/changes:   [x] No changes reported      OBJECTIVE  14 min Therapeutic Exercise:  [x] See flow sheet :   Rationale: increase ROM and increase strength to improve the patients ability to improve ADL ease. 30 min Neuromuscular Re-education:  [x]  See flow sheet :   Rationale: improve coordination, improve balance and increase proprioception  to improve the patients ability to improve ADL ease. With   [] TE   [] TA   [] neuro   [] other: Patient Education: [x] Review HEP    [] Progressed/Changed HEP based on:   [] positioning   [] body mechanics   [] transfers   [] heat/ice application    [] other:      Other Objective/Functional Measures: increased reps with stairs    Pain Level (0-10 scale) post treatment: 0/10    ASSESSMENT/Changes in Function:  Pt is progressing well with PT. She demonstrates improved ability for stairs and reports much improvement with transfers.      Patient will continue to benefit from skilled PT services to modify and progress therapeutic interventions, address functional mobility deficits, address ROM deficits, address strength deficits, analyze and address soft tissue restrictions, analyze and cue movement patterns, analyze and modify body mechanics/ergonomics, assess and modify postural abnormalities and instruct in home and community integration to attain remaining goals. []  See Plan of Care  []  See progress note/recertification  []  See Discharge Summary         Progress towards goals / Updated goals:  Updated Goals to be accomplished in 4 weeks:              4. Improve FOTO to 55 to improve ability for daily tasks              2. Pt will report being able to climb several flight of stairs with a little difficulty or less. - not yet met 7-1-19              3. Pt will be able to squat with \"a little difficulty or less\" for improved ability for daily activities.  - progressing 7-29-19     PLAN  [x]  Upgrade activities as tolerated     [x]  Continue plan of care  []  Update interventions per flow sheet       []  Discharge due to:_  []  Other:_      Ari Bonilla, PT 7/29/2019  9:57 AM    Future Appointments   Date Time Provider Odin Toisti   7/31/2019 10:00 AM Cj Hardy, PT [facility]   8/6/2019 11:00 AM Paul Mixon, PT [facility]   8/9/2019 10:30 AM Paul Mixon, PT [facility]   8/13/2019 10:30 AM Paul Mixon, PT [facility]   8/15/2019 11:30 AM Paul Mixon, PT [facility]   9/10/2019 10:45 AM Morales Sherman  [facility]

## 2019-07-31 ENCOUNTER — HOSPITAL ENCOUNTER (OUTPATIENT)
Dept: PHYSICAL THERAPY | Age: 69
Discharge: HOME | End: 2019-07-31
Payer: MEDICARE

## 2019-07-31 PROCEDURE — 97112 NEUROMUSCULAR REEDUCATION: CPT

## 2019-07-31 PROCEDURE — 97110 THERAPEUTIC EXERCISES: CPT

## 2019-07-31 NOTE — PROGRESS NOTES
PT DAILY TREATMENT NOTE 10-18    Patient Name: Geovanna Patel  Date:2019  : 1950  [x]  Patient  Verified  Payor: [place] MEDICARE / Plan: [place] MEDICARE PART A & B / Product Type: Medicare /    In time:10:00  Out time:10:38  Total Treatment Time (min): 38  Visit #: 4 of 8     Medicare/BCBS Only   Total Timed Codes (min):  38 1:1 Treatment Time:  38       Treatment Area: Unsteadiness on feet [R26.81]    SUBJECTIVE  Pain Level (0-10 scale): 0/10  Any medication changes, allergies to medications, adverse drug reactions, diagnosis change, or new procedure performed?: [x] No    [] Yes (see summary sheet for update)  Subjective functional status/changes:   [] No changes reported  The patient states that her feet are sore, but doing better since putting Voltaren gel on them. She reports that she has been on them for a while. OBJECTIVE  28 min Therapeutic Exercise:  [x] See flow sheet :   Rationale: increase ROM and increase strength to improve the patients ability to improve ADL ease. 10 min Neuromuscular Re-education:  [x]  See flow sheet :   Rationale: improve coordination, improve balance and increase proprioception  to improve the patients ability to improve ADL ease. With   [] TE   [] TA   [] neuro   [] other: Patient Education: [x] Review HEP    [] Progressed/Changed HEP based on:   [] positioning   [] body mechanics   [] transfers   [] heat/ice application    [] other:      Other Objective/Functional Measures:   Updated Goals to be accomplished in 4 weeks:              1. Improve FOTO to 55 to improve ability for daily tasks.               2. Pt will report being able to climb several flight of stairs with a little difficulty or less. - not yet met 19; Improved, slight difficulty descent 2019              3. Pt will be able to squat with \"a little difficulty or less\" for improved ability for daily activities.  - progressing 2019    Pain Level (0-10 scale) post treatment: 0/10    ASSESSMENT/Changes in Function: The patient appears to have progressed with weight shifting into single leg following consecutive repetitions with step taps and slow ramiro tap step overs. She completed session void pain post session. Patient will continue to benefit from skilled PT services to modify and progress therapeutic interventions, address functional mobility deficits, address strength deficits, analyze and address soft tissue restrictions, analyze and cue movement patterns, analyze and modify body mechanics/ergonomics, assess and modify postural abnormalities, address imbalance/dizziness and instruct in home and community integration to attain remaining goals. []  See Plan of Care  []  See progress note/recertification  []  See Discharge Summary         Progress towards goals / Updated goals:  Updated Goals to be accomplished in 4 weeks:              1. Improve FOTO to 55 to improve ability for daily tasks              2. Pt will report being able to climb several flight of stairs with a little difficulty or less. - not yet met 7-1-19              3. Pt will be able to squat with \"a little difficulty or less\" for improved ability for daily activities.  - progressing 7-29-19     PLAN  []  Upgrade activities as tolerated     [x]  Continue plan of care  []  Update interventions per flow sheet       []  Discharge due to:_  []  Other:_      Yoav Christine, PT 7/31/2019  10:52 AM    Future Appointments   Date Time Provider Odin Rivera   8/6/2019 11:00 AM Eliecer Sherman, PT [facility]   8/9/2019 10:30 AM Eliecer Sherman, PT [facility]   8/13/2019 10:30 AM Eliecer Sherman, PT [facility]   8/15/2019 11:30 AM Eliecer Sherman, PT [facility]   9/10/2019 10:45 AM Karla Cordoba MD [address] [place] [address]

## 2019-08-06 ENCOUNTER — HOSPITAL ENCOUNTER (OUTPATIENT)
Dept: PHYSICAL THERAPY | Age: 69
Discharge: HOME | End: 2019-08-06
Payer: MEDICARE

## 2019-08-06 PROCEDURE — 97110 THERAPEUTIC EXERCISES: CPT

## 2019-08-06 PROCEDURE — 97112 NEUROMUSCULAR REEDUCATION: CPT

## 2019-08-06 NOTE — PROGRESS NOTES
PT DAILY TREATMENT NOTE 10-18    Patient Name: Melanie Jurado  Date:2019  : 1950  [x]  Patient  Verified  Payor: [place] MEDICARE / Plan: [place] MEDICARE PART A & B / Product Type: Medicare /    In time:11:00  Out time:11:42  Total Treatment Time (min): 42  Visit #: 5 of 8     Medicare/BCBS Only   Total Timed Codes (min):  42 1:1 Treatment Time:  40       Treatment Area: Unsteadiness on feet [R26.81]    SUBJECTIVE  Pain Level (0-10 scale): 0/10  Any medication changes, allergies to medications, adverse drug reactions, diagnosis change, or new procedure performed?: [x] No    [] Yes (see summary sheet for update)  Subjective functional status/changes:   [] No changes reported      OBJECTIVE  28 min Therapeutic Exercise:  [x] See flow sheet :   Rationale: increase ROM and increase strength to improve the patients ability to improve ADL ease. 14 min Neuromuscular Re-education:  [x]  See flow sheet :   Rationale: improve coordination, improve balance and increase proprioception  to improve the patients ability to improve ADL ease. With   [] TE   [] TA   [] neuro   [] other: Patient Education: [x] Review HEP    [] Progressed/Changed HEP based on:   [] positioning   [] body mechanics   [] transfers   [] heat/ice application    [] other:      Other Objective/Functional Measures:   Updated Goals to be accomplished in 4 weeks:              1. Improve FOTO to 55 to improve ability for daily tasks.               2. Pt will report being able to climb several flight of stairs with a little difficulty or less. - not yet met 19; Improved, slight difficulty descent 2019, progressing 19              3. Pt will be able to squat with \"a little difficulty or less\" for improved ability for daily activities. - progressing 2019    Pain Level (0-10 scale) post treatment: 0/10    ASSESSMENT/Changes in Function:  Pt is progressing well with PT. Balance and strength have improved. .     Patient will continue to benefit from skilled PT services to modify and progress therapeutic interventions, address functional mobility deficits, address strength deficits, analyze and address soft tissue restrictions, analyze and cue movement patterns, analyze and modify body mechanics/ergonomics, assess and modify postural abnormalities, address imbalance/dizziness and instruct in home and community integration to attain remaining goals. []  See Plan of Care  []  See progress note/recertification  []  See Discharge Summary         Progress towards goals / Updated goals:  Updated Goals to be accomplished in 4 weeks:              1. Improve FOTO to 55 to improve ability for daily tasks              2. Pt will report being able to climb several flight of stairs with a little difficulty or less. - not yet met 7-1-19              3. Pt will be able to squat with \"a little difficulty or less\" for improved ability for daily activities.  - progressing 7-29-19     PLAN  []  Upgrade activities as tolerated     [x]  Continue plan of care  []  Update interventions per flow sheet       []  Discharge due to:_  []  Other:_      Rossy Davison PT 8/6/2019  10:52 AM    Future Appointments   Date Time Provider Odin Rivera   8/9/2019 10:30 AM [facility]   8/13/2019 10:30 AM [facility]   8/15/2019 11:30 AM [facility]   9/10/2019 10:45 AM Patsy Delatorre  [facility]

## 2019-08-09 ENCOUNTER — HOSPITAL ENCOUNTER (OUTPATIENT)
Dept: PHYSICAL THERAPY | Age: 69
Discharge: HOME | End: 2019-08-09
Payer: MEDICARE

## 2019-08-09 PROCEDURE — 97110 THERAPEUTIC EXERCISES: CPT

## 2019-08-09 PROCEDURE — 97112 NEUROMUSCULAR REEDUCATION: CPT

## 2019-08-09 NOTE — PROGRESS NOTES
PT DAILY TREATMENT NOTE 10-18    Patient Name: Cesar Donovan  Date:2019  : 1950  [x]  Patient  Verified  Payor: [place] MEDICARE / Plan: [place] MEDICARE PART A & B / Product Type: Medicare /    In time:1028  Out time:1112  Total Treatment Time (min): 44  Visit #: 6 of 8     Medicare/BCBS Only   Total Timed Codes (min):  44 1:1 Treatment Time:  44       Treatment Area: Unsteadiness on feet [R26.81]    SUBJECTIVE  Pain Level (0-10 scale): 0/10  Any medication changes, allergies to medications, adverse drug reactions, diagnosis change, or new procedure performed?: [x] No    [] Yes (see summary sheet for update)  Subjective functional status/changes:   [] No changes reported      OBJECTIVE  28 min Therapeutic Exercise:  [x] See flow sheet :   Rationale: increase ROM and increase strength to improve the patients ability to improve ADL ease. 16 min Neuromuscular Re-education:  [x]  See flow sheet :   Rationale: improve coordination, improve balance and increase proprioception  to improve the patients ability to improve ADL ease. With   [] TE   [] TA   [] neuro   [] other: Patient Education: [x] Review HEP    [] Progressed/Changed HEP based on:   [] positioning   [] body mechanics   [] transfers   [] heat/ice application    [] other:      Other Objective/Functional Measures: increased reps with sit to stands from 8\" box and lowered surface with     Pain Level (0-10 scale) post treatment: 0/10    ASSESSMENT/Changes in Function:  Pt demonstrated improved ability for low sit to stands today. She is progressing with ease with stairs.      Patient will continue to benefit from skilled PT services to modify and progress therapeutic interventions, address functional mobility deficits, address strength deficits, analyze and address soft tissue restrictions, analyze and cue movement patterns, analyze and modify body mechanics/ergonomics, assess and modify postural abnormalities, address imbalance/dizziness and instruct in home and community integration to attain remaining goals. []  See Plan of Care  []  See progress note/recertification  []  See Discharge Summary         Progress towards goals / Updated goals:  Updated Goals to be accomplished in 4 weeks:              1. Improve FOTO to 55 to improve ability for daily tasks              2. Pt will report being able to climb several flight of stairs with a little difficulty or less. - pt progressing 8-9-19              3. Pt will be able to squat with \"a little difficulty or less\" for improved ability for daily activities.  - progressing 7-29-19 ,     PLAN  []  Upgrade activities as tolerated     [x]  Continue plan of care  []  Update interventions per flow sheet       []  Discharge due to:_  []  Other:_      Stacia Morris, PT 8/9/2019  10:52 AM    Future Appointments   Date Time Provider Odin Rivera   8/9/2019 10:30 AM Racheal Smith, PT [facility]   8/13/2019 10:30 [facility]   8/15/2019 11:30 AM [facility]   9/10/2019 10:45 AM Tila Garrett  [facility]

## 2019-08-13 ENCOUNTER — HOSPITAL ENCOUNTER (OUTPATIENT)
Dept: PHYSICAL THERAPY | Age: 69
Discharge: HOME | End: 2019-08-13
Payer: MEDICARE

## 2019-08-13 PROCEDURE — 97110 THERAPEUTIC EXERCISES: CPT

## 2019-08-13 PROCEDURE — 97112 NEUROMUSCULAR REEDUCATION: CPT

## 2019-08-13 NOTE — PROGRESS NOTES
PT DAILY TREATMENT NOTE 10-18    Patient Name: Cassandra Duarte  Date:2019  : 1950  [x]  Patient  Verified  Payor: VA MEDICARE / Plan: VA MEDICARE PART A & B / Product Type: Medicare /    In time:1034  Out time:1114  Total Treatment Time (min): 40  Visit #: 7 of 8     Medicare/BCBS Only   Total Timed Codes (min):  40 1:1 Treatment Time:  40       Treatment Area: Unsteadiness on feet [R26.81]    SUBJECTIVE  Pain Level (0-10 scale): 0/10  Any medication changes, allergies to medications, adverse drug reactions, diagnosis change, or new procedure performed?: [x] No    [] Yes (see summary sheet for update)  Subjective functional status/changes:   [] No changes reported      OBJECTIVE  28 min Therapeutic Exercise:  [x] See flow sheet :   Rationale: increase ROM and increase strength to improve the patients ability to improve ADL ease. 12 min Neuromuscular Re-education:  [x]  See flow sheet :   Rationale: improve coordination, improve balance and increase proprioception  to improve the patients ability to improve ADL ease. With   [] TE   [] TA   [] neuro   [] other: Patient Education: [x] Review HEP    [] Progressed/Changed HEP based on:   [] positioning   [] body mechanics   [] transfers   [] heat/ice application    [] other:      Other Objective/Functional Measures:increased therex per flow sheet    Pain Level (0-10 scale) post treatment: 0/10    ASSESSMENT/Changes in Function:  Pt is progressing well. Improved ability for stairs noted. Patient will continue to benefit from skilled PT services to modify and progress therapeutic interventions, address functional mobility deficits, address strength deficits, analyze and address soft tissue restrictions, analyze and cue movement patterns, analyze and modify body mechanics/ergonomics, assess and modify postural abnormalities, address imbalance/dizziness and instruct in home and community integration to attain remaining goals.      []  See Plan of Care  []  See progress note/recertification  []  See Discharge Summary         Progress towards goals / Updated goals:  Updated Goals to be accomplished in 4 weeks:              1. Improve FOTO to 55 to improve ability for daily tasks              2. Pt will report being able to climb several flight of stairs with a little difficulty or less. - pt progressing 8-13-19              3. Pt will be able to squat with \"a little difficulty or less\" for improved ability for daily activities.  - progressing 7-29-19 ,     PLAN  []  Upgrade activities as tolerated     [x]  Continue plan of care  []  Update interventions per flow sheet       []  Discharge due to:_  []  Other:_      Chantell Le PT 8/13/2019  10:52 AM    Future Appointments   Date Time Provider Odin Rivera   8/15/2019 11:30 AM Robert Del Toro PT [facility]   9/10/2019 10:45 AM Edenilson Sheridan MD [id number] [facility]

## 2019-08-15 ENCOUNTER — APPOINTMENT (OUTPATIENT)
Dept: PHYSICAL THERAPY | Age: 69
End: 2019-08-15
Payer: MEDICARE

## 2019-08-19 ENCOUNTER — HOSPITAL ENCOUNTER (OUTPATIENT)
Dept: PHYSICAL THERAPY | Age: 69
Discharge: HOME | End: 2019-08-19
Payer: MEDICARE

## 2019-08-19 PROCEDURE — 97112 NEUROMUSCULAR REEDUCATION: CPT

## 2019-08-19 PROCEDURE — 97110 THERAPEUTIC EXERCISES: CPT

## 2019-08-19 NOTE — PROGRESS NOTES
[facility]  [address]  [place], [address]  [phone] fax    Physical Therapy Discharge Summary    Patient name: Eve Hernandez Start of Care: 2019   Referral source: Prabhjot Becker MD : 1950               Medical Diagnosis: Gait abnormality [R26.9]  Payor: VA MEDICARE / Plan: VA MEDICARE PART A & B / Product Type: Medicare /  Onset Date:2-3 years               Treatment Diagnosis: unsteady gait, RA    Prior Hospitalization: see medical history Provider#: [id number]   Medications: Verified on Patient summary List    Comorbidities: RA, carpal tunnel release   Prior Level of Function: functionally I with activities    Visits from Start of Care: 15    Missed Visits: 1  Reporting Period : 19 to 19    Summary of Care:  pt has made good functional progress with PT. She demonstrates improved ability for transfers and stairs and improved overall strength and balance. She has been instructed to continue with home program and is being discharged at this time. Progress towards goals:  Updated Goals to be accomplished in 4 weeks:              1. Improve FOTO to 55 to improve ability for daily tasks- not met 51 on 19              2. Pt will report being able to climb several flight of stairs with a little difficulty or less. - MET 19              3. Pt will be able to squat with \"a little difficulty or less\" for improved ability for daily activities.  -MET per pt 19      ASSESSMENT/RECOMMENDATIONS:  [x]Discontinue therapy: [x]Patient has reached or is progressing toward set goals      []Patient is non-compliant or has abdicated      []Due to lack of appreciable progress towards set [address], PT 2019 12:26 PM

## 2019-08-19 NOTE — PROGRESS NOTES
PT DAILY TREATMENT NOTE 10-18    Patient Name: Jose Valdes  Date:2019  : 1950  [x]  Patient  Verified  Payor: VA MEDICARE / Plan: VA MEDICARE PART A & B / Product Type: Medicare /    In time:1000  Out time:1040  Total Treatment Time (min): 40  Visit #: 8 of 8     Medicare/BCBS Only   Total Timed Codes (min):  40 1:1 Treatment Time: 25       Treatment Area: Unsteadiness on feet [R26.81]    SUBJECTIVE  Pain Level (0-10 scale): 0/10  Any medication changes, allergies to medications, adverse drug reactions, diagnosis change, or new procedure performed?: [x] No    [] Yes (see summary sheet for update)  Subjective functional status/changes:   [] No changes reported      OBJECTIVE  25 min Therapeutic Exercise:  [x] See flow sheet :   Rationale: increase ROM and increase strength to improve the patients ability to improve ADL ease. 15 min Neuromuscular Re-education:  [x]  See flow sheet :   Rationale: improve coordination, improve balance and increase proprioception  to improve the patients ability to improve ADL ease. With   [] TE   [] TA   [] neuro   [] other: Patient Education: [x] Review HEP    [] Progressed/Changed HEP based on:   [] positioning   [] body mechanics   [] transfers   [] heat/ice application    [] other:      Other Objective/Functional Measures: FOTO: 51    Pain Level (0-10 scale) post treatment: 0/10    ASSESSMENT/Changes in Function: pt has made good functional progress with PT. She demonstrates improved ability for transfers and stairs and improved overall strength and balance. She has been instructed to continue with home program and is being discharged at this time. []  See Plan of Care  []  See progress note/recertification  [x]  See Discharge Summary         Progress towards goals / Updated goals:  Updated Goals to be accomplished in 4 weeks:              1.  Improve FOTO to 55 to improve ability for daily tasks- not met 51 on 19              2. Pt will report being able to climb several flight of stairs with a little difficulty or less. - MET 8-19-19              3. Pt will be able to squat with \"a little difficulty or less\" for improved ability for daily activities.  -MET per pt 8-19-19    PLAN  []  Upgrade activities as tolerated     []  Continue plan of care  []  Update interventions per flow sheet       [x]  Discharge due to:_  []  Other:_      Trupti Cedeño, LISS 8/19/2019  10:52 AM    Future Appointments   Date Time Provider Odin Rivera   9/10/2019 10:45 AM MD MARTINE Wadsworth

## 2019-09-10 ENCOUNTER — OFFICE VISIT (OUTPATIENT)
Dept: CARDIOLOGY CLINIC | Age: 69
End: 2019-09-10

## 2019-09-10 VITALS
WEIGHT: 131.6 LBS | HEART RATE: 74 BPM | HEIGHT: 65 IN | BODY MASS INDEX: 21.92 KG/M2 | SYSTOLIC BLOOD PRESSURE: 79 MMHG | DIASTOLIC BLOOD PRESSURE: 63 MMHG

## 2019-09-10 DIAGNOSIS — I34.1 MVP (MITRAL VALVE PROLAPSE): ICD-10-CM

## 2019-09-10 DIAGNOSIS — G45.9 TRANSIENT CEREBRAL ISCHEMIA, UNSPECIFIED TYPE: ICD-10-CM

## 2019-09-10 DIAGNOSIS — I48.0 PAF (PAROXYSMAL ATRIAL FIBRILLATION) (HCC): Primary | ICD-10-CM

## 2019-09-10 NOTE — PROGRESS NOTES
HISTORY OF PRESENT ILLNESS  Lesa Zaragoza is a 77 y.o. female. Palpitations    The history is provided by the patient. This is a chronic problem. The problem has not changed since onset. The problem occurs rarely. Pertinent negatives include no diaphoresis, no fever, no malaise/fatigue, no numbness, no near-syncope, no orthopnea, no PND, no nausea, no vomiting, no dizziness, no weakness, no cough, no hemoptysis and no sputum production. Her past medical history is significant for atrial fibrillation. Review of Systems   Constitutional: Negative for chills, diaphoresis, fever, malaise/fatigue and weight loss. HENT: Negative for congestion, ear discharge, ear pain, hearing loss, nosebleeds and tinnitus. Eyes: Negative for blurred vision. Respiratory: Negative for cough, hemoptysis, sputum production, wheezing and stridor. Cardiovascular: Positive for palpitations. Negative for orthopnea, leg swelling, PND and near-syncope. Gastrointestinal: Negative for heartburn, nausea and vomiting. Musculoskeletal: Negative for myalgias and neck pain. Skin: Negative for itching and rash. Neurological: Negative for dizziness, tingling, tremors, focal weakness, loss of consciousness, weakness and numbness. Psychiatric/Behavioral: Negative for depression and suicidal ideas.      Family History   Problem Relation Age of Onset    Hypertension Father     High Cholesterol Father     Diabetes Father     Stroke Father     Heart Attack Father     Cancer Father     Heart Disease Father     Depression Sister     Breast Cancer Maternal Grandmother        Past Medical History:   Diagnosis Date    A-fib [facility])     Arthritis     Feet    Memory change     Menopause     Multiple thyroid nodules 2009    under care of Dr. Chantel Beal MVP (mitral valve prolapse)     under care of Dr. Dez Tena Neuropathy     Plantar fasciitis     PSVT (paroxysmal supraventricular tachycardia) ([facility] 2/8/2011    Right knee pain     Routine gynecological examination     done by Dr. Marciano Rubin S/P colonoscopy     done past 1-2 yrs? Dr. Joelle Duval Sjogren's syndrome [facility])     sees Dr. Woo Vargas    TIA (transient ischemic attack)     possible history of    Tibialis tendonitis     following with Dr Lidia Argueta ankle       Past Surgical History:   Procedure Laterality Date    DILATION AND CURETTAGE  1987    HX BREAST AUGMENTATION  1984    HX DILATION AND CURETTAGE  1/2013    HX TONSILLECTOMY      IMPLANT BREAST SILICONE/EQ      TARSAL TUNNEL RELEASE  1994    Right       Social History     Tobacco Use    Smoking status: Never Smoker    Smokeless tobacco: Never Used   Substance Use Topics    Alcohol use: No     Alcohol/week: 0.0 standard drinks       Allergies   Allergen Reactions    Plaquenil [Hydroxychloroquine] Nausea and Vomiting    Pcn [Penicillins] Itching and Rash    Prednisone Nausea and Vomiting     She reports a plaquenil/prednisone come causes vomiting    Zithromax [Azithromycin] Rash, Other (comments) and Hives     [facility] records state skin peeling. High fever, peeling       Outpatient Medications Marked as Taking for the 9/10/19 encounter (Office Visit) with Corrinne Gearing, MD   Medication Sig Dispense Refill    metoprolol tartrate (LOPRESSOR) 25 mg tablet TAKE ONE-HALF TABLET BY MOUTH TWICE A DAY 90 Tab 2    methotrexate (RHEUMATREX) 2.5 mg tablet       folic acid (FOLVITE) 1 mg tablet Take  by mouth daily.  XARELTO 20 mg tab tablet Take 1 Tab by mouth daily (with dinner). 30 Tab 6    gabapentin (NEURONTIN) 300 mg capsule Take 2 Caps by mouth nightly. 180 Cap 1    cholecalciferol, VITAMIN D3, (VITAMIN D3) 5,000 unit tab tablet Take 5,000 Units by mouth daily.  cyanocobalamin 1,000 mcg tablet Take 1,000 mcg by mouth two (2) times a day.  diclofenac (VOLTAREN) 1 % gel Apply  to affected area four (4) times daily.       acetaminophen (TYLENOL) 650 mg CR tablet Take 650 mg by mouth three (3) times daily as needed for Pain (taking 2 tid).  ascorbic acid (VITAMIN C) 500 mg tablet Take 500 mg by mouth two (2) times a day. Visit Vitals  BP (!) 79/63   Pulse 74   Ht 5' 5\" (1.651 m)   Wt 59.7 kg (131 lb 9.6 oz)   LMP 04/25/2016   BMI 21.90 kg/m²     Physical Exam   Constitutional: She is oriented to person, place, and time. She appears well-developed and well-nourished. No distress. HENT:   Head: Atraumatic. Mouth/Throat: No oropharyngeal exudate. Eyes: Conjunctivae are normal. Right eye exhibits no discharge. Left eye exhibits no discharge. No scleral icterus. Neck: Neck supple. No JVD present. No tracheal deviation present. No thyromegaly present. Cardiovascular: Normal rate and regular rhythm. Exam reveals no gallop. No murmur heard. Pulmonary/Chest: Effort normal and breath sounds normal. No stridor. She has no wheezes. She has no rales. Abdominal: Soft. There is no tenderness. There is no rebound and no guarding. Musculoskeletal: Normal range of motion. She exhibits no edema or tenderness. Lymphadenopathy:     She has no cervical adenopathy. Neurological: She is alert and oriented to person, place, and time. She exhibits normal muscle tone. Skin: Skin is warm. She is not diaphoretic. Psychiatric: She has a normal mood and affect. Her behavior is normal.     Echo 2016:  [address] SMALL LEFT VENTRICLE WITH MILD LEFT VENTRICULAR HYPERTROPHY PRESENT. NORMAL GLOBAL LEFT VENTRICULAR SYSTOLIC FUNCTION. NORMAL DIASTOLIC FUNCTION. ESTIMATED EJECTION FRACTION OF 60%   NORMAL RIGHT VENTRICULAR SIZE. NORMAL RIGHT VENTRICULAR GLOBAL SYSTOLIC FUNCTION. ESTIMATED PULMONARY ARTERY PRESSURE OF 21MMHG. NO HEMODYNAMICALLY SIGNIFICANT VALVULAR PATHOLOGY. NO PRIOR STUDY FOR COMPARISON. ASSESSMENT and PLAN    ICD-10-CM ICD-9-CM    1. PAF (paroxysmal atrial fibrillation) ([facility]) I48.0 427.31 ECHO ADULT COMPLETE   2.  MVP (mitral valve prolapse) I34.1 424.0    3. Transient cerebral ischemia, unspecified type G45.9 435.9      No orders of the defined types were placed in this encounter. Follow-up and Dispositions    · Return in about 6 months (around 3/10/2020). current treatment plan is effective, no change in therapy. Patient with h/o ? ?? TIA vs transient amenesia in 2016 - also found to have atrial flutter- discharged home on xarelto and cartia. Cartia discontinued due to fatigue. Doing better on lopressor. Underwent ETT-- developed Afib - started on digoxin in addition to BB. Now in NSR. Currently off digoxin. Blood pressure improved after activity. No dizziness or lightheadedness. Continue current medications  No significant palpitations since last appointment. Follow-up in 6 months.

## 2019-09-10 NOTE — PROGRESS NOTES
1. Have you been to the ER, urgent care clinic since your last visit? Hospitalized since your last visit? No    2. Have you seen or consulted any other health care providers outside of the [address] [place] [address] since your last visit? Include any pap smears or colon screening.  Yes Where: Rheumatology Reason for visit: Routine Visit

## 2019-09-26 LAB — AMB DEXA, EXTERNAL: NORMAL

## 2019-10-23 RX ORDER — RIVAROXABAN 20 MG/1
20 TABLET, FILM COATED ORAL
Qty: 30 TAB | Refills: 6 | Status: SHIPPED | OUTPATIENT
Start: 2019-10-23 | End: 2020-04-02

## 2019-12-26 ENCOUNTER — OFFICE VISIT (OUTPATIENT)
Dept: INTERNAL MEDICINE CLINIC | Age: 69
End: 2019-12-26

## 2019-12-26 ENCOUNTER — HOSPITAL ENCOUNTER (OUTPATIENT)
Dept: GENERAL RADIOLOGY | Age: 69
Discharge: HOME | End: 2019-12-26
Payer: MEDICARE

## 2019-12-26 VITALS
SYSTOLIC BLOOD PRESSURE: 127 MMHG | BODY MASS INDEX: 22.49 KG/M2 | TEMPERATURE: 97.4 F | DIASTOLIC BLOOD PRESSURE: 89 MMHG | OXYGEN SATURATION: 98 % | HEART RATE: 85 BPM | WEIGHT: 135 LBS | HEIGHT: 65 IN | RESPIRATION RATE: 16 BRPM

## 2019-12-26 DIAGNOSIS — R07.9 CHEST PAIN IN ADULT: Primary | ICD-10-CM

## 2019-12-26 DIAGNOSIS — R07.9 CHEST PAIN IN ADULT: ICD-10-CM

## 2019-12-26 PROCEDURE — 71046 X-RAY EXAM CHEST 2 VIEWS: CPT

## 2019-12-26 NOTE — PROGRESS NOTES
Cece Waters 1950, is a 77 y.o. female, who is seen today for right lower chest pain that is been present for about 6 days. She does not recall any injury or strain but it started hurting then and if anything it has gotten a little worse. She can feel it somewhat if she takes a deep breath but the pain is at its worst when she tries to get up from a supine position or when she first lays down it hurts in that area. No GI symptoms. No rash. Past Medical History:   Diagnosis Date    A-fib [facility])     Arthritis     Feet    Memory change     Menopause     Multiple thyroid nodules 2009    under care of Dr. Deo Betts MVP (mitral valve prolapse)     under care of Dr. Stephanie Lehman Neuropathy     Plantar fasciitis     [facility]/2011    Right knee pain     Routine gynecological examination     done by Dr. Zahra Russo S/P colonoscopy     done past 1-2 yrs? Dr. Elyssa Boston Sjogren's syndrome [facility])     sees Dr. Nancy Connelly    TIA (transient ischemic attack)     possible history of    Tibialis tendonitis     following with Dr Valente Teague ankle     Current Outpatient Medications   Medication Sig Dispense Refill    abatacept (ORENCIA SC) by SubCUTAneous route.  XARELTO 20 mg tab tablet Take 1 Tab by mouth daily (with dinner). 30 Tab 6    metoprolol tartrate (LOPRESSOR) 25 mg tablet TAKE ONE-HALF TABLET BY MOUTH TWICE A DAY 90 Tab 2    methotrexate (RHEUMATREX) 2.5 mg tablet       folic acid (FOLVITE) 1 mg tablet Take  by mouth daily.  gabapentin (NEURONTIN) 300 mg capsule Take 2 Caps by mouth nightly. 180 Cap 1    cholecalciferol, VITAMIN D3, (VITAMIN D3) 5,000 unit tab tablet Take 5,000 Units by mouth daily.  cyanocobalamin 1,000 mcg tablet Take 1,000 mcg by mouth two (2) times a day.  acetaminophen (TYLENOL) 650 mg CR tablet Take 650 mg by mouth three (3) times daily as needed for Pain (taking 2 tid).       ascorbic acid (VITAMIN C) 500 mg tablet Take 500 mg by mouth two (2) times a day.  digoxin (LANOXIN) 0.125 mg tablet Take 1 Tab by mouth daily. 30 Tab 3    B.infantis-B.ani-B.long-B.bifi (PROBIOTIC 4X) 10-15 mg TbEC Take  by mouth.  diclofenac (VOLTAREN) 1 % gel Apply  to affected area four (4) times daily.  melatonin tab tablet Take 8 mg by mouth nightly.  calcium-vitamin D (CALCIUM 500+D) 500 mg(1,250mg) -200 unit per tablet Take 1 Tab by mouth two (2) times daily (with meals). Visit Vitals  /89 (BP 1 Location: Left arm, BP Patient Position: Sitting)   Pulse 85   Temp 97.4 °F (36.3 °C) (Oral)   Resp 16   Ht 5' 5\" (1.651 m)   Wt 135 lb (61.2 kg)   SpO2 98%   BMI 22.47 kg/m²     There is tenderness in the anterior lowermost ribs at approximately the midclavicular line. She also has some slight tenderness far up in the right upper quadrant with Faustina Economy sign present. No hepatosplenomegaly or masses. No distention. Breath sounds are normal with no crepitation or crackles. Assessment:  Probable musculoskeletal chest pain in the lowermost anterior right ribs, will order a chest x-ray for her since she is concerned it could be something else but everything is pointing to musculoskeletal etiology that should get better on her own, I recommended she just use Tylenol for the pain since she is on Xarelto. She will call if pain fails to improve or worsens regardless of x-ray result. Yoav Smith MD FACP    Please note: This document has been produced using voice recognition software. Unrecognized errors in transcription may be present.

## 2019-12-26 NOTE — PROGRESS NOTES
Paulette Joseph presents today for   Chief Complaint   Patient presents with    Side Pain     right side pain under lower rib started 12/20/19     Dental Problem     reports breaking a tooth yesterday unable to reach dentist              Depression Screening:  3 most recent PHQ Screens 12/20/2018   Little interest or pleasure in doing things Not at all   Feeling down, depressed, irritable, or hopeless Not at all   Total Score PHQ 2 0       Learning Assessment:  Learning Assessment 1/7/2016   PRIMARY LEARNER Patient   HIGHEST LEVEL OF EDUCATION - PRIMARY LEARNER  > 4 YEARS [address] LEARNER NONE   CO-LEARNER CAREGIVER No   PRIMARY LANGUAGE ENGLISH   LEARNER PREFERENCE PRIMARY DEMONSTRATION     -     -   ANSWERED BY patient   RELATIONSHIP SELF       Abuse Screening:  Abuse Screening Questionnaire 12/20/2018   Do you ever feel afraid of your partner? N   Are you in a relationship with someone who physically or mentally threatens you? N   Is it safe for you to go home? Y       Fall Risk  Fall Risk Assessment, last 12 mths 12/20/2018   Able to walk? Yes   Fall in past 12 months? No   Fall with injury? -   Number of falls in past 12 months -   Fall Risk Score -           Coordination of Care:  1. Have you been to the ER, urgent care clinic since your last visit? Hospitalized since your last visit? no    2. Have you seen or consulted any other health care providers outside of the [address] [place] [address] since your last visit? Include any pap smears or colon screening.  no

## 2019-12-27 ENCOUNTER — PATIENT MESSAGE (OUTPATIENT)
Dept: INTERNAL MEDICINE CLINIC | Age: 69
End: 2019-12-27

## 2019-12-27 NOTE — PROGRESS NOTES
Please notify the patient that her chest x-ray is normal, she should be in touch if the pain gets worse or if it is not getting better over the next 10 to 14 days.

## 2020-01-21 ENCOUNTER — HOSPITAL ENCOUNTER (OUTPATIENT)
Dept: MAMMOGRAPHY | Age: 70
Discharge: HOME | End: 2020-01-21
Attending: INTERNAL MEDICINE
Payer: MEDICARE

## 2020-01-21 DIAGNOSIS — Z12.31 VISIT FOR SCREENING MAMMOGRAM: ICD-10-CM

## 2020-01-21 PROCEDURE — 77063 BREAST TOMOSYNTHESIS BI: CPT

## 2020-03-16 RX ORDER — METOPROLOL TARTRATE 25 MG/1
TABLET, FILM COATED ORAL
Qty: 90 TAB | Refills: 1 | Status: SHIPPED | OUTPATIENT
Start: 2020-03-16 | End: 2020-09-11

## 2020-04-01 ENCOUNTER — VIRTUAL VISIT (OUTPATIENT)
Dept: CARDIOLOGY CLINIC | Age: 70
End: 2020-04-01

## 2020-04-01 VITALS
DIASTOLIC BLOOD PRESSURE: 73 MMHG | WEIGHT: 135 LBS | HEART RATE: 78 BPM | BODY MASS INDEX: 22.49 KG/M2 | HEIGHT: 65 IN | SYSTOLIC BLOOD PRESSURE: 108 MMHG

## 2020-04-01 DIAGNOSIS — I48.0 PAF (PAROXYSMAL ATRIAL FIBRILLATION) (HCC): Primary | ICD-10-CM

## 2020-04-01 DIAGNOSIS — G45.9 TRANSIENT CEREBRAL ISCHEMIA, UNSPECIFIED TYPE: ICD-10-CM

## 2020-04-01 NOTE — PROGRESS NOTES
1. Have you been to the ER, urgent care clinic since your last visit? Hospitalized since your last visit?     no    2. Have you seen or consulted any other health care providers outside of the [address] [place] [address] since your last visit? Include any pap smears or colon screening. Yes Where: pcp     3. Since your last visit, have you had any of the following symptoms?      palpitations.

## 2020-04-02 RX ORDER — RIVAROXABAN 20 MG/1
TABLET, FILM COATED ORAL
Qty: 30 TAB | Refills: 5 | Status: SHIPPED | OUTPATIENT
Start: 2020-04-02 | End: 2020-04-30 | Stop reason: SDUPTHER

## 2020-04-30 RX ORDER — RIVAROXABAN 20 MG/1
20 TABLET, FILM COATED ORAL
Qty: 30 TAB | Refills: 6 | Status: SHIPPED | OUTPATIENT
Start: 2020-04-30 | End: 2020-08-25

## 2020-08-25 RX ORDER — RIVAROXABAN 20 MG/1
TABLET, FILM COATED ORAL
Qty: 90 TAB | Refills: 4 | Status: SHIPPED | OUTPATIENT
Start: 2020-08-25 | End: 2021-09-21

## 2020-09-11 RX ORDER — METOPROLOL TARTRATE 25 MG/1
TABLET, FILM COATED ORAL
Qty: 90 TAB | Refills: 0 | Status: SHIPPED | OUTPATIENT
Start: 2020-09-11 | End: 2020-12-09

## 2020-10-21 ENCOUNTER — OFFICE VISIT (OUTPATIENT)
Dept: CARDIOLOGY CLINIC | Age: 70
End: 2020-10-21
Payer: MEDICARE

## 2020-10-21 VITALS
BODY MASS INDEX: 21.66 KG/M2 | HEIGHT: 65 IN | HEART RATE: 67 BPM | SYSTOLIC BLOOD PRESSURE: 96 MMHG | TEMPERATURE: 97.7 F | WEIGHT: 130 LBS | DIASTOLIC BLOOD PRESSURE: 65 MMHG

## 2020-10-21 DIAGNOSIS — I34.1 MVP (MITRAL VALVE PROLAPSE): ICD-10-CM

## 2020-10-21 DIAGNOSIS — I48.0 PAF (PAROXYSMAL ATRIAL FIBRILLATION) (HCC): Primary | ICD-10-CM

## 2020-10-21 DIAGNOSIS — G45.9 TRANSIENT CEREBRAL ISCHEMIA, UNSPECIFIED TYPE: ICD-10-CM

## 2020-10-21 PROCEDURE — G8432 DEP SCR NOT DOC, RNG: HCPCS | Performed by: INTERNAL MEDICINE

## 2020-10-21 PROCEDURE — 99214 OFFICE O/P EST MOD 30 MIN: CPT | Performed by: INTERNAL MEDICINE

## 2020-10-21 PROCEDURE — G8536 NO DOC ELDER MAL SCRN: HCPCS | Performed by: INTERNAL MEDICINE

## 2020-10-21 PROCEDURE — G8399 PT W/DXA RESULTS DOCUMENT: HCPCS | Performed by: INTERNAL MEDICINE

## 2020-10-21 PROCEDURE — 1090F PRES/ABSN URINE INCON ASSESS: CPT | Performed by: INTERNAL MEDICINE

## 2020-10-21 PROCEDURE — G8420 CALC BMI NORM PARAMETERS: HCPCS | Performed by: INTERNAL MEDICINE

## 2020-10-21 PROCEDURE — G9899 SCRN MAM PERF RSLTS DOC: HCPCS | Performed by: INTERNAL MEDICINE

## 2020-10-21 PROCEDURE — G8428 CUR MEDS NOT DOCUMENT: HCPCS | Performed by: INTERNAL MEDICINE

## 2020-10-21 PROCEDURE — 3017F COLORECTAL CA SCREEN DOC REV: CPT | Performed by: INTERNAL MEDICINE

## 2020-10-21 PROCEDURE — 1101F PT FALLS ASSESS-DOCD LE1/YR: CPT | Performed by: INTERNAL MEDICINE

## 2020-10-21 NOTE — PROGRESS NOTES
HISTORY OF PRESENT ILLNESS  Bernarda Arce is a 72 y.o. female. Palpitations    The history is provided by the patient. This is a chronic problem. The problem has not changed since onset. The problem occurs rarely. Pertinent negatives include no diaphoresis, no fever, no malaise/fatigue, no numbness, no near-syncope, no orthopnea, no PND, no nausea, no vomiting, no dizziness, no weakness, no cough, no hemoptysis and no sputum production. Her past medical history is significant for atrial fibrillation. Review of Systems   Constitutional: Negative for chills, diaphoresis, fever, malaise/fatigue and weight loss. HENT: Negative for congestion, ear discharge, ear pain, hearing loss, nosebleeds and tinnitus. Eyes: Negative for blurred vision. Respiratory: Negative for cough, hemoptysis, sputum production, wheezing and stridor. Cardiovascular: Positive for palpitations. Negative for orthopnea, leg swelling, PND and near-syncope. Gastrointestinal: Negative for heartburn, nausea and vomiting. Musculoskeletal: Negative for myalgias and neck pain. Skin: Negative for itching and rash. Neurological: Negative for dizziness, tingling, tremors, focal weakness, loss of consciousness, weakness and numbness. Psychiatric/Behavioral: Negative for depression and suicidal ideas.      Family History   Problem Relation Age of Onset    Hypertension Father     High Cholesterol Father     Diabetes Father     Stroke Father     Heart Attack Father     Cancer Father     Heart Disease Father     Depression Sister     Breast Cancer Maternal Grandmother        Past Medical History:   Diagnosis Date    [facility]-fib [facility])     Arthritis     Feet    Memory change     Menopause     Multiple thyroid nodules 2009    under care of Dr. Connie Todd MVP (mitral valve prolapse)     under care of Dr. Anthony Ogden Neuropathy     Plantar fasciitis     [facility]    Right knee pain     Routine gynecological examination     done by Dr. Gonzalo Barrera S/P colonoscopy     done past 1-2 yrs? Dr. Catherine Oconnor Sjogren's syndrome [facility])     sees Dr. Jonny Olivares    TIA (transient ischemic attack)     possible history of    Tibialis tendonitis     following with Dr Neil Melchor ankle       Past Surgical History:   Procedure Laterality Date    DILATION AND CURETTAGE  1987    HX BREAST AUGMENTATION  1984    HX DILATION AND CURETTAGE  1/2013    HX TONSILLECTOMY      IMPLANT BREAST SILICONE/EQ      [place] TARSAL TUNNEL RELEASE  1994    Right       Social History     Tobacco Use    Smoking status: Never Smoker    Smokeless tobacco: Never Used   Substance Use Topics    Alcohol use: No     Alcohol/week: 0.0 standard drinks       Allergies   Allergen Reactions    Plaquenil [Hydroxychloroquine] Nausea and Vomiting    Pcn [Penicillins] Itching and Rash    Prednisone Nausea and Vomiting     She reports a plaquenil/prednisone come causes vomiting    Zithromax [Azithromycin] Rash, Other (comments) and Hives     [facility] records state skin peeling. High fever, peeling       Outpatient Medications Marked as Taking for the 10/21/20 encounter (Office Visit) with David Johnson MD   Medication Sig Dispense Refill    metoprolol tartrate (LOPRESSOR) 25 mg tablet TAKE ONE-HALF TABLET BY MOUTH TWICE A DAY 90 Tab 0    Xarelto 20 mg tab tablet TAKE ONE TABLET BY MOUTH DAILY WITH DINNER 90 Tab 4    abatacept (ORENCIA SC) by SubCUTAneous route every month.  methotrexate (RHEUMATREX) 2.5 mg tablet       folic acid (FOLVITE) 1 mg tablet Take  by mouth daily.  gabapentin (NEURONTIN) 300 mg capsule Take 2 Caps by mouth nightly. 180 Cap 1    cholecalciferol, VITAMIN D3, (VITAMIN D3) 5,000 unit tab tablet Take 5,000 Units by mouth daily.  cyanocobalamin 1,000 mcg tablet Take 1,000 mcg by mouth two (2) times a day.  diclofenac (VOLTAREN) 1 % gel Apply  to affected area as needed.       acetaminophen (TYLENOL) 650 mg CR tablet Take 650 mg by mouth three (3) times daily as needed for Pain (taking 2 tid).  ascorbic acid (VITAMIN C) 500 mg tablet Take 500 mg by mouth two (2) times a day. Visit Vitals  BP 96/65   Pulse 67   Temp 97.7 °F (36.5 °C) (Temporal)   Ht 5' 5\" (1.651 m)   Wt 59 kg (130 lb)   LMP 04/25/2016   BMI 21.63 kg/m²     Physical Exam   Constitutional: She is oriented to person, place, and time. She appears well-developed and well-nourished. No distress. HENT:   Head: Atraumatic. Mouth/Throat: No oropharyngeal exudate. Eyes: Conjunctivae are normal. Right eye exhibits no discharge. Left eye exhibits no discharge. No scleral icterus. Neck: Neck supple. No JVD present. No tracheal deviation present. No thyromegaly present. Cardiovascular: Normal rate and regular rhythm. Exam reveals no gallop. No murmur heard. Pulmonary/Chest: Effort normal and breath sounds normal. No stridor. She has no wheezes. She has no rales. Abdominal: Soft. There is no abdominal tenderness. There is no rebound and no guarding. Musculoskeletal: Normal range of motion. General: No tenderness or edema. Lymphadenopathy:     She has no cervical adenopathy. Neurological: She is alert and oriented to person, place, and time. She exhibits normal muscle tone. Skin: Skin is warm. She is not diaphoretic. Psychiatric: She has a normal mood and affect. Her behavior is normal.     Echo 2016:  [address]. SMALL LEFT VENTRICLE WITH MILD LEFT VENTRICULAR HYPERTROPHY PRESENT. NORMAL GLOBAL LEFT VENTRICULAR SYSTOLIC FUNCTION. NORMAL DIASTOLIC FUNCTION. ESTIMATED EJECTION FRACTION OF 60%   NORMAL RIGHT VENTRICULAR SIZE. NORMAL RIGHT VENTRICULAR GLOBAL SYSTOLIC FUNCTION. ESTIMATED PULMONARY ARTERY PRESSURE OF 21MMHG. NO HEMODYNAMICALLY SIGNIFICANT VALVULAR PATHOLOGY. NO PRIOR STUDY FOR COMPARISON. ASSESSMENT and PLAN    ICD-10-CM ICD-9-CM    1. PAF (paroxysmal atrial fibrillation) (HCC)  I48.0 427.31    2. Transient cerebral ischemia, unspecified type  G45.9 435.9    3. MVP (mitral valve prolapse)  I34.1 424.0      No orders of the defined types were placed in this encounter. Follow-up and Dispositions    · Return in about 6 months (around 4/21/2021). current treatment plan is effective, no change in therapy. Patient with h/o ? ?? TIA vs transient amenesia in 2016 - also found to have atrial flutter- discharged home on xarelto and cartia. Cartia discontinued due to fatigue. Doing better on lopressor. Underwent ETT-- developed Afib - started on digoxin in addition to BB. Now in NSR. Seen for f/u- rare palpitations- lasting few secs. No dizziness or syncope  On low dose lopressor and xarelto. No bleeding complications. F/u in 6 months.

## 2020-10-21 NOTE — PROGRESS NOTES
1. Have you been to the ER, urgent care clinic since your last visit? Hospitalized since your last visit?     no  2. Have you seen or consulted any other health care providers outside of the [address] [place] [address] since your last visit? Include any pap smears or colon screening.       No

## 2020-12-09 RX ORDER — METOPROLOL TARTRATE 25 MG/1
TABLET, FILM COATED ORAL
Qty: 90 TAB | Refills: 0 | Status: SHIPPED | OUTPATIENT
Start: 2020-12-09 | End: 2021-03-08

## 2020-12-29 ENCOUNTER — PATIENT MESSAGE (OUTPATIENT)
Dept: INTERNAL MEDICINE CLINIC | Age: 70
End: 2020-12-29

## 2021-01-04 DIAGNOSIS — E04.2 MULTIPLE THYROID NODULES: ICD-10-CM

## 2021-01-04 DIAGNOSIS — M06.9 RHEUMATOID ARTHRITIS INVOLVING BOTH HANDS, UNSPECIFIED WHETHER RHEUMATOID FACTOR PRESENT (HCC): ICD-10-CM

## 2021-01-04 DIAGNOSIS — K75.81 NASH (NONALCOHOLIC STEATOHEPATITIS): ICD-10-CM

## 2021-01-04 DIAGNOSIS — I10 HYPERTENSION, UNSPECIFIED TYPE: ICD-10-CM

## 2021-01-04 DIAGNOSIS — I48.0 PAF (PAROXYSMAL ATRIAL FIBRILLATION) (HCC): Primary | ICD-10-CM

## 2021-01-05 NOTE — TELEPHONE ENCOUNTER
Patient called to schedule labs and follow up. I advised her that Dr. Cynthia Santana would not be able to see her until March because she is currently out on leave. She was not happy about that and asked to switch to another doctor. I advised her that none of our doctors are taking on anympore patients at this time. Patient stated she would have to find another doctor who can see her sooner because she was supposed to have her well visit back in 2019.

## 2021-01-14 ENCOUNTER — HOSPITAL ENCOUNTER (OUTPATIENT)
Dept: LAB | Age: 71
Discharge: HOME | End: 2021-01-14
Payer: MEDICARE

## 2021-01-14 DIAGNOSIS — M06.9 RHEUMATOID ARTHRITIS INVOLVING BOTH HANDS, UNSPECIFIED WHETHER RHEUMATOID FACTOR PRESENT (HCC): ICD-10-CM

## 2021-01-14 DIAGNOSIS — I10 HYPERTENSION, UNSPECIFIED TYPE: ICD-10-CM

## 2021-01-14 DIAGNOSIS — I48.0 PAF (PAROXYSMAL ATRIAL FIBRILLATION) (HCC): ICD-10-CM

## 2021-01-14 DIAGNOSIS — E04.2 MULTIPLE THYROID NODULES: ICD-10-CM

## 2021-01-14 DIAGNOSIS — K75.81 NASH (NONALCOHOLIC STEATOHEPATITIS): ICD-10-CM

## 2021-01-14 LAB
ALBUMIN SERPL-MCNC: 4 G/DL (ref 3.4–5)
ALBUMIN/GLOB SERPL: 1.2 {RATIO} (ref 0.8–1.7)
ALP SERPL-CCNC: 79 U/L (ref 45–117)
ALT SERPL-CCNC: 26 U/L (ref 13–56)
ANION GAP SERPL CALC-SCNC: 9 MMOL/L (ref 3–18)
AST SERPL-CCNC: 19 U/L (ref 10–38)
BASOPHILS # BLD: 0 K/UL (ref 0–0.1)
BASOPHILS NFR BLD: 1 % (ref 0–2)
BILIRUB SERPL-MCNC: 0.5 MG/DL (ref 0.2–1)
BUN SERPL-MCNC: 9 MG/DL (ref 7–18)
BUN/CREAT SERPL: 13 (ref 12–20)
CALCIUM SERPL-MCNC: 9.3 MG/DL (ref 8.5–10.1)
CHLORIDE SERPL-SCNC: 109 MMOL/L (ref 100–111)
CHOLEST SERPL-MCNC: 171 MG/DL
CO2 SERPL-SCNC: 25 MMOL/L (ref 21–32)
CREAT SERPL-MCNC: 0.67 MG/DL (ref 0.6–1.3)
CREAT UR-MCNC: 42 MG/DL (ref 30–125)
CRP SERPL-MCNC: <0.3 MG/DL (ref 0–0.3)
DIFFERENTIAL METHOD BLD: ABNORMAL
EOSINOPHIL # BLD: 0.2 K/UL (ref 0–0.4)
EOSINOPHIL NFR BLD: 3 % (ref 0–5)
ERYTHROCYTE [DISTWIDTH] IN BLOOD BY AUTOMATED COUNT: 12.5 % (ref 11.6–14.5)
GLOBULIN SER CALC-MCNC: 3.4 G/DL (ref 2–4)
GLUCOSE SERPL-MCNC: 83 MG/DL (ref 74–99)
HCT VFR BLD AUTO: 45.4 % (ref 35–45)
HDLC SERPL-MCNC: 83 MG/DL (ref 40–60)
HDLC SERPL: 2.1 {RATIO} (ref 0–5)
HGB BLD-MCNC: 15.6 G/DL (ref 12–16)
LDLC SERPL CALC-MCNC: 78.4 MG/DL (ref 0–100)
LIPID PROFILE,FLP: ABNORMAL
LYMPHOCYTES # BLD: 1.5 K/UL (ref 0.9–3.6)
LYMPHOCYTES NFR BLD: 29 % (ref 21–52)
MCH RBC QN AUTO: 31.1 PG (ref 24–34)
MCHC RBC AUTO-ENTMCNC: 34.4 G/DL (ref 31–37)
MCV RBC AUTO: 90.6 FL (ref 74–97)
MICROALBUMIN UR-MCNC: <0.5 MG/DL (ref 0–3)
MICROALBUMIN/CREAT UR-RTO: NORMAL MG/G (ref 0–30)
MONOCYTES # BLD: 0.5 K/UL (ref 0.05–1.2)
MONOCYTES NFR BLD: 9 % (ref 3–10)
NEUTS SEG # BLD: 3 K/UL (ref 1.8–8)
NEUTS SEG NFR BLD: 58 % (ref 40–73)
PLATELET # BLD AUTO: 287 K/UL (ref 135–420)
PMV BLD AUTO: 9.5 FL (ref 9.2–11.8)
POTASSIUM SERPL-SCNC: 4.9 MMOL/L (ref 3.5–5.5)
PROT SERPL-MCNC: 7.4 G/DL (ref 6.4–8.2)
RBC # BLD AUTO: 5.01 M/UL (ref 4.2–5.3)
SODIUM SERPL-SCNC: 143 MMOL/L (ref 136–145)
T4 FREE SERPL-MCNC: 1.2 NG/DL (ref 0.7–1.5)
TRIGL SERPL-MCNC: 48 MG/DL (ref ?–150)
TSH SERPL DL<=0.05 MIU/L-ACNC: 1.76 UIU/ML (ref 0.36–3.74)
VLDLC SERPL CALC-MCNC: 9.6 MG/DL
WBC # BLD AUTO: 5.2 K/UL (ref 4.6–13.2)

## 2021-01-14 PROCEDURE — 85025 COMPLETE CBC W/AUTO DIFF WBC: CPT

## 2021-01-14 PROCEDURE — 36415 COLL VENOUS BLD VENIPUNCTURE: CPT

## 2021-01-14 PROCEDURE — 86140 C-REACTIVE PROTEIN: CPT

## 2021-01-14 PROCEDURE — 80061 LIPID PANEL: CPT

## 2021-01-14 PROCEDURE — 80053 COMPREHEN METABOLIC PANEL: CPT

## 2021-01-14 PROCEDURE — 84439 ASSAY OF FREE THYROXINE: CPT

## 2021-01-14 PROCEDURE — 82043 UR ALBUMIN QUANTITATIVE: CPT

## 2021-01-15 NOTE — PROGRESS NOTES
I will discuss her results with her at her upcoming apt. CRP is WNL. Her CMP is unremarkable. Her TFTs are WNL. Her total cholesterol is 171. Triglycerides are 48. HDL is 83. LDL is 78. Her CBC is unremarkable. No microalbuminuria.     Dr. Sindhu Wang  [facility], [address] [place] [address], [address] [place] [address] Str.  Phone: [phone]  Fax: [phone]

## 2021-01-22 ENCOUNTER — HOSPITAL ENCOUNTER (OUTPATIENT)
Dept: MAMMOGRAPHY | Age: 71
Discharge: HOME | End: 2021-01-22
Payer: MEDICARE

## 2021-01-22 DIAGNOSIS — Z12.31 VISIT FOR SCREENING MAMMOGRAM: ICD-10-CM

## 2021-01-22 PROCEDURE — 77063 BREAST TOMOSYNTHESIS BI: CPT

## 2021-02-03 ENCOUNTER — HOSPITAL ENCOUNTER (OUTPATIENT)
Dept: GENERAL RADIOLOGY | Age: 71
Discharge: HOME | End: 2021-02-03
Payer: MEDICARE

## 2021-02-03 DIAGNOSIS — M05.79 RHEUMATOID ARTHRITIS OF MULTIPLE SITES WITHOUT ORGAN OR SYSTEM INVOLVEMENT WITH POSITIVE RHEUMATOID FACTOR (HCC): ICD-10-CM

## 2021-02-03 PROCEDURE — 71046 X-RAY EXAM CHEST 2 VIEWS: CPT

## 2021-02-17 ENCOUNTER — TELEPHONE (OUTPATIENT)
Dept: INTERNAL MEDICINE CLINIC | Age: 71
End: 2021-02-17

## 2021-02-17 NOTE — TELEPHONE ENCOUNTER
Pt calling with UTI symptoms since Sat of frequency and burning. Stated after urinating the burning seems like it goes back upwards in the urethra     Stated she has been drinking lot of water and cranberry juice. l stated she is no longer taking methotrexate     Pharmacy is [address]     Please advise

## 2021-02-18 ENCOUNTER — VIRTUAL VISIT (OUTPATIENT)
Dept: INTERNAL MEDICINE CLINIC | Age: 71
End: 2021-02-18
Payer: MEDICARE

## 2021-02-18 ENCOUNTER — HOSPITAL ENCOUNTER (OUTPATIENT)
Dept: LAB | Age: 71
Discharge: HOME | End: 2021-02-18
Payer: MEDICARE

## 2021-02-18 ENCOUNTER — LAB ONLY (OUTPATIENT)
Dept: INTERNAL MEDICINE CLINIC | Age: 71
End: 2021-02-18
Payer: MEDICARE

## 2021-02-18 DIAGNOSIS — N39.0 URINARY TRACT INFECTION WITHOUT HEMATURIA, SITE UNSPECIFIED: Primary | ICD-10-CM

## 2021-02-18 DIAGNOSIS — N39.0 URINARY TRACT INFECTION WITHOUT HEMATURIA, SITE UNSPECIFIED: ICD-10-CM

## 2021-02-18 LAB
APPEARANCE UR: ABNORMAL
BACTERIA URNS QL MICRO: ABNORMAL /HPF
BILIRUB UR QL STRIP: NEGATIVE
BILIRUB UR QL: NEGATIVE
COLOR UR: YELLOW
EPITH CASTS URNS QL MICRO: NEGATIVE /LPF (ref 0–5)
GLUCOSE UR STRIP.AUTO-MCNC: NEGATIVE MG/DL
GLUCOSE UR-MCNC: NEGATIVE MG/DL
HGB UR QL STRIP: ABNORMAL
KETONES P FAST UR STRIP-MCNC: NEGATIVE MG/DL
KETONES UR QL STRIP.AUTO: NEGATIVE MG/DL
LEUKOCYTE ESTERASE UR QL STRIP.AUTO: ABNORMAL
NITRITE UR QL STRIP.AUTO: POSITIVE
PH UR STRIP: 7.5 [PH] (ref 5–8)
PH UR STRIP: 8 [PH] (ref 4.6–8)
PROT UR QL STRIP: NORMAL
PROT UR STRIP-MCNC: 30 MG/DL
RBC #/AREA URNS HPF: ABNORMAL /HPF (ref 0–5)
SP GR UR REFRACTOMETRY: 1.01 (ref 1–1.03)
SP GR UR STRIP: 1.01 (ref 1–1.03)
UA UROBILINOGEN AMB POC: NORMAL (ref 0.2–1)
URINALYSIS CLARITY POC: CLEAR
URINALYSIS COLOR POC: YELLOW
URINE BLOOD POC: NORMAL
URINE LEUKOCYTES POC: NORMAL
URINE NITRITES POC: NEGATIVE
UROBILINOGEN UR QL STRIP.AUTO: 1 EU/DL (ref 0.2–1)
WBC URNS QL MICRO: ABNORMAL /HPF (ref 0–4)

## 2021-02-18 PROCEDURE — 81001 URINALYSIS AUTO W/SCOPE: CPT

## 2021-02-18 PROCEDURE — 87086 URINE CULTURE/COLONY COUNT: CPT

## 2021-02-18 PROCEDURE — 87186 SC STD MICRODIL/AGAR DIL: CPT

## 2021-02-18 PROCEDURE — 87077 CULTURE AEROBIC IDENTIFY: CPT

## 2021-02-18 PROCEDURE — 81001 URINALYSIS AUTO W/SCOPE: CPT | Performed by: INTERNAL MEDICINE

## 2021-02-18 PROCEDURE — 99441 PR PHYS/QHP TELEPHONE EVALUATION 5-10 MIN: CPT | Performed by: INTERNAL MEDICINE

## 2021-02-18 RX ORDER — CIPROFLOXACIN 500 MG/1
500 TABLET ORAL 2 TIMES DAILY
Qty: 14 TAB | Refills: 0 | Status: SHIPPED | OUTPATIENT
Start: 2021-02-18 | End: 2021-02-25

## 2021-02-18 NOTE — PROGRESS NOTES
Please let her know that her UA results are consistent with a UTI. Please have her complete the antibiotic I prescribed today. I will follow-up with her at her upcoming appointment to assess her response.     Dr. Burton Friday  [facility], [address] [place] [address], [address] [place] [address].  Phone: [phone]  Fax: [phone]

## 2021-02-18 NOTE — PROGRESS NOTES
Reinaldo Trotter is a 80 y.o. female who was seen by synchronous (real-time) audio phone only technology on 2/18/2021. Assessment & Plan:   UTI: On orencia for RA. Symptoms are highly suspicious for underlying UTI. Urinalysis and urine culture ordered. Ordering ciprofloxacin for 7 days. Lab review: labs are reviewed in the EHR     I have discussed the diagnosis with the patient and the intended plan as seen in the above orders. I have discussed medication side effects and warnings with the patient as well. I have reviewed the plan of care with the patient, accepted their input and they are in agreement with the treatment goals. All questions were answered. The patient understands the plan of care. Pt instructed if symptoms worsen to call the office or report to the ED for continued care. I spent 8 min with the patient for this phone only encounter. Voice recognition was used to generate this report, which may have resulted in some phonetic based errors in grammar and contents. Even though attempts were made to correct all the mistakes, some may have been missed, and remained in the body of the document. Subjective:   Reinaldo Trotter was seen for   Chief Complaint   Patient presents with    Bladder Infection     The patient is a 81-year-old female with history of SIERRA, left foot bone spur, lichen sclerosis per biopsy in 2012, cervical disc disease, peripheral artery disease, varicose veins, Sjogren's syndrome, dysphasia, rheumatoid arthritis (), paroxysmal supraventricular tachycardia, mitral valve prolapse, multiple thyroid nodules, atrial fibrillation per EHR, plantar fasciitis, and dysphasia. UTI:  She reports: pressure to urinate, dysuria, and frequency for 2 wks. Sx are worsening. No alleviating factors are known. Fever: none. No abdominal pain. No bleeding. She is drinking lots of water and thinks her sx may be linked to eating some sugary foods recently.  She is on MTX and orencia for underlying RA. Prior to Admission medications    Medication Sig Start Date End Date Taking? Authorizing Provider   ciprofloxacin HCl (CIPRO) 500 mg tablet Take 1 Tab by mouth two (2) times a day for 7 days. 2/18/21 2/25/21 Yes Erich Macias MD   metoprolol tartrate (LOPRESSOR) 25 mg tablet TAKE 1/2 TABLET BY MOUTH TWICE A DAY 12/9/20   Cynthia Miles NP   Xarelto 20 mg tab tablet TAKE ONE TABLET BY MOUTH DAILY WITH DINNER 8/25/20   David Donohue MD   abatacept [facility]) by SubCUTAneous route every month. Provider, Historical   digoxin (LANOXIN) 0.125 mg tablet Take 1 Tab by mouth daily. 2/26/19 2/18/21  David Donohue MD   folic acid (FOLVITE) 1 mg tablet Take  by mouth daily. Provider, Historical   methotrexate (RHEUMATREX) 2.5 mg tablet  12/5/18 2/18/21  Provider, Historical   gabapentin (NEURONTIN) 300 mg capsule Take 2 Caps by mouth nightly. 6/14/18   Erich Macias MD   B.infantis-B.ani-B.long-B.bifi (PROBIOTIC 4X) 10-15 mg TbEC Take  by mouth. Provider, Historical   cholecalciferol, VITAMIN D3, (VITAMIN D3) 5,000 unit tab tablet Take 5,000 Units by mouth daily. Provider, Historical   cyanocobalamin 1,000 mcg tablet Take 1,000 mcg by mouth two (2) times a day. Provider, Historical   diclofenac (VOLTAREN) 1 % gel Apply  to affected area as needed. Provider, Historical   acetaminophen (TYLENOL) 650 mg CR tablet Take 650 mg by mouth three (3) times daily as needed for Pain (taking 2 tid). Provider, Historical   melatonin tab tablet Take 8 mg by mouth nightly. Provider, Historical   ascorbic acid (VITAMIN C) 500 mg tablet Take 500 mg by mouth two (2) times a day. Provider, Historical   calcium-vitamin D (CALCIUM 500+D) 500 mg(1,250mg) -200 unit per tablet Take 1 Tab by mouth two (2) times daily (with meals).     Provider, Historical     Allergies   Allergen Reactions    Plaquenil [Hydroxychloroquine] Nausea and Vomiting    Pcn [Penicillins] Itching and Rash    Prednisone Nausea and Vomiting     She reports a plaquenil/prednisone come causes vomiting    Zithromax [Azithromycin] Rash, Other (comments) and Hives     [facility] records state skin peeling. High fever, peeling     Past Medical History:   Diagnosis Date    A-fib ([facility]     Arthritis     Feet    Memory change     Menopause     Multiple thyroid nodules 2009    under care of Dr. Ruth Auguste MVP (mitral valve prolapse)     under care of Dr. Valdez Pain Neuropathy     Plantar fasciitis     PSVT (paroxysmal supraventricular tachycardia) ([facility] 2/8/2011    Right knee pain     Routine gynecological examination     done by Dr. Cindy Sheridan S/P colonoscopy     done past 1-2 yrs?   Dr. Andrey Carpio Sjogren's syndrome [facility])     sees Dr. Lurdes Teixeira    TIA (transient ischemic attack)     possible history of    Tibialis tendonitis     following with Dr Julia Martines ankle     Past Surgical History:   Procedure Laterality Date    DILATION AND CURETTAGE  1987    HX BREAST AUGMENTATION  1984    HX DILATION AND CURETTAGE  1/2013    HX TONSILLECTOMY      IMPLANT BREAST SILICONE/EQ      NV TARSAL TUNNEL RELEASE  1994    Right     Family History   Problem Relation Age of Onset    Hypertension Father     High Cholesterol Father     Diabetes Father     Stroke Father     Heart Attack Father     Cancer Father     Heart Disease Father     Depression Sister     Breast Cancer Maternal Grandmother      Social History     Socioeconomic History    Marital status:      Spouse name: Not on file    Number of children: Not on file    Years of education: Not on file    Highest education level: Not on file   Tobacco Use    Smoking status: Never Smoker    Smokeless tobacco: Never Used   Substance and Sexual Activity    Alcohol use: No     Alcohol/week: 0.0 standard drinks    Drug use: No     Types: Prescription, OTC    Sexual activity: Yes     Partners: Male   Social History Narrative    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 No HA  CV: No CP  Resp: No cough/SOB  GI: No abdominal pain  : See HPI  Derm: No rash  Neuro: No new paresthesias/weakness  Musc: No new myalgias/jt pain  Psych: No depression sx    LABS:  Lab Results   Component Value Date/Time    Sodium 143 01/14/2021 11:36 AM    Potassium 4.9 01/14/2021 11:36 AM    Chloride 109 01/14/2021 11:36 AM    CO2 25 01/14/2021 11:36 AM    Anion gap 9 01/14/2021 11:36 AM    Glucose 83 01/14/2021 11:36 AM    BUN 9 01/14/2021 11:36 AM    Creatinine 0.67 01/14/2021 11:36 AM    BUN/Creatinine ratio 13 01/14/2021 11:36 AM    GFR est AA >60 01/14/2021 11:36 AM    GFR est non-AA >60 01/14/2021 11:36 AM    Calcium 9.3 01/14/2021 11:36 AM       Lab Results   Component Value Date/Time    Cholesterol, total 171 01/14/2021 11:36 AM    HDL Cholesterol 83 (H) 01/14/2021 11:36 AM    LDL, calculated 78.4 01/14/2021 11:36 AM    VLDL, calculated 9.6 01/14/2021 11:36 AM    Triglyceride 48 01/14/2021 11:36 AM    CHOL/HDL Ratio 2.1 01/14/2021 11:36 AM       Lab Results   Component Value Date/Time    WBC 5.2 01/14/2021 11:36 AM    HGB 15.6 01/14/2021 11:36 AM    HCT 45.4 (H) 01/14/2021 11:36 AM    PLATELET 159 10/28/1178 11:36 AM    MCV 90.6 01/14/2021 11:36 AM       Lab Results   Component Value Date/Time    Hemoglobin A1c 5.5 07/19/2016 09:10 AM       Lab Results   Component Value Date/Time    TSH 1.76 01/14/2021 11:36 AM           Due to this being a TeleHealth phone only evaluation, many elements of the physical examination are unable to be assessed. The pt was seen by synchronous (real-time) audio-phone only technology, and/or her healthcare decision maker, is aware that this patient-initiated, Telehealth encounter is a billable service, with coverage as determined by her insurance carrier.  She is aware that she may receive a bill and has provided verbal consent to proceed: Yes. The pt is being evaluated by a phone only visit encounter for concerns as above. A caregiver was present when appropriate. Due to this being a TeleHealth encounter (During MZDND-18 public health emergency), evaluation of the following organ systems was limited: Vitals/Constitutional/EENT/Resp/CV/GI//MS/Neuro/Skin/Heme-Lymph-Imm. Pursuant to the emergency declaration under the [address] [place] [address] authority and the [employer] Act, this Virtual phone only Visit was conducted, with patient's (and/or legal guardian's) consent, to reduce the patient's risk of exposure to COVID-19 and provide necessary medical care. Services were provided through a phone only synchronous discussion virtually to substitute for in-person clinic visit. Patient and provider were located at their individual homes. We discussed the expected course, resolution and complications of the diagnosis(es) in detail. Medication risks, benefits, costs, interactions, and alternatives were discussed as indicated. I advised her to contact the office if her condition worsens, changes or fails to improve as anticipated. She expressed understanding with the diagnosis(es) and plan.      Anni Melchor MD

## 2021-02-21 LAB
BACTERIA SPEC CULT: ABNORMAL
CC UR VC: ABNORMAL
SERVICE CMNT-IMP: ABNORMAL

## 2021-03-02 NOTE — PROGRESS NOTES
Silke Herrera presents today for No chief complaint on file. Establish care Labs 2-18-21 DUE V Coordination of Care: 1. Have you been to the ER, urgent care clinic since your last visit? Hospitalized since your last visit? NO 
 
2. Have you seen or consulted any other health care providers outside of the [address] [place] [address] since your last visit? Include any pap smears or colon screening.  YES

## 2021-03-05 ENCOUNTER — OFFICE VISIT (OUTPATIENT)
Dept: INTERNAL MEDICINE CLINIC | Age: 71
End: 2021-03-05
Payer: MEDICARE

## 2021-03-05 VITALS
HEART RATE: 76 BPM | OXYGEN SATURATION: 97 % | WEIGHT: 136.2 LBS | DIASTOLIC BLOOD PRESSURE: 73 MMHG | RESPIRATION RATE: 16 BRPM | BODY MASS INDEX: 22.69 KG/M2 | TEMPERATURE: 98.2 F | HEIGHT: 65 IN | SYSTOLIC BLOOD PRESSURE: 117 MMHG

## 2021-03-05 DIAGNOSIS — M35.01 SJOGREN'S SYNDROME WITH KERATOCONJUNCTIVITIS SICCA (HCC): ICD-10-CM

## 2021-03-05 DIAGNOSIS — M06.9 RHEUMATOID ARTHRITIS INVOLVING BOTH HANDS, UNSPECIFIED WHETHER RHEUMATOID FACTOR PRESENT (HCC): Primary | ICD-10-CM

## 2021-03-05 DIAGNOSIS — K75.81 NASH (NONALCOHOLIC STEATOHEPATITIS): ICD-10-CM

## 2021-03-05 DIAGNOSIS — I48.0 PAF (PAROXYSMAL ATRIAL FIBRILLATION) (HCC): ICD-10-CM

## 2021-03-05 DIAGNOSIS — Z71.89 ADVANCE CARE PLANNING: ICD-10-CM

## 2021-03-05 DIAGNOSIS — Z00.00 MEDICARE ANNUAL WELLNESS VISIT, SUBSEQUENT: ICD-10-CM

## 2021-03-05 PROCEDURE — 99214 OFFICE O/P EST MOD 30 MIN: CPT | Performed by: INTERNAL MEDICINE

## 2021-03-05 PROCEDURE — G8420 CALC BMI NORM PARAMETERS: HCPCS | Performed by: INTERNAL MEDICINE

## 2021-03-05 PROCEDURE — G9899 SCRN MAM PERF RSLTS DOC: HCPCS | Performed by: INTERNAL MEDICINE

## 2021-03-05 PROCEDURE — G8536 NO DOC ELDER MAL SCRN: HCPCS | Performed by: INTERNAL MEDICINE

## 2021-03-05 PROCEDURE — 3017F COLORECTAL CA SCREEN DOC REV: CPT | Performed by: INTERNAL MEDICINE

## 2021-03-05 PROCEDURE — G8754 DIAS BP LESS 90: HCPCS | Performed by: INTERNAL MEDICINE

## 2021-03-05 PROCEDURE — G0463 HOSPITAL OUTPT CLINIC VISIT: HCPCS | Performed by: INTERNAL MEDICINE

## 2021-03-05 PROCEDURE — G0439 PPPS, SUBSEQ VISIT: HCPCS | Performed by: INTERNAL MEDICINE

## 2021-03-05 PROCEDURE — 1090F PRES/ABSN URINE INCON ASSESS: CPT | Performed by: INTERNAL MEDICINE

## 2021-03-05 PROCEDURE — G8432 DEP SCR NOT DOC, RNG: HCPCS | Performed by: INTERNAL MEDICINE

## 2021-03-05 PROCEDURE — G8427 DOCREV CUR MEDS BY ELIG CLIN: HCPCS | Performed by: INTERNAL MEDICINE

## 2021-03-05 PROCEDURE — G8399 PT W/DXA RESULTS DOCUMENT: HCPCS | Performed by: INTERNAL MEDICINE

## 2021-03-05 PROCEDURE — 1101F PT FALLS ASSESS-DOCD LE1/YR: CPT | Performed by: INTERNAL MEDICINE

## 2021-03-05 PROCEDURE — G8752 SYS BP LESS 140: HCPCS | Performed by: INTERNAL MEDICINE

## 2021-03-05 NOTE — PATIENT INSTRUCTIONS
Medicare Part B Preventive Services Limitations Recommendation Scheduled Bone Mass Measurement 
(age 72 & older, biennial) Requires diagnosis related to osteoporosis or estrogen deficiency. Biennial benefit unless patient has history of long-term glucocorticoid tx or baseline is needed because initial test was by other method This should be done at age 72 and again if on osteoporosis medication at 2-3 year intervals. Up to date Cardiovascular Screening Blood Tests (every 5 years) Total cholesterol, HDL, Triglycerides Order as a panel if possible We should check your cholesterol panel at least once every 5 years. Up to date Colorectal Cancer Screening 
-Fecal occult blood test (annual) -Flexible sigmoidoscopy (5y) 
-Screening colonoscopy (10y) -Barium Enema  Due per your Gastroenterologist's recommendations. Up to date Counseling to Prevent Tobacco Use (up to 8 sessions per year) - Counseling greater than 3 and up to 10 minutes - Counseling greater than 10 minutes Patients must be asymptomatic of tobacco-related conditions to receive as preventive service Continue with smoking cessation Not applicable Diabetes Screening Tests (at least every 3 years, Medicare covers annually or at 6-month intervals for prediabetic patients) Fasting blood sugar (FBS) or glucose tolerance test (GTT) Patient must be diagnosed with one of the following: 
-Hypertension, Dyslipidemia, obesity, previous impaired FBS or GTT 
Or any two of the following: overweight, FH of diabetes, age ? 72, history of gestational diabetes, birth of baby weighing more than 9 pounds Should be done yearly Up to date Diabetes Self-Management Training (DSMT) (no USPSTF recommendation) Requires referral by treating physician for patient with diabetes or renal disease. 10 hours of initial DSMT session of no less than 30 minutes each in a continuous 12-month period. 2 hours of follow-up DSMT in subsequent years.  Not applicable Not applicable Glaucoma Screening (no USPSTF recommendation) Diabetes mellitus, family history, , age 48 or over,  American, age 72 or over Continue with annual eye exams. Up to date Human Immunodeficiency Virus (HIV) Screening (annually for increased risk patients) HIV-1 and HIV-2 by EIA, RONY, rapid antibody test, or oral mucosa transudate Patient must be at increased risk for HIV infection per USPSTF guidelines or pregnant. Tests covered annually for patients at increased risk. Pregnant patients may receive up to 3 test during pregnancy. Not applicable Not applicable Medical Nutrition Therapy (MNT) (for diabetes or renal disease not recommended schedule) Requires referral by treating physician for patient with diabetes or renal disease. Can be provided in same year as diabetes self-management training (DSMT), and CMS recommends medical nutrition therapy take place after DSMT. Up to 3 hours for initial year and 2 hours in subsequent years. Not applicable Not applicable Shingles Vaccination A shingles vaccine is also recommended once in a lifetime after age 61 Vaccination recommended for shingles vaccination. Up to date Seasonal Influenza Vaccination (annually)  Continue with yearly \"flu\" shot annually Up to date Pneumococcal Vaccination (once after 65)  Please receive this vaccination at age 72. Up to date Hepatitis B Vaccinations (if medium/high risk) Medium/high risk factors:  End-stage renal disease, Hemophiliacs who received Factor VIII or IX concentrates, Clients of institutions for the mentally retarded, Persons who live in the same house as a HepB virus carrier, Homosexual men, Illicit injectable drug abusers. Not applicable Not applicable Screening Mammography (biennial age 54-69) Annually (age 36 or over) You need a mammogram yearly to screen for breast cancer. Up to date Screening Pap Tests and Pelvic Examination (up to age 79 and after 79 if unknown history or abnormal study last 10 years) Every 24 months except high risk You need no Pap smear at this time. Not needed Ultrasound Screening for Abdominal Aortic Aneurysm (AAA) (once) Patient must be referred through IPPE and not have had a screening for abdominal aortic aneurysm before under Medicare. Limited to patients who meet one of the following criteria: 
- Men who are 73-68 years old and have smoked more than 100 cigarettes in their lifetime. 
-Anyone with a FH of AAA 
-Anyone recommended for screening by USPSTF Not applicable Not applicable Well Visit, Over 72: Care Instructions Your Care Instructions Physical exams can help you stay healthy. Your doctor has checked your overall health and may have suggested ways to take good care of yourself. He or she also may have recommended tests. At home, you can help prevent illness with healthy eating, regular exercise, and other steps. Follow-up care is a key part of your treatment and safety. Be sure to make and go to all appointments, and call your doctor if you are having problems. It's also a good idea to know your test results and keep a list of the medicines you take. How can you care for yourself at home? · Reach and stay at a healthy weight. This will lower your risk for many problems, such as obesity, diabetes, heart disease, and high blood pressure. · Get at least 30 minutes of exercise on most days of the week. Walking is a good choice. You also may want to do other activities, such as running, swimming, cycling, or playing tennis or team sports. · Do not smoke. Smoking can make health problems worse. If you need help quitting, talk to your doctor about stop-smoking programs and medicines. These can increase your chances of quitting for good. · Protect your skin from too much sun. When you're outdoors from 10 a.m. to 4 p.m., stay in the shade or cover up with clothing and a hat with a wide brim. Wear sunglasses that block UV rays.  Even when it's cloudy, put broad-spectrum sunscreen (SPF 30 or higher) on any exposed skin. · See a dentist one or two times a year for checkups and to have your teeth cleaned. · Wear a seat belt in the car. Follow your doctor's advice about when to have certain tests. These tests can spot problems early. For men and women · Cholesterol. Your doctor will tell you how often to have this done based on your overall health and other things that can increase your risk for heart attack and stroke. · Blood pressure. Have your blood pressure checked during a routine doctor visit. Your doctor will tell you how often to check your blood pressure based on your age, your blood pressure results, and other factors. · Diabetes. Ask your doctor whether you should have tests for diabetes. · Vision. Experts recommend that you have yearly exams for glaucoma and other age-related eye problems. · Hearing. Tell your doctor if you notice any change in your hearing. You can have tests to find out how well you hear. · Colon cancer tests. Keep having colon cancer tests as your doctor recommends. You can have one of several types of tests. · Heart attack and stroke risk. At least every 4 to 6 years, you should have your risk for heart attack and stroke assessed. Your doctor uses factors such as your age, blood pressure, cholesterol, and whether you smoke or have diabetes to show what your risk for a heart attack or stroke is over the next 10 years. · Osteoporosis. Talk to your doctor about whether you should have a bone density test to find out whether you have thinning bones. Ask your doctor if you need to take a calcium plus vitamin D supplement. You may be able to get enough calcium and vitamin D through your diet. For women · Pap test and pelvic exam. You may no longer need a Pap test. Talk with your doctor about whether to stop or continue to have Pap tests.  
· Breast exam and mammogram. Ask how often you should have a mammogram, which is an X-ray of your breasts. A mammogram can spot breast cancer before it can be felt and when it is easiest to treat. · Thyroid disease. Talk to your doctor about whether to have your thyroid checked as part of a regular physical exam. Women have an increased chance of a thyroid problem. For men · Prostate exam. Talk to your doctor about whether you should have a blood test (called a PSA test) for prostate cancer. Experts recommend that you discuss the benefits and risks of the test with your doctor before you decide whether to have this test. Some experts say that men ages 79 and older no longer need testing. · Abdominal aortic aneurysm. Ask your doctor whether you should have a test to check for an aneurysm. You may need a test if you ever smoked or if your parent, brother, sister, or child has had an aneurysm. When should you call for help? Watch closely for changes in your health, and be sure to contact your doctor if you have any problems or symptoms that concern you. Where can you learn more? Go to http://www.gray.com/ Enter B737 in the search box to learn more about \"Well Visit, Over 65: Care Instructions. \" Current as of: May 27, 2020               Content Version: 12.6 © 0512-7746 [employer]. Care instructions adapted under license by [employer] (which disclaims liability or warranty for this information). If you have questions about a medical condition or this instruction, always ask your healthcare professional. [place] [address] any warranty or liability for your use of this information. Medicare Wellness Visit, Female The best way to live healthy is to have a lifestyle where you eat a well-balanced diet, exercise regularly, limit alcohol use, and quit all forms of tobacco/nicotine, if applicable. Regular preventive services are another way to keep healthy.  Preventive services (vaccines, screening tests, monitoring & exams) can help personalize your care plan, which helps you manage your own care. Screening tests can find health problems at the earliest stages, when they are easiest to treat. [facility] follows the current, evidence-based guidelines published by the [facility] ([facility]) when recommending preventive services for our patients. Because we follow these guidelines, sometimes recommendations change over time as research supports it. (For example, mammograms used to be recommended annually. Even though Medicare will still pay for an annual mammogram, the newer guidelines recommend a mammogram every two years for women of average risk). Of course, you and your doctor may decide to screen more often for some diseases, based on your risk and your co-morbidities (chronic disease you are already diagnosed with). Preventive services for you include: - Medicare offers their members a free annual wellness visit, which is time for you and your primary care provider to discuss and plan for your preventive service needs. Take advantage of this benefit every year! 
-All adults over the age of 72 should receive the recommended pneumonia vaccines. Current USPSTF guidelines recommend a series of two vaccines for the best pneumonia protection.  
-All adults should have a flu vaccine yearly and a tetanus vaccine every 10 years.  
-All adults age 48 and older should receive the shingles vaccines (series of two vaccines).      
-All adults age 38-68 who are overweight should have a diabetes screening test once every three years.  
-All adults born between [birth year] and 1965 should be screened once for Hepatitis C. 
-Other screening tests and preventive services for persons with diabetes include: an eye exam to screen for diabetic retinopathy, a kidney function test, a foot exam, and stricter control over your cholesterol.  
-Cardiovascular screening for adults with routine risk involves an electrocardiogram (ECG) at intervals determined by your doctor.  
-Colorectal cancer screenings should be done for adults age 54-65 with no increased risk factors for colorectal cancer. There are a number of acceptable methods of screening for this type of cancer. Each test has its own benefits and drawbacks. Discuss with your doctor what is most appropriate for you during your annual wellness visit. The different tests include: colonoscopy (considered the best screening method), a fecal occult blood test, a fecal DNA test, and sigmoidoscopy. 
 
-A bone mass density test is recommended when a woman turns 65 to screen for osteoporosis. This test is only recommended one time, as a screening. Some providers will use this same test as a disease monitoring tool if you already have osteoporosis. -Breast cancer screenings are recommended every other year for women of normal risk, age 54-69. 
-Cervical cancer screenings for women over age 72 are only recommended with certain risk factors. Here is a list of your current Health Maintenance items (your personalized list of preventive services) with a due date: 
Health Maintenance Due Topic Date Due  
 Annual Well Visit  12/21/2019

## 2021-03-05 NOTE — PROGRESS NOTES
INTERNISTS [facility]:  3/5/2021, MRN: [id number]      Rosemary Bruce is a 80 y.o. female and presents to clinic to [address] (done on 2/18/21)    Subjective: The patient is a 64-year-old female with history of SIERRA, left foot bone spur, lichen sclerosis per biopsy in 2012, cervical disc disease, peripheral artery disease, varicose veins, Sjogren's syndrome, dysphasia, rheumatoid arthritis (), paroxysmal supraventricular tachycardia, mitral valve prolapse, multiple thyroid nodules, atrial fibrillation per EHR, plantar fasciitis, and dysphasia. 1. HTN/SVT: Taking xarelto and metoprolol. Asymptomatic. 2. RA/Sjogren's Disease: Followed by . Last apt: last month. On orencia. Today she reports: she has jt stiffness but her stiffness is not worse. No jt pain. 3. Peripheral Neuropathy: Taking gabapentin. It is working well to control her sx. +H/o cervical disc disease. She has tingling in her feet and hands if she does not use it. No adverse side effects from this rx. No drug use. 4. SIERRA: No excessive ETOH intake. LFTs were normal in January.         Patient Active Problem List    Diagnosis Date Noted    PAF (paroxysmal atrial fibrillation) ([facility] [address] 11/21/2017    Osteopenia of multiple sites, 9/17/17 10/04/2017    Rheumatoid arthritis ([address] 10/04/2017    Bilateral carpal tunnel syndrome, per EMG testing 8/17 08/17/2017    SIERRA (nonalcoholic steatohepatitis) 05/30/2017    Bone spur of left foot 63/31/9871    Lichen sclerosus 99/35 05/30/2017    Cervical disc disease 01/08/2016    Varicose veins of lower extremities with other complications 88/35/2116    Sjogren's syndrome ([facility] [address]     Dysphagia 11/24/2010    MVP (mitral valve prolapse)     TIA (transient ischemic attack)     Multiple thyroid nodules        Current Outpatient Medications   Medication Sig Dispense Refill    metoprolol tartrate (LOPRESSOR) 25 mg tablet TAKE 1/2 TABLET BY MOUTH TWICE A DAY 90 Tab 0    Xarelto 20 mg tab tablet TAKE ONE TABLET BY MOUTH DAILY WITH DINNER 90 Tab 4    abatacept (ORENCIA SC) by SubCUTAneous route every month.  folic acid (FOLVITE) 1 mg tablet Take  by mouth daily.  gabapentin (NEURONTIN) 300 mg capsule Take 2 Caps by mouth nightly. 180 Cap 1    cholecalciferol, VITAMIN D3, (VITAMIN D3) 5,000 unit tab tablet Take 5,000 Units by mouth daily.  cyanocobalamin 1,000 mcg tablet Take 1,000 mcg by mouth two (2) times a day.  diclofenac (VOLTAREN) 1 % gel Apply  to affected area as needed.  acetaminophen (TYLENOL) 650 mg CR tablet Take 650 mg by mouth three (3) times daily as needed for Pain (taking 2 tid).  ascorbic acid (VITAMIN C) 500 mg tablet Take 500 mg by mouth two (2) times a day.  B.infantis-B.ani-B.long-B.bifi (PROBIOTIC 4X) 10-15 mg TbEC Take  by mouth.  melatonin tab tablet Take 8 mg by mouth nightly.  calcium-vitamin D (CALCIUM 500+D) 500 mg(1,250mg) -200 unit per tablet Take 1 Tab by mouth two (2) times daily (with meals). Allergies   Allergen Reactions    Plaquenil [Hydroxychloroquine] Nausea and Vomiting    Pcn [Penicillins] Itching and Rash    Prednisone Nausea and Vomiting     She reports a plaquenil/prednisone come causes vomiting    Zithromax [Azithromycin] Rash, Other (comments) and Hives     Deion lanny's records state skin peeling. High fever, peeling       Past Medical History:   Diagnosis Date    A[facility]fib ([facility] [address]     Arthritis     Feet    Memory change     Menopause     Multiple thyroid nodules 2009    under care of Dr. Nehemiah Penny MVP (mitral valve prolapse)     under care of Dr. Sarah Sinha Neuropathy     Plantar fasciitis     PSVT (paroxysmal supraventricular tachycardia) ([facility] [address] 2/8/2011    Right knee pain     Routine gynecological examination     done by Dr. Rcohelle Soni S/P colonoscopy     done past 1-2 yrs?   Dr. Tree Grande Sjogren's syndrome [facility])     sees Dr. Mik Connolly    TIA (transient ischemic attack)     possible history of    Tibialis tendonitis     following with Dr Cinthya Rodriguez ankle       Past Surgical History:   Procedure Laterality Date    DILATION AND CURETTAGE  1987    HX BREAST AUGMENTATION  1984    HX DILATION AND CURETTAGE  1/2013    HX TONSILLECTOMY      IMPLANT BREAST SILICONE/EQ      [place] TARSAL TUNNEL RELEASE  1994    Right       Family History   Problem Relation Age of Onset    Hypertension Father     High Cholesterol Father     Diabetes Father     Stroke Father     Heart Attack Father     Cancer Father     Heart Disease Father     Depression Sister     Breast Cancer Maternal Grandmother        Social History     Tobacco Use    Smoking status: Never Smoker    Smokeless tobacco: Never Used   Substance Use Topics    Alcohol use: No     Alcohol/week: 0.0 standard drinks       ROS   Review of Systems   Constitutional: Negative for chills and fever. HENT: Negative for ear pain and sore throat. Eyes: Negative for blurred vision and pain. Respiratory: Negative for shortness of breath. Cardiovascular: Negative for chest pain. Gastrointestinal: Negative for abdominal pain. Genitourinary: Negative for dysuria and urgency. Musculoskeletal: Positive for joint pain. Negative for myalgias. Skin: Negative for rash. Neurological: Negative for tingling, focal weakness and headaches. Endo/Heme/Allergies: Does not bruise/bleed easily. Psychiatric/Behavioral: Negative for substance abuse. Objective     Vitals:    03/05/21 1208   BP: 117/73   Pulse: 76   Resp: 16   Temp: 98.2 °F (36.8 °C)   TempSrc: Temporal   SpO2: 97%   Weight: 136 lb 3.2 oz (61.8 kg)   Height: 5' 5\" (1.651 m)   PainSc:   0 - No pain   LMP: 04/25/2016       Physical Exam  Vitals signs and nursing note reviewed. HENT:      Head: Normocephalic and atraumatic. Right Ear: External ear normal.      Left Ear: External ear normal.   Eyes:      General: No scleral icterus.         Right eye: No discharge. Left eye: No discharge. Conjunctiva/sclera: Conjunctivae normal.   Neck:      Musculoskeletal: Neck supple. Cardiovascular:      Rate and Rhythm: Normal rate and regular rhythm. Heart sounds: Normal heart sounds. No murmur. No friction rub. No gallop. Pulmonary:      Effort: Pulmonary effort is normal. No respiratory distress. Breath sounds: Normal breath sounds. No wheezing or rales. Abdominal:      General: Bowel sounds are normal. There is no distension. Palpations: Abdomen is soft. There is no mass. Tenderness: There is no abdominal tenderness. There is no guarding or rebound. Musculoskeletal:         General: No swelling (BUE) or tenderness (BUE). Lymphadenopathy:      Cervical: No cervical adenopathy. Skin:     General: Skin is warm and dry. Findings: No erythema. Neurological:      Mental Status: She is alert and oriented to person, place, and time. Motor: No abnormal muscle tone. Gait: Gait is intact.  Gait normal.   Psychiatric:         Mood and Affect: Mood and affect normal.         LABS   Data Review:   Lab Results   Component Value Date/Time    WBC 5.2 01/14/2021 11:36 AM    HGB 15.6 01/14/2021 11:36 AM    HCT 45.4 (H) 01/14/2021 11:36 AM    PLATELET 701 23/81/9127 11:36 AM    MCV 90.6 01/14/2021 11:36 AM       Lab Results   Component Value Date/Time    Sodium 143 01/14/2021 11:36 AM    Potassium 4.9 01/14/2021 11:36 AM    Chloride 109 01/14/2021 11:36 AM    CO2 25 01/14/2021 11:36 AM    Anion gap 9 01/14/2021 11:36 AM    Glucose 83 01/14/2021 11:36 AM    BUN 9 01/14/2021 11:36 AM    Creatinine 0.67 01/14/2021 11:36 AM    BUN/Creatinine ratio 13 01/14/2021 11:36 AM    GFR est AA >60 01/14/2021 11:36 AM    GFR est non-AA >60 01/14/2021 11:36 AM    Calcium 9.3 01/14/2021 11:36 AM       Lab Results   Component Value Date/Time    Cholesterol, total 171 01/14/2021 11:36 AM    HDL Cholesterol 83 (H) 01/14/2021 11:36 AM    LDL, calculated 78.4 01/14/2021 11:36 AM    VLDL, calculated 9.6 01/14/2021 11:36 AM    Triglyceride 48 01/14/2021 11:36 AM    CHOL/HDL Ratio 2.1 01/14/2021 11:36 AM       Lab Results   Component Value Date/Time    Hemoglobin A1c 5.5 07/19/2016 09:10 AM       Assessment/Plan:   1. Gutierrez: Stable. I encouraged her to maintain a heart healthy diet. We will recheck her LFTs in a year. 2. HTN/SVT: Stable. Continue with Rx as prescribed. 3. RA/Sjogren's Disease:   I encouraged him to continue with Rx per Dr. Yazmin Nazario recommendations. 4. Peripheral Neuropathy:   Continue with gabapentin. Notify me if symptoms worsen. Health Maintenance Due   Topic Date Due    Medicare Yearly Exam  12/21/2019         Lab review: labs are reviewed in the EHR    I have discussed the diagnosis with the patient and the intended plan as seen in the above orders. The patient has received an after-visit summary and questions were answered concerning future plans. I have discussed medication side effects and warnings with the patient as well. I have reviewed the plan of care with the patient, accepted their input and they are in agreement with the treatment goals. All questions were answered. The patient understands the plan of care. Handouts provided today with above information. Pt instructed if symptoms worsen to call the office or report to the ED for continued care. Greater than 50% of the visit time was spent in counseling and/or coordination of care. Voice recognition was used to generate this report, which may have resulted in some phonetic based errors in grammar and contents. Even though attempts were made to correct all the mistakes, some may have been missed, and remained in the body of the document.           Oleg Hankins MD

## 2021-03-08 RX ORDER — METOPROLOL TARTRATE 25 MG/1
TABLET, FILM COATED ORAL
Qty: 90 TAB | Refills: 3 | Status: SHIPPED | OUTPATIENT
Start: 2021-03-08 | End: 2022-03-10

## 2021-03-08 NOTE — TELEPHONE ENCOUNTER
Requested Prescriptions     Pending Prescriptions Disp Refills    metoprolol tartrate (LOPRESSOR) 25 mg tablet [Pharmacy Med Name: METOPROLOL TARTRATE 25 MG TAB] 90 Tab 3     Sig: TAKE 1/2 TABLET BY MOUTH TWICE A DAY

## 2021-03-13 NOTE — ACP (ADVANCE CARE PLANNING)
Advance Care Planning  Advance Care Planning (ACP) Provider Conversation     Date of ACP Conversation: 3/5/21  Persons included in Conversation:  patient    Authorized Decision Maker (if patient is incapable of making informed decisions): This person is:   Named in Advance Directive or Healthcare Power of           For Patients with Decision Making Capacity:   Values/Goals: Exploration of values, goals, and preferences if recovery is not expected, even with continued medical treatment in the event of:  Imminent death  Severe, permanent brain injury    Conversation Outcomes / Follow-Up Plan:   ACP complete - no further action today. She has no changes to make to her preexisting advance directive.      Dr. Arabella Jung  [facility], [address] [place] [address], [address] [place] [address] Str.  Phone: [phone]  Fax: [phone]

## 2021-03-13 NOTE — PROGRESS NOTES
INTERNISTS OF [facility]:  03/13/21, [id number]      The Subsequent Medicare Annual Wellness Exam PROGRESS NOTE    This is a Subsequent Medicare Annual Wellness Exam (AWV). I have reviewed the patient's medical history in detail and updated the computerized patient record. Bryce Frielvira is a 80 y.o.  female and presents for an annual wellness exam.    SUBJECTIVE    Past Medical History:   Diagnosis Date    A-fib [facility])     Arthritis     Feet    Memory change     Menopause     Multiple thyroid nodules 2009    under care of Dr. Marion Leal MVP (mitral valve prolapse)     under care of Dr. Keely Grullon Neuropathy     Plantar fasciitis     PSVT (paroxysmal supraventricular tachycardia) ([facility] [address] 2/8/2011    Right knee pain     Routine gynecological examination     done by Dr. Raul Guzman S/P colonoscopy     done past 1-2 yrs? Dr. Sergo Palm Sjogren's syndrome [facility])     sees Dr. Mani Pollard    TIA (transient ischemic attack)     possible history of    Tibialis tendonitis     following with Dr Padilla Pen ankle      Past Surgical History:   Procedure Laterality Date    DILATION AND CURETTAGE  1987    HX BREAST AUGMENTATION  1984    HX DILATION AND CURETTAGE  1/2013    HX TONSILLECTOMY      IMPLANT BREAST SILICONE/EQ      [place] TARSAL TUNNEL RELEASE  1994    Right     Current Outpatient Medications   Medication Sig Dispense Refill    Xarelto 20 mg tab tablet TAKE ONE TABLET BY MOUTH DAILY WITH DINNER 90 Tab 4    abatacept (ORENCIA SC) by SubCUTAneous route every month.  folic acid (FOLVITE) 1 mg tablet Take  by mouth daily.  gabapentin (NEURONTIN) 300 mg capsule Take 2 Caps by mouth nightly. 180 Cap 1    cholecalciferol, VITAMIN D3, (VITAMIN D3) 5,000 unit tab tablet Take 5,000 Units by mouth daily.  cyanocobalamin 1,000 mcg tablet Take 1,000 mcg by mouth two (2) times a day.  diclofenac (VOLTAREN) 1 % gel Apply  to affected area as needed.       acetaminophen (TYLENOL) 650 mg CR tablet Take 650 mg by mouth three (3) times daily as needed for Pain (taking 2 tid).  ascorbic acid (VITAMIN C) 500 mg tablet Take 500 mg by mouth two (2) times a day.  metoprolol tartrate (LOPRESSOR) 25 mg tablet TAKE 1/2 TABLET BY MOUTH TWICE A DAY 90 Tab 3    B.infantis-B.ani-B.long-B.bifi (PROBIOTIC 4X) 10-15 mg TbEC Take  by mouth.  melatonin tab tablet Take 8 mg by mouth nightly.  calcium-vitamin D (CALCIUM 500+D) 500 mg(1,250mg) -200 unit per tablet Take 1 Tab by mouth two (2) times daily (with meals). Allergies   Allergen Reactions    Plaquenil [Hydroxychloroquine] Nausea and Vomiting    Pcn [Penicillins] Itching and Rash    Prednisone Nausea and Vomiting     She reports a plaquenil/prednisone come causes vomiting    Zithromax [Azithromycin] Rash, Other (comments) and Hives     [facility] ca's records state skin peeling.   High fever, peeling     Family History   Problem Relation Age of Onset    Hypertension Father     High Cholesterol Father     Diabetes Father     Stroke Father     Heart Attack Father     Cancer Father     Heart Disease Father     Depression Sister     Breast Cancer Maternal Grandmother      Social History     Tobacco Use    Smoking status: Never Smoker    Smokeless tobacco: Never Used   Substance Use Topics    Alcohol use: No     Alcohol/week: 0.0 standard drinks     Patient Active Problem List   Diagnosis Code    MVP (mitral valve prolapse) I34.1    TIA (transient ischemic attack) G45.9    Multiple thyroid nodules E04.2    Dysphagia R13.10    Sjogren's syndrome ([facility] [address] M35.00    Varicose veins of lower extremities with other complications E17.926    Cervical disc disease M50.90    SIERRA (nonalcoholic steatohepatitis) K75.81    Bone spur of left foot X85.06    Lichen sclerosus 77/22 L90.0    Bilateral carpal tunnel syndrome, per EMG testing 8/17 G56.03    Osteopenia of multiple sites, 9/17/17 M85.89    Rheumatoid arthritis ([facility] [address] M06.9    PAF (paroxysmal atrial fibrillation) ([facility]) I48.0     The patient is a 71-year-old female with history of SIERRA, left foot bone spur, lichen sclerosis per biopsy in 2012, cervical disc disease, peripheral artery disease, varicose veins, Sjogren's syndrome, dysphasia, rheumatoid arthritis (), paroxysmal supraventricular tachycardia, mitral valve prolapse, multiple thyroid nodules, atrial fibrillation per EHR, plantar fasciitis, and dysphasia. Health Maintenance History  Immunizations reviewed: Tdap up to date   Pneumovax: up to date   Flu: up to date  Zoster: up to date    Immunization History   Administered Date(s) Administered    Covid-19, MODERNA, Mrna, Lnp-s, Pf, 100mcg/0.5mL 01/30/2021, 02/27/2021    Influenza High Dose Vaccine PF 09/26/2017, 10/01/2018    Influenza Vaccine 10/29/2013, 09/20/2014, 11/06/2015, 11/02/2016, 09/05/2019, 10/02/2020    Influenza Vaccine ([employer] Canine Kid[employer]) PF 09/20/2017, 10/01/2018    Influenza Vaccine (Trivalent) 09/18/2019, 10/20/2020    Influenza Vaccine Split 11/01/2010, 09/23/2011, 10/03/2012    Pneumococcal Conjugate (PCV-13) 07/07/2016    Pneumococcal Polysaccharide (PPSV-23) 07/16/2017    TD Vaccine 01/01/2007    Tdap 09/26/2017    Zoster Recombinant 09/27/2018, 12/07/2018    Zoster Vaccine, Live 02/14/2017, 09/27/2018       Colonoscopy: Up to date. No bleeding. Eye exam: Up to date    Mammo: Up to date    Dexascan: Up to date    Pelvic/Pap:  No bleeding      Review of Systems   Constitutional: Negative for chills and fever. HENT: Negative for ear pain and sore throat. Eyes: Negative for blurred vision and pain. Respiratory: Negative for cough and shortness of breath. Cardiovascular: Negative for chest pain. Gastrointestinal: Negative for abdominal pain, blood in stool and melena. Genitourinary: Negative for dysuria and hematuria. Musculoskeletal: Positive for joint pain. Negative for myalgias.    Skin: Negative for rash. Neurological: Positive for tingling. Negative for focal weakness and headaches. Endo/Heme/Allergies: Does not bruise/bleed easily. Psychiatric/Behavioral: Negative for depression and substance abuse. Depression Risk Factor Screening:      Patient Health Questionnaire (PHQ-2)   Over the last 2 weeks, how often have you been bothered by any of the following problems? · Little interest or pleasure in doing things? · Not at all. [0]  · Feeling down, depressed, or hopeless? · Not at all. [0]    Total Score: 0/6  PHQ-2 Assessment Scoring:   A score of 2 or more requires further screening with the PHQ-9    Alcohol Risk Factor Screening:   Women: On any occasion during the past 3 months, have you had more than 3 drinks containing alcohol? no    Do you average more than 7 drinks per week? no    Tobacco Use Screening:     Social History     Tobacco Use   Smoking Status Never Smoker   Smokeless Tobacco Never Used       Hearing Loss    Hearing is good. Activities of Daily Living   Self-care. Requires assistance with: no ADLs  She does not need assistance with ADLs/IADLs    Fall Risk   no falls within the past year    Abuse Screen   None    Additional Examination Findings:  Vitals:    03/05/21 1208   BP: 117/73   Pulse: 76   Resp: 16   Temp: 98.2 °F (36.8 °C)   TempSrc: Temporal   SpO2: 97%   Weight: 136 lb 3.2 oz (61.8 kg)   Height: 5' 5\" (1.651 m)   PainSc:   0 - No pain   LMP: 04/25/2016      Body mass index is 22.66 kg/m². Evaluation of Cognitive Function:  Mood/affect: Euthymic  Appearance: Well-groomed  Family member/caregiver input: She is not accompanied by a family member      General:   Well-nourished, well-groomed, pleasant, alert, in no acute distress.      Head:  Normocephalic, atraumatic  Ears:  External ears WNL  Eyes:  Clear sclera  Neuro:   Alert, conversant, appropriate, following commands  Skin:    No rashes noted  Psych: Affect, mood and judgment appropriate      Dementia Screen:  Clock Drawing (ten past eleven) Exercise: unremarkable      LABS   Data Review:   Lab Results   Component Value Date/Time    Sodium 143 01/14/2021 11:36 AM    Potassium 4.9 01/14/2021 11:36 AM    Chloride 109 01/14/2021 11:36 AM    CO2 25 01/14/2021 11:36 AM    Anion gap 9 01/14/2021 11:36 AM    Glucose 83 01/14/2021 11:36 AM    BUN 9 01/14/2021 11:36 AM    Creatinine 0.67 01/14/2021 11:36 AM    BUN/Creatinine ratio 13 01/14/2021 11:36 AM    GFR est AA >60 01/14/2021 11:36 AM    GFR est non-AA >60 01/14/2021 11:36 AM    Calcium 9.3 01/14/2021 11:36 AM       Lab Results   Component Value Date/Time    WBC 5.2 01/14/2021 11:36 AM    HGB 15.6 01/14/2021 11:36 AM    HCT 45.4 (H) 01/14/2021 11:36 AM    PLATELET 469 22/95/0395 11:36 AM    MCV 90.6 01/14/2021 11:36 AM       Lab Results   Component Value Date/Time    Hemoglobin A1c 5.5 07/19/2016 09:10 AM       Lab Results   Component Value Date/Time    Cholesterol, total 171 01/14/2021 11:36 AM    HDL Cholesterol 83 (H) 01/14/2021 11:36 AM    LDL, calculated 78.4 01/14/2021 11:36 AM    VLDL, calculated 9.6 01/14/2021 11:36 AM    Triglyceride 48 01/14/2021 11:36 AM    CHOL/HDL Ratio 2.1 01/14/2021 11:36 AM         Patient Care Team:  Shantal Fuller MD as PCP - General (Family Medicine)  Shantal Fuller MD as PCP - [address] [place] [address] Provider  Gussie Phoenix, MD as Physician (Rheumatology)  Miguelina Ochoa DPM (Podiatry)  Rena Lovett MD (Vascular Surgery)  Kyra Ritter (Vascular Surgery)  Pete Valles MD (Orthopedic Surgery)  Enriqueta Becker MD as Physician (Rheumatology)  Leila Ha MD as Physician (Neurology)  Vania Narayanan MD (Internal Medicine)  Cory Nettles MD (Plastic Surgery)  Jean Betts MD (Cardiology)    End-of-life planning  Advanced Directive in the case than an injury or illness causes the patient to be unable to make health care decisions was discussed with the patient. Advice/Referrals/Counselling/Plan:   Education and counseling provided:  Are appropriate based on today's review and evaluation  End-of-Life planning (with patient's consent)  Pneumococcal Vaccine  Influenza Vaccine  Screening Mammography  Colorectal cancer screening tests  Cardiovascular screening blood test  Bone mass measurement (DEXA)  Screening for glaucoma  Diabetes screening test  Include in education list (weight loss, physical activity, smoking cessation, fall prevention, and nutrition)    ICD-10-CM ICD-9-CM    1. PAF (paroxysmal atrial fibrillation) ([facility])  I48.0 427.31    2. Rheumatoid arthritis involving both hands, unspecified whether rheumatoid factor present ([facility] [address]  M06.9 714.0    3. Sjogren's syndrome with keratoconjunctivitis sicca ([facility])  M35.01 710.2    4. SIERRA (nonalcoholic steatohepatitis)  K75.81 571.8    5. Medicare annual wellness visit, subsequent  Z00.00 V70.0    . Brief written plan, checklist    Assessment/Plan:    Lab review: labs are reviewed in the [address] [place] [address] (ACP) Provider Conversation     Date of ACP Conversation: 3/5/21  Persons included in Conversation:  patient    Authorized Decision Maker (if patient is incapable of making informed decisions): This person is:   Named in Advance Directive or Healthcare Power of           For Patients with Decision Making Capacity:   Values/Goals: Exploration of values, goals, and preferences if recovery is not expected, even with continued medical treatment in the event of:  Imminent death  Severe, permanent brain injury    Conversation Outcomes / Follow-Up Plan:   ACP complete - no further action today. She has no changes to make to her preexisting advance directive. I have discussed the diagnosis with the patient and the intended plan as seen in the above orders. The patient has received an after-visit summary and questions were answered concerning future plans.   I have discussed medication side effects and warnings with the patient as well. I have reviewed the plan of care with the patient, accepted their input and they are in agreement with the treatment goals. Follow-up and Dispositions    · Return in about 6 months (around 9/5/2021) for BP check.

## 2021-04-21 ENCOUNTER — OFFICE VISIT (OUTPATIENT)
Dept: CARDIOLOGY CLINIC | Age: 71
End: 2021-04-21
Payer: MEDICARE

## 2021-04-21 VITALS
HEART RATE: 99 BPM | SYSTOLIC BLOOD PRESSURE: 94 MMHG | HEIGHT: 65 IN | WEIGHT: 134 LBS | DIASTOLIC BLOOD PRESSURE: 65 MMHG | BODY MASS INDEX: 22.33 KG/M2

## 2021-04-21 DIAGNOSIS — I48.0 PAF (PAROXYSMAL ATRIAL FIBRILLATION) (HCC): Primary | ICD-10-CM

## 2021-04-21 DIAGNOSIS — G45.9 TRANSIENT CEREBRAL ISCHEMIA, UNSPECIFIED TYPE: ICD-10-CM

## 2021-04-21 DIAGNOSIS — I34.1 MVP (MITRAL VALVE PROLAPSE): ICD-10-CM

## 2021-04-21 PROCEDURE — G8428 CUR MEDS NOT DOCUMENT: HCPCS | Performed by: INTERNAL MEDICINE

## 2021-04-21 PROCEDURE — G8754 DIAS BP LESS 90: HCPCS | Performed by: INTERNAL MEDICINE

## 2021-04-21 PROCEDURE — 93000 ELECTROCARDIOGRAM COMPLETE: CPT | Performed by: INTERNAL MEDICINE

## 2021-04-21 PROCEDURE — G8752 SYS BP LESS 140: HCPCS | Performed by: INTERNAL MEDICINE

## 2021-04-21 PROCEDURE — G8399 PT W/DXA RESULTS DOCUMENT: HCPCS | Performed by: INTERNAL MEDICINE

## 2021-04-21 PROCEDURE — G8510 SCR DEP NEG, NO PLAN REQD: HCPCS | Performed by: INTERNAL MEDICINE

## 2021-04-21 PROCEDURE — 3017F COLORECTAL CA SCREEN DOC REV: CPT | Performed by: INTERNAL MEDICINE

## 2021-04-21 PROCEDURE — G9899 SCRN MAM PERF RSLTS DOC: HCPCS | Performed by: INTERNAL MEDICINE

## 2021-04-21 PROCEDURE — G8536 NO DOC ELDER MAL SCRN: HCPCS | Performed by: INTERNAL MEDICINE

## 2021-04-21 PROCEDURE — 1101F PT FALLS ASSESS-DOCD LE1/YR: CPT | Performed by: INTERNAL MEDICINE

## 2021-04-21 PROCEDURE — 1090F PRES/ABSN URINE INCON ASSESS: CPT | Performed by: INTERNAL MEDICINE

## 2021-04-21 PROCEDURE — G8420 CALC BMI NORM PARAMETERS: HCPCS | Performed by: INTERNAL MEDICINE

## 2021-04-21 PROCEDURE — 99214 OFFICE O/P EST MOD 30 MIN: CPT | Performed by: INTERNAL MEDICINE

## 2021-04-21 NOTE — PROGRESS NOTES
1. Have you been to the ER, urgent care clinic since your last visit? Hospitalized since your last visit?  no  2. Have you seen or consulted any other health care providers outside of the [address] [place] [address] since your last visit? Include any pap smears or colon screening. Yes pcp    3. Since your last visit, have you had any of the following symptoms? Palpitations d/t traffic none since before today       4. Have you had any blood work, X-rays or cardiac testing? Yes at rheumatology         5. Where do you normally have your labs drawn? [facility]    6. Do you need any refills today?    no

## 2021-04-28 NOTE — PROGRESS NOTES
HISTORY OF PRESENT ILLNESS  Meenakshi Alexander is a 80 y.o. female. Palpitations   The history is provided by the patient. This is a chronic problem. The problem has not changed since onset. The problem occurs rarely. Pertinent negatives include no diaphoresis, no fever, no malaise/fatigue, no numbness, no near-syncope, no orthopnea, no PND, no nausea, no vomiting, no dizziness, no weakness, no cough, no hemoptysis and no sputum production. Her past medical history is significant for atrial fibrillation. Review of Systems   Constitutional: Negative for chills, diaphoresis, fever, malaise/fatigue and weight loss. HENT: Negative for congestion, ear discharge, ear pain, hearing loss, nosebleeds and tinnitus. Eyes: Negative for blurred vision. Respiratory: Negative for cough, hemoptysis, sputum production, wheezing and stridor. Cardiovascular: Positive for palpitations. Negative for orthopnea, leg swelling, PND and near-syncope. Gastrointestinal: Negative for heartburn, nausea and vomiting. Musculoskeletal: Negative for myalgias and neck pain. Skin: Negative for itching and rash. Neurological: Negative for dizziness, tingling, tremors, focal weakness, loss of consciousness, weakness and numbness. Psychiatric/Behavioral: Negative for depression and suicidal ideas.      Family History   Problem Relation Age of Onset    Hypertension Father     High Cholesterol Father     Diabetes Father     Stroke Father     Heart Attack Father     Cancer Father     Heart Disease Father     Depression Sister     Breast Cancer Maternal Grandmother        Past Medical History:   Diagnosis Date    A-fib [facility])     Arthritis     Feet    Memory change     Menopause     Multiple thyroid nodules 2009    under care of Dr. Lawson Suzie MVP (mitral valve prolapse)     under care of Dr. Rosetta Tran Neuropathy     Plantar fasciitis     PSVT (paroxysmal supraventricular tachycardia) ([facility] 2/8/2011    Right knee pain     Routine gynecological examination     done by Dr. Darell Curiel S/P colonoscopy     done past 1-2 yrs? Dr. Nadja Werner Sjogren's syndrome [facility])     sees Dr. Jaxon Tee    TIA (transient ischemic attack)     possible history of    Tibialis tendonitis     following with Dr Stephanie Chamberlain ankle       Past Surgical History:   Procedure Laterality Date    DILATION AND CURETTAGE  1987    HX BREAST AUGMENTATION  1984    HX DILATION AND CURETTAGE  1/2013    HX TONSILLECTOMY      IMPLANT BREAST SILICONE/EQ      MO TARSAL TUNNEL RELEASE  1994    Right       Social History     Tobacco Use    Smoking status: Never Smoker    Smokeless tobacco: Never Used   Substance Use Topics    Alcohol use: No     Alcohol/week: 0.0 standard drinks       Allergies   Allergen Reactions    Plaquenil [Hydroxychloroquine] Nausea and Vomiting    Pcn [Penicillins] Itching and Rash    Prednisone Nausea and Vomiting     She reports a plaquenil/prednisone come causes vomiting    Zithromax [Azithromycin] Rash, Other (comments) and Hives     [facility] records state skin peeling. High fever, peeling       Outpatient Medications Marked as Taking for the 4/21/21 encounter (Office Visit) with Vernon Ellison MD   Medication Sig Dispense Refill    metoprolol tartrate (LOPRESSOR) 25 mg tablet TAKE 1/2 TABLET BY MOUTH TWICE A DAY 90 Tab 3    Xarelto 20 mg tab tablet TAKE ONE TABLET BY MOUTH DAILY WITH DINNER 90 Tab 4    abatacept (ORENCIA SC) by SubCUTAneous route every month.  folic acid (FOLVITE) 1 mg tablet Take  by mouth daily.  gabapentin (NEURONTIN) 300 mg capsule Take 2 Caps by mouth nightly. 180 Cap 1    cholecalciferol, VITAMIN D3, (VITAMIN D3) 5,000 unit tab tablet Take 5,000 Units by mouth daily.  cyanocobalamin 1,000 mcg tablet Take 1,000 mcg by mouth two (2) times a day.  diclofenac (VOLTAREN) 1 % gel Apply  to affected area as needed.       acetaminophen (TYLENOL) 650 mg CR tablet Take 650 mg by mouth three (3) times daily as needed for Pain (taking 2 tid).  ascorbic acid (VITAMIN C) 500 mg tablet Take 500 mg by mouth two (2) times a day. Visit Vitals  BP 94/65   Pulse 99   Ht 5' 5\" (1.651 m)   Wt 60.8 kg (134 lb)   LMP 04/25/2016   BMI 22.30 kg/m²     Physical Exam   Constitutional: She is oriented to person, place, and time. She appears well-developed and well-nourished. No distress. HENT:   Head: Atraumatic. Mouth/Throat: No oropharyngeal exudate. Eyes: Conjunctivae are normal. Right eye exhibits no discharge. Left eye exhibits no discharge. No scleral icterus. Neck: Neck supple. No JVD present. No tracheal deviation present. No thyromegaly present. Cardiovascular: Normal rate and regular rhythm. Exam reveals no gallop. No murmur heard. Pulmonary/Chest: Effort normal and breath sounds normal. No stridor. She has no wheezes. She has no rales. Abdominal: Soft. There is no abdominal tenderness. There is no rebound and no guarding. Musculoskeletal: Normal range of motion. General: No tenderness or edema. Lymphadenopathy:     She has no cervical adenopathy. Neurological: She is alert and oriented to person, place, and time. She exhibits normal muscle tone. Skin: Skin is warm. She is not diaphoretic. Psychiatric: She has a normal mood and affect. Her behavior is normal.     Echo 2016:  [address] SMALL LEFT VENTRICLE WITH MILD LEFT VENTRICULAR HYPERTROPHY PRESENT. NORMAL GLOBAL LEFT VENTRICULAR SYSTOLIC FUNCTION. NORMAL DIASTOLIC FUNCTION. ESTIMATED EJECTION FRACTION OF 60%   NORMAL RIGHT VENTRICULAR SIZE. NORMAL RIGHT VENTRICULAR GLOBAL SYSTOLIC FUNCTION. ESTIMATED PULMONARY ARTERY PRESSURE OF 21MMHG. NO HEMODYNAMICALLY SIGNIFICANT VALVULAR PATHOLOGY. NO PRIOR STUDY FOR COMPARISON. ASSESSMENT and PLAN    ICD-10-CM ICD-9-CM    1.  PAF (paroxysmal atrial fibrillation) ([facility])  I48.0 427.31 AMB POC EKG ROUTINE W/ 12 LEADS, INTER & REP   2. Transient cerebral ischemia, unspecified type  G45.9 435.9    3. MVP (mitral valve prolapse)  I34.1 424.0      Orders Placed This Encounter    AMB POC EKG ROUTINE W/ 12 LEADS, INTER & REP     Order Specific Question:   Reason for Exam:     Answer:   palpitations     Follow-up and Dispositions    · Return in about 6 months (around 10/21/2021). current treatment plan is effective, no change in therapy. Patient with h/o ? ?? TIA vs transient amenesia in 2016 - also found to have atrial flutter- discharged home on xarelto and cartia. Cartia discontinued due to fatigue. Doing better on lopressor. Underwent ETT-- developed Afib - started on digoxin in addition to BB. Now in NSR. Seen for f/u- rare palpitations- lasting few secs. No dizziness or syncope  On low dose lopressor and xarelto. No bleeding complications. Continue current meds  F/u in 6 months  F/u in 6 months.

## 2021-09-21 RX ORDER — RIVAROXABAN 20 MG/1
TABLET, FILM COATED ORAL
Qty: 30 TABLET | Refills: 6 | Status: SHIPPED | OUTPATIENT
Start: 2021-09-21 | End: 2022-03-29

## 2021-11-30 ENCOUNTER — OFFICE VISIT (OUTPATIENT)
Dept: CARDIOLOGY CLINIC | Age: 71
End: 2021-11-30
Payer: MEDICARE

## 2021-11-30 VITALS
SYSTOLIC BLOOD PRESSURE: 117 MMHG | OXYGEN SATURATION: 98 % | HEIGHT: 65 IN | DIASTOLIC BLOOD PRESSURE: 72 MMHG | HEART RATE: 69 BPM | WEIGHT: 135 LBS | BODY MASS INDEX: 22.49 KG/M2

## 2021-11-30 DIAGNOSIS — I48.0 PAF (PAROXYSMAL ATRIAL FIBRILLATION) (HCC): Primary | ICD-10-CM

## 2021-11-30 DIAGNOSIS — I34.1 MVP (MITRAL VALVE PROLAPSE): ICD-10-CM

## 2021-11-30 DIAGNOSIS — I34.0 MILD MITRAL REGURGITATION: ICD-10-CM

## 2021-11-30 DIAGNOSIS — G45.9 TRANSIENT CEREBRAL ISCHEMIA, UNSPECIFIED TYPE: ICD-10-CM

## 2021-11-30 PROCEDURE — G8420 CALC BMI NORM PARAMETERS: HCPCS | Performed by: INTERNAL MEDICINE

## 2021-11-30 PROCEDURE — G9899 SCRN MAM PERF RSLTS DOC: HCPCS | Performed by: INTERNAL MEDICINE

## 2021-11-30 PROCEDURE — 1101F PT FALLS ASSESS-DOCD LE1/YR: CPT | Performed by: INTERNAL MEDICINE

## 2021-11-30 PROCEDURE — 1090F PRES/ABSN URINE INCON ASSESS: CPT | Performed by: INTERNAL MEDICINE

## 2021-11-30 PROCEDURE — G8536 NO DOC ELDER MAL SCRN: HCPCS | Performed by: INTERNAL MEDICINE

## 2021-11-30 PROCEDURE — G8399 PT W/DXA RESULTS DOCUMENT: HCPCS | Performed by: INTERNAL MEDICINE

## 2021-11-30 PROCEDURE — 99214 OFFICE O/P EST MOD 30 MIN: CPT | Performed by: INTERNAL MEDICINE

## 2021-11-30 PROCEDURE — 3017F COLORECTAL CA SCREEN DOC REV: CPT | Performed by: INTERNAL MEDICINE

## 2021-11-30 PROCEDURE — G8432 DEP SCR NOT DOC, RNG: HCPCS | Performed by: INTERNAL MEDICINE

## 2021-11-30 PROCEDURE — G8754 DIAS BP LESS 90: HCPCS | Performed by: INTERNAL MEDICINE

## 2021-11-30 PROCEDURE — G8752 SYS BP LESS 140: HCPCS | Performed by: INTERNAL MEDICINE

## 2021-11-30 PROCEDURE — G8428 CUR MEDS NOT DOCUMENT: HCPCS | Performed by: INTERNAL MEDICINE

## 2021-11-30 NOTE — PROGRESS NOTES
HISTORY OF PRESENT ILLNESS  Jesus Good is a 80 y.o. female. Palpitations   The history is provided by the patient. This is a chronic problem. The problem has not changed since onset. The problem occurs rarely. Pertinent negatives include no diaphoresis, no fever, no malaise/fatigue, no numbness, no near-syncope, no orthopnea, no PND, no nausea, no vomiting, no dizziness, no weakness, no cough, no hemoptysis and no sputum production. Her past medical history is significant for atrial fibrillation. Review of Systems   Constitutional: Negative for chills, diaphoresis, fever, malaise/fatigue and weight loss. HENT: Negative for congestion, ear discharge, ear pain, hearing loss, nosebleeds and tinnitus. Eyes: Negative for blurred vision. Respiratory: Negative for cough, hemoptysis, sputum production, wheezing and stridor. Cardiovascular: Positive for palpitations. Negative for orthopnea, leg swelling, PND and near-syncope. Gastrointestinal: Negative for heartburn, nausea and vomiting. Musculoskeletal: Negative for myalgias and neck pain. Skin: Negative for itching and rash. Neurological: Negative for dizziness, tingling, tremors, focal weakness, loss of consciousness, weakness and numbness. Psychiatric/Behavioral: Negative for depression and suicidal ideas.      Family History   Problem Relation Age of Onset    Hypertension Father     High Cholesterol Father     Diabetes Father     Stroke Father     Heart Attack Father     Cancer Father     Heart Disease Father     Depression Sister     Breast Cancer Maternal Grandmother        Past Medical History:   Diagnosis Date    A-fib [facility])     Arthritis     Feet    Memory change     Menopause     Multiple thyroid nodules 2009    under care of Dr. Javan Romero MVP (mitral valve prolapse)     under care of Dr. Leslie Mobley Neuropathy     Plantar fasciitis     PSVT (paroxysmal supraventricular tachycardia) ([facility] 2/8/2011    Right knee pain     Routine gynecological examination     done by Dr. Sultana Burden S/P colonoscopy     done past 1-2 yrs? Dr. Rojelio Shelley Sjogren's syndrome [facility])     sees Dr. Rush Delaney    TIA (transient ischemic attack)     possible history of    Tibialis tendonitis     following with Dr Ortega Leal ankle       Past Surgical History:   Procedure Laterality Date    DILATION AND CURETTAGE  1987    HX BREAST AUGMENTATION  1984    HX DILATION AND CURETTAGE  1/2013    HX TONSILLECTOMY      IMPLANT BREAST SILICONE/EQ      [place] TARSAL TUNNEL RELEASE  1994    Right       Social History     Tobacco Use    Smoking status: Never Smoker    Smokeless tobacco: Never Used   Substance Use Topics    Alcohol use: No     Alcohol/week: 0.0 standard drinks       Allergies   Allergen Reactions    Plaquenil [Hydroxychloroquine] Nausea and Vomiting    Pcn [Penicillins] Itching and Rash    Prednisone Nausea and Vomiting     She reports a plaquenil/prednisone come causes vomiting    Zithromax [Azithromycin] Rash, Other (comments) and Hives     [facility] records state skin peeling. High fever, peeling       Outpatient Medications Marked as Taking for the 11/30/21 encounter (Office Visit) with Farhan Hughes MD   Medication Sig Dispense Refill    Xarelto 20 mg tab tablet TAKE ONE TABLET BY MOUTH DAILY WITH DINNER 30 Tablet 6    metoprolol tartrate (LOPRESSOR) 25 mg tablet TAKE 1/2 TABLET BY MOUTH TWICE A DAY 90 Tab 3    abatacept (ORENCIA SC) by SubCUTAneous route every month.  folic acid (FOLVITE) 1 mg tablet Take  by mouth daily.  gabapentin (NEURONTIN) 300 mg capsule Take 2 Caps by mouth nightly. 180 Cap 1    cholecalciferol, VITAMIN D3, (VITAMIN D3) 5,000 unit tab tablet Take 5,000 Units by mouth daily.  cyanocobalamin 1,000 mcg tablet Take 1,000 mcg by mouth two (2) times a day.  diclofenac (VOLTAREN) 1 % gel Apply  to affected area as needed.       acetaminophen (TYLENOL) 650 mg CR tablet Take 650 mg by mouth three (3) times daily as needed for Pain (taking 2 tid).  ascorbic acid (VITAMIN C) 500 mg tablet Take 500 mg by mouth two (2) times a day. Visit Vitals  /72 (BP 1 Location: Left upper arm, BP Patient Position: Sitting, BP Cuff Size: Adult)   Pulse 69   Ht 5' 5\" (1.651 m)   Wt 61.2 kg (135 lb)   LMP 04/25/2016   SpO2 98%   BMI 22.47 kg/m²     Physical Exam  Constitutional:       General: She is not in acute distress. Appearance: She is well-developed. She is not diaphoretic. HENT:      Head: Atraumatic. Mouth/Throat:      Pharynx: No oropharyngeal exudate. Eyes:      General: No scleral icterus. Right eye: No discharge. Left eye: No discharge. Conjunctiva/sclera: Conjunctivae normal.   Neck:      Thyroid: No thyromegaly. Vascular: No JVD. Trachea: No tracheal deviation. Cardiovascular:      Rate and Rhythm: Normal rate and regular rhythm. Heart sounds: No murmur heard. No gallop. Pulmonary:      Effort: Pulmonary effort is normal.      Breath sounds: Normal breath sounds. No stridor. No wheezing or rales. Abdominal:      Palpations: Abdomen is soft. Tenderness: There is no abdominal tenderness. There is no guarding or rebound. Musculoskeletal:         General: No tenderness. Normal range of motion. Cervical back: Neck supple. Lymphadenopathy:      Cervical: No cervical adenopathy. Skin:     General: Skin is warm. Neurological:      Mental Status: She is alert and oriented to person, place, and time. Motor: No abnormal muscle tone. Psychiatric:         Behavior: Behavior normal.       Echo 2016:  POOR ACOUSTIC WINDOWS DUE TO IMPLANTS. SMALL LEFT VENTRICLE WITH MILD LEFT VENTRICULAR HYPERTROPHY PRESENT. NORMAL GLOBAL LEFT VENTRICULAR SYSTOLIC FUNCTION. NORMAL DIASTOLIC FUNCTION. ESTIMATED EJECTION FRACTION OF 60%   NORMAL RIGHT VENTRICULAR SIZE. NORMAL RIGHT VENTRICULAR GLOBAL SYSTOLIC FUNCTION. ESTIMATED PULMONARY ARTERY PRESSURE OF 21MMHG. NO HEMODYNAMICALLY SIGNIFICANT VALVULAR PATHOLOGY. NO PRIOR STUDY FOR COMPARISON. ASSESSMENT and PLAN    ICD-10-CM ICD-9-CM    1. PAF (paroxysmal atrial fibrillation) (HCC)  I48.0 427.31    2. Transient cerebral ischemia, unspecified type  G45.9 435.9    3. MVP (mitral valve prolapse)  I34.1 424.0    4. Mild mitral regurgitation  I34.0 424.0      No orders of the defined types were placed in this encounter. current treatment plan is effective, no change in therapy. Patient with h/o ? ?? TIA vs transient amenesia in 2016 - also found to have atrial flutter- discharged home on xarelto and cartia. Cartia discontinued due to fatigue. Doing better on lopressor. Underwent ETT-- developed Afib - started on digoxin in addition to BB. Now in NSR. Seen for f/u- rare palpitations- lasting few secs. No dizziness or syncope  On low dose lopressor and xarelto. No breakthrough symptoms. F/u in 6 months.

## 2021-11-30 NOTE — PROGRESS NOTES
1. Have you been to the ER, urgent care clinic since your last visit? Hospitalized since your last visit? No    2. Have you seen or consulted any other health care providers outside of the [address] [place] [address] since your last visit? Include any pap smears or colon screening.  Yes Where: Dr. Ava Garner for rheumatologist

## 2022-01-07 ENCOUNTER — TRANSCRIBE ORDER (OUTPATIENT)
Dept: SCHEDULING | Age: 72
End: 2022-01-07

## 2022-01-07 DIAGNOSIS — Z12.31 VISIT FOR SCREENING MAMMOGRAM: Primary | ICD-10-CM

## 2022-01-25 ENCOUNTER — HOSPITAL ENCOUNTER (OUTPATIENT)
Dept: WOMENS IMAGING | Age: 72
Discharge: HOME | End: 2022-01-25
Attending: OBSTETRICS & GYNECOLOGY
Payer: MEDICARE

## 2022-01-25 DIAGNOSIS — Z12.31 VISIT FOR SCREENING MAMMOGRAM: ICD-10-CM

## 2022-01-25 PROCEDURE — 77063 BREAST TOMOSYNTHESIS BI: CPT

## 2022-03-10 ENCOUNTER — HOSPITAL ENCOUNTER (OUTPATIENT)
Dept: GENERAL RADIOLOGY | Age: 72
Discharge: HOME | End: 2022-03-10
Payer: MEDICARE

## 2022-03-10 DIAGNOSIS — M06.9 RA (RHEUMATOID ARTHRITIS) (HCC): ICD-10-CM

## 2022-03-10 PROCEDURE — 71046 X-RAY EXAM CHEST 2 VIEWS: CPT

## 2022-03-10 RX ORDER — METOPROLOL TARTRATE 25 MG/1
TABLET, FILM COATED ORAL
Qty: 90 TABLET | Refills: 3 | Status: SHIPPED | OUTPATIENT
Start: 2022-03-10

## 2022-03-18 PROBLEM — M77.52 BONE SPUR OF LEFT FOOT: Status: ACTIVE | Noted: 2017-05-30

## 2022-03-18 PROBLEM — M06.9 RHEUMATOID ARTHRITIS (HCC): Status: ACTIVE | Noted: 2017-10-04

## 2022-03-18 PROBLEM — M85.89 OSTEOPENIA OF MULTIPLE SITES: Status: ACTIVE | Noted: 2017-10-04

## 2022-03-19 PROBLEM — K75.81 NASH (NONALCOHOLIC STEATOHEPATITIS): Status: ACTIVE | Noted: 2017-05-30

## 2022-03-19 PROBLEM — I48.0 PAF (PAROXYSMAL ATRIAL FIBRILLATION) (HCC): Status: ACTIVE | Noted: 2017-11-21

## 2022-03-19 PROBLEM — G56.03 BILATERAL CARPAL TUNNEL SYNDROME: Status: ACTIVE | Noted: 2017-08-17

## 2022-03-20 PROBLEM — L90.0 LICHEN SCLEROSUS: Status: ACTIVE | Noted: 2017-05-30

## 2022-03-29 RX ORDER — RIVAROXABAN 20 MG/1
TABLET, FILM COATED ORAL
Qty: 30 TABLET | Refills: 6 | Status: SHIPPED | OUTPATIENT
Start: 2022-03-29 | End: 2022-10-21 | Stop reason: SDUPTHER

## 2022-05-10 ENCOUNTER — OFFICE VISIT (OUTPATIENT)
Dept: CARDIOLOGY CLINIC | Age: 72
End: 2022-05-10
Payer: MEDICARE

## 2022-05-10 VITALS
OXYGEN SATURATION: 97 % | WEIGHT: 135 LBS | BODY MASS INDEX: 22.49 KG/M2 | SYSTOLIC BLOOD PRESSURE: 102 MMHG | DIASTOLIC BLOOD PRESSURE: 63 MMHG | HEART RATE: 66 BPM | HEIGHT: 65 IN

## 2022-05-10 DIAGNOSIS — I48.0 PAF (PAROXYSMAL ATRIAL FIBRILLATION) (HCC): Primary | ICD-10-CM

## 2022-05-10 DIAGNOSIS — G45.9 TRANSIENT CEREBRAL ISCHEMIA, UNSPECIFIED TYPE: ICD-10-CM

## 2022-05-10 PROCEDURE — 99214 OFFICE O/P EST MOD 30 MIN: CPT | Performed by: INTERNAL MEDICINE

## 2022-05-10 PROCEDURE — G8432 DEP SCR NOT DOC, RNG: HCPCS | Performed by: INTERNAL MEDICINE

## 2022-05-10 PROCEDURE — G8420 CALC BMI NORM PARAMETERS: HCPCS | Performed by: INTERNAL MEDICINE

## 2022-05-10 PROCEDURE — 1101F PT FALLS ASSESS-DOCD LE1/YR: CPT | Performed by: INTERNAL MEDICINE

## 2022-05-10 PROCEDURE — 1090F PRES/ABSN URINE INCON ASSESS: CPT | Performed by: INTERNAL MEDICINE

## 2022-05-10 PROCEDURE — G8536 NO DOC ELDER MAL SCRN: HCPCS | Performed by: INTERNAL MEDICINE

## 2022-05-10 PROCEDURE — G9899 SCRN MAM PERF RSLTS DOC: HCPCS | Performed by: INTERNAL MEDICINE

## 2022-05-10 PROCEDURE — 3017F COLORECTAL CA SCREEN DOC REV: CPT | Performed by: INTERNAL MEDICINE

## 2022-05-10 PROCEDURE — G8427 DOCREV CUR MEDS BY ELIG CLIN: HCPCS | Performed by: INTERNAL MEDICINE

## 2022-05-10 PROCEDURE — 93000 ELECTROCARDIOGRAM COMPLETE: CPT | Performed by: INTERNAL MEDICINE

## 2022-05-10 PROCEDURE — G8399 PT W/DXA RESULTS DOCUMENT: HCPCS | Performed by: INTERNAL MEDICINE

## 2022-05-10 NOTE — PROGRESS NOTES
HISTORY OF PRESENT ILLNESS  Antonio Dye is a 71 y.o. female. Palpitations   The history is provided by the patient. This is a chronic problem. The problem has not changed since onset. The problem occurs rarely. Pertinent negatives include no diaphoresis, no fever, no malaise/fatigue, no numbness, no near-syncope, no orthopnea, no PND, no nausea, no vomiting, no dizziness, no weakness, no cough, no hemoptysis and no sputum production. Her past medical history is significant for atrial fibrillation. Review of Systems   Constitutional: Negative for chills, diaphoresis, fever, malaise/fatigue and weight loss. HENT: Negative for congestion, ear discharge, ear pain, hearing loss, nosebleeds and tinnitus. Eyes: Negative for blurred vision. Respiratory: Negative for cough, hemoptysis, sputum production, wheezing and stridor. Cardiovascular: Positive for palpitations. Negative for orthopnea, leg swelling, PND and near-syncope. Gastrointestinal: Negative for heartburn, nausea and vomiting. Musculoskeletal: Negative for myalgias and neck pain. Skin: Negative for itching and rash. Neurological: Negative for dizziness, tingling, tremors, focal weakness, loss of consciousness, weakness and numbness. Psychiatric/Behavioral: Negative for depression and suicidal ideas.      Family History   Problem Relation Age of Onset    Hypertension Father     High Cholesterol Father     Diabetes Father     Stroke Father     Heart Attack Father     Cancer Father     Heart Disease Father     Depression Sister     Breast Cancer Maternal Grandmother        Past Medical History:   Diagnosis Date    [facility]-fib [facility])     Arthritis     Feet    Memory change     Menopause     Multiple thyroid nodules 2009    under care of Dr. Carolina Jacques MVP (mitral valve prolapse)     under care of Dr. Capo Deasi Neuropathy     Plantar fasciitis     [facility] 2/8/2011    Right knee pain     Routine gynecological examination     done by Dr. Sharan Barrett S/P colonoscopy     done past 1-2 yrs? Dr. Aaliyah Lee Sjogren's syndrome [facility])     sees Dr. Yunior Sheehan    TIA (transient ischemic attack)     possible history of    Tibialis tendonitis     following with Dr Madhuri Mercer ankle       Past Surgical History:   Procedure Laterality Date    DILATION AND CURETTAGE  1987    HX BREAST AUGMENTATION  1984    HX DILATION AND CURETTAGE  1/2013    HX TONSILLECTOMY      IMPLANT BREAST SILICONE/EQ      [place] TARSAL TUNNEL RELEASE  1994    Right       Social History     Tobacco Use    Smoking status: Never Smoker    Smokeless tobacco: Never Used   Substance Use Topics    Alcohol use: No     Alcohol/week: 0.0 standard drinks       Allergies   Allergen Reactions    Plaquenil [Hydroxychloroquine] Nausea and Vomiting    Pcn [Penicillins] Itching and Rash    Prednisone Nausea and Vomiting     She reports a plaquenil/prednisone come causes vomiting    Zithromax [Azithromycin] Rash, Other (comments) and Hives     [facility] records state skin peeling. High fever, peeling       Outpatient Medications Marked as Taking for the 5/10/22 encounter (Office Visit) with Fernando Castillo MD   Medication Sig Dispense Refill    Xarelto 20 mg tab tablet TAKE ONE TABLET BY MOUTH DAILY WITH DINNER 30 Tablet 6    metoprolol tartrate (LOPRESSOR) 25 mg tablet TAKE 1/2 TABLET BY MOUTH TWICE A DAY 90 Tablet 3    abatacept (ORENCIA SC) by SubCUTAneous route every month.  gabapentin (NEURONTIN) 300 mg capsule Take 2 Caps by mouth nightly. 180 Cap 1    cholecalciferol, VITAMIN D3, (VITAMIN D3) 5,000 unit tab tablet Take 5,000 Units by mouth daily.  cyanocobalamin 1,000 mcg tablet Take 1,000 mcg by mouth two (2) times a day.  diclofenac (VOLTAREN) 1 % gel Apply  to affected area as needed.       acetaminophen (TYLENOL) 650 mg CR tablet Take 650 mg by mouth three (3) times daily as needed for Pain (taking 2 tid).      ascorbic acid (VITAMIN C) 500 mg tablet Take 500 mg by mouth two (2) times a day. Visit Vitals  /63 (BP 1 Location: Left upper arm, BP Patient Position: Sitting, BP Cuff Size: Adult)   Pulse 66   Ht 5' 5\" (1.651 m)   Wt 61.2 kg (135 lb)   LMP 04/25/2016   SpO2 97%   BMI 22.47 kg/m²     Physical Exam  Constitutional:       General: She is not in acute distress. Appearance: She is well-developed. She is not diaphoretic. HENT:      Head: Atraumatic. Mouth/Throat:      Pharynx: No oropharyngeal exudate. Eyes:      General: No scleral icterus. Right eye: No discharge. Left eye: No discharge. Conjunctiva/sclera: Conjunctivae normal.   Neck:      Thyroid: No thyromegaly. Vascular: No JVD. Trachea: No tracheal deviation. Cardiovascular:      Rate and Rhythm: Normal rate and regular rhythm. Heart sounds: No murmur heard. No gallop. Pulmonary:      Effort: Pulmonary effort is normal.      Breath sounds: Normal breath sounds. No stridor. No wheezing or rales. Abdominal:      Palpations: Abdomen is soft. Tenderness: There is no abdominal tenderness. There is no guarding or rebound. Musculoskeletal:         General: No tenderness. Normal range of motion. Cervical back: Neck supple. Lymphadenopathy:      Cervical: No cervical adenopathy. Skin:     General: Skin is warm. Neurological:      Mental Status: She is alert and oriented to person, place, and time. Motor: No abnormal muscle tone. Psychiatric:         Behavior: Behavior normal.       Echo 2016:  POOR ACOUSTIC WINDOWS DUE TO IMPLANTS. SMALL LEFT VENTRICLE WITH MILD LEFT VENTRICULAR HYPERTROPHY PRESENT. NORMAL GLOBAL LEFT VENTRICULAR SYSTOLIC FUNCTION. NORMAL DIASTOLIC FUNCTION. ESTIMATED EJECTION FRACTION OF 60%   NORMAL RIGHT VENTRICULAR SIZE. NORMAL RIGHT VENTRICULAR GLOBAL SYSTOLIC FUNCTION. ESTIMATED PULMONARY ARTERY PRESSURE OF 21MMHG.    NO HEMODYNAMICALLY SIGNIFICANT VALVULAR PATHOLOGY. NO PRIOR STUDY FOR COMPARISON. ASSESSMENT and PLAN    ICD-10-CM ICD-9-CM    1. PAF (paroxysmal atrial fibrillation) (HCC)  I48.0 427.31 AMB POC EKG ROUTINE W/ 12 LEADS, INTER & REP   2. Transient cerebral ischemia, unspecified type  G45.9 435.9      Orders Placed This Encounter    AMB POC EKG ROUTINE W/ 12 LEADS, INTER & REP     Order Specific Question:   Reason for Exam:     Answer:   routine     Follow-up and Dispositions    · Return in about 6 months (around 11/10/2022). current treatment plan is effective, no change in therapy. Patient with h/o ? ?? TIA vs transient amenesia in 2016 - also found to have atrial flutter- discharged home on xarelto and cartia. Cartia discontinued due to fatigue. Doing better on lopressor. Underwent ETT-- developed Afib - started on digoxin in addition to BB. Now in NSR. Seen for f/u- rare palpitations- lasting few secs. No dizziness or syncope  On low dose lopressor and xarelto. No breakthrough symptoms. F/u in 9 months.

## 2022-05-10 NOTE — PROGRESS NOTES
1. Have you been to the ER, urgent care clinic since your last visit? Hospitalized since your last visit? No    2. Have you seen or consulted any other health care providers outside of the [address] [place] [address] since your last visit? Include any pap smears or colon screening.       Yes Where: Dr. Gatica Reason for visit: rheumatology

## 2022-08-25 ENCOUNTER — HOSPITAL ENCOUNTER (EMERGENCY)
Age: 72
Discharge: HOME | End: 2022-08-25
Attending: EMERGENCY MEDICINE
Payer: MEDICARE

## 2022-08-25 ENCOUNTER — APPOINTMENT (OUTPATIENT)
Dept: CT IMAGING | Age: 72
End: 2022-08-25
Attending: EMERGENCY MEDICINE
Payer: MEDICARE

## 2022-08-25 ENCOUNTER — APPOINTMENT (OUTPATIENT)
Dept: GENERAL RADIOLOGY | Age: 72
End: 2022-08-25
Attending: EMERGENCY MEDICINE
Payer: MEDICARE

## 2022-08-25 VITALS
WEIGHT: 135 LBS | OXYGEN SATURATION: 100 % | HEIGHT: 66 IN | HEART RATE: 72 BPM | BODY MASS INDEX: 21.69 KG/M2 | DIASTOLIC BLOOD PRESSURE: 78 MMHG | TEMPERATURE: 98.4 F | RESPIRATION RATE: 18 BRPM | SYSTOLIC BLOOD PRESSURE: 131 MMHG

## 2022-08-25 DIAGNOSIS — S09.90XA CLOSED HEAD INJURY, INITIAL ENCOUNTER: Primary | ICD-10-CM

## 2022-08-25 DIAGNOSIS — S92.301A CLOSED DISPLACED FRACTURE OF METATARSAL BONE OF RIGHT FOOT, UNSPECIFIED METATARSAL, INITIAL ENCOUNTER: ICD-10-CM

## 2022-08-25 PROCEDURE — 73630 X-RAY EXAM OF FOOT: CPT

## 2022-08-25 PROCEDURE — 99284 EMERGENCY DEPT VISIT MOD MDM: CPT

## 2022-08-25 PROCEDURE — 70450 CT HEAD/BRAIN W/O DYE: CPT

## 2022-08-25 NOTE — ED TRIAGE NOTES
Patient states that she was walking in her garage with flip flops on at time of fall. She states that she caught her foot in a cord in the garage and fell striking her head into a piece of metal machinery. She denies LOC. Voices concerns related to use of anticoagulant therapy and head injury occurrence. C/o pain to right foot, right knee and right shoulder.

## 2022-08-25 NOTE — ED PROVIDER NOTES
EMERGENCY DEPARTMENT HISTORY AND PHYSICAL EXAM    4:02 PM      Date: 8/25/2022  Patient Name: Tyron Desai    History of Presenting Illness     Chief Complaint   Patient presents with    Fall    Head Injury    Foot Injury         History Provided By: Patient  Location/Duration/Severity/Modifying factors   78-year-old female presents with a history of atrial fibrillation, on Xarelto, states she was walking in the garage today when she tripped on a cord with a fan, causing her to catch her left foot and lunged forward. She hit the right side of her head against the steel leg of a piece of equipment in the garage. She did not have LOC. She has a palpable hematoma on the right parietal scalp. She is been applying ice for relief. She also has pain in her right lateral foot from her fall. She otherwise denies pain in the upper extremity or other portions of the lower extremity. She denies any chest pain, shortness of breath, or abdominal pain. No recent fever, cough, congestion, no urinary symptoms. She denies any dizziness or lightheadedness. She has had no vomiting. Fall  Associated symptoms include headaches (mild). Pertinent negatives include no fever, no abdominal pain and no vomiting. Head Injury   Associated symptoms include headaches (mild). Pertinent negatives include no chest pain, no visual disturbance, no abdominal pain, no vomiting, no neck pain and no weakness. Foot Injury   Pertinent negatives include no neck pain. PCP: Nika De MD    Current Outpatient Medications   Medication Sig Dispense Refill    Xarelto 20 mg tab tablet TAKE ONE TABLET BY MOUTH DAILY WITH DINNER 30 Tablet 6    metoprolol tartrate (LOPRESSOR) 25 mg tablet TAKE 1/2 TABLET BY MOUTH TWICE A DAY 90 Tablet 3    abatacept (ORENCIA SC) by SubCUTAneous route every month.      gabapentin (NEURONTIN) 300 mg capsule Take 2 Caps by mouth nightly.  180 Cap 1    cholecalciferol, VITAMIN D3, (VITAMIN D3) 5,000 unit tab tablet Take 1,000 Units by mouth daily. cyanocobalamin 1,000 mcg tablet Take 1,000 mcg by mouth two (2) times a day. diclofenac (VOLTAREN) 1 % gel Apply  to affected area as needed. ascorbic acid, vitamin C, (VITAMIN C) 500 mg tablet Take 500 mg by mouth two (2) times a day. Past History     Past Medical History:  Past Medical History:   Diagnosis Date    A-fib [facility])     Arthritis     Feet    Memory change     Menopause     Multiple thyroid nodules 2009    under care of Dr. Kelsea Ansari    MVP (mitral valve prolapse)     under care of Dr. Sammy Allen    Neuropathy     Plantar fasciitis     [facility] 02[facility]2011    RA (rheumatoid arthritis) ([facility])     Right knee pain     Routine gynecological examination     done by Dr. Darwin Sauceda    S/P colonoscopy     done past 1-2 yrs? Dr. Jeremias Gold    Sjogren's syndrome [facility])     sees Dr. Breanna Delgado    TIA (transient ischemic attack)     possible history of    Tibialis tendonitis     following with Dr Kervin Rawls ankle       Past Surgical History:  Past Surgical History:   Procedure Laterality Date    DILATION AND CURETTAGE  1987    HX BREAST AUGMENTATION  1984    HX DILATION AND CURETTAGE  1/2013    HX TONSILLECTOMY      IMPLANT BREAST SILICONE/EQ      TN TARSAL TUNNEL RELEASE  1994    Right       Family History:  Family History   Problem Relation Age of Onset    Hypertension Father     High Cholesterol Father     Diabetes Father     Stroke Father     Heart Attack Father     Cancer Father     Heart Disease Father     Depression Sister     Breast Cancer Maternal Grandmother        Social History:  Social History     Tobacco Use    Smoking status: Never    Smokeless tobacco: Never   Substance Use Topics    Alcohol use: No     Alcohol/week: 0.0 standard drinks    Drug use: No     Types: Prescription, OTC       Allergies:   Allergies   Allergen Reactions    Plaquenil [Hydroxychloroquine] Nausea and Vomiting    Pcn [Penicillins] Itching and Rash    Prednisone Nausea and Vomiting     She reports a plaquenil/prednisone come causes vomiting    Zithromax [Azithromycin] Rash, Other (comments) and Hives     [facility] records state skin peeling. High fever, peeling         Review of Systems       Review of Systems   Constitutional:  Negative for fever. HENT:  Negative for congestion and sore throat. Eyes:  Negative for visual disturbance. Respiratory:  Negative for shortness of breath. Cardiovascular:  Negative for chest pain and leg swelling. Gastrointestinal:  Negative for abdominal pain, diarrhea and vomiting. Genitourinary:  Negative for dysuria. Musculoskeletal:  Negative for neck pain and neck stiffness. Skin:  Negative for rash. Neurological:  Positive for headaches (mild). Negative for syncope and weakness. Psychiatric/Behavioral:  Negative for confusion. Physical Exam   Visit Vitals  /78 (BP 1 Location: Left upper arm, BP Patient Position: At rest)   Pulse 72   Temp 98.4 °F (36.9 °C)   Resp 18   Ht 5' 6\" (1.676 m)   Wt 61.2 kg (135 lb)   LMP 04/25/2016   SpO2 100%   BMI 21.79 kg/m²         Physical Exam  Nursing note and vitals reviewed.   General appearance: non-toxic, no distress  Eyes: PERRL, EOMI, conjunctiva normal, anicteric sclera  HEENT: Small palpable hematoma right parietal scalp, mucous membranes moist, oropharynx is clear  Neck: No midline tenderness, no step-off deformity  Pulmonary: clear to auscultation bilaterally  Cardiac: normal rate and regular rhythm, no murmurs, 2+ peripheral pulses, cap refill < 2 seconds  Abdomen: soft, nontender, nondistended, bowel sounds present  MSK: Tender to palpation over the right fifth metatarsal, otherwise range of motion right upper and right lower extremity preserved, no crepitus or deformity  Neuro: Alert, answers questions appropriately, 5/5 strength in all 4 extremities, VFF, finger to nose normal, light touch intact in all four extremities, cranial nerves II-XII intact      Diagnostic Study Results     Labs -  No results found for this or any previous visit (from the past 12 hour(s)). Radiologic Studies -   CT HEAD WO CONT   Final Result   No acute intracranial abnormality. XR FOOT RT MIN 3 V   Final Result      Osteopenia. Acute or subacute third and fourth metatarsal necks impacted   fractures. Old healed fracture of the fifth metatarsal.   Hallux valgus and severe first MTP osteoarthrosis. Medical Decision Making   I am the first provider for this patient. I reviewed the vital signs, available nursing notes, past medical history, past surgical history, family history and social history. Vital Signs-Reviewed the patient's vital signs. EKG:     Records Reviewed: Nursing Notes (Time of Review: 4:02 PM)    ED Course: Progress Notes, Reevaluation, and Consults:    ED Course as of 08/25/22 2016   Thu Aug 25, 2022   1816 Out ambulatory in the hallway with her cam walking boot. She states she feels comfortable and secure in the boot. I discussed with her my pending consultation with orthopedic surgery. She has family in the car waiting to go home, and would like to be discharged home. I advised her to wear the cam walking boot for safety and immobilization of her metatarsal fractures. Explained to her the third and fourth distal metatarsal fractures, and need for immobilization and follow-up. Through shared decision-making, the patient would like to be discharged now, as I explained to her I have not yet heard back from orthopedic surgery.   We will have her follow-up with orthopedics as an outpatient. [FH]      ED Course User Index  [FH] Francisco Stewart MD       Provider Notes (Medical Decision Making):   MDM  Number of Diagnoses or Management Options  Closed displaced fracture of metatarsal bone of right foot, unspecified metatarsal, initial encounter  Closed head injury, initial encounter  Diagnosis management comments: Patient with mechanical fall, closed head injury, no ICH noted on CT. Does have third and fourth metatarsal fractures distally. Placed in a Cam walking boot, reviewed with orthopedic surgery on-call who agrees with Cam walking boot. Patient discharged home. Procedures    Critical Care Time: 0      Diagnosis     Clinical Impression:   1. Closed head injury, initial encounter    2. Closed displaced fracture of metatarsal bone of right foot, unspecified metatarsal, initial encounter        Disposition: discharge    Follow-up Information       Follow up With Specialties Details Why Contact Info    Martine Fitch MD Family Medicine Schedule an appointment as soon as possible for a visit in 1 week As needed [facility]  [address] [address]      Ricky Ortiz MD Orthopedic Surgery Schedule an appointment as soon as possible for a visit in 1 week Follow-up with orthopedic surgery for further evaluation and management of your metatarsal fracture. [address]               Discharge Medication List as of 8/25/2022  6:18 PM        CONTINUE these medications which have NOT CHANGED    Details   Xarelto 20 mg tab tablet TAKE ONE TABLET BY MOUTH DAILY WITH DINNER, Normal, Disp-30 Tablet, R-6, KWAME      metoprolol tartrate (LOPRESSOR) 25 mg tablet TAKE 1/2 TABLET BY MOUTH TWICE A DAY, Normal, Disp-90 Tablet, R-3      abatacept (ORENCIA SC) by SubCUTAneous route every month., Historical Med      gabapentin (NEURONTIN) 300 mg capsule Take 2 Caps by mouth nightly., Normal, Disp-180 Cap, R-1      cholecalciferol, VITAMIN D3, (VITAMIN D3) 5,000 unit tab tablet Take 1,000 Units by mouth daily. , Historical Med      cyanocobalamin 1,000 mcg tablet Take 1,000 mcg by mouth two (2) times a day., Historical Med      diclofenac (VOLTAREN) 1 % gel Apply  to affected area as needed., Historical Med      ascorbic acid, vitamin C, (VITAMIN C) 500 mg tablet Take 500 mg by mouth two (2) times a day.  , Historical Med           Disclaimer: Sections of this note are dictated using utilizing voice recognition software. Minor typographical errors may be present. If questions arise, please do not hesitate to contact me or call our department.

## 2022-08-26 ENCOUNTER — TELEPHONE (OUTPATIENT)
Dept: INTERNAL MEDICINE CLINIC | Age: 72
End: 2022-08-26

## 2022-08-26 NOTE — TELEPHONE ENCOUNTER
Pt was seen at [facility] ed 08/26- she ineeds a f/up next avaiable is 09/13 - pt asking if someone jose d in office can see her sooner

## 2022-08-29 ENCOUNTER — OFFICE VISIT (OUTPATIENT)
Dept: ORTHOPEDIC SURGERY | Age: 72
End: 2022-08-29
Payer: MEDICARE

## 2022-08-29 VITALS — TEMPERATURE: 96.8 F | BODY MASS INDEX: 21.79 KG/M2 | WEIGHT: 135 LBS

## 2022-08-29 DIAGNOSIS — S92.323A: ICD-10-CM

## 2022-08-29 DIAGNOSIS — S92.334A CLOSED NONDISPLACED FRACTURE OF THIRD METATARSAL BONE OF RIGHT FOOT, INITIAL ENCOUNTER: Primary | ICD-10-CM

## 2022-08-29 PROCEDURE — 99203 OFFICE O/P NEW LOW 30 MIN: CPT | Performed by: ORTHOPAEDIC SURGERY

## 2022-08-29 PROCEDURE — 3017F COLORECTAL CA SCREEN DOC REV: CPT | Performed by: ORTHOPAEDIC SURGERY

## 2022-08-29 PROCEDURE — G8432 DEP SCR NOT DOC, RNG: HCPCS | Performed by: ORTHOPAEDIC SURGERY

## 2022-08-29 PROCEDURE — G8399 PT W/DXA RESULTS DOCUMENT: HCPCS | Performed by: ORTHOPAEDIC SURGERY

## 2022-08-29 PROCEDURE — 1090F PRES/ABSN URINE INCON ASSESS: CPT | Performed by: ORTHOPAEDIC SURGERY

## 2022-08-29 PROCEDURE — G8420 CALC BMI NORM PARAMETERS: HCPCS | Performed by: ORTHOPAEDIC SURGERY

## 2022-08-29 PROCEDURE — 1123F ACP DISCUSS/DSCN MKR DOCD: CPT | Performed by: ORTHOPAEDIC SURGERY

## 2022-08-29 PROCEDURE — 1101F PT FALLS ASSESS-DOCD LE1/YR: CPT | Performed by: ORTHOPAEDIC SURGERY

## 2022-08-29 PROCEDURE — 28470 CLTX METATARSAL FX WO MNP EA: CPT | Performed by: ORTHOPAEDIC SURGERY

## 2022-08-29 PROCEDURE — G8427 DOCREV CUR MEDS BY ELIG CLIN: HCPCS | Performed by: ORTHOPAEDIC SURGERY

## 2022-08-29 PROCEDURE — G8536 NO DOC ELDER MAL SCRN: HCPCS | Performed by: ORTHOPAEDIC SURGERY

## 2022-08-29 PROCEDURE — G9899 SCRN MAM PERF RSLTS DOC: HCPCS | Performed by: ORTHOPAEDIC SURGERY

## 2022-08-29 NOTE — PROGRESS NOTES
DIAGNOSTIC IMAGING         XR Results (most recent):  Results from Hospital Encounter encounter on 08/25/22    XR FOOT RT MIN 3 V    Narrative  EXAM:  XR FOOT RT MIN 3 V    INDICATION:   fall, pain over 5th MT    COMPARISON:  None. FINDINGS: 3 views of the right foot    Osteopenia. Hallux valgus and bunion with severe first MTP joint space loss and  lateral subluxation. Chronic healed the fifth metatarsal fracture deformity. Acute or subacute third and fourth metatarsal necks impacted fractures. Chronic  ossification along the proximal plantar calcaneus. Impression  Osteopenia. Acute or subacute third and fourth metatarsal necks impacted  fractures. Old healed fracture of the fifth metatarsal.  Hallux valgus and severe first MTP osteoarthrosis. I have reviewed the radiology transcribed results and I have reviewed the images of the above study.       Rc Santana MD  8/28/2022  10:50 PM

## 2022-08-29 NOTE — PROGRESS NOTES
AMBULATORY PROGRESS NOTE      Patient: Lesia Bain             MRN: [id number]     SSN: [id number] Body mass index is 21.79 kg/m². YOB: 1950     AGE: 71 y.o. EX: female    PCP: Amanda Dumont MD       IMPRESSION //  DIAGNOSIS AND TREATMENT PLAN      Lesia Bain has a diagnosis of:          DIAGNOSES    1. Closed nondisplaced fracture of third metatarsal bone of right foot, initial encounter    2. Closed metaphyseal fracture of second metatarsal bone        Orders Placed This Encounter    [place] CLOSED TX METATARSAL FX    [place] CLOSED TX METATARSAL FX          PLAN:    1. Continue to wear short CAM walker boot: She has been in his cam walker boot, as this was given to her by the ER. 2. I anticipate obtaining right foot 3V XR at next OV    RTO 1 month    There are no Patient Instructions on file for this visit. Please follow up with your PCP for any health maintenance as recommended         Lesia Bain  expresses understanding of the diagnosis, treatment plan, and all of their proposed questions were answered to their satisfaction. Patient education has been provided re the diagnoses. HPI //  Traci Marshlal IS A 71 y.o. female who is a/an  new patient, presenting to my outpatient office for evaluation of  the following chief complaint(s):     Chief Complaint   Patient presents with    Foot Pain     right     DOI: 08/25/2022    Lesia Bain presents today with a right foot injury. She reports that on 08/25/2022 she tripped over an electrical cord while walking from her house to her garage and fell, striking her head on the leg of a workbench. She further reports that she followed up at the ED the same day, underwent a head CT with normal results and right foot XR revealing 3rd and 4th metatarsal fractures, and received a right short CAM walker boot. She takes Xarelto.  She also has a history of prior toe fractures in 2020 and states she did not follow up due to the COVID-19 pandemic. Visit Vitals  Temp 96.8 °F (36 °C)   Wt 135 lb (61.2 kg)   LMP 04/25/2016   BMI 21.79 kg/m²       Appearance: Alert, well appearing and pleasant patient who is in no distress, oriented to person, place/time, and who follows commands. Normal dress/motor activity/thought processes/memory. This patient is accompanied in the examination room by her  self. Patient arrives to office via: with assistive device: right short CAM walker boot. Psychiatric:  Normal Affect/mood. Judgement, behavior, and conduct are appropriate. Speech normal in context and clarity, memory intact grossly, no involuntary movements - tremors. H EENT (2): Head normocephalic & atraumatic. Eye: pupils are round// EOM are intact // Neck: ROM WNL  // Hearings Intact   Respiratory: Breathing non labored     ANKLE/FOOT right    Gait: uses assistive device - right short CAM walker boot  Tenderness: moderate     3/4 forefoot region distal 1/3  Cutaneous: Mild brusiing over dorsal forefoot near 3rd and 4th metatarsals. WNL. Joint Motion: ankle/HF  WNL   Joint / Tendon Stability:  No Ankle or Subtalar instability or joint laxity. No peroneal sublux ability or dislocation  Alignment: neutral Hindfoot,    Neuro Motor/Sensory: NL/NL  Vascular: NL foot/ankle pulses,   Lymphatics: No extremity lymphedema, No calf swelling, no tenderness to calf muscles. CHART REVIEW     Reji Pavon has been experiencing pain and discomfort confirmed as outlined in the pain assessment outlined below.  was reviewed by Daren Peralta MD, on 8/29/2022.      Pain Assessment  8/29/2022   Location of Pain Foot   Location Modifiers Right   Severity of Pain 1   Quality of Pain Dull   Quality of Pain Comment -   Duration of Pain -   Frequency of Pain Intermittent   Aggravating Factors Walking;Standing   Aggravating Factors Comment -   Limiting Behavior No Relieving Factors -   Relieving Factors Comment -   Result of Injury -        yTron Desai  has a past medical history of [facility] Arthritis, Memory change, Menopause, Multiple thyroid nodules (2009), MVP (mitral valve prolapse), Neuropathy, Plantar fasciitis, PSVT (paroxysmal supraventricular tachycardia) ([facility] (02/08/2011), RA (rheumatoid arthritis) ([facility] Right knee pain, Routine gynecological examination, S/P colonoscopy, Sjogren's syndrome ([facility] TIA (transient ischemic attack), and Tibialis tendonitis. She has no past medical history of Breast lump. Patients is employed at:          has a past medical history of A-fib ([facility] Arthritis, Memory change, Menopause, Multiple thyroid nodules (2009), MVP (mitral valve prolapse), Neuropathy, Plantar fasciitis, PSVT (paroxysmal supraventricular tachycardia) ([facility] (02/08/2011), RA (rheumatoid arthritis) ([facility] Right knee pain, Routine gynecological examination, S/P colonoscopy, Sjogren's syndrome ([facility] TIA (transient ischemic attack), and Tibialis tendonitis. She has no past medical history of Breast lump.    has a past surgical history that includes hx tonsillectomy; hx breast augmentation (1984); pr tarsal tunnel release (1994); dilation and curettage (1987); hx dilation and curettage (1/2013); and implant breast silicone/eq.   family history includes Breast Cancer in her maternal grandmother; Cancer in her father; Depression in her sister; Diabetes in her father; Heart Attack in her father; Heart Disease in her father; High Cholesterol in her father; Hypertension in her father; Stroke in her father. Current Outpatient Medications   Medication Sig    Xarelto 20 mg tab tablet TAKE ONE TABLET BY MOUTH DAILY WITH DINNER    metoprolol tartrate (LOPRESSOR) 25 mg tablet TAKE 1/2 TABLET BY MOUTH TWICE A DAY    abatacept (ORENCIA SC) by SubCUTAneous route every month.    gabapentin (NEURONTIN) 300 mg capsule Take 2 Caps by mouth nightly. cholecalciferol, VITAMIN D3, (VITAMIN D3) 5,000 unit tab tablet Take 1,000 Units by mouth daily. cyanocobalamin 1,000 mcg tablet Take 1,000 mcg by mouth two (2) times a day. diclofenac (VOLTAREN) 1 % gel Apply  to affected area as needed. ascorbic acid, vitamin C, (VITAMIN C) 500 mg tablet Take 500 mg by mouth two (2) times a day. No current facility-administered medications for this visit. Allergies   Allergen Reactions    Plaquenil [Hydroxychloroquine] Nausea and Vomiting    Pcn [Penicillins] Itching and Rash    Prednisone Nausea and Vomiting     She reports a plaquenil/prednisone come causes vomiting    Zithromax [Azithromycin] Rash, Other (comments) and Hives     [facility] records state skin peeling. High fever, peeling     Social History     Occupational History    Not on file   Tobacco Use    Smoking status: Never    Smokeless tobacco: Never   Substance and Sexual Activity    Alcohol use: No     Alcohol/week: 0.0 standard drinks    Drug use: No     Types: Prescription, OTC    Sexual activity: Yes     Partners: Male       reports that she has never smoked. She has never used smokeless tobacco. She reports that she does not drink alcohol and does not use drugs. DIAGNOSTIC LAB DATA      Lab Results   Component Value Date/Time    Hemoglobin A1c 5.5 07/19/2016 09:10 AM    //   Lab Results   Component Value Date/Time    Glucose 83 01/14/2021 11:36 AM    Glucose (POC) 145 (H) 04/17/2016 09:53 PM        No results found for: [id number], [id number]      Lab Results   Component Value Date/Time    Vitamin D 25-Hydroxy 33.7 12/31/2016 08:09 AM        Drug Screen Most Recent Result Date    No resulted procedures found. REVIEW OF SYSTEMS : 8/29/2022  ALL BELOW ARE Negative except : SEE HPI     All other systems reviewed and are negative. 12 point review of systems otherwise negative unless noted in HPI.     RADIOGRAPHS// IMAGING//DIAGNOSTIC DATA     Orders Placed This Encounter    [place] CLOSED [place] METATARSAL FX    [place] CLOSED [place] METATARSAL FX      XR Results (most recent):  Results from Hospital Encounter encounter on 08/25/22    XR FOOT RT MIN 3 V    Narrative  EXAM:  XR FOOT RT MIN 3 V    INDICATION:   fall, pain over 5th MT    COMPARISON:  None. FINDINGS: 3 views of the right foot    Osteopenia. Hallux valgus and bunion with severe first MTP joint space loss and  lateral subluxation. Chronic healed the fifth metatarsal fracture deformity. Acute or subacute third and fourth metatarsal necks impacted fractures. Chronic  ossification along the proximal plantar calcaneus. Impression  Osteopenia. Acute or subacute third and fourth metatarsal necks impacted  fractures. Old healed fracture of the fifth metatarsal.  Hallux valgus and severe first MTP osteoarthrosis. I have reviewed the radiology transcribed results and I have reviewed the images of the above study. I have spent 30 minutes reviewing the previous notes, reviewing diagnostic studies [Advanced  Imaging, Diagnostic test results (x-rays)] and had a direct face to face with the patient discussing the diagnosis and importance of compliance with the treatment and plan. There is  discussion for the potential for surgery, answering all questions, as well as documenting patient care coordination for this individual on the day of the visit. Disclaimer:     Sections of this note are dictated using utilizing voice recognition software, which may have resulted in some phonetic based errors in grammar and contents. Even though attempts were made to correct all the mistakes, some may have been missed, and remained in the body of the document. If questions arise, please contact our department. An electronic signature was used to authenticate this note. Jaya Roblero may have a reminder for a \"due or due soon\" health maintenance.  I have asked that she contact her primary care provider for follow-up on this health maintenance. There are no Patient Instructions on file for this visit. Please follow up with your PCP for any health maintenance as recommended.                 Scribed by Yareli Navarrete, as dictated by Katy Rico MD.   8/29/2022  7:08 AM

## 2022-08-30 ENCOUNTER — OFFICE VISIT (OUTPATIENT)
Dept: INTERNAL MEDICINE CLINIC | Age: 72
End: 2022-08-30
Payer: MEDICARE

## 2022-08-30 VITALS
WEIGHT: 132 LBS | OXYGEN SATURATION: 96 % | HEART RATE: 67 BPM | HEIGHT: 66 IN | DIASTOLIC BLOOD PRESSURE: 68 MMHG | BODY MASS INDEX: 21.21 KG/M2 | RESPIRATION RATE: 14 BRPM | SYSTOLIC BLOOD PRESSURE: 111 MMHG | TEMPERATURE: 97.8 F

## 2022-08-30 DIAGNOSIS — M35.01 SJOGREN'S SYNDROME WITH KERATOCONJUNCTIVITIS SICCA (HCC): ICD-10-CM

## 2022-08-30 DIAGNOSIS — M06.9 RHEUMATOID ARTHRITIS INVOLVING BOTH HANDS, UNSPECIFIED WHETHER RHEUMATOID FACTOR PRESENT (HCC): ICD-10-CM

## 2022-08-30 DIAGNOSIS — E04.2 MULTIPLE THYROID NODULES: ICD-10-CM
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
## 2022-08-30 DIAGNOSIS — I48.0 PAF (PAROXYSMAL ATRIAL FIBRILLATION) (HCC): Primary | ICD-10-CM

## 2022-08-30 DIAGNOSIS — K75.81 NASH (NONALCOHOLIC STEATOHEPATITIS): ICD-10-CM

## 2022-08-30 PROCEDURE — G8420 CALC BMI NORM PARAMETERS: HCPCS | Performed by: INTERNAL MEDICINE

## 2022-08-30 PROCEDURE — 1123F ACP DISCUSS/DSCN MKR DOCD: CPT | Performed by: INTERNAL MEDICINE

## 2022-08-30 PROCEDURE — 3017F COLORECTAL CA SCREEN DOC REV: CPT | Performed by: INTERNAL MEDICINE

## 2022-08-30 PROCEDURE — G8536 NO DOC ELDER MAL SCRN: HCPCS | Performed by: INTERNAL MEDICINE

## 2022-08-30 PROCEDURE — G0463 HOSPITAL OUTPT CLINIC VISIT: HCPCS | Performed by: INTERNAL MEDICINE

## 2022-08-30 PROCEDURE — 1101F PT FALLS ASSESS-DOCD LE1/YR: CPT | Performed by: INTERNAL MEDICINE

## 2022-08-30 PROCEDURE — G9899 SCRN MAM PERF RSLTS DOC: HCPCS | Performed by: INTERNAL MEDICINE

## 2022-08-30 PROCEDURE — G8427 DOCREV CUR MEDS BY ELIG CLIN: HCPCS | Performed by: INTERNAL MEDICINE

## 2022-08-30 PROCEDURE — 1090F PRES/ABSN URINE INCON ASSESS: CPT | Performed by: INTERNAL MEDICINE

## 2022-08-30 PROCEDURE — G8510 SCR DEP NEG, NO PLAN REQD: HCPCS | Performed by: INTERNAL MEDICINE

## 2022-08-30 PROCEDURE — 99214 OFFICE O/P EST MOD 30 MIN: CPT | Performed by: INTERNAL MEDICINE

## 2022-08-30 PROCEDURE — G8399 PT W/DXA RESULTS DOCUMENT: HCPCS | Performed by: INTERNAL MEDICINE

## 2022-08-30 RX ORDER — ACETAMINOPHEN 325 MG/1
325 TABLET ORAL
COMMUNITY

## 2022-08-30 NOTE — PROGRESS NOTES
Evaristo Shoemaker presents today for   Chief Complaint   Patient presents with    ED Follow-up     seen at [facility] ER on 8-25-22 for fall, head and foot injury           1. \"Have you been to the ER, urgent care clinic since your last visit? Hospitalized since your last visit? \" yes    2. \"Have you seen or consulted any other health care providers outside of the [address] [place] [address] since your last visit? \" yes     3. For patients aged 39-70: Has the patient had a colonoscopy / FIT/ Cologuard? Yes - no Care Gap present      If the patient is female:    4. For patients aged 41-77: Has the patient had a mammogram within the past 2 years? Yes - no Care Gap present  See top three    5. For patients aged 21-65: Has the patient had a pap smear?  NA - based on age or sex

## 2022-08-30 NOTE — PROGRESS NOTES
INTERNISTS OF [facility]:  8/30/2022, MRN: [id number]      Cory Best is a 71 y.o. female and presents to clinic for ED Follow-up (seen at [facility] ER on 8-25-22 for fall, head and foot injury)      Subjective: The patient is a 52-year-old female with history of SIERRA, left foot bone spur, lichen sclerosis per biopsy in 2012, cervical disc disease, varicose veins, Sjogren's syndrome, dysphasia, rheumatoid arthritis (), paroxysmal supraventricular tachycardia, mitral valve prolapse, multiple thyroid nodules, atrial fibrillation per EHR, plantar fasciitis, and dysphasia. 1. SVT/AF: Taking Xarelto and metoprolol. No adverse effects. Asymptomatic. Last apt with Cardiology: May.    2. RA: Last apt with : earlier this yr. On Orencia. Her hands feel stiff. S/p xrays earlier this yr of her hands. Per her history, her x-rays this year did not show any new changes. 3. NAFLD: No ETOH. She has not had a FibroSure test.  She had a CT scan done in 2016 that showed findings consistent with hepatic steatosis. 4. Fall: Occurred on 8/25/22. She stumbled over a cord. She was seen in the ED at which time an x-ray was obtained. Findings are listed below. She is wearing a boot along her right leg. S/p visit with Clint Box yesterday. She has a follow up apt in a month. Her last DEXA scan was last yr. No falls since her ED visit. 8/25/22 Head CT: No acute intracranial abnormality    8/25/22 Right Foot Xray: Osteopenia. Acute or subacute third and fourth metatarsal necks impacted fractures. Old healed fracture of the fifth metatarsal. Hallux valgus and severe first MTP osteoarthrosis     5. Thyroid Nodule Hx: She is overdue for TFTs to be checked. 1/12/16 Thyroid Ultrasound: Tiny nonspecific but generally benign-appearing nodules within the right and left thyroid lobes, similar to comparison studies. Some of the nodules noted previously are no longer seen.         Patient Active Problem List Diagnosis Date Noted    PAF (paroxysmal atrial fibrillation) ([facility] 11/21/2017    Osteopenia of multiple sites, 9/17/17 10/04/2017    Rheumatoid arthritis ([facility] [place] 10/04/2017    Bilateral carpal tunnel syndrome, per EMG testing 8/17 08/17/2017    SIERRA (nonalcoholic steatohepatitis) 05/30/2017    Bone spur of left foot 02/04/1600    Lichen sclerosus 91/94 05/30/2017    Cervical disc disease 01/08/2016    Varicose veins of lower extremities with other complications 76/36/1367    Sjogren's syndrome ([facility] [place] [facility]     Dysphagia 11/24/2010    MVP (mitral valve prolapse)     TIA (transient ischemic attack)     Multiple thyroid nodules        Current Outpatient Medications   Medication Sig Dispense Refill    acetaminophen (TylenoL) 325 mg tablet Take 325 mg by mouth every four (4) hours as needed for Pain. Xarelto 20 mg tab tablet TAKE ONE TABLET BY MOUTH DAILY WITH DINNER 30 Tablet 6    metoprolol tartrate (LOPRESSOR) 25 mg tablet TAKE 1/2 TABLET BY MOUTH TWICE A DAY 90 Tablet 3    abatacept (ORENCIA SC) by SubCUTAneous route every month.      gabapentin (NEURONTIN) 300 mg capsule Take 2 Caps by mouth nightly. 180 Cap 1    cholecalciferol, VITAMIN D3, (VITAMIN D3) 5,000 unit tab tablet Take 1,000 Units by mouth daily. cyanocobalamin 1,000 mcg tablet Take 1,000 mcg by mouth two (2) times a day. diclofenac (VOLTAREN) 1 % gel Apply  to affected area as needed. ascorbic acid, vitamin C, (VITAMIN C) 500 mg tablet Take 500 mg by mouth two (2) times a day. Allergies   Allergen Reactions    Plaquenil [Hydroxychloroquine] Nausea and Vomiting    Pcn [Penicillins] Itching and Rash    Prednisone Nausea and Vomiting     She reports a plaquenil/prednisone come causes vomiting    Zithromax [Azithromycin] Rash, Other (comments) and Hives     [facility] records state skin peeling.   High fever, peeling       Past Medical History:   Diagnosis Date    A-fib [facility])     Arthritis     Feet    Memory change     Menopause Multiple thyroid nodules 2009    under care of Dr. Kelsea Ansari    MVP (mitral valve prolapse)     under care of Dr. Sammy Allen    Neuropathy     Plantar fasciitis     PSVT (paroxysmal supraventricular tachycardia) ([facility] 02/08/2011    RA (rheumatoid arthritis) ([facility])     Right knee pain     Routine gynecological examination     done by Dr. Darwin Sauceda    S/P colonoscopy     done past 1-2 yrs? Dr. Jeremias Gold    Sjogren's syndrome [facility])     sees Dr. Breanna Delgado    TIA (transient ischemic attack)     possible history of    Tibialis tendonitis     following with Dr Kervin Rawls ankle       Past Surgical History:   Procedure Laterality Date    DILATION AND CURETTAGE  1987    HX BREAST AUGMENTATION  1984    HX DILATION AND CURETTAGE  1/2013    HX TONSILLECTOMY      IMPLANT BREAST SILICONE/EQ      [place] TARSAL TUNNEL RELEASE  1994    Right       Family History   Problem Relation Age of Onset    Hypertension Father     High Cholesterol Father     Diabetes Father     Stroke Father     Heart Attack Father     Cancer Father     Heart Disease Father     Depression Sister     Breast Cancer Maternal Grandmother        Social History     Tobacco Use    Smoking status: Never    Smokeless tobacco: Never   Substance Use Topics    Alcohol use: No     Alcohol/week: 0.0 standard drinks       ROS   Review of Systems   Constitutional:  Negative for chills and fever. HENT:  Negative for ear pain and sore throat. Eyes:  Negative for pain. Respiratory:  Negative for cough and shortness of breath. Cardiovascular:  Negative for chest pain. Gastrointestinal:  Negative for abdominal pain, blood in stool and melena. Genitourinary:  Negative for dysuria and hematuria. Musculoskeletal:  Positive for joint pain. Negative for myalgias. Skin:  Negative for rash. Neurological:  Negative for headaches. Endo/Heme/Allergies:  Does not bruise/bleed easily. Psychiatric/Behavioral:  Negative for substance abuse.       Objective     Vitals:    08/30/22 1101 Temp: 97.8 °F (36.6 °C)   TempSrc: Temporal   Weight: 132 lb (59.9 kg)   Height: 5' 6\" (1.676 m)   PainSc:   2   PainLoc: Generalized   LMP: 04/25/2016       Physical Exam  Vitals and nursing note reviewed. HENT:      Head: Normocephalic and atraumatic. Right Ear: External ear normal.      Left Ear: External ear normal.   Eyes:      General: No scleral icterus. Right eye: No discharge. Left eye: No discharge. Conjunctiva/sclera: Conjunctivae normal.   Cardiovascular:      Rate and Rhythm: Normal rate and regular rhythm. Heart sounds: Normal heart sounds. No murmur heard. No friction rub. No gallop. Pulmonary:      Effort: Pulmonary effort is normal. No respiratory distress. Breath sounds: Normal breath sounds. No wheezing or rales. Abdominal:      General: Bowel sounds are normal. There is no distension. Palpations: Abdomen is soft. There is no mass. Tenderness: There is no abdominal tenderness. There is no guarding or rebound. Musculoskeletal:         General: No swelling (BUE) or tenderness (BUE). Cervical back: Neck supple. Comments: Her right leg is in an orthopedic boot. Lymphadenopathy:      Cervical: No cervical adenopathy. Skin:     General: Skin is warm and dry. Comments: She has an abrasion along her right knee without any surrounding erythema. Neurological:      General: No focal deficit present. Mental Status: She is alert. Mental status is at baseline. Motor: No abnormal muscle tone.    Psychiatric:         Mood and Affect: Mood normal.       LABS   Data Review:   Lab Results   Component Value Date/Time    WBC 5.2 01/14/2021 11:36 AM    HGB 15.6 01/14/2021 11:36 AM    HCT 45.4 (H) 01/14/2021 11:36 AM    PLATELET 978 23/61/4113 11:36 AM    MCV 90.6 01/14/2021 11:36 AM       Lab Results   Component Value Date/Time    Sodium 143 01/14/2021 11:36 AM    Potassium 4.9 01/14/2021 11:36 AM    Chloride 109 01/14/2021 11:36 AM CO2 25 01/14/2021 11:36 AM    Anion gap 9 01/14/2021 11:36 AM    Glucose 83 01/14/2021 11:36 AM    BUN 9 01/14/2021 11:36 AM    Creatinine 0.67 01/14/2021 11:36 AM    BUN/Creatinine ratio 13 01/14/2021 11:36 AM    GFR est AA >60 01/14/2021 11:36 AM    GFR est non-AA >60 01/14/2021 11:36 AM    Calcium 9.3 01/14/2021 11:36 AM       Lab Results   Component Value Date/Time    Cholesterol, total 171 01/14/2021 11:36 AM    HDL Cholesterol 83 (H) 01/14/2021 11:36 AM    LDL, calculated 78.4 01/14/2021 11:36 AM    VLDL, calculated 9.6 01/14/2021 11:36 AM    Triglyceride 48 01/14/2021 11:36 AM    CHOL/HDL Ratio 2.1 01/14/2021 11:36 AM       Lab Results   Component Value Date/Time    Hemoglobin A1c 5.5 07/19/2016 09:10 AM       Assessment/Plan:   1. PAF: Stable. -Continue medication as prescribed. - We will check a lipid panel, CBC, and a TFT panel just before her upcoming visit next month. ORDERS:  - LIPID PANEL; Future  - CBC WITH AUTOMATED DIFF; Future  - TSH AND FREE T4; Future    2. SIERRA (nonalcoholic steatohepatitis): Unchanged.  -Checking labs. ORDERS:  - LIPID PANEL; Future  - METABOLIC PANEL, COMPREHENSIVE; Future  - CBC WITH AUTOMATED DIFF; Future  - SIERRA FIBROSURE; Future    3. RA and Sjogren's syndrome: Adequately controlled with Orencia for her history.  -I encouraged her to follow-up with her rheumatologist for further recommendations. - Continue with medication as prescribed for now. 4. Fall: +New Right foot fractures. +Osteopenia.   -Fall risk reduction measures. - I encouraged her to follow-up with rheumatologist given her history of osteopenia. - I encouraged her to follow-up with Dr. Andrew Perez  -Activity per Dr. Andrew Perez. - Continue use of orthopedic boot    5. Thyroid nodule history:  - Checking labs as mentioned above. **I will do her Medicare wellness visit at her upcoming visit next month. **    Health Maintenance Due   Topic Date Due    Medicare Yearly Exam  03/06/2022    Depression Screen  04/21/2022     Lab review: labs are reviewed in the EHR and ordered as mentioned above. I have discussed the diagnosis with the patient and the intended plan as seen in the above orders. The patient has received an after-visit summary and questions were answered concerning future plans. I have discussed medication side effects and warnings with the patient as well. I have reviewed the plan of care with the patient, accepted their input and they are in agreement with the treatment goals. All questions were answered. The patient understands the plan of care. Handouts provided today with above information. Pt instructed if symptoms worsen to call the office or report to the ED for continued care. Greater than 50% of the visit time was spent in counseling and/or coordination of care. Voice recognition was used to generate this report, which may have resulted in some phonetic based errors in grammar and contents. Even though attempts were made to correct all the mistakes, some may have been missed, and remained in the body of the document.           Suki Bhat MD

## 2022-09-08 ENCOUNTER — HOSPITAL ENCOUNTER (OUTPATIENT)
Dept: LAB | Age: 72
Discharge: HOME | End: 2022-09-08
Payer: MEDICARE

## 2022-09-08 LAB
ALBUMIN SERPL-MCNC: 3.6 G/DL (ref 3.4–5)
ALBUMIN/GLOB SERPL: 1.2 {RATIO} (ref 0.8–1.7)
ALP SERPL-CCNC: 62 U/L (ref 45–117)
ALT SERPL-CCNC: 20 U/L (ref 13–56)
ANION GAP SERPL CALC-SCNC: 4 MMOL/L (ref 3–18)
AST SERPL-CCNC: 19 U/L (ref 10–38)
BASOPHILS # BLD: 0.1 K/UL (ref 0–0.1)
BASOPHILS NFR BLD: 1 % (ref 0–2)
BILIRUB SERPL-MCNC: 0.7 MG/DL (ref 0.2–1)
BUN SERPL-MCNC: 14 MG/DL (ref 7–18)
BUN/CREAT SERPL: 20 (ref 12–20)
CALCIUM SERPL-MCNC: 8.9 MG/DL (ref 8.5–10.1)
CHLORIDE SERPL-SCNC: 108 MMOL/L (ref 100–111)
CHOLEST SERPL-MCNC: 171 MG/DL
CO2 SERPL-SCNC: 30 MMOL/L (ref 21–32)
CREAT SERPL-MCNC: 0.69 MG/DL (ref 0.6–1.3)
DIFFERENTIAL METHOD BLD: ABNORMAL
EOSINOPHIL # BLD: 0.4 K/UL (ref 0–0.4)
EOSINOPHIL NFR BLD: 9 % (ref 0–5)
ERYTHROCYTE [DISTWIDTH] IN BLOOD BY AUTOMATED COUNT: 12.9 % (ref 11.6–14.5)
GLOBULIN SER CALC-MCNC: 3 G/DL (ref 2–4)
GLUCOSE SERPL-MCNC: 87 MG/DL (ref 74–99)
HCT VFR BLD AUTO: 44 % (ref 35–45)
HDLC SERPL-MCNC: 61 MG/DL (ref 40–60)
HDLC SERPL: 2.8 {RATIO} (ref 0–5)
HGB BLD-MCNC: 14.6 G/DL (ref 12–16)
IMM GRANULOCYTES # BLD AUTO: 0 K/UL (ref 0–0.04)
IMM GRANULOCYTES NFR BLD AUTO: 0 % (ref 0–0.5)
LDLC SERPL CALC-MCNC: 99.6 MG/DL (ref 0–100)
LIPID PROFILE,FLP: ABNORMAL
LYMPHOCYTES # BLD: 1.4 K/UL (ref 0.9–3.6)
LYMPHOCYTES NFR BLD: 28 % (ref 21–52)
MCH RBC QN AUTO: 30.2 PG (ref 24–34)
MCHC RBC AUTO-ENTMCNC: 33.2 G/DL (ref 31–37)
MCV RBC AUTO: 91.1 FL (ref 78–100)
MONOCYTES # BLD: 0.5 K/UL (ref 0.05–1.2)
MONOCYTES NFR BLD: 10 % (ref 3–10)
NEUTS SEG # BLD: 2.5 K/UL (ref 1.8–8)
NEUTS SEG NFR BLD: 52 % (ref 40–73)
NRBC # BLD: 0 K/UL (ref 0–0.01)
NRBC BLD-RTO: 0 PER 100 WBC
PLATELET # BLD AUTO: 266 K/UL (ref 135–420)
PMV BLD AUTO: 9.3 FL (ref 9.2–11.8)
POTASSIUM SERPL-SCNC: 4.6 MMOL/L (ref 3.5–5.5)
PROT SERPL-MCNC: 6.6 G/DL (ref 6.4–8.2)
RBC # BLD AUTO: 4.83 M/UL (ref 4.2–5.3)
SODIUM SERPL-SCNC: 142 MMOL/L (ref 136–145)
T4 FREE SERPL-MCNC: 1.1 NG/DL (ref 0.7–1.5)
TRIGL SERPL-MCNC: 52 MG/DL (ref ?–150)
TSH SERPL DL<=0.05 MIU/L-ACNC: 1.49 UIU/ML (ref 0.36–3.74)
VLDLC SERPL CALC-MCNC: 10.4 MG/DL
WBC # BLD AUTO: 4.9 K/UL (ref 4.6–13.2)

## 2022-09-08 PROCEDURE — 84450 TRANSFERASE (AST) (SGOT): CPT

## 2022-09-08 PROCEDURE — 80061 LIPID PANEL: CPT

## 2022-09-08 PROCEDURE — 80053 COMPREHEN METABOLIC PANEL: CPT

## 2022-09-08 PROCEDURE — 36415 COLL VENOUS BLD VENIPUNCTURE: CPT

## 2022-09-08 PROCEDURE — 84439 ASSAY OF FREE THYROXINE: CPT

## 2022-09-08 PROCEDURE — 85025 COMPLETE CBC W/AUTO DIFF WBC: CPT

## 2022-09-08 NOTE — PROGRESS NOTES
I will let her know upcoming appointment that her CBC shows an elevated eosinophil percentage at 9%. Her TFTs and CMP are unremarkable.   Her lipid panel is normal.    Dr. Dhruv Gore  [facility] of [address], [address] [place] [address] Str.  Phone: [phone]  Fax: [phone]

## 2022-09-10 LAB
A2 MACROGLOB SERPL-MCNC: 203 MG/DL (ref 110–276)
ALT (SGPT) P5P, 001547: 15 IU/L (ref 0–40)
APO A-I SERPL-MCNC: 143 MG/DL (ref 116–209)
AST SERPL W P-5'-P-CCNC: 22 IU/L (ref 0–40)
BILIRUB SERPL-MCNC: 0.6 MG/DL (ref 0–1.2)
CHOLEST SERPL-MCNC: 170 MG/DL (ref 100–199)
COMMENT:: ABNORMAL
FIBROSIS SCORING:, 550107: ABNORMAL
FIBROSIS STAGE SERPL QL: ABNORMAL
GGT SERPL-CCNC: 10 IU/L (ref 0–60)
GLUCOSE SERPL-MCNC: 88 MG/DL (ref 65–99)
HAPTOGLOB SERPL-MCNC: 123 MG/DL (ref 42–346)
HEIGHT (NASH), 13912: ABNORMAL
INTERPRETATIONS:, 550143: ABNORMAL
LIVER FIBR SCORE SERPL CALC.FIBROSURE: 0.23 (ref 0–0.21)
NASH SCORING, 550144: ABNORMAL
NECROINFLAMMATORY ACT GRADE SERPL QL: ABNORMAL
NECROINFLAMMATORY ACT SCORE SERPL: 0.25
SERVICE CMNT-IMP: ABNORMAL
STEATOSIS GRADE, 550153: ABNORMAL
STEATOSIS GRADING, 550189: ABNORMAL
STEATOSIS SCORE, 550149: 0.15 (ref 0–0.3)
TRIGL SERPL-MCNC: 52 MG/DL (ref 0–149)
WEIGHT (NASH): ABNORMAL

## 2022-09-12 ENCOUNTER — OFFICE VISIT (OUTPATIENT)
Dept: INTERNAL MEDICINE CLINIC | Age: 72
End: 2022-09-12
Payer: MEDICARE

## 2022-09-12 VITALS
WEIGHT: 132.6 LBS | OXYGEN SATURATION: 96 % | HEIGHT: 66 IN | HEART RATE: 69 BPM | SYSTOLIC BLOOD PRESSURE: 102 MMHG | BODY MASS INDEX: 21.31 KG/M2 | DIASTOLIC BLOOD PRESSURE: 69 MMHG | RESPIRATION RATE: 18 BRPM | TEMPERATURE: 97.5 F

## 2022-09-12 DIAGNOSIS — Z71.89 ADVANCE CARE PLANNING: ICD-10-CM

## 2022-09-12 DIAGNOSIS — I48.0 PAF (PAROXYSMAL ATRIAL FIBRILLATION) (HCC): ICD-10-CM

## 2022-09-12 DIAGNOSIS — M06.9 RHEUMATOID ARTHRITIS INVOLVING BOTH HANDS, UNSPECIFIED WHETHER RHEUMATOID FACTOR PRESENT (HCC): ICD-10-CM

## 2022-09-12 DIAGNOSIS — R79.89 ABNORMAL CBC: ICD-10-CM

## 2022-09-12 DIAGNOSIS — Z12.31 ENCOUNTER FOR SCREENING MAMMOGRAM FOR BREAST CANCER: ICD-10-CM

## 2022-09-12 DIAGNOSIS — E04.2 MULTIPLE THYROID NODULES: ICD-10-CM

## 2022-09-12 DIAGNOSIS — K76.0 NAFLD (NONALCOHOLIC FATTY LIVER DISEASE): Primary | ICD-10-CM

## 2022-09-12 DIAGNOSIS — K76.0 NAFLD (NONALCOHOLIC FATTY LIVER DISEASE): ICD-10-CM

## 2022-09-12 DIAGNOSIS — Z00.00 MEDICARE ANNUAL WELLNESS VISIT, SUBSEQUENT: ICD-10-CM

## 2022-09-12 PROCEDURE — G8432 DEP SCR NOT DOC, RNG: HCPCS | Performed by: INTERNAL MEDICINE

## 2022-09-12 PROCEDURE — 99214 OFFICE O/P EST MOD 30 MIN: CPT | Performed by: INTERNAL MEDICINE

## 2022-09-12 PROCEDURE — 1101F PT FALLS ASSESS-DOCD LE1/YR: CPT | Performed by: INTERNAL MEDICINE

## 2022-09-12 PROCEDURE — G9899 SCRN MAM PERF RSLTS DOC: HCPCS | Performed by: INTERNAL MEDICINE

## 2022-09-12 PROCEDURE — G8420 CALC BMI NORM PARAMETERS: HCPCS | Performed by: INTERNAL MEDICINE

## 2022-09-12 PROCEDURE — G0463 HOSPITAL OUTPT CLINIC VISIT: HCPCS | Performed by: INTERNAL MEDICINE

## 2022-09-12 PROCEDURE — G8536 NO DOC ELDER MAL SCRN: HCPCS | Performed by: INTERNAL MEDICINE

## 2022-09-12 PROCEDURE — G8427 DOCREV CUR MEDS BY ELIG CLIN: HCPCS | Performed by: INTERNAL MEDICINE

## 2022-09-12 PROCEDURE — 1123F ACP DISCUSS/DSCN MKR DOCD: CPT | Performed by: INTERNAL MEDICINE

## 2022-09-12 PROCEDURE — 1090F PRES/ABSN URINE INCON ASSESS: CPT | Performed by: INTERNAL MEDICINE

## 2022-09-12 PROCEDURE — G8399 PT W/DXA RESULTS DOCUMENT: HCPCS | Performed by: INTERNAL MEDICINE

## 2022-09-12 PROCEDURE — G0439 PPPS, SUBSEQ VISIT: HCPCS | Performed by: INTERNAL MEDICINE

## 2022-09-12 PROCEDURE — 3017F COLORECTAL CA SCREEN DOC REV: CPT | Performed by: INTERNAL MEDICINE

## 2022-09-12 NOTE — PATIENT INSTRUCTIONS

## 2022-09-12 NOTE — PROGRESS NOTES
Steffanie Coronado presents today for   Chief Complaint   Patient presents with    Annual Wellness Visit     Medicare Wellness visit. 1. \"Have you been to the ER, urgent care clinic since your last visit? Hospitalized since your last visit? \" No    2. \"Have you seen or consulted any other health care providers outside of the [address] [place] [address] since your last visit? \" yes     3. For patients aged 39-70: Has the patient had a colonoscopy / FIT/ Cologuard? Yes - no Care Gap present      If the patient is female:    4. For patients aged 41-77: Has the patient had a mammogram within the past 2 years? Yes - no Care Gap present  See top three    5. For patients aged 21-65: Has the patient had a pap smear?  NA - based on age or sex

## 2022-09-12 NOTE — PROGRESS NOTES
INTERNISTS OF [facility]:  9/12/2022, MRN: [id number]      Piedad Villatoro is a 71 y.o. female and presents to clinic for follow up and Annual Wellness Visit (Medicare Wellness visit. )      Subjective: The patient is a 61-year-old female with history of NAFLD, left foot bone spur, lichen sclerosis per biopsy in 2012, cervical disc disease, varicose veins, Sjogren's syndrome, dysphasia, rheumatoid arthritis (), paroxysmal supraventricular tachycardia, mitral valve prolapse, multiple thyroid nodules, atrial fibrillation per EHR, plantar fasciitis, and dysphasia. 1. SVT/AF: Her most recent labs show a normal lipid panel. She is on Xarelto and metoprolol; she reports no adverse side effects. Asymptomatic. 2. NAFLD: Her most recent FibroSure test indicates that she has F0-F1 fibrosis present. Her LFTs per her CMP are unremarkable. She had findings consistent with hepatic steatosis in 2016 per a CT scan. 3.  Abnormal CBC: Her most recent CBC shows an elevated eosinophil percentage at 9%. Her eosinphil count was as high as 27% in 2016. It's been normal since then until this yr. She is asymptomatic. No h/o food allergies. No seasonal allergy sx at this time. 4.  Thyroid Nodule History: Her most recent TFTs are normal.  Her thyroid ultrasound from 2016 were unremarkable. 1/12/16 Thyroid Ultrasound: Tiny nonspecific but generally benign-appearing nodules within the right and left thyroid lobes, similar to comparison studies. Some of the nodules noted previously are no longer seen. 5. Health Maintenance:  - Depression sx: none  - # of falls w/I the past yr: 2  - She sees  4 times per yr, given her history of rheumatoid arthritis. She is on Orencia.          Patient Active Problem List    Diagnosis Date Noted    PAF (paroxysmal atrial fibrillation) ([facility] 11/21/2017    Osteopenia of multiple sites, 9/17/17 10/04/2017    Rheumatoid arthritis ([facility] 10/04/2017    Bilateral carpal tunnel syndrome, per EMG testing 8/17 08/17/2017    SIERRA (nonalcoholic steatohepatitis) 05/30/2017    Bone spur of left foot 02/25/3939    Lichen sclerosus 70/65 05/30/2017    Cervical disc disease 01/08/2016    Varicose veins of lower extremities with other complications 14/93/5579    Sjogren's syndrome ([facility]     Dysphagia 11/24/2010    MVP (mitral valve prolapse)     TIA (transient ischemic attack)     Multiple thyroid nodules        Current Outpatient Medications   Medication Sig Dispense Refill    acetaminophen (TYLENOL) 325 mg tablet Take 325 mg by mouth every four (4) hours as needed for Pain. Xarelto 20 mg tab tablet TAKE ONE TABLET BY MOUTH DAILY WITH DINNER 30 Tablet 6    metoprolol tartrate (LOPRESSOR) 25 mg tablet TAKE 1/2 TABLET BY MOUTH TWICE A DAY 90 Tablet 3    abatacept (ORENCIA SC) by SubCUTAneous route every month.      gabapentin (NEURONTIN) 300 mg capsule Take 2 Caps by mouth nightly. 180 Cap 1    cholecalciferol, VITAMIN D3, (VITAMIN D3) 5,000 unit tab tablet Take 1,000 Units by mouth daily. cyanocobalamin 1,000 mcg tablet Take 1,000 mcg by mouth two (2) times a day. diclofenac (VOLTAREN) 1 % gel Apply  to affected area as needed. ascorbic acid, vitamin C, (VITAMIN C) 500 mg tablet Take 500 mg by mouth two (2) times a day. Allergies   Allergen Reactions    Plaquenil [Hydroxychloroquine] Nausea and Vomiting    Alendronate Other (comments)    Pcn [Penicillins] Itching and Rash    Prednisone Nausea and Vomiting     She reports a plaquenil/prednisone come causes vomiting    Zithromax [Azithromycin] Rash, Other (comments) and Hives     [facility] records state skin peeling.   High fever, peeling       Past Medical History:   Diagnosis Date    A-fib [facility])     Arthritis     Feet    Memory change     Menopause     Multiple thyroid nodules 2009    under care of Dr. Larayne Meckel    MVP (mitral valve prolapse)     under care of Dr. Barney Ha    Neuropathy     Plantar fasciitis     PSVT (paroxysmal supraventricular tachycardia) ([facility] 02/08/2011    RA (rheumatoid arthritis) ([facility])     Right knee pain     Routine gynecological examination     done by Dr. Fouzia Brower    S/P colonoscopy     done past 1-2 yrs? Dr. Freeman Denny    Sjogren's syndrome [facility])     sees Dr. Albert Marc    TIA (transient ischemic attack)     possible history of    Tibialis tendonitis     following with Dr Derick Rebolledo ankle       Past Surgical History:   Procedure Laterality Date    DILATION AND CURETTAGE  1987    HX BREAST AUGMENTATION  1984    HX DILATION AND CURETTAGE  1/2013    HX TONSILLECTOMY      IMPLANT BREAST SILICONE/EQ      [place] TARSAL TUNNEL RELEASE  1994    Right       Family History   Problem Relation Age of Onset    Hypertension Father     High Cholesterol Father     Diabetes Father     Stroke Father     Heart Attack Father     Cancer Father     Heart Disease Father     Depression Sister     Breast Cancer Maternal Grandmother        Social History     Tobacco Use    Smoking status: Never    Smokeless tobacco: Never   Substance Use Topics    Alcohol use: No     Alcohol/week: 0.0 standard drinks       ROS   Review of Systems   Constitutional:  Negative for chills and fever. HENT:  Negative for ear pain and sore throat. Eyes:  Negative for blurred vision and pain. Respiratory:  Negative for cough and shortness of breath. Cardiovascular:  Negative for chest pain. Gastrointestinal:  Negative for abdominal pain, blood in stool and melena. Genitourinary:  Negative for dysuria and hematuria. Musculoskeletal:  Positive for joint pain (off/on). Negative for myalgias. Skin:  Negative for rash. Neurological:  Negative for headaches. Endo/Heme/Allergies:  Does not bruise/bleed easily. Psychiatric/Behavioral:  Negative for depression and substance abuse.       Objective     Vitals:    09/12/22 1156   BP: 102/69   Pulse: 69   Resp: 18   Temp: 97.5 °F (36.4 °C)   TempSrc: Temporal   SpO2: 96%   Weight: 132 lb 9.6 oz (60.1 kg)   Height: 5' 6\" (1.676 m)   PainSc:   0 - No pain   LMP: 04/25/2016       Physical Exam  Vitals and nursing note reviewed. HENT:      Head: Normocephalic and atraumatic. Right Ear: External ear normal.      Left Ear: External ear normal.   Eyes:      General: No scleral icterus. Right eye: No discharge. Left eye: No discharge. Conjunctiva/sclera: Conjunctivae normal.   Cardiovascular:      Rate and Rhythm: Normal rate and regular rhythm. Heart sounds: Normal heart sounds. No murmur heard. No friction rub. No gallop. Pulmonary:      Effort: Pulmonary effort is normal. No respiratory distress. Breath sounds: Normal breath sounds. No wheezing or rales. Chest:      Chest wall: No tenderness. Abdominal:      General: Bowel sounds are normal. There is no distension. Palpations: Abdomen is soft. There is no mass. Tenderness: There is no abdominal tenderness. There is no guarding or rebound. Musculoskeletal:         General: No swelling (BUE) or tenderness (BUE). Cervical back: Neck supple. Comments: Her RLE is in an orthopedic boot   Lymphadenopathy:      Cervical: No cervical adenopathy. Skin:     General: Skin is warm and dry. Findings: No erythema or rash. Neurological:      Mental Status: She is alert. Motor: No abnormal muscle tone.       Gait: Gait normal.   Psychiatric:         Mood and Affect: Mood normal.       LABS   Data Review:   Lab Results   Component Value Date/Time    WBC 4.9 09/08/2022 08:54 AM    HGB 14.6 09/08/2022 08:54 AM    HCT 44.0 09/08/2022 08:54 AM    PLATELET 855 64/92/1933 08:54 AM    MCV 91.1 09/08/2022 08:54 AM       Lab Results   Component Value Date/Time    Sodium 142 09/08/2022 08:54 AM    Potassium 4.6 09/08/2022 08:54 AM    Chloride 108 09/08/2022 08:54 AM    CO2 30 09/08/2022 08:54 AM    Anion gap 4 09/08/2022 08:54 AM    Glucose 87 09/08/2022 08:54 AM    Glucose 88 09/08/2022 08:54 AM    BUN 14 09/08/2022 08:54 AM    Creatinine 0.69 09/08/2022 08:54 AM    BUN/Creatinine ratio 20 09/08/2022 08:54 AM    GFR est AA >60 09/08/2022 08:54 AM    GFR est non-AA >60 09/08/2022 08:54 AM    Calcium 8.9 09/08/2022 08:54 AM       Lab Results   Component Value Date/Time    Cholesterol, total 171 09/08/2022 08:54 AM    Cholesterol, total 170 09/08/2022 08:54 AM    HDL Cholesterol 61 (H) 09/08/2022 08:54 AM    LDL, calculated 99.6 09/08/2022 08:54 AM    VLDL, calculated 10.4 09/08/2022 08:54 AM    Triglyceride 52 09/08/2022 08:54 AM    Triglyceride 52 09/08/2022 08:54 AM    CHOL/HDL Ratio 2.8 09/08/2022 08:54 AM       Lab Results   Component Value Date/Time    Hemoglobin A1c 5.5 07/19/2016 09:10 AM       Assessment/Plan:   1. SVT/AF: Stable. -Continue medication as prescribed  -Checking labs in the year. 2. RA: Stable. -Checking labs in the year. - I encouraged her to follow-up with her rheumatologist for continued treatment recommendations. - Continue with Orencia as prescribed. 3. NAFLD (nonalcoholic fatty liver disease): Resolved or still present? Her FibroSure results are discordant. Her AST and ALT are normal.  There is no evidence of hepatic steatosis but her FibroSure test score is of F0-F1.  -I am checking a right upper quadrant ultrasound for completeness. ORDERS:  -  [employer]; Future    4. Abnormal CBC: Likely from allergic rhinitis. Asymptomatic. Suggested by her elevated eosinophil count that has been intermittent off and on over several years. - I discussed that she can take Belviq on her allergy medication as needed. - I will check a follow-up CBC in a year. 5.  Thyroid nodule history: TFTs are stable. - I will check TFTs and a CBC in the year. Health Maintenance Due   Topic Date Due    Medicare Yearly Exam  03/06/2022    Flu Vaccine (1) 09/01/2022     Lab review: labs are reviewed in the EHR and ordered mention above.     I have discussed the diagnosis with the patient and the intended plan as seen in the above orders. The patient has received an after-visit summary and questions were answered concerning future plans. I have discussed medication side effects and warnings with the patient as well. I have reviewed the plan of care with the patient, accepted their input and they are in agreement with the treatment goals. All questions were answered. The patient understands the plan of care. Handouts provided today with above information. Pt instructed if symptoms worsen to call the office or report to the ED for continued care. Greater than 50% of the visit time was spent in counseling and/or coordination of care. Voice recognition was used to generate this report, which may have resulted in some phonetic based errors in grammar and contents. Even though attempts were made to correct all the mistakes, some may have been missed, and remained in the body of the document. Follow-up and Dispositions    Return in about 1 year (around 9/12/2023).          Anni Melchor MD

## 2022-09-12 NOTE — ACP (ADVANCE CARE PLANNING)
Advance Care Planning  Advance Care Planning (ACP) Provider Conversation     Date of ACP Conversation: 09/12/22  Persons included in Conversation:  patient    Authorized Decision Maker (if patient is incapable of making informed decisions): This person is:   Named in Advance Directive or Healthcare Power of           For Patients with Decision Making Capacity:   Values/Goals: Exploration of values, goals, and preferences if recovery is not expected, even with continued medical treatment in the event of:  Imminent death  Severe, permanent brain injury    Conversation Outcomes / Follow-Up Plan:   ACP in process - information provided, considering goals and options. She is okay with her pre-existing advance directive but she only has 1 POA listed, her sister. She does not want her  to be a decision-maker or POA. If she is terminally ill or comatose/brain dead and there is no hope of recovery with aggressive interventions such as intubation and or CPR, she does not want these interventions. I gave her another advance directive to complete. I encouraged her to think of a secondary/successor POA if she does not desire her  to be a backup POA to her sister.     Dr. Burton Friday  [facility] [address], [address] [place], [place] [address]  Phone: [phone]  Fax: [phone]

## 2022-09-12 NOTE — PROGRESS NOTES
[facility] OF [facility]:  09/12/22, [id number]      The Subsequent Medicare Annual Wellness Exam PROGRESS NOTE    This is a Subsequent Medicare Annual Wellness Exam (AWV). I have reviewed the patient's medical history in detail and updated the computerized patient record. Huey Ring is a 71 y.o.  female and presents for an annual wellness exam.    SUBJECTIVE    Past Medical History:   Diagnosis Date    A-fib [facility])     Arthritis     Feet    Memory change     Menopause     Multiple thyroid nodules 2009    under care of Dr. Tenzin Rick    MVP (mitral valve prolapse)     under care of Dr. Kaila Gee    Neuropathy     Plantar fasciitis     PSVT (paroxysmal supraventricular tachycardia) ([facility] [address] 02/08/2011    RA (rheumatoid arthritis) ([facility])     Right knee pain     Routine gynecological examination     done by Dr. Sandy Velasquez    S/P colonoscopy     done past 1-2 yrs? Dr. Wiggins Confer    Sjogren's syndrome [facility])     sees Dr. Ashley Swanson    TIA (transient ischemic attack)     possible history of    Tibialis tendonitis     following with Dr Jon Fabian ankle      Past Surgical History:   Procedure Laterality Date    DILATION AND CURETTAGE  1987    HX BREAST AUGMENTATION  1984    HX DILATION AND CURETTAGE  1/2013    HX TONSILLECTOMY      IMPLANT BREAST SILICONE/EQ      DE TARSAL TUNNEL RELEASE  1994    Right     Current Outpatient Medications   Medication Sig Dispense Refill    acetaminophen (TYLENOL) 325 mg tablet Take 325 mg by mouth every four (4) hours as needed for Pain. Xarelto 20 mg tab tablet TAKE ONE TABLET BY MOUTH DAILY WITH DINNER 30 Tablet 6    metoprolol tartrate (LOPRESSOR) 25 mg tablet TAKE 1/2 TABLET BY MOUTH TWICE A DAY 90 Tablet 3    abatacept (ORENCIA SC) by SubCUTAneous route every month.      gabapentin (NEURONTIN) 300 mg capsule Take 2 Caps by mouth nightly. 180 Cap 1    cholecalciferol, VITAMIN D3, (VITAMIN D3) 5,000 unit tab tablet Take 1,000 Units by mouth daily.       cyanocobalamin 1,000 mcg tablet Take 1,000 mcg by mouth two (2) times a day. diclofenac (VOLTAREN) 1 % gel Apply  to affected area as needed. ascorbic acid, vitamin C, (VITAMIN C) 500 mg tablet Take 500 mg by mouth two (2) times a day. Allergies   Allergen Reactions    Plaquenil [Hydroxychloroquine] Nausea and Vomiting    Alendronate Other (comments)    Pcn [Penicillins] Itching and Rash    Prednisone Nausea and Vomiting     She reports a plaquenil/prednisone come causes vomiting    Zithromax [Azithromycin] Rash, Other (comments) and Hivkrishan Gracia's records state skin peeling.   High fever, peeling     Family History   Problem Relation Age of Onset    Hypertension Father     High Cholesterol Father     Diabetes Father     Stroke Father     Heart Attack Father     Cancer Father     Heart Disease Father     Depression Sister     Breast Cancer Maternal Grandmother      Social History     Tobacco Use    Smoking status: Never    Smokeless tobacco: Never   Substance Use Topics    Alcohol use: No     Alcohol/week: 0.0 standard drinks     Patient Active Problem List   Diagnosis Code    MVP (mitral valve prolapse) I34.1    TIA (transient ischemic attack) G45.9    Multiple thyroid nodules E04.2    Dysphagia R13.10    Sjogren's syndrome ([facility]) M35.00    Varicose veins of lower extremities with other complications C58.682    Cervical disc disease M50.90    SIERRA (nonalcoholic steatohepatitis) K75.81    Bone spur of left foot M72.81    Lichen sclerosus 38/30 L90.0    Bilateral carpal tunnel syndrome, per EMG testing 8/17 G56.03    Osteopenia of multiple sites, 9/17/17 M85.89    [facility] M06.9    PAF (paroxysmal atrial fibrillation) ([facility]) I48.0       The patient is a 80-year-old female with history of SIERRA, left foot bone spur, lichen sclerosis per biopsy in 2012, cervical disc disease, varicose veins, Sjogren's syndrome, dysphasia, rheumatoid arthritis (), paroxysmal supraventricular tachycardia, mitral valve prolapse, multiple thyroid nodules, atrial fibrillation per EHR, plantar fasciitis, and dysphasia. 1. SVT/AF: Her most recent labs show a normal lipid panel. She is on Xarelto and metoprolol; she reports no adverse side effects. Asymptomatic. 2. NAFLD: Her most recent FibroSure test indicates that she has F0-F1 fibrosis present. Her LFTs per her CMP are unremarkable. She had findings consistent with hepatic steatosis in 2016 per a CT scan. 3.  Allergic rhinitis: Her most recent CBC shows an elevated eosinophil percentage at 9%. Her eosinphil count was as high as 27% in 2016. It's been normal since then until this yr. She is asymptomatic. No h/o food allergies. No seasonal allergy sx at this time. 4.  Thyroid Nodule History: Her most recent TFTs are normal.  Her thyroid ultrasound from 2016 were unremarkable. 1/12/16 Thyroid Ultrasound: Tiny nonspecific but generally benign-appearing nodules within the right and left thyroid lobes, similar to comparison studies. Some of the nodules noted previously are no longer seen. 5. Health Maintenance:  - Depression sx: none  - # of falls w/I the past yr: 2  - She sees  4 times per yr, given her history of rheumatoid arthritis. She is on Orencia. Health Maintenance History  Immunizations reviewed:    Tdap up to date   Pneumovax: up to date   Flu: over-due  Zoster: up to date    Immunization History   Administered Date(s) Administered    COVID-19, MODERNA BLUE border, Primary or Immunocompromised, (age 18y+), IM, 100 mcg/0.5mL 01/30/2021, 02/27/2021    COVID-19, MODERNA Booster BLUE border, (age 18y+), IM, 50mcg/0.25mL 10/26/2021, 05/19/2022    Influenza High Dose Vaccine PF 09/26/2017, 10/01/2018, 09/23/2021    Influenza Vaccine 10/29/2013, 09/20/2014, 11/06/2015, 11/02/2016, 09/05/2019, 10/02/2020    Influenza Vaccine (Madin Harmony Canine Kidney) PF 09/20/2017, 10/01/2018    Influenza Vaccine (Trivalent) 09/18/2019, 10/20/2020 Influenza Vaccine Split 11/01/2010, 09/23/2011, 10/03/2012    Influenza, FLUAD, (age 72 y+), Adjuvanted 09/07/2021    Pneumococcal Conjugate (PCV-13) 07/07/2016    Pneumococcal Polysaccharide (PPSV-23) 04/06/2017, 07/16/2017    TD Vaccine 01/01/2007    Tdap 09/26/2017    Zoster Recombinant 09/27/2018, 12/07/2018    Zoster Vaccine, Live 02/14/2017, 09/27/2018       Colonoscopy: Up to date. No bleeding/pain. Eye exam: Up to date. Mammo: Up to date    Dexascan: Up to date. Pelvic/Pap: No bleeding. UTD      Review of Systems   Constitutional:  Negative for chills and fever. HENT:  Negative for ear pain and sore throat. Eyes:  Negative for blurred vision and pain. Respiratory:  Negative for cough and shortness of breath. Cardiovascular:  Negative for chest pain. Gastrointestinal:  Negative for abdominal pain, blood in stool and melena. Genitourinary:  Negative for dysuria and hematuria. Musculoskeletal:  Positive for joint pain (off/on). Negative for myalgias. Skin:  Negative for rash. Neurological:  Negative for headaches. Endo/Heme/Allergies:  Does not bruise/bleed easily. Psychiatric/Behavioral:  Negative for depression and substance abuse. Depression Risk Factor Screening:      Patient Health Questionnaire (PHQ-2)   Over the last 2 weeks, how often have you been bothered by any of the following problems? Little interest or pleasure in doing things? Not at all. [0]  Feeling down, depressed, or hopeless? Not at all. [0]    Total Score: 0/6  PHQ-2 Assessment Scoring:   A score of 2 or more requires further screening with the PHQ-9    Alcohol Risk Factor Screening:   Women:    On any occasion during the past 3 months, have you had more than 3 drinks containing alcohol? no    Do you average more than 7 drinks per week? no  Tobacco Use Screening:     Social History     Tobacco Use   Smoking Status Never   Smokeless Tobacco Never       Hearing Loss   There have been no changes to the pt's hearing. No additional studies/evaluation is warranted at this time    Activities of Daily Living   Self-care. Requires assistance with: no ADLs  She does not need help with ADLs/IADLs    Fall Risk   She has had two falls w/I the past yr, including one earlier this yr that resulted in fractures. Abuse Screen   None    Additional Examination Findings: There were no vitals filed for this visit. There is no height or weight on file to calculate BMI. Evaluation of Cognitive Function:  Mood/affect: Euthymic  Appearance: Well-groomed  Family member/caregiver input: she is not with a family member today      General:   Well-nourished, well-groomed, pleasant, alert, in no acute distress. Head:  Normocephalic, atraumatic  Ears:  External ears WNL  Eyes:  Clear sclera  Neuro:   Alert, conversant, appropriate, following commands  Skin:    No rashes noted  Psych: Affect, mood and judgment appropriate      Dementia Screen:  Clock Drawing (ten past eleven) Exercise: Unremarkable.       LABS   Data Review:   Lab Results   Component Value Date/Time    Sodium 142 09/08/2022 08:54 AM    Potassium 4.6 09/08/2022 08:54 AM    Chloride 108 09/08/2022 08:54 AM    CO2 30 09/08/2022 08:54 AM    Anion gap 4 09/08/2022 08:54 AM    Glucose 87 09/08/2022 08:54 AM    Glucose 88 09/08/2022 08:54 AM    BUN 14 09/08/2022 08:54 AM    Creatinine 0.69 09/08/2022 08:54 AM    BUN/Creatinine ratio 20 09/08/2022 08:54 AM    GFR est AA >60 09/08/2022 08:54 AM    GFR est non-AA >60 09/08/2022 08:54 AM    Calcium 8.9 09/08/2022 08:54 AM       Lab Results   Component Value Date/Time    WBC 4.9 09/08/2022 08:54 AM    HGB 14.6 09/08/2022 08:54 AM    HCT 44.0 09/08/2022 08:54 AM    PLATELET 793 50/99/5141 08:54 AM    MCV 91.1 09/08/2022 08:54 AM       Lab Results   Component Value Date/Time    Hemoglobin A1c 5.5 07/19/2016 09:10 AM       Lab Results   Component Value Date/Time    Cholesterol, total 171 09/08/2022 08:54 AM    Cholesterol, total 170 09/08/2022 08:54 AM    HDL Cholesterol 61 (H) 09/08/2022 08:54 AM    LDL, calculated 99.6 09/08/2022 08:54 AM    VLDL, calculated 10.4 09/08/2022 08:54 AM    Triglyceride 52 09/08/2022 08:54 AM    Triglyceride 52 09/08/2022 08:54 AM    CHOL/HDL Ratio 2.8 09/08/2022 08:54 AM         Patient Care Team:  Veronica Urena MD as PCP - General (Family Medicine)  Veronica Urena MD as PCP [facility]  Vernette Goodell, MD as Physician (Rheumatology Internal Medicine)  Joy Jimenes DPM (Podiatry)  Froylan Monae MD (Vascular Surgery)  Kyra Olmos (Vascular Surgery)  Darwin Davis MD (Orthopedic Surgery)  Angie Becker MD as Physician (Rheumatology Internal Medicine)  Laura Mccullough MD as Physician (Neurology)  Joan Robin MD (Internal Medicine Physician)  Joey Castro MD (Plastic Surgery)  Angel Hernandez MD (Cardiovascular Disease Physician)    End-of-life planning  Advanced Directive in the case than an injury or illness causes the patient to be unable to make health care decisions was discussed with the patient. Advice/Referrals/Counselling/Plan:   Education and counseling provided:  Are appropriate based on today's review and evaluation  End-of-Life planning (with patient's consent)  Pneumococcal Vaccine  Influenza Vaccine  Screening Mammography  Screening Pap and pelvic (covered once every 2 years)  Colorectal cancer screening tests  Cardiovascular screening blood test  Bone mass measurement (DEXA)  Screening for glaucoma  Diabetes screening test  Include in education list (weight loss, physical activity, smoking cessation, fall prevention, and nutrition)    ICD-10-CM ICD-9-CM    1. Encounter for screening mammogram for breast cancer  Z12.31 V76.12 MANJINDER 3D CITLALY W MAMMO BI SCREENING INCL CAD      2. Medicare annual wellness visit, subsequent  Z00.00 V70.0       3.  NAFLD (nonalcoholic fatty liver disease)  K76.0 571.8 US ABD LTD        reviewed diet, exercise and weight control. Brief written plan, checklist    Assessment/Plan:    Health Maintenance:  -I encouraged her to get her flu vaccine.  -Ordering her yearly mammogram      Lab review: labs are reviewed in the [address] [place] [address] (ACP) Provider Conversation     Date of ACP Conversation: 09/12/22  Persons included in Conversation:  patient    Authorized Decision Maker (if patient is incapable of making informed decisions): This person is:   Named in Advance Directive or Healthcare Power of           For Patients with Decision Making Capacity:   Values/Goals: Exploration of values, goals, and preferences if recovery is not expected, even with continued medical treatment in the event of:  Imminent death  Severe, permanent brain injury    Conversation Outcomes / Follow-Up Plan:   ACP in process - information provided, considering goals and options. She is okay with her pre-existing advance directive but she only has 1 POA listed, her sister. She does not want her  to be a decision-maker or POA. If she is terminally ill or comatose/brain dead and there is no hope of recovery with aggressive interventions such as intubation and or CPR, she does not want these interventions. I have discussed the diagnosis with the patient and the intended plan as seen in the above orders. The patient has received an after-visit summary and questions were answered concerning future plans. I have discussed medication side effects and warnings with the patient as well. I have reviewed the plan of care with the patient, accepted their input and they are in agreement with the treatment goals.

## 2022-09-12 NOTE — PROGRESS NOTES
I will let her know upcoming appointment that her CBC shows an elevated eosinophil percentage at 9%.  Her TFTs and CMP are unremarkable.  Her lipid panel is normal. I will also discuss her Fibrosure test result - indicating F0-F1 fibrosis.     Dr. Francisco J Bliss  [facility] [address]  Phone: [phone]  Fax: [phone]

## 2022-09-23 ENCOUNTER — HOSPITAL ENCOUNTER (OUTPATIENT)
Dept: ULTRASOUND IMAGING | Age: 72
Discharge: HOME | End: 2022-09-23
Attending: INTERNAL MEDICINE
Payer: MEDICARE

## 2022-09-23 PROCEDURE — 76705 ECHO EXAM OF ABDOMEN: CPT

## 2022-09-26 ENCOUNTER — OFFICE VISIT (OUTPATIENT)
Dept: ORTHOPEDIC SURGERY | Age: 72
End: 2022-09-26
Payer: MEDICARE

## 2022-09-26 VITALS — WEIGHT: 132 LBS | BODY MASS INDEX: 21.31 KG/M2

## 2022-09-26 DIAGNOSIS — S92.334D CLOSED NONDISPLACED FRACTURE OF THIRD METATARSAL BONE OF RIGHT FOOT WITH ROUTINE HEALING, SUBSEQUENT ENCOUNTER: Primary | ICD-10-CM

## 2022-09-26 DIAGNOSIS — S92.344D CLOSED NONDISPLACED FRACTURE OF FOURTH METATARSAL BONE OF RIGHT FOOT WITH ROUTINE HEALING, SUBSEQUENT ENCOUNTER: ICD-10-CM

## 2022-09-26 PROCEDURE — 73630 X-RAY EXAM OF FOOT: CPT | Performed by: ORTHOPAEDIC SURGERY

## 2022-09-26 PROCEDURE — 99024 POSTOP FOLLOW-UP VISIT: CPT | Performed by: ORTHOPAEDIC SURGERY

## 2022-09-26 NOTE — PROGRESS NOTES
AMBULATORY PROGRESS NOTE      Patient: Melia Moreno             MRN: [id number]     SSN: [id number] Body mass index is 21.31 kg/m². YOB: 1950     AGE: 71 y.o. EX: female    PCP: Dea Villafuerte MD       IMPRESSION //  DIAGNOSIS AND TREATMENT PLAN      Melia Moreno has a diagnosis of:      DIAGNOSES    1. Closed nondisplaced fracture of third metatarsal bone of right foot with routine healing, subsequent encounter    2. Closed nondisplaced fracture of fourth metatarsal bone of right foot with routine healing, subsequent encounter        Orders Placed This Encounter    [38214] Foot Min 3V     Order Specific Question:   Weight bearing? Answer:   No          PLAN:    1. I obtained right foot 3V XR in the office today. 2. I recommend she use her arch supports with the CAM walker boot. 3. I anticipate obtaining right foot 3V XR at next OV    RTO 3 weeks    There are no Patient Instructions on file for this visit. Follow-up and Dispositions    Return in about 3 weeks (around 10/17/2022). Please follow up with your PCP for any health maintenance as recommended         Melia Moreno  expresses understanding of the diagnosis, treatment plan, and all of their proposed questions were answered to their satisfaction. Patient education has been provided re the diagnoses. HPI //  [facility] [address] A 71 y.o. female who is a/an  established patient, presenting to my outpatient office for evaluation of  the following chief complaint(s):     Chief Complaint   Patient presents with    Foot Pain     right     DOI: 08/25/2022    At [facility], Melia Moreno presented with closed fractures of the right 2nd and 3rd metatarsals. I obtained right foot 3V XR in the office. I advised her to continue wearing her CAM walker boot provided at the ED. Since LOV, Melia Moreno states her right foot is doing very well.  She denies any pain with ambulation and notes benefit with wearing her CAM walker boot. She also notes that her foot arches have fallen and she wears orthotics. She states her left orthotic helps balance her gait with the CAM walker boot. Visit Vitals  Wt 132 lb (59.9 kg)   LMP 04/25/2016   BMI 21.31 kg/m²       Appearance: Alert, well appearing and pleasant patient who is in no distress, oriented to person, place/time, and who follows commands. Normal dress/motor activity/thought processes/memory. This patient is accompanied in the examination room by her  self. Patient arrives to office via: with assistive device: right short CAM walker boot. Psychiatric:  Normal Affect/mood. Judgement, behavior, and conduct are appropriate. Speech normal in context and clarity, memory intact grossly, no involuntary movements - tremors. H EENT (2): Head normocephalic & atraumatic. Eye: pupils are round// EOM are intact // Neck: ROM WNL  // Hearings Intact   Respiratory: Breathing non labored     ANKLE/FOOT right    Gait: uses assistive device - right short CAM walker boot, left orthotic  Tenderness: minimally over the 3rd and 4th metatarsals. Cutaneous:  WNL. Joint Motion:  WNL   Joint / Tendon Stability:  No Ankle or Subtalar instability or joint laxity. No peroneal sublux ability or dislocation  Alignment: neutral Hindfoot. Moderate bnion. Neuro Motor/Sensory: NL/NL  Vascular: NL foot/ankle pulses,   Lymphatics: No extremity lymphedema, No calf swelling, no tenderness to calf muscles. CHART REVIEW     Piedad Villatoro has been experiencing pain and discomfort confirmed as outlined in the pain assessment outlined below.  was reviewed by Angie Tejeda MD, on 9/26/2022.      Pain Assessment  9/26/2022   Location of Pain Foot   Location Modifiers Right   Severity of Pain 1   Quality of Pain -   Quality of Pain Comment -   Duration of Pain -   Frequency of Pain Rarely   Aggravating Factors Walking   Aggravating Factors Comment -   Limiting Behavior No   Relieving Factors -   Relieving Factors Comment -   Result of Injury -        Zohaib Russo  has a past medical history of [facility] ([facility] Arthritis, Memory change, Menopause, Multiple thyroid nodules (2009), MVP (mitral valve prolapse), Neuropathy, Plantar fasciitis, PSVT (paroxysmal supraventricular tachycardia) ([facility] (02/08/2011), RA (rheumatoid arthritis) ([facility] Right knee pain, Routine gynecological examination, S/P colonoscopy, Sjogren's syndrome ([facility] TIA (transient ischemic attack), and Tibialis tendonitis. She has no past medical history of Breast lump. Patients is employed at:          has a past medical history of A-fib ([facility] Arthritis, Memory change, Menopause, Multiple thyroid nodules (2009), MVP (mitral valve prolapse), Neuropathy, Plantar fasciitis, PSVT (paroxysmal supraventricular tachycardia) ([facility] (02/08/2011), RA (rheumatoid arthritis) ([facility] Right knee pain, Routine gynecological examination, S/P colonoscopy, Sjogren's syndrome ([facility] TIA (transient ischemic attack), and Tibialis tendonitis. She has no past medical history of Breast lump.    has a past surgical history that includes hx tonsillectomy; hx breast augmentation (1984); pr tarsal tunnel release (1994); dilation and curettage (1987); hx dilation and curettage (1/2013); and implant breast silicone/eq.   family history includes Breast Cancer in her maternal grandmother; Cancer in her father; Depression in her sister; Diabetes in her father; Heart Attack in her father; Heart Disease in her father; High Cholesterol in her father; Hypertension in her father; Stroke in her father. Current Outpatient Medications   Medication Sig    acetaminophen (TYLENOL) 325 mg tablet Take 325 mg by mouth every four (4) hours as needed for Pain.     Xarelto 20 mg tab tablet TAKE ONE TABLET BY MOUTH DAILY WITH DINNER    metoprolol tartrate (LOPRESSOR) 25 mg tablet TAKE 1/2 TABLET BY MOUTH TWICE A DAY    abatacept (ORENCIA SC) by SubCUTAneous route every month.    gabapentin (NEURONTIN) 300 mg capsule Take 2 Caps by mouth nightly. cholecalciferol, VITAMIN D3, (VITAMIN D3) 5,000 unit tab tablet Take 1,000 Units by mouth daily. cyanocobalamin 1,000 mcg tablet Take 1,000 mcg by mouth two (2) times a day. diclofenac (VOLTAREN) 1 % gel Apply  to affected area as needed. ascorbic acid, vitamin C, (VITAMIN C) 500 mg tablet Take 500 mg by mouth two (2) times a day. No current facility-administered medications for this visit. Allergies   Allergen Reactions    Plaquenil [Hydroxychloroquine] Nausea and Vomiting    Alendronate Other (comments)    Pcn [Penicillins] Itching and Rash    Prednisone Nausea and Vomiting     She reports a plaquenil/prednisone come causes vomiting    Zithromax [Azithromycin] Rash, Other (comments) and Hives     [facility] records state skin peeling. High fever, peeling     Social History     Occupational History    Not on file   Tobacco Use    Smoking status: Never    Smokeless tobacco: Never   Substance and Sexual Activity    Alcohol use: No     Alcohol/week: 0.0 standard drinks    Drug use: No     Types: Prescription, OTC    Sexual activity: Yes     Partners: Male       reports that she has never smoked. She has never used smokeless tobacco. She reports that she does not drink alcohol and does not use drugs. DIAGNOSTIC LAB DATA      Lab Results   Component Value Date/Time    Hemoglobin A1c 5.5 07/19/2016 09:10 AM    //   Lab Results   Component Value Date/Time    Glucose 87 09/08/2022 08:54 AM    Glucose 88 09/08/2022 08:54 AM    Glucose (POC) 145 (H) 04/17/2016 09:53 PM        No results found for: [id number], [id number]      Lab Results   Component Value Date/Time    Vitamin D 25-Hydroxy 33.7 12/31/2016 08:09 AM        Drug Screen Most Recent Result Date    No resulted procedures found.            REVIEW OF SYSTEMS : 9/26/2022  ALL BELOW ARE Negative except : SEE HPI     All other systems reviewed and are negative. 12 point review of systems otherwise negative unless noted in HPI. RADIOGRAPHS// IMAGING//DIAGNOSTIC DATA     Orders Placed This Encounter    [[id number]] Foot Min 3V              Right foot x-rays, nonweightbearing, 3 views, AP/LAT/OBL completed 9/26/2022 AT [facility]    X-rays reveal Osseous: Healing right third and fourth metatarsal fractures of the head neck junction, there is a healed fifth metatarsal fracture prior. there are no dislocation or subluxation noted. Overall alignment is yes acceptable. Soft tissue swelling is mild noted. No osteolytic or osteoblastic lesions noted. Mineralization suggests yes osteopenia. Degenerative changes are mild midfoot noted. Calcified vessels are none seen present. I have personally reviewed the images of the above study. The interpretation of this study is my professional opinion           I have spent 15 minutes reviewing the previous notes, reviewing diagnostic studies [Advanced  Imaging, Diagnostic test results (x-rays)] and had a direct face to face with the patient discussing the diagnosis and importance of compliance with the treatment and plan. There is  discussion for the potential for surgery, answering all questions, as well as documenting patient care coordination for this individual on the day of the visit. Disclaimer:     Sections of this note are dictated using utilizing voice recognition software, which may have resulted in some phonetic based errors in grammar and contents. Even though attempts were made to correct all the mistakes, some may have been missed, and remained in the body of the document. If questions arise, please contact our department. An electronic signature was used to authenticate this note. Yuimko Parada may have a reminder for a \"due or due soon\" health maintenance.  I have asked that she contact her primary care provider for follow-up on this health maintenance. There are no Patient Instructions on file for this visit. Follow-up and Dispositions    Return in about 3 weeks (around 10/17/2022). Please follow up with your PCP for any health maintenance as recommended.                 Scribed by Pritesh Bishop, as dictated by Radha Mathew MD.   9/26/2022  7:17 AM

## 2022-09-26 NOTE — PROGRESS NOTES
Please let her know that her liver ultrasound findings are consistent with fatty liver disease. There is no evidence of severe damage/cirrhosis. She should maintain a heart healthy diet. Weight reduction is not necessary at given her BMI of 21.       Dr. Cristofer Sagastume  [facility] [address] [place] [address], [address] [place] [address], [address] [place] [address] Str.  Phone: [phone]  Fax: [phone]

## 2022-10-17 ENCOUNTER — OFFICE VISIT (OUTPATIENT)
Dept: ORTHOPEDIC SURGERY | Age: 72
End: 2022-10-17
Payer: MEDICARE

## 2022-10-17 VITALS — OXYGEN SATURATION: 98 % | TEMPERATURE: 97.3 F | WEIGHT: 136 LBS | BODY MASS INDEX: 21.95 KG/M2 | HEART RATE: 83 BPM

## 2022-10-17 DIAGNOSIS — M81.0 AGE-RELATED OSTEOPOROSIS WITHOUT CURRENT PATHOLOGICAL FRACTURE: ICD-10-CM

## 2022-10-17 DIAGNOSIS — M79.671 RIGHT FOOT PAIN: ICD-10-CM

## 2022-10-17 DIAGNOSIS — S92.344D CLOSED NONDISPLACED FRACTURE OF FOURTH METATARSAL BONE OF RIGHT FOOT WITH ROUTINE HEALING, SUBSEQUENT ENCOUNTER: ICD-10-CM

## 2022-10-17 DIAGNOSIS — S92.334D CLOSED NONDISPLACED FRACTURE OF THIRD METATARSAL BONE OF RIGHT FOOT WITH ROUTINE HEALING, SUBSEQUENT ENCOUNTER: Primary | ICD-10-CM

## 2022-10-17 PROCEDURE — 73630 X-RAY EXAM OF FOOT: CPT | Performed by: ORTHOPAEDIC SURGERY

## 2022-10-17 PROCEDURE — 99024 POSTOP FOLLOW-UP VISIT: CPT | Performed by: ORTHOPAEDIC SURGERY

## 2022-10-17 NOTE — PROGRESS NOTES
AMBULATORY PROGRESS NOTE      Patient: Senia Adkins             MRN: [id number]     SSN: [id number] Body mass index is 21.95 kg/m². YOB: 1950     AGE: 71 y.o. EX: female    PCP: Carmelina Santana MD       IMPRESSION //  DIAGNOSIS AND TREATMENT PLAN      Senia Adkins has a diagnosis of:      DIAGNOSES    1. Closed nondisplaced fracture of third metatarsal bone of right foot with routine healing, subsequent encounter    2. Right foot pain    3. Age-related osteoporosis without current pathological fracture    4. Closed nondisplaced fracture of fourth metatarsal bone of right foot with routine healing, subsequent encounter        Orders Placed This Encounter    AMB POC XRAY, FOOT; COMPLETE, 3+ VIEW     ASK ALL FEMALE PATIENTS IF PREGNANT     Order Specific Question:   Reason for Exam     Answer:   PAIN          PLAN:    1. I obtained right foot 3V XR in the office today. 2. Continue to use orthotic  3. Wean off CAM walker boot - in home only x 2 weeks, then transition to outdoor    RTO 5 weeks    There are no Patient Instructions on file for this visit. Please follow up with your PCP for any health maintenance as recommended         Senia Adkins  expresses understanding of the diagnosis, treatment plan, and all of their proposed questions were answered to their satisfaction. Patient education has been provided re the diagnoses. HPI //  [address] [facility] [address] A 71 y.o. female who is a/an  established patient, presenting to my outpatient office for evaluation of  the following chief complaint(s):     Chief Complaint   Patient presents with    Foot Pain     Right       DOI: 08/25/2022    At [facility], Senia Adkins presented with healing fractures of the right 3rd and 4th metatarsals. I obtained right foot 3V XR in the office. I recommended she use her arch supports with the CAM walker boot.      Since Gely Salinas Pedrito Alegria states her right foot has been doing well. She reports she has been wearing her CAM walker boot consistently since LOV and notes some soreness in the right foot with walking back from the X-ray room at today's OV without her boot. She also notes she wears a right custom trilaminar orthotic, medially posted, and usually wears firm leather \"stabilizing\" shoes. She mentions she has rheumatoid arthritis and receives monthly Orencia infusions. She denies any history of kidney stones or gallstones. She takes vitamin D supplementation. Visit Vitals  Pulse 83   Temp 97.3 °F (36.3 °C) (Temporal)   Wt 136 lb (61.7 kg)   LMP 04/25/2016   SpO2 98%   BMI 21.95 kg/m²       Appearance: Alert, well appearing and pleasant patient who is in no distress, oriented to person, place/time, and who follows commands. Normal dress/motor activity/thought processes/memory. This patient is accompanied in the examination room by her  self. Patient arrives to office via: with assistive device: right short CAM walker boot,  right custom trilaminar orthotic    Psychiatric:  Normal Affect/mood. Judgement, behavior, and conduct are appropriate. Speech normal in context and clarity, memory intact grossly, no involuntary movements - tremors. H EENT (2): Head normocephalic & atraumatic. Eye: pupils are round// EOM are intact // Neck: ROM WNL  // Hearings Intact   Respiratory: Breathing non labored     ANKLE/FOOT right    Gait: uses assistive device - right short CAM walker boot, right custom trilaminar orthotic  Tenderness: mild over the 3rd and 4th metatarsal head/neck, 5th metatarsal   Cutaneous: redness over medial eminence of 1st MTP joint. Joint Motion:  WNL   Joint / Tendon Stability:  No Ankle or Subtalar instability or joint laxity. No peroneal sublux ability or dislocation  Alignment: neutral Hindfoot. Mild bunion.    Neuro Motor/Sensory: NL/NL  Vascular: NL foot/ankle pulses,   Lymphatics: No extremity lymphedema, No calf swelling, no tenderness to calf muscles. CHART REVIEW     Sandra Mendiola has been experiencing pain and discomfort confirmed as outlined in the pain assessment outlined below.  was reviewed by Radha Hayden MD, on 10/17/2022. Pain Assessment  10/17/2022   Location of Pain Foot   Location Modifiers Right   Severity of Pain 0   Quality of Pain -   Quality of Pain Comment -   Duration of Pain -   Frequency of Pain -   Aggravating Factors -   Aggravating Factors Comment -   Limiting Behavior -   Relieving Factors -   Relieving Factors Comment -   Result of Injury -        Sandra Mendiola  has a past medical history of A-fib ([address] Arthritis, Memory change, Menopause, Multiple thyroid nodules (2009), MVP (mitral valve prolapse), Neuropathy, Plantar fasciitis, PSVT (paroxysmal supraventricular tachycardia) ([address] (02/08/2011), RA (rheumatoid arthritis) ([address] Right knee pain, Routine gynecological examination, S/P colonoscopy, Sjogren's syndrome ([address] TIA (transient ischemic attack), and Tibialis tendonitis. She has no past medical history of Breast lump. Patients is employed at:          has a past medical history of A-fib ([address] Arthritis, Memory change, Menopause, Multiple thyroid nodules (2009), MVP (mitral valve prolapse), Neuropathy, Plantar fasciitis, PSVT (paroxysmal supraventricular tachycardia) ([address] (02/08/2011), RA (rheumatoid arthritis) ([address] Right knee pain, Routine gynecological examination, S/P colonoscopy, Sjogren's syndrome ([address] TIA (transient ischemic attack), and Tibialis tendonitis.     She has no past medical history of Breast lump.    has a past surgical history that includes hx tonsillectomy; hx breast augmentation (1984); pr tarsal tunnel release (1994); dilation and curettage (1987); hx dilation and curettage (1/2013); and implant breast silicone/eq.   family history includes Breast Cancer in her maternal grandmother; Cancer in her father; Depression in her sister; Diabetes in her father; Heart Attack in her father; Heart Disease in her father; High Cholesterol in her father; Hypertension in her father; Stroke in her father. Current Outpatient Medications   Medication Sig    acetaminophen (TYLENOL) 325 mg tablet Take 325 mg by mouth every four (4) hours as needed for Pain. Xarelto 20 mg tab tablet TAKE ONE TABLET BY MOUTH DAILY WITH DINNER    metoprolol tartrate (LOPRESSOR) 25 mg tablet TAKE 1/2 TABLET BY MOUTH TWICE A DAY    abatacept (ORENCIA SC) by SubCUTAneous route every month.    gabapentin (NEURONTIN) 300 mg capsule Take 2 Caps by mouth nightly. cholecalciferol, VITAMIN D3, (VITAMIN D3) 5,000 unit tab tablet Take 1,000 Units by mouth daily. cyanocobalamin 1,000 mcg tablet Take 1,000 mcg by mouth two (2) times a day. diclofenac (VOLTAREN) 1 % gel Apply  to affected area as needed. ascorbic acid, vitamin C, (VITAMIN C) 500 mg tablet Take 500 mg by mouth two (2) times a day. No current facility-administered medications for this visit. Allergies   Allergen Reactions    Plaquenil [Hydroxychloroquine] Nausea and Vomiting    Alendronate Other (comments)    Pcn [Penicillins] Itching and Rash    Prednisone Nausea and Vomiting     She reports a plaquenil/prednisone come causes vomiting    Zithromax [Azithromycin] Rash, Other (comments) and [facility] records state skin peeling. High fever, peeling     Social History     Occupational History    Not on file   Tobacco Use    Smoking status: Never    Smokeless tobacco: Never   Substance and Sexual Activity    Alcohol use: No     Alcohol/week: 0.0 standard drinks    Drug use: No     Types: Prescription, OTC    Sexual activity: Yes     Partners: Male       reports that she has never smoked. She has never used smokeless tobacco. She reports that she does not drink alcohol and does not use drugs.       DIAGNOSTIC LAB DATA      Lab Results   Component Value Date/Time Hemoglobin A1c 5.5 07/19/2016 09:10 AM    //   Lab Results   Component Value Date/Time    Glucose 87 09/08/2022 08:54 AM    Glucose 88 09/08/2022 08:54 AM    Glucose (POC) 145 (H) 04/17/2016 09:53 PM        No results found for: [id number], [id number]      Lab Results   Component Value Date/Time    Vitamin D 25-Hydroxy 33.7 12/31/2016 08:09 AM        Drug Screen Most Recent Result Date    No resulted procedures found. REVIEW OF SYSTEMS : 10/17/2022  ALL BELOW ARE Negative except : SEE HPI     All other systems reviewed and are negative. 12 point review of systems otherwise negative unless noted in HPI. RADIOGRAPHS// IMAGING//DIAGNOSTIC DATA     Orders Placed This Encounter    AMB POC XRAY, FOOT; COMPLETE, 3+ VIEW      FOOT X RAYS 3 VIEWS Right AP, lateral oblique images     10/17/2022    NON WEIGHT BEARING    X RAYS AT [address] [place] [address]  10/17/2022      Bones: Healing fractures to the right third fourth and fifth metatarsals of the head neck junction is seen. There are no dislocations. No focal osteolytic or osteoblastic process     Bone Spurs: No significant bone spurs  Foot Alignment: Significant houses valgus alignment,  Joint Condition: No Significant OA  Soft Tissues: Normal, No radiopaque foreign body and No abnormal calcific densities to soft tissues   No ankle joint effusion in lateral projection. Mineralization: Suggests  no Osteopenia    I have personally reviewed the results of the above study and the interpretation of this study is my professional opinion              I have spent 15 minutes reviewing the previous notes, reviewing diagnostic studies [Advanced  Imaging, Diagnostic test results (x-rays)] and had a direct face to face with the patient discussing the diagnosis and importance of compliance with the treatment and plan.   There is  discussion for the potential for surgery, answering all questions, as well as documenting patient care coordination for this individual on the day of the visit. Disclaimer:     Sections of this note are dictated using utilizing voice recognition software, which may have resulted in some phonetic based errors in grammar and contents. Even though attempts were made to correct all the mistakes, some may have been missed, and remained in the body of the document. If questions arise, please contact our department. An electronic signature was used to authenticate this note. Mayra Wen may have a reminder for a \"due or due soon\" health maintenance. I have asked that she contact her primary care provider for follow-up on this health maintenance. There are no Patient Instructions on file for this visit. Please follow up with your PCP for any health maintenance as recommended.                 Scribed by Carlos Eduardo Thompson, as dictated by Jeanette Bacon MD.   10/17/2022  7:28 AM

## 2022-10-21 RX ORDER — RIVAROXABAN 20 MG/1
20 TABLET, FILM COATED ORAL
Qty: 90 TABLET | Refills: 1 | Status: SHIPPED | OUTPATIENT
Start: 2022-10-21

## 2022-12-06 NOTE — PROGRESS NOTES
AMBULATORY PROGRESS NOTE      Patient: Jayla Meléndez             MRN: [id number]     SSN: [id number] Body mass index is 21.95 kg/m². YOB: 1950     AGE: 71 y.o. EX: female    PCP: Gianna Ferraro MD       IMPRESSION //  DIAGNOSIS AND TREATMENT PLAN      Jayla Meléndez has a diagnosis of:      DIAGNOSES    1. Closed nondisplaced fracture of third metatarsal bone of right foot with routine healing, subsequent encounter    2. Age-related osteoporosis without current pathological fracture    3. Closed nondisplaced fracture of fourth metatarsal bone of right foot with routine healing, subsequent encounter    4. Right foot pain    5. Closed nondisplaced fracture of fifth metatarsal bone of left foot with routine healing, subsequent encounter        Orders Placed This Encounter    AMB POC XRAY, FOOT; COMPLETE, 3+ VIEW     ASK ALL FEMALE PATIENTS IF PREGNANT     Order Specific Question:   Reason for Exam     Answer:   PAIN          PLAN:    1. I obtained right foot 3V XR in the office today. 2. Continue activities as tolerated    RTO PRN    There are no Patient Instructions on file for this visit. Please follow up with your PCP for any health maintenance as recommended. Jayla Meléndez  expresses understanding of the diagnosis, treatment plan, and all of their proposed questions were answered to their satisfaction. Patient education has been provided re the diagnoses. HPI //  [address] [facility] [address] A 71 y.o. female who is a/an  established patient, presenting to my outpatient office for evaluation of  the following chief complaint(s):     Chief Complaint   Patient presents with    Foot Pain     right     DOI: 08/25/2022    At [facility], Jayla Meléndez presented with healing right 3rd and 4th metatarsal fractures. I obtained right foot 3V XR in the office. I recommended she continue to use her orthotic.  I advised her to wean off her CAM walker boot, indoors only for 2 weeks, then transition to outdoor. Since Eddie Hanks states she is doing well. She denies any significant right foot pain or soreness. She reports that she supinates her right foot with ambulating uneven terrain and frequently twists her foot. She notes she has a trilaminar orthotic from [address]. She further states she previously followed up with Dr. Marciano Quezada and is followed by Dr. Leilani De Los Santos. Visit Vitals  Wt 136 lb (61.7 kg)   LMP 04/25/2016   BMI 21.95 kg/m²       Appearance: Alert, well appearing and pleasant patient who is in no distress, oriented to person, place/time, and who follows commands. Normal dress/motor activity/thought processes/memory. This patient is accompanied in the examination room by her  self. Patient arrives to office via: with assistive device: right custom trilaminar orthotic. Footwear: New Balance sneakers    Psychiatric:  Normal Affect/mood. Judgement, behavior, and conduct are appropriate. Speech normal in context and clarity, memory intact grossly, no involuntary movements - tremors. H EENT (2): Head normocephalic & atraumatic. Eye: pupils are round// EOM are intact // Neck: ROM WNL  // Hearings Intact   Respiratory: Breathing non labored     ANKLE/FOOT right    Gait: uses assistive device - right custom trilaminar orthotic  Tenderness: none  Cutaneous: WNL. Joint Motion: Limited inversion   Joint / Tendon Stability:  No Ankle or Subtalar instability or joint laxity. No peroneal sublux ability or dislocation  Alignment: Neutral hindfoot Hallux valgus. 2nd hammertoe with superior deviation and slight crossover of 1st and 2nd toes. Neuro Motor/Sensory: NL/NL  Vascular: NL foot/ankle pulses,   Lymphatics: No extremity lymphedema, No calf swelling, no tenderness to calf muscles.         CHART REVIEW     Debra Leon has been experiencing pain and discomfort confirmed as outlined in the pain assessment outlined below.  was reviewed by Jaki Zambrano MD, on 12/7/2022. Pain Assessment  12/7/2022   Location of Pain Foot   Location Modifiers Right   Severity of Pain 0   Quality of Pain -   Quality of Pain Comment -   Duration of Pain -   Frequency of Pain -   Aggravating Factors -   Aggravating Factors Comment -   Limiting Behavior -   Relieving Factors -   Relieving Factors Comment -   Result of Injury -        Richard Alford  has a past medical history of A-fib ([facility] [address] Arthritis, Memory change, Menopause, Multiple thyroid nodules (2009), MVP (mitral valve prolapse), Neuropathy, Plantar fasciitis, PSVT (paroxysmal supraventricular tachycardia) ([address] (02/08/2011), RA (rheumatoid arthritis) ([address] Right knee pain, Routine gynecological examination, S/P colonoscopy, Sjogren's syndrome ([address] TIA (transient ischemic attack), and Tibialis tendonitis. She has no past medical history of Breast lump. Patients is employed at:          has a past medical history of A-fib ([facility] [address] Arthritis, Memory change, Menopause, Multiple thyroid nodules (2009), MVP (mitral valve prolapse), Neuropathy, Plantar fasciitis, PSVT (paroxysmal supraventricular tachycardia) ([facility] [address] (02/08/2011), RA (rheumatoid arthritis) ([facility] [address] Right knee pain, Routine gynecological examination, S/P colonoscopy, Sjogren's syndrome ([address] TIA (transient ischemic attack), and Tibialis tendonitis.     She has no past medical history of Breast lump.    has a past surgical history that includes hx tonsillectomy; hx breast augmentation (1984); pr tarsal tunnel release (1994); dilation and curettage (1987); hx dilation and curettage (1/2013); and implant breast silicone/eq.   family history includes Breast Cancer in her maternal grandmother; Cancer in her father; Depression in her sister; Diabetes in her father; Heart Attack in her father; Heart Disease in her father; High Cholesterol in her father; Hypertension in her father; Stroke in her father. Current Outpatient Medications   Medication Sig    Xarelto 20 mg tab tablet Take 1 Tablet by mouth daily (with dinner). acetaminophen (TYLENOL) 325 mg tablet Take 325 mg by mouth every four (4) hours as needed for Pain.    metoprolol tartrate (LOPRESSOR) 25 mg tablet TAKE 1/2 TABLET BY MOUTH TWICE A DAY    abatacept (ORENCIA SC) by SubCUTAneous route every month.    gabapentin (NEURONTIN) 300 mg capsule Take 2 Caps by mouth nightly. cholecalciferol, VITAMIN D3, (VITAMIN D3) 5,000 unit tab tablet Take 1,000 Units by mouth daily. cyanocobalamin 1,000 mcg tablet Take 1,000 mcg by mouth two (2) times a day. diclofenac (VOLTAREN) 1 % gel Apply  to affected area as needed. ascorbic acid, vitamin C, (VITAMIN C) 500 mg tablet Take 500 mg by mouth two (2) times a day. No current facility-administered medications for this visit. Allergies   Allergen Reactions    Plaquenil [Hydroxychloroquine] Nausea and Vomiting    Alendronate Other (comments)    Pcn [Penicillins] Itching and Rash    Prednisone Nausea and Vomiting     She reports a plaquenil/prednisone come causes vomiting    Zithromax [Azithromycin] Rash, Other (comments) and Hives     [facility] records state skin peeling. High fever, peeling     Social History     Occupational History    Not on file   Tobacco Use    Smoking status: Never    Smokeless tobacco: Never   Substance and Sexual Activity    Alcohol use: No     Alcohol/week: 0.0 standard drinks    Drug use: No     Types: Prescription, OTC    Sexual activity: Yes     Partners: Male       reports that she has never smoked. She has never used smokeless tobacco. She reports that she does not drink alcohol and does not use drugs.       DIAGNOSTIC LAB DATA      Lab Results   Component Value Date/Time    Hemoglobin A1c 5.5 07/19/2016 09:10 AM    //   Lab Results   Component Value Date/Time    Glucose 87 09/08/2022 08:54 AM    Glucose 88 09/08/2022 08:54 AM    Glucose (POC) 145 (H) 04/17/2016 09:53 PM        No results found for: [id number], CAH3CCRH      Lab Results   Component Value Date/Time    Vitamin D 25-Hydroxy 33.7 12/31/2016 08:09 AM        Drug Screen Most Recent Result Date    No resulted procedures found. REVIEW OF SYSTEMS : 12/7/2022  ALL BELOW ARE Negative except : SEE HPI     All other systems reviewed and are negative. 12 point review of systems otherwise negative unless noted in HPI. RADIOGRAPHS// IMAGING//DIAGNOSTIC DATA     Orders Placed This Encounter    AMB POC XRAY, FOOT; COMPLETE, 3+ VIEW               Right foot X-rays, NON WEIGHT BEARING, three views, AP/LAT/OBL completed 12/7/2022 AT [facility]    X-rays reveal Osseous:  HEALED 3RD,4TH,5TH MET FRACTURES//there are no dislocation or subluxation noted. Overall alignment is significant bunion deformities present acceptable. Soft tissue swelling is no noted. No osteolytic or osteoblastic lesions noted. Mineralization suggests definitely is osteopenia. Degenerative changes are mild midfoot noted. Calcified vessels are NOT present. I have personally reviewed the images of the above study. The interpretation of this study is my professional opinion           I have spent 20 minutes reviewing the previous notes, reviewing diagnostic studies [Advanced  Imaging, Diagnostic test results (x-rays)] and had a direct face to face with the patient discussing the diagnosis and importance of compliance with the treatment and plan. The treatment plan is listed in the above plan section of this Office encounter. I  answered all of her questions, as well as documenting patient care coordination for this individual on the day of the visit. Disclaimer:     Sections of this note are dictated using utilizing voice recognition software, which may have resulted in some phonetic based errors in grammar and contents.  Even though attempts were made to correct all the mistakes, some may have been missed, and remained in the body of the document. If questions arise, please contact our department. An electronic signature was used to authenticate this note. Latha Gottlieb may have a reminder for a \"due or due soon\" health maintenance. I have asked that she contact her primary care provider for follow-up on this health maintenance.            Scribed by Debra Matt, as dictated by María Elena Harden MD.   12/7/2022  8:09 AM

## 2022-12-07 ENCOUNTER — OFFICE VISIT (OUTPATIENT)
Dept: ORTHOPEDIC SURGERY | Age: 72
End: 2022-12-07
Payer: MEDICARE

## 2022-12-07 VITALS — BODY MASS INDEX: 21.95 KG/M2 | WEIGHT: 136 LBS

## 2022-12-07 DIAGNOSIS — S92.355D CLOSED NONDISPLACED FRACTURE OF FIFTH METATARSAL BONE OF LEFT FOOT WITH ROUTINE HEALING, SUBSEQUENT ENCOUNTER: ICD-10-CM

## 2022-12-07 DIAGNOSIS — S92.344D CLOSED NONDISPLACED FRACTURE OF FOURTH METATARSAL BONE OF RIGHT FOOT WITH ROUTINE HEALING, SUBSEQUENT ENCOUNTER: ICD-10-CM

## 2022-12-07 DIAGNOSIS — M81.0 AGE-RELATED OSTEOPOROSIS WITHOUT CURRENT PATHOLOGICAL FRACTURE: ICD-10-CM

## 2022-12-07 DIAGNOSIS — M79.671 RIGHT FOOT PAIN: ICD-10-CM

## 2022-12-07 DIAGNOSIS — S92.334D CLOSED NONDISPLACED FRACTURE OF THIRD METATARSAL BONE OF RIGHT FOOT WITH ROUTINE HEALING, SUBSEQUENT ENCOUNTER: Primary | ICD-10-CM

## 2022-12-07 PROCEDURE — 3017F COLORECTAL CA SCREEN DOC REV: CPT | Performed by: ORTHOPAEDIC SURGERY

## 2022-12-07 PROCEDURE — G8536 NO DOC ELDER MAL SCRN: HCPCS | Performed by: ORTHOPAEDIC SURGERY

## 2022-12-07 PROCEDURE — 1123F ACP DISCUSS/DSCN MKR DOCD: CPT | Performed by: ORTHOPAEDIC SURGERY

## 2022-12-07 PROCEDURE — G8427 DOCREV CUR MEDS BY ELIG CLIN: HCPCS | Performed by: ORTHOPAEDIC SURGERY

## 2022-12-07 PROCEDURE — G8432 DEP SCR NOT DOC, RNG: HCPCS | Performed by: ORTHOPAEDIC SURGERY

## 2022-12-07 PROCEDURE — 73630 X-RAY EXAM OF FOOT: CPT | Performed by: ORTHOPAEDIC SURGERY

## 2022-12-07 PROCEDURE — G8420 CALC BMI NORM PARAMETERS: HCPCS | Performed by: ORTHOPAEDIC SURGERY

## 2022-12-07 PROCEDURE — 99213 OFFICE O/P EST LOW 20 MIN: CPT | Performed by: ORTHOPAEDIC SURGERY

## 2022-12-07 PROCEDURE — G9899 SCRN MAM PERF RSLTS DOC: HCPCS | Performed by: ORTHOPAEDIC SURGERY

## 2022-12-07 PROCEDURE — 1101F PT FALLS ASSESS-DOCD LE1/YR: CPT | Performed by: ORTHOPAEDIC SURGERY

## 2022-12-07 PROCEDURE — 1090F PRES/ABSN URINE INCON ASSESS: CPT | Performed by: ORTHOPAEDIC SURGERY

## 2023-01-27 DIAGNOSIS — Z12.31 ENCOUNTER FOR SCREENING MAMMOGRAM FOR BREAST CANCER: ICD-10-CM

## 2023-01-30 ENCOUNTER — HOSPITAL ENCOUNTER (OUTPATIENT)
Dept: WOMENS IMAGING | Age: 73
Discharge: HOME | End: 2023-01-30
Attending: INTERNAL MEDICINE
Payer: MEDICARE

## 2023-01-30 PROCEDURE — 77063 BREAST TOMOSYNTHESIS BI: CPT

## 2023-02-03 DIAGNOSIS — I48.0 PAF (PAROXYSMAL ATRIAL FIBRILLATION) (HCC): ICD-10-CM

## 2023-02-03 DIAGNOSIS — K76.0 NAFLD (NONALCOHOLIC FATTY LIVER DISEASE): Primary | ICD-10-CM

## 2023-02-05 DIAGNOSIS — I48.0 PAF (PAROXYSMAL ATRIAL FIBRILLATION) (HCC): ICD-10-CM

## 2023-02-05 DIAGNOSIS — K76.0 NAFLD (NONALCOHOLIC FATTY LIVER DISEASE): Primary | ICD-10-CM

## 2023-02-06 DIAGNOSIS — K76.0 NAFLD (NONALCOHOLIC FATTY LIVER DISEASE): Primary | ICD-10-CM

## 2023-02-06 DIAGNOSIS — I48.0 PAF (PAROXYSMAL ATRIAL FIBRILLATION) (HCC): ICD-10-CM

## 2023-02-06 DIAGNOSIS — E04.2 MULTIPLE THYROID NODULES: ICD-10-CM

## 2023-02-07 DIAGNOSIS — I48.0 PAF (PAROXYSMAL ATRIAL FIBRILLATION) (HCC): Primary | ICD-10-CM

## 2023-03-17 ENCOUNTER — HOSPITAL ENCOUNTER (OUTPATIENT)
Facility: HOSPITAL | Age: 73
End: 2023-03-17
Payer: MEDICARE

## 2023-03-17 DIAGNOSIS — M05.79 RHEU ARTHRITIS W RHEU FACTOR MULT SITE W/O ORG/SYS INVOLV (HCC): ICD-10-CM

## 2023-03-17 PROCEDURE — 71046 X-RAY EXAM CHEST 2 VIEWS: CPT

## 2023-03-31 RX ORDER — RIVAROXABAN 20 MG/1
TABLET, FILM COATED ORAL
Qty: 30 TABLET | Refills: 5 | Status: SHIPPED | OUTPATIENT
Start: 2023-03-31

## 2023-04-24 ENCOUNTER — OFFICE VISIT (OUTPATIENT)
Age: 73
End: 2023-04-24
Payer: MEDICARE

## 2023-04-24 VITALS
SYSTOLIC BLOOD PRESSURE: 102 MMHG | WEIGHT: 134 LBS | OXYGEN SATURATION: 99 % | HEART RATE: 114 BPM | DIASTOLIC BLOOD PRESSURE: 65 MMHG | BODY MASS INDEX: 21.53 KG/M2 | HEIGHT: 66 IN

## 2023-04-24 DIAGNOSIS — I34.1 MVP (MITRAL VALVE PROLAPSE): ICD-10-CM

## 2023-04-24 DIAGNOSIS — G45.9 TIA (TRANSIENT ISCHEMIC ATTACK): ICD-10-CM

## 2023-04-24 DIAGNOSIS — M06.9 RHEUMATOID ARTHRITIS, INVOLVING UNSPECIFIED SITE, UNSPECIFIED WHETHER RHEUMATOID FACTOR PRESENT (HCC): ICD-10-CM

## 2023-04-24 DIAGNOSIS — I48.0 PAF (PAROXYSMAL ATRIAL FIBRILLATION) (HCC): Primary | ICD-10-CM

## 2023-04-24 PROCEDURE — 1090F PRES/ABSN URINE INCON ASSESS: CPT | Performed by: NURSE PRACTITIONER

## 2023-04-24 PROCEDURE — 93000 ELECTROCARDIOGRAM COMPLETE: CPT | Performed by: NURSE PRACTITIONER

## 2023-04-24 PROCEDURE — G8420 CALC BMI NORM PARAMETERS: HCPCS | Performed by: NURSE PRACTITIONER

## 2023-04-24 PROCEDURE — 99214 OFFICE O/P EST MOD 30 MIN: CPT | Performed by: NURSE PRACTITIONER

## 2023-04-24 PROCEDURE — 1036F TOBACCO NON-USER: CPT | Performed by: NURSE PRACTITIONER

## 2023-04-24 PROCEDURE — G8399 PT W/DXA RESULTS DOCUMENT: HCPCS | Performed by: NURSE PRACTITIONER

## 2023-04-24 PROCEDURE — 1123F ACP DISCUSS/DSCN MKR DOCD: CPT | Performed by: NURSE PRACTITIONER

## 2023-04-24 PROCEDURE — 3017F COLORECTAL CA SCREEN DOC REV: CPT | Performed by: NURSE PRACTITIONER

## 2023-04-24 PROCEDURE — G8427 DOCREV CUR MEDS BY ELIG CLIN: HCPCS | Performed by: NURSE PRACTITIONER

## 2023-04-24 NOTE — PATIENT INSTRUCTIONS
Increase Metoprolol to 25 mg twice daily    Echocardiogram    Continue all other medications including Xarelto

## 2023-04-24 NOTE — PROGRESS NOTES
1. Have you been to the ER, urgent care clinic since your last visit? Hospitalized since your last visit? Yes Where: [facility]    2. Have you seen or consulted any other health care providers outside of the [address] [place] [address] since your last visit? Include any pap smears or colon screening. No    3. Since your last visit, have you had any of the following symptoms?      palpitations and shortness of breath.

## 2023-04-24 NOTE — PROGRESS NOTES
HISTORY OF PRESENT ILLNESS  Sharon Saucedo is a 71 y.o. female. Palpitations   The history is provided by the patient. This is a chronic problem. The problem has not changed since onset. The problem occurs rarely. Pertinent negatives include no diaphoresis, no fever, no malaise/fatigue, no numbness, no near-syncope, no orthopnea, no PND, no nausea, no vomiting, no dizziness, no weakness, no cough, no hemoptysis and no sputum production. Her past medical history is significant for atrial fibrillation. 4/24/2023  Ms. Gerry August presents with c/o rapid heart rates which she first noted on Sunday. She denies chest pain, shortness of breath, palpitations or edema. She has noted fatigue with stair climbing. Review of Systems   Constitutional: Negative for chills, diaphoresis, fever, malaise/fatigue and weight loss. HENT: Negative for congestion, ear discharge, ear pain, hearing loss, nosebleeds and tinnitus. Eyes: Negative for blurred vision. Respiratory: Negative for cough, hemoptysis, sputum production, wheezing and stridor. Cardiovascular: Positive for palpitations. Negative for orthopnea, leg swelling, PND and near-syncope. Gastrointestinal: Negative for heartburn, nausea and vomiting. Musculoskeletal: Negative for myalgias and neck pain. Skin: Negative for itching and rash. Neurological: Negative for dizziness, tingling, tremors, focal weakness, loss of consciousness, weakness and numbness. Psychiatric/Behavioral: Negative for depression and suicidal ideas.    Family History   Problem Relation Age of Onset    Breast Cancer Maternal Grandmother     Depression Sister     Heart Disease Father     Cancer Father     Heart Attack Father     Stroke Father     Diabetes Father     High Cholesterol Father     Hypertension Father        Past Medical History:   Diagnosis Date    A-fib [facility])     Arthritis     Feet    Memory change     Menopause     Multiple thyroid nodules 2009    under care of

## 2023-05-08 ENCOUNTER — OFFICE VISIT (OUTPATIENT)
Age: 73
End: 2023-05-08
Payer: MEDICARE

## 2023-05-08 VITALS
HEART RATE: 97 BPM | OXYGEN SATURATION: 96 % | BODY MASS INDEX: 21.69 KG/M2 | SYSTOLIC BLOOD PRESSURE: 106 MMHG | WEIGHT: 135 LBS | HEIGHT: 66 IN | DIASTOLIC BLOOD PRESSURE: 82 MMHG

## 2023-05-08 DIAGNOSIS — I34.0 NONRHEUMATIC MITRAL (VALVE) INSUFFICIENCY: ICD-10-CM

## 2023-05-08 DIAGNOSIS — Z79.01 CURRENT USE OF LONG TERM ANTICOAGULATION: ICD-10-CM

## 2023-05-08 DIAGNOSIS — I48.0 PAF (PAROXYSMAL ATRIAL FIBRILLATION) (HCC): Primary | ICD-10-CM

## 2023-05-08 DIAGNOSIS — G45.9 TIA (TRANSIENT ISCHEMIC ATTACK): ICD-10-CM

## 2023-05-08 DIAGNOSIS — I48.4 ATYPICAL ATRIAL FLUTTER (HCC): ICD-10-CM

## 2023-05-08 PROCEDURE — 3017F COLORECTAL CA SCREEN DOC REV: CPT | Performed by: INTERNAL MEDICINE

## 2023-05-08 PROCEDURE — 1090F PRES/ABSN URINE INCON ASSESS: CPT | Performed by: INTERNAL MEDICINE

## 2023-05-08 PROCEDURE — 1123F ACP DISCUSS/DSCN MKR DOCD: CPT | Performed by: INTERNAL MEDICINE

## 2023-05-08 PROCEDURE — G8428 CUR MEDS NOT DOCUMENT: HCPCS | Performed by: INTERNAL MEDICINE

## 2023-05-08 PROCEDURE — G8420 CALC BMI NORM PARAMETERS: HCPCS | Performed by: INTERNAL MEDICINE

## 2023-05-08 PROCEDURE — 1036F TOBACCO NON-USER: CPT | Performed by: INTERNAL MEDICINE

## 2023-05-08 PROCEDURE — 99214 OFFICE O/P EST MOD 30 MIN: CPT | Performed by: INTERNAL MEDICINE

## 2023-05-08 PROCEDURE — G8399 PT W/DXA RESULTS DOCUMENT: HCPCS | Performed by: INTERNAL MEDICINE

## 2023-05-11 ENCOUNTER — OFFICE VISIT (OUTPATIENT)
Age: 73
End: 2023-05-11
Payer: MEDICARE

## 2023-05-11 VITALS
WEIGHT: 133 LBS | SYSTOLIC BLOOD PRESSURE: 110 MMHG | BODY MASS INDEX: 21.38 KG/M2 | DIASTOLIC BLOOD PRESSURE: 66 MMHG | HEART RATE: 96 BPM | OXYGEN SATURATION: 98 % | HEIGHT: 66 IN

## 2023-05-11 DIAGNOSIS — I34.0 NONRHEUMATIC MITRAL (VALVE) INSUFFICIENCY: ICD-10-CM

## 2023-05-11 DIAGNOSIS — D68.69 SECONDARY HYPERCOAGULABLE STATE (HCC): Primary | ICD-10-CM

## 2023-05-11 DIAGNOSIS — I48.0 PAROXYSMAL ATRIAL FIBRILLATION (HCC): ICD-10-CM

## 2023-05-11 PROCEDURE — G8399 PT W/DXA RESULTS DOCUMENT: HCPCS | Performed by: INTERNAL MEDICINE

## 2023-05-11 PROCEDURE — 3017F COLORECTAL CA SCREEN DOC REV: CPT | Performed by: INTERNAL MEDICINE

## 2023-05-11 PROCEDURE — 1090F PRES/ABSN URINE INCON ASSESS: CPT | Performed by: INTERNAL MEDICINE

## 2023-05-11 PROCEDURE — G8420 CALC BMI NORM PARAMETERS: HCPCS | Performed by: INTERNAL MEDICINE

## 2023-05-11 PROCEDURE — 99205 OFFICE O/P NEW HI 60 MIN: CPT | Performed by: INTERNAL MEDICINE

## 2023-05-11 PROCEDURE — G8427 DOCREV CUR MEDS BY ELIG CLIN: HCPCS | Performed by: INTERNAL MEDICINE

## 2023-05-11 PROCEDURE — 1123F ACP DISCUSS/DSCN MKR DOCD: CPT | Performed by: INTERNAL MEDICINE

## 2023-05-11 PROCEDURE — 1036F TOBACCO NON-USER: CPT | Performed by: INTERNAL MEDICINE

## 2023-05-11 NOTE — PROGRESS NOTES
done by Dr. Jessi Nice    S/P colonoscopy     done past 1-2 yrs? Dr. Akua Lopez    Sjogren's syndrome [facility])     sees Dr. Phuong Carrizales    TIA (transient ischemic attack)     possible history of    Tibialis tendonitis     following with Dr Radha Eller ankle       Current Outpatient Medications   Medication Sig Dispense Refill    abatacept (ORENCIA) 250 MG injection Infuse intravenously every 30 days      metoprolol tartrate (LOPRESSOR) 25 MG tablet Take 1 tablet by mouth 2 times daily 180 tablet 2    XARELTO 20 MG TABS tablet TAKE ONE TABLET BY MOUTH EVERY EVENING WITH DINNER 30 tablet 5    acetaminophen (TYLENOL) 325 MG tablet Take 1 tablet by mouth every 4 hours as needed      ascorbic acid (VITAMIN C) 500 MG tablet Take 1 tablet by mouth 2 times daily      vitamin D3 (CHOLECALCIFEROL) 125 MCG (5000 UT) TABS tablet Take 1,000 Units by mouth daily      cyanocobalamin 1000 MCG tablet Take 1 tablet by mouth 2 times daily      diclofenac sodium (VOLTAREN) 1 % GEL Apply topically as needed      gabapentin (NEURONTIN) 300 MG capsule Take 2 capsules by mouth. No current facility-administered medications for this visit. Social History   reports that she has never smoked. She has never used smokeless tobacco.   reports no history of alcohol use. Family History  family history includes Breast Cancer in her maternal grandmother; Cancer in her father; Depression in her sister; Diabetes in her father; Heart Attack in her father; Heart Disease in her father; High Cholesterol in her father; Hypertension in her father; Stroke in her father. Review of Systems  Except as stated above include:  Constitutional: Negative for fever, chills and malaise/fatigue. HEENT: No congestion or recent URI. Gastrointestinal: No nausea, vomiting, abdominal pain, bloody stools. Pulmonary:  Negative except as stated above. Cardiac:  Negative except as stated above. Musculoskeletal: Negative except as stated above.   Neurological:  No

## 2023-05-11 NOTE — PATIENT INSTRUCTIONS
[facility]          Patient Instructions    Patients Name:  Marcelo Tineo are scheduled to have a Transesophageal Echocardiogram (SHAYNA)  on  May 22,2023, at 8:30am.    Please check in at 6:45am.    Please go to [facility] and park in the outpatient parking lot that is located around to the back of the hospital and enter through the [facility]. Once you enter through the [facility] check in with the  there. The  will either give you directions or assist you in getting to the cath holding area. 3.  You are not to eat or drink anything after midnight the night before your procedure. Please continue to take your medications with a small sip of water on the morning of the procedure with the following exceptions:  None     If you are diabetic, do not take your insulin/sugar pill the morning of the procedure. We encourage families to wait in the waiting room on the first floor while the procedure is being done. The Doctor will come out and talk with you as soon as the procedure is over. There is the possibility that you may spend the night in the hospital, depending on the results of the procedure. This will be determined after the procedure is done. 8.   If you or your family have any questions, please call our office Monday-Friday 9:00am         -4:30 pm , at [phone], and ask to speak to one of the nurses.

## 2023-05-17 ENCOUNTER — HOSPITAL ENCOUNTER (OUTPATIENT)
Facility: HOSPITAL | Age: 73
Discharge: HOME | End: 2023-05-20
Payer: MEDICARE

## 2023-05-17 LAB
ANION GAP SERPL CALC-SCNC: 6 MMOL/L (ref 3–18)
BUN SERPL-MCNC: 17 MG/DL (ref 7–18)
BUN/CREAT SERPL: 23 (ref 12–20)
CALCIUM SERPL-MCNC: 9 MG/DL (ref 8.5–10.1)
CHLORIDE SERPL-SCNC: 107 MMOL/L (ref 100–111)
CO2 SERPL-SCNC: 28 MMOL/L (ref 21–32)
CREAT SERPL-MCNC: 0.74 MG/DL (ref 0.6–1.3)
GLUCOSE SERPL-MCNC: 86 MG/DL (ref 74–99)
POTASSIUM SERPL-SCNC: 4.7 MMOL/L (ref 3.5–5.5)
SODIUM SERPL-SCNC: 141 MMOL/L (ref 136–145)

## 2023-05-17 PROCEDURE — 80048 BASIC METABOLIC PNL TOTAL CA: CPT

## 2023-05-17 PROCEDURE — 36415 COLL VENOUS BLD VENIPUNCTURE: CPT

## 2023-05-18 ENCOUNTER — TELEPHONE (OUTPATIENT)
Age: 73
End: 2023-05-18

## 2023-05-21 ENCOUNTER — ANESTHESIA EVENT (OUTPATIENT)
Facility: HOSPITAL | Age: 73
End: 2023-05-21

## 2023-05-22 ENCOUNTER — ANESTHESIA (OUTPATIENT)
Facility: HOSPITAL | Age: 73
End: 2023-05-22
Payer: MEDICARE

## 2023-05-22 ENCOUNTER — HOSPITAL ENCOUNTER (OUTPATIENT)
Facility: HOSPITAL | Age: 73
Discharge: HOME | End: 2023-05-24
Payer: MEDICARE

## 2023-05-22 VITALS
RESPIRATION RATE: 15 BRPM | DIASTOLIC BLOOD PRESSURE: 89 MMHG | TEMPERATURE: 98.3 F | HEIGHT: 66 IN | WEIGHT: 133 LBS | HEART RATE: 88 BPM | SYSTOLIC BLOOD PRESSURE: 106 MMHG | BODY MASS INDEX: 21.38 KG/M2 | OXYGEN SATURATION: 99 %

## 2023-05-22 DIAGNOSIS — I34.0 MITRAL VALVE INSUFFICIENCY, UNSPECIFIED ETIOLOGY: ICD-10-CM

## 2023-05-22 LAB
ECHO BSA: 1.68 M2
ECHO MV REGURGITANT ALIASING (NYQUIST) VELOCITY: 98 CM/S
ECHO MV REGURGITANT VELOCITY PISA: 5.3 M/S
ECHO MV REGURGITANT VTIA: 159.1 CM

## 2023-05-22 PROCEDURE — 99100 ANES PT EXTEME AGE<1 YR&>70: CPT | Performed by: NURSE ANESTHETIST, CERTIFIED REGISTERED

## 2023-05-22 PROCEDURE — 6360000002 HC RX W HCPCS: Performed by: NURSE ANESTHETIST, CERTIFIED REGISTERED

## 2023-05-22 PROCEDURE — 01922 ANES N-INVAS IMG/RADJ THER: CPT | Performed by: NURSE ANESTHETIST, CERTIFIED REGISTERED

## 2023-05-22 PROCEDURE — 93312 ECHO TRANSESOPHAGEAL: CPT | Performed by: INTERNAL MEDICINE

## 2023-05-22 PROCEDURE — 01922 ANES N-INVAS IMG/RADJ THER: CPT | Performed by: ANESTHESIOLOGY

## 2023-05-22 PROCEDURE — 2580000003 HC RX 258: Performed by: NURSE ANESTHETIST, CERTIFIED REGISTERED

## 2023-05-22 PROCEDURE — 3700000001 HC ADD 15 MINUTES (ANESTHESIA)

## 2023-05-22 PROCEDURE — 99152 MOD SED SAME PHYS/QHP 5/>YRS: CPT

## 2023-05-22 PROCEDURE — 3700000000 HC ANESTHESIA ATTENDED CARE

## 2023-05-22 PROCEDURE — 93325 DOPPLER ECHO COLOR FLOW MAPG: CPT | Performed by: INTERNAL MEDICINE

## 2023-05-22 PROCEDURE — 6360000002 HC RX W HCPCS: Performed by: INTERNAL MEDICINE

## 2023-05-22 PROCEDURE — 99100 ANES PT EXTEME AGE<1 YR&>70: CPT | Performed by: ANESTHESIOLOGY

## 2023-05-22 PROCEDURE — 93320 DOPPLER ECHO COMPLETE: CPT | Performed by: INTERNAL MEDICINE

## 2023-05-22 PROCEDURE — 93320 DOPPLER ECHO COMPLETE: CPT

## 2023-05-22 RX ORDER — BACTERIOSTATIC SODIUM CHLORIDE 0.9 %
20 VIAL (ML) INJECTION
Status: DISPENSED | OUTPATIENT
Start: 2023-05-22 | End: 2023-05-22

## 2023-05-22 RX ORDER — ONDANSETRON 2 MG/ML
4 INJECTION INTRAMUSCULAR; INTRAVENOUS
Status: COMPLETED | OUTPATIENT
Start: 2023-05-22 | End: 2023-05-22

## 2023-05-22 RX ORDER — SODIUM CHLORIDE 0.9 % (FLUSH) 0.9 %
5-40 SYRINGE (ML) INJECTION PRN
Status: DISCONTINUED | OUTPATIENT
Start: 2023-05-22 | End: 2023-05-25 | Stop reason: HOSPADM

## 2023-05-22 RX ORDER — SODIUM CHLORIDE 9 MG/ML
INJECTION, SOLUTION INTRAVENOUS PRN
Status: DISCONTINUED | OUTPATIENT
Start: 2023-05-22 | End: 2023-05-25 | Stop reason: HOSPADM

## 2023-05-22 RX ORDER — SODIUM CHLORIDE 0.9 % (FLUSH) 0.9 %
5-40 SYRINGE (ML) INJECTION EVERY 12 HOURS SCHEDULED
Status: DISCONTINUED | OUTPATIENT
Start: 2023-05-22 | End: 2023-05-25 | Stop reason: HOSPADM

## 2023-05-22 RX ORDER — SODIUM CHLORIDE 9 MG/ML
INJECTION, SOLUTION INTRAVENOUS CONTINUOUS
Status: DISCONTINUED | OUTPATIENT
Start: 2023-05-22 | End: 2023-05-25 | Stop reason: HOSPADM

## 2023-05-22 RX ORDER — PROPOFOL 10 MG/ML
INJECTION, EMULSION INTRAVENOUS PRN
Status: DISCONTINUED | OUTPATIENT
Start: 2023-05-22 | End: 2023-05-22 | Stop reason: SDUPTHER

## 2023-05-22 RX ADMIN — PROPOFOL 10 MG: 10 INJECTION, EMULSION INTRAVENOUS at 08:08

## 2023-05-22 RX ADMIN — SODIUM CHLORIDE: 900 INJECTION, SOLUTION INTRAVENOUS at 07:58

## 2023-05-22 RX ADMIN — ONDANSETRON 4 MG: 2 INJECTION INTRAMUSCULAR; INTRAVENOUS at 08:16

## 2023-05-22 RX ADMIN — PROPOFOL 10 MG: 10 INJECTION, EMULSION INTRAVENOUS at 08:11

## 2023-05-22 RX ADMIN — PROPOFOL 70 MG: 10 INJECTION, EMULSION INTRAVENOUS at 08:06

## 2023-05-22 NOTE — DISCHARGE INSTRUCTIONS
DISCHARGE SUMMARY from Nurse    PATIENT INSTRUCTIONS:    Please resume taking your home medications as prescribed. After general anesthesia or intravenous sedation, for 24 hours or while taking prescription Narcotics:  Limit your activities  Do not drive and operate hazardous machinery  Do not make important personal or business decisions  Do  not drink alcoholic beverages  If you have not urinated within 8 hours after discharge, please contact your surgeon on call. Report the following to your surgeon:  Excessive pain, swelling, redness or odor of or around the surgical area  Temperature over 100.5  Nausea and vomiting lasting longer than 4 hours or if unable to take medications  Any signs of decreased circulation or nerve impairment to extremity: change in color, persistent  numbness, tingling, coldness or increase pain  Any questions    What to do at Home:  Recommended activity: activity as tolerated and no driving for today,     If you experience any of the following symptoms  painful ordifficulty swallowing, difficulty breathing, chest pain,  please follow up with Dr. Darryl Keith MD.    *  Please give a list of your current medications to your Primary Care Provider. *  Please update this list whenever your medications are discontinued, doses are      changed, or new medications (including over-the-counter products) are added. *  Please carry medication information at all times in case of emergency situations. These are general instructions for a healthy lifestyle:    No smoking/ No tobacco products/ Avoid exposure to second hand smoke  Surgeon General's Warning:  Quitting smoking now greatly reduces serious risk to your health.     Obesity, smoking, and sedentary lifestyle greatly increases your risk for illness    A healthy diet, regular physical exercise & weight monitoring are important for maintaining a healthy lifestyle    You may be retaining fluid if you have a history of heart failure or

## 2023-05-22 NOTE — ANESTHESIA PRE PROCEDURE
Neuro/Psych:   (+) TIA,             GI/Hepatic/Renal:   (+) liver disease:,           Endo/Other:    (+) : arthritis:., .                 Abdominal: normal exam            Vascular: negative vascular ROS. Other Findings:           Anesthesia Plan      MAC     ASA 3             Anesthetic plan and risks discussed with patient.                         Stephen Dias DO   5/22/2023

## 2023-05-22 NOTE — PROGRESS NOTES
SHAYNA reviewed/read and MR moderate/severe with regurgitant volume near 60cc. I will discuss further in the office options.

## 2023-05-22 NOTE — ANESTHESIA POSTPROCEDURE EVALUATION
Department of Anesthesiology  Postprocedure Note    Patient: Justus Irbyllor  MRN: [id number]  YOB: 1950  Date of evaluation: 5/22/2023      Procedure Summary     Date: 05/22/23 Room / Location: [address] [place] [address] CATH LAB; [address] [place] [address]  NON-INVASIVE CARDIOLOGY    Anesthesia Start: 0803 Anesthesia Stop: 3282    Procedure: TRANSESOPHAGEAL ECHOCARDIOGRAM (CONTRAST/3D PRN) Diagnosis: Mitral valve insufficiency, unspecified etiology    Scheduled Providers:  Responsible Provider: Gallo Rueda DO    Anesthesia Type: MAC ASA Status: 3          Anesthesia Type: MAC    Hugo Phase I: Hugo Score: 10    Huog Phase II:        Anesthesia Post Evaluation    Patient location during evaluation: PACU  Patient participation: complete - patient participated  Level of consciousness: awake and alert  Airway patency: patent  Nausea & Vomiting: no nausea and no vomiting  Complications: no  Cardiovascular status: hemodynamically stable  Respiratory status: acceptable  Hydration status: stable  Multimodal analgesia pain management approach

## 2023-07-06 ENCOUNTER — OFFICE VISIT (OUTPATIENT)
Age: 73
End: 2023-07-06

## 2023-07-06 VITALS
SYSTOLIC BLOOD PRESSURE: 106 MMHG | BODY MASS INDEX: 21.79 KG/M2 | HEART RATE: 123 BPM | DIASTOLIC BLOOD PRESSURE: 80 MMHG | OXYGEN SATURATION: 98 % | WEIGHT: 135 LBS

## 2023-07-06 DIAGNOSIS — I48.91 ATRIAL FIBRILLATION, UNSPECIFIED TYPE (HCC): Primary | ICD-10-CM

## 2023-07-06 NOTE — PROGRESS NOTES
History of Present Illness:  68 YOF here for follow up. She initially had been following with Dr. Po Knapp and then Dr. Mila Echeverria and then Dr. Karl Zuñiga and referred for atrial fibrillation and discussion of ablation versus Watchman. She had at least moderate mitral regurgitation by recent transthoracic echo, so I performed SHAYNA, which showed moderate to severe mitral regurgitation with regurgitant volume 57 cc and ERO 0.4 cm2, which is closer to the severe side. In general, she does have some chronic fatigue and dyspnea with mild exertion, but no chest pain or syncope. She also has a history of carpal tunnel and Achilles' pathology. Impression:  History of paroxysmal, now more persistent, atrial fibrillation with borderline rates, on Metoprolol. Diagnosed about 4-5 years ago. Moderate to severe mitral regurgitation with regurgitant volume of 57 cc and ERO 0.4 cm2 by SHAYNA May 22, 2023. Chronic Xarelto use. Severely enlarged left atrium. History of rheumatoid arthritis. History of carpal tunnel, as well as Achilles' pathology. Plan:  She has borderline severe MR, although preserved LV function, along with atrial fibrillation. It is reasonable to consider surgery, but I would like to obtain a cardiac MRI for completeness to make sure she does not have cardiac amyloid as well and I will see her back after testing. I am unable to increase her beta blocker anymore given hypotension.         Wt Readings from Last 3 Encounters:   07/06/23 135 lb (61.2 kg)   05/22/23 133 lb (60.3 kg)   05/11/23 133 lb (60.3 kg)     Past Medical History:   Diagnosis Date    A-fib [facility])     Arthritis     Feet    Memory change     Menopause     Multiple thyroid nodules 2009    under care of Dr. Sharlene Landis    MVP (mitral valve prolapse)     under care of Dr. Thierno Angeles    Neuropathy     Plantar fasciitis     PSVT (paroxysmal supraventricular tachycardia) ([address]) 02/08/2011    RA (rheumatoid arthritis) ([facility])     Right knee pain

## 2023-08-28 ENCOUNTER — OFFICE VISIT (OUTPATIENT)
Age: 73
End: 2023-08-28
Payer: MEDICARE

## 2023-08-28 VITALS
DIASTOLIC BLOOD PRESSURE: 67 MMHG | SYSTOLIC BLOOD PRESSURE: 98 MMHG | BODY MASS INDEX: 20.89 KG/M2 | WEIGHT: 130 LBS | HEART RATE: 113 BPM | HEIGHT: 66 IN | OXYGEN SATURATION: 98 %

## 2023-08-28 DIAGNOSIS — Z79.01 CURRENT USE OF LONG TERM ANTICOAGULATION: ICD-10-CM

## 2023-08-28 DIAGNOSIS — I34.1 MVP (MITRAL VALVE PROLAPSE): ICD-10-CM

## 2023-08-28 DIAGNOSIS — G47.33 OSA (OBSTRUCTIVE SLEEP APNEA): ICD-10-CM

## 2023-08-28 DIAGNOSIS — I34.0 MITRAL VALVE INSUFFICIENCY, UNSPECIFIED ETIOLOGY: ICD-10-CM

## 2023-08-28 DIAGNOSIS — I48.0 PAROXYSMAL ATRIAL FIBRILLATION (HCC): Primary | ICD-10-CM

## 2023-08-28 DIAGNOSIS — G45.9 TIA (TRANSIENT ISCHEMIC ATTACK): ICD-10-CM

## 2023-08-28 PROCEDURE — 99214 OFFICE O/P EST MOD 30 MIN: CPT | Performed by: INTERNAL MEDICINE

## 2023-08-28 PROCEDURE — 1123F ACP DISCUSS/DSCN MKR DOCD: CPT | Performed by: INTERNAL MEDICINE

## 2023-08-28 PROCEDURE — 1090F PRES/ABSN URINE INCON ASSESS: CPT | Performed by: INTERNAL MEDICINE

## 2023-08-28 PROCEDURE — G8428 CUR MEDS NOT DOCUMENT: HCPCS | Performed by: INTERNAL MEDICINE

## 2023-08-28 PROCEDURE — 1036F TOBACCO NON-USER: CPT | Performed by: INTERNAL MEDICINE

## 2023-08-28 PROCEDURE — 3017F COLORECTAL CA SCREEN DOC REV: CPT | Performed by: INTERNAL MEDICINE

## 2023-08-28 PROCEDURE — G8420 CALC BMI NORM PARAMETERS: HCPCS | Performed by: INTERNAL MEDICINE

## 2023-08-28 PROCEDURE — G8399 PT W/DXA RESULTS DOCUMENT: HCPCS | Performed by: INTERNAL MEDICINE

## 2023-08-28 RX ORDER — DIGOXIN 250 MCG
250 TABLET ORAL DAILY
Qty: 30 TABLET | Refills: 5 | Status: SHIPPED | OUTPATIENT
Start: 2023-08-28

## 2023-08-28 NOTE — PROGRESS NOTES
1. Have you been to the ER, urgent care clinic since your last visit? Hospitalized since your last visit?     no      2. Where do you normally have your labs drawn? [facility]    3. Do you need any refills today?    no

## 2023-08-28 NOTE — PROGRESS NOTES
HISTORY OF PRESENT ILLNESS  Maury Mcghee is a 71 y.o. female. Palpitations   The history is provided by the patient. This is a chronic problem. The problem has not changed since onset. The problem occurs rarely. Pertinent negatives include no diaphoresis, no fever, no malaise/fatigue, no numbness, no near-syncope, no orthopnea, no PND, no nausea, no vomiting, no dizziness, no weakness, no cough, no hemoptysis and no sputum production. Her past medical history is significant for atrial fibrillation. 4/24/2023  Ms. Trupti Merino presents with c/o rapid heart rates which she first noted on Sunday. She denies chest pain, shortness of breath, palpitations or edema. She has noted fatigue with stair climbing. Review of Systems   Constitutional: Negative for chills, diaphoresis, fever, malaise/fatigue and weight loss. HENT: Negative for congestion, ear discharge, ear pain, hearing loss, nosebleeds and tinnitus. Eyes: Negative for blurred vision. Respiratory: Negative for cough, hemoptysis, sputum production, wheezing and stridor. Cardiovascular: Positive for palpitations. Negative for orthopnea, leg swelling, PND and near-syncope. Gastrointestinal: Negative for heartburn, nausea and vomiting. Musculoskeletal: Negative for myalgias and neck pain. Skin: Negative for itching and rash. Neurological: Negative for dizziness, tingling, tremors, focal weakness, loss of consciousness, weakness and numbness. Psychiatric/Behavioral: Negative for depression and suicidal ideas.    Family History   Problem Relation Age of Onset    Breast Cancer Maternal Grandmother     Depression Sister     Heart Disease Father     Cancer Father     Heart Attack Father     Stroke Father     Diabetes Father     High Cholesterol Father     Hypertension Father        Past Medical History:   Diagnosis Date    A-fib [facility])     Arthritis     Feet    Memory change     Menopause     Multiple thyroid nodules 2009    under care of

## 2023-09-05 ENCOUNTER — HOSPITAL ENCOUNTER (OUTPATIENT)
Facility: HOSPITAL | Age: 73
Discharge: HOME | End: 2023-09-08
Payer: MEDICARE

## 2023-09-05 DIAGNOSIS — K76.0 NAFLD (NONALCOHOLIC FATTY LIVER DISEASE): ICD-10-CM

## 2023-09-05 DIAGNOSIS — E04.2 MULTIPLE THYROID NODULES: ICD-10-CM

## 2023-09-05 DIAGNOSIS — I48.0 PAF (PAROXYSMAL ATRIAL FIBRILLATION) (HCC): ICD-10-CM

## 2023-09-05 LAB
ALBUMIN SERPL-MCNC: 3.7 G/DL (ref 3.4–5)
ALBUMIN/GLOB SERPL: 1.3 (ref 0.8–1.7)
ALP SERPL-CCNC: 74 U/L (ref 45–117)
ALT SERPL-CCNC: 28 U/L (ref 13–56)
ANION GAP SERPL CALC-SCNC: 5 MMOL/L (ref 3–18)
AST SERPL-CCNC: 22 U/L (ref 10–38)
BASOPHILS # BLD: 0.1 K/UL (ref 0–0.1)
BASOPHILS NFR BLD: 1 % (ref 0–2)
BILIRUB SERPL-MCNC: 0.8 MG/DL (ref 0.2–1)
BUN SERPL-MCNC: 17 MG/DL (ref 7–18)
BUN/CREAT SERPL: 24 (ref 12–20)
CALCIUM SERPL-MCNC: 8.8 MG/DL (ref 8.5–10.1)
CHLORIDE SERPL-SCNC: 105 MMOL/L (ref 100–111)
CHOLEST SERPL-MCNC: 143 MG/DL
CO2 SERPL-SCNC: 28 MMOL/L (ref 21–32)
CREAT SERPL-MCNC: 0.72 MG/DL (ref 0.6–1.3)
CREAT UR-MCNC: 41 MG/DL (ref 30–125)
CRP SERPL-MCNC: <0.3 MG/DL (ref 0–0.3)
DIFFERENTIAL METHOD BLD: ABNORMAL
EOSINOPHIL # BLD: 0.2 K/UL (ref 0–0.4)
EOSINOPHIL NFR BLD: 3 % (ref 0–5)
ERYTHROCYTE [DISTWIDTH] IN BLOOD BY AUTOMATED COUNT: 12.7 % (ref 11.6–14.5)
GLOBULIN SER CALC-MCNC: 2.8 G/DL (ref 2–4)
GLUCOSE SERPL-MCNC: 85 MG/DL (ref 74–99)
HCT VFR BLD AUTO: 47.2 % (ref 35–45)
HDLC SERPL-MCNC: 64 MG/DL (ref 40–60)
HDLC SERPL: 2.2 (ref 0–5)
HGB BLD-MCNC: 15.6 G/DL (ref 12–16)
IMM GRANULOCYTES # BLD AUTO: 0 K/UL (ref 0–0.04)
IMM GRANULOCYTES NFR BLD AUTO: 0 % (ref 0–0.5)
LDLC SERPL CALC-MCNC: 71.4 MG/DL (ref 0–100)
LIPID PANEL: ABNORMAL
LYMPHOCYTES # BLD: 1.9 K/UL (ref 0.9–3.6)
LYMPHOCYTES NFR BLD: 32 % (ref 21–52)
MCH RBC QN AUTO: 30.7 PG (ref 24–34)
MCHC RBC AUTO-ENTMCNC: 33.1 G/DL (ref 31–37)
MCV RBC AUTO: 92.9 FL (ref 78–100)
MICROALBUMIN UR-MCNC: <0.5 MG/DL (ref 0–3)
MICROALBUMIN/CREAT UR-RTO: NORMAL MG/G (ref 0–30)
MONOCYTES # BLD: 0.5 K/UL (ref 0.05–1.2)
MONOCYTES NFR BLD: 8 % (ref 3–10)
NEUTS SEG # BLD: 3.2 K/UL (ref 1.8–8)
NEUTS SEG NFR BLD: 56 % (ref 40–73)
NRBC # BLD: 0 K/UL (ref 0–0.01)
NRBC BLD-RTO: 0 PER 100 WBC
PLATELET # BLD AUTO: 250 K/UL (ref 135–420)
PMV BLD AUTO: 9.4 FL (ref 9.2–11.8)
POTASSIUM SERPL-SCNC: 4.3 MMOL/L (ref 3.5–5.5)
PROT SERPL-MCNC: 6.5 G/DL (ref 6.4–8.2)
RBC # BLD AUTO: 5.08 M/UL (ref 4.2–5.3)
SODIUM SERPL-SCNC: 138 MMOL/L (ref 136–145)
TRIGL SERPL-MCNC: 38 MG/DL
VLDLC SERPL CALC-MCNC: 7.6 MG/DL
WBC # BLD AUTO: 5.8 K/UL (ref 4.6–13.2)

## 2023-09-05 PROCEDURE — 82043 UR ALBUMIN QUANTITATIVE: CPT

## 2023-09-05 PROCEDURE — 85025 COMPLETE CBC W/AUTO DIFF WBC: CPT

## 2023-09-05 PROCEDURE — 80053 COMPREHEN METABOLIC PANEL: CPT

## 2023-09-05 PROCEDURE — 82570 ASSAY OF URINE CREATININE: CPT

## 2023-09-05 PROCEDURE — 36415 COLL VENOUS BLD VENIPUNCTURE: CPT

## 2023-09-05 PROCEDURE — 86140 C-REACTIVE PROTEIN: CPT

## 2023-09-05 PROCEDURE — 80061 LIPID PANEL: CPT

## 2023-09-08 ENCOUNTER — HOSPITAL ENCOUNTER (OUTPATIENT)
Facility: HOSPITAL | Age: 73
End: 2023-09-08
Payer: MEDICARE

## 2023-09-08 DIAGNOSIS — I34.0 MITRAL VALVE INSUFFICIENCY, UNSPECIFIED ETIOLOGY: ICD-10-CM

## 2023-09-08 DIAGNOSIS — I48.0 PAROXYSMAL ATRIAL FIBRILLATION (HCC): ICD-10-CM

## 2023-09-08 LAB — DIGOXIN SERPL-MCNC: 1 NG/ML (ref 0.9–2)

## 2023-09-08 PROCEDURE — 36415 COLL VENOUS BLD VENIPUNCTURE: CPT

## 2023-09-08 PROCEDURE — 80162 ASSAY OF DIGOXIN TOTAL: CPT

## 2023-09-10 SDOH — ECONOMIC STABILITY: TRANSPORTATION INSECURITY
IN THE PAST 12 MONTHS, HAS LACK OF TRANSPORTATION KEPT YOU FROM MEETINGS, WORK, OR FROM GETTING THINGS NEEDED FOR DAILY LIVING?: NO

## 2023-09-10 SDOH — ECONOMIC STABILITY: FOOD INSECURITY: WITHIN THE PAST 12 MONTHS, YOU WORRIED THAT YOUR FOOD WOULD RUN OUT BEFORE YOU GOT MONEY TO BUY MORE.: NEVER TRUE

## 2023-09-10 SDOH — ECONOMIC STABILITY: HOUSING INSECURITY
IN THE LAST 12 MONTHS, WAS THERE A TIME WHEN YOU DID NOT HAVE A STEADY PLACE TO SLEEP OR SLEPT IN A SHELTER (INCLUDING NOW)?: NO

## 2023-09-10 SDOH — ECONOMIC STABILITY: FOOD INSECURITY: WITHIN THE PAST 12 MONTHS, THE FOOD YOU BOUGHT JUST DIDN'T LAST AND YOU DIDN'T HAVE MONEY TO GET MORE.: NEVER TRUE

## 2023-09-10 SDOH — ECONOMIC STABILITY: INCOME INSECURITY: HOW HARD IS IT FOR YOU TO PAY FOR THE VERY BASICS LIKE FOOD, HOUSING, MEDICAL CARE, AND HEATING?: SOMEWHAT HARD

## 2023-09-11 ENCOUNTER — TELEPHONE (OUTPATIENT)
Age: 73
End: 2023-09-11

## 2023-09-11 NOTE — TELEPHONE ENCOUNTER
----- Message from Evangelista Díaz MD sent at 9/11/2023  7:37 AM EDT -----  Lab/test  reviewed  No significant abnormality

## 2023-09-11 NOTE — TELEPHONE ENCOUNTER
Spoke with patient per Dr. Priyanka Durham regarding lab/test. Lab reviewed. No significant abnormality. She voices understanding and acceptance of this advice and will call back if any further questions or concerns.

## 2023-09-13 ENCOUNTER — OFFICE VISIT (OUTPATIENT)
Age: 73
End: 2023-09-13
Payer: MEDICARE

## 2023-09-13 VITALS
HEART RATE: 64 BPM | BODY MASS INDEX: 21.56 KG/M2 | OXYGEN SATURATION: 98 % | DIASTOLIC BLOOD PRESSURE: 61 MMHG | WEIGHT: 129.38 LBS | SYSTOLIC BLOOD PRESSURE: 92 MMHG | TEMPERATURE: 98 F | HEIGHT: 65 IN | RESPIRATION RATE: 18 BRPM

## 2023-09-13 DIAGNOSIS — Z71.89 ADVANCE CARE PLANNING: ICD-10-CM

## 2023-09-13 DIAGNOSIS — K76.0 FATTY (CHANGE OF) LIVER, NOT ELSEWHERE CLASSIFIED: ICD-10-CM

## 2023-09-13 DIAGNOSIS — M06.9 RHEUMATOID ARTHRITIS, INVOLVING UNSPECIFIED SITE, UNSPECIFIED WHETHER RHEUMATOID FACTOR PRESENT (HCC): ICD-10-CM

## 2023-09-13 DIAGNOSIS — Z12.31 ENCOUNTER FOR SCREENING MAMMOGRAM FOR MALIGNANT NEOPLASM OF BREAST: ICD-10-CM

## 2023-09-13 DIAGNOSIS — I48.0 PAROXYSMAL ATRIAL FIBRILLATION (HCC): ICD-10-CM

## 2023-09-13 DIAGNOSIS — Z00.00 MEDICARE ANNUAL WELLNESS VISIT, SUBSEQUENT: ICD-10-CM

## 2023-09-13 DIAGNOSIS — B35.1 ONYCHOMYCOSIS: ICD-10-CM

## 2023-09-13 DIAGNOSIS — E04.2 NONTOXIC MULTINODULAR GOITER: Primary | ICD-10-CM

## 2023-09-13 PROCEDURE — 3017F COLORECTAL CA SCREEN DOC REV: CPT | Performed by: INTERNAL MEDICINE

## 2023-09-13 PROCEDURE — 99213 OFFICE O/P EST LOW 20 MIN: CPT | Performed by: INTERNAL MEDICINE

## 2023-09-13 PROCEDURE — 1036F TOBACCO NON-USER: CPT | Performed by: INTERNAL MEDICINE

## 2023-09-13 PROCEDURE — G8420 CALC BMI NORM PARAMETERS: HCPCS | Performed by: INTERNAL MEDICINE

## 2023-09-13 PROCEDURE — 99211 OFF/OP EST MAY X REQ PHY/QHP: CPT | Performed by: INTERNAL MEDICINE

## 2023-09-13 PROCEDURE — 1123F ACP DISCUSS/DSCN MKR DOCD: CPT | Performed by: INTERNAL MEDICINE

## 2023-09-13 PROCEDURE — G8427 DOCREV CUR MEDS BY ELIG CLIN: HCPCS | Performed by: INTERNAL MEDICINE

## 2023-09-13 PROCEDURE — G8399 PT W/DXA RESULTS DOCUMENT: HCPCS | Performed by: INTERNAL MEDICINE

## 2023-09-13 PROCEDURE — G0439 PPPS, SUBSEQ VISIT: HCPCS | Performed by: INTERNAL MEDICINE

## 2023-09-13 PROCEDURE — 1090F PRES/ABSN URINE INCON ASSESS: CPT | Performed by: INTERNAL MEDICINE

## 2023-09-13 ASSESSMENT — PATIENT HEALTH QUESTIONNAIRE - PHQ9
SUM OF ALL RESPONSES TO PHQ QUESTIONS 1-9: 0
SUM OF ALL RESPONSES TO PHQ9 QUESTIONS 1 & 2: 0
SUM OF ALL RESPONSES TO PHQ QUESTIONS 1-9: 0
1. LITTLE INTEREST OR PLEASURE IN DOING THINGS: 0
2. FEELING DOWN, DEPRESSED OR HOPELESS: 0

## 2023-09-15 ENCOUNTER — TRANSCRIBE ORDERS (OUTPATIENT)
Facility: HOSPITAL | Age: 73
End: 2023-09-15

## 2023-09-15 DIAGNOSIS — Z12.31 ENCOUNTER FOR SCREENING MAMMOGRAM FOR MALIGNANT NEOPLASM OF BREAST: Primary | ICD-10-CM

## 2023-09-19 ASSESSMENT — ENCOUNTER SYMPTOMS
ANAL BLEEDING: 0
ABDOMINAL PAIN: 0
EYE PAIN: 0
SHORTNESS OF BREATH: 0
BLOOD IN STOOL: 0
COUGH: 0
SORE THROAT: 0

## 2023-09-20 NOTE — ACP (ADVANCE CARE PLANNING)
Advance Care Planning     General Advance Care Planning (ACP) Conversation    Date of Conversation: 9/13/23    Conducted with: Patient with Decision Making Capacity    Healthcare Decision Maker: Today we documented Decision Maker(s) consistent with ACP documents on file. Content/Action Overview: Has ACP document(s) on file - reflects the patient's care preferences  She has no changes to make to her preexisting advance directive.      Length of Voluntary ACP Conversation in minutes:  <16 minutes (Non-Billable)    Aniyah Moreno MD

## 2023-09-21 ENCOUNTER — HOSPITAL ENCOUNTER (OUTPATIENT)
Facility: HOSPITAL | Age: 73
End: 2023-09-21
Attending: INTERNAL MEDICINE
Payer: MEDICARE

## 2023-09-21 ENCOUNTER — HOSPITAL ENCOUNTER (OUTPATIENT)
Facility: HOSPITAL | Age: 73
Discharge: HOME | End: 2023-09-21
Attending: INTERNAL MEDICINE
Payer: MEDICARE

## 2023-09-21 DIAGNOSIS — E04.2 NONTOXIC MULTINODULAR GOITER: ICD-10-CM

## 2023-09-21 DIAGNOSIS — K76.0 FATTY (CHANGE OF) LIVER, NOT ELSEWHERE CLASSIFIED: ICD-10-CM

## 2023-09-21 LAB
T4 FREE SERPL-MCNC: 1.1 NG/DL (ref 0.7–1.5)
TSH SERPL DL<=0.05 MIU/L-ACNC: 3.11 UIU/ML (ref 0.36–3.74)

## 2023-09-21 PROCEDURE — 84443 ASSAY THYROID STIM HORMONE: CPT

## 2023-09-21 PROCEDURE — 84439 ASSAY OF FREE THYROXINE: CPT

## 2023-09-21 PROCEDURE — 83010 ASSAY OF HAPTOGLOBIN QUANT: CPT

## 2023-09-21 PROCEDURE — 82172 ASSAY OF APOLIPOPROTEIN: CPT

## 2023-09-21 PROCEDURE — 36415 COLL VENOUS BLD VENIPUNCTURE: CPT

## 2023-09-21 PROCEDURE — 82977 ASSAY OF GGT: CPT

## 2023-09-21 PROCEDURE — 84478 ASSAY OF TRIGLYCERIDES: CPT

## 2023-09-21 PROCEDURE — 84450 TRANSFERASE (AST) (SGOT): CPT

## 2023-09-21 PROCEDURE — 84460 ALANINE AMINO (ALT) (SGPT): CPT

## 2023-09-21 PROCEDURE — 83883 ASSAY NEPHELOMETRY NOT SPEC: CPT

## 2023-09-21 PROCEDURE — 76536 US EXAM OF HEAD AND NECK: CPT

## 2023-09-21 PROCEDURE — 82465 ASSAY BLD/SERUM CHOLESTEROL: CPT

## 2023-09-22 LAB
A2 MACROGLOB SERPL-MCNC: 197 MG/DL (ref 110–276)
ALT SERPL-CCNC: 19 IU/L (ref 0–40)
APO A-I SERPL-MCNC: 131 MG/DL (ref 116–209)
AST SERPL W P-5'-P-CCNC: 23 IU/L (ref 0–40)
BILIRUB SERPL-MCNC: 0.7 MG/DL (ref 0–1.2)
CHOLEST SERPL-MCNC: 138 MG/DL (ref 100–199)
FIBROSIS SCORING:: ABNORMAL
FIBROSIS STAGE SERPL QL: ABNORMAL
GGT SERPL-CCNC: 15 IU/L (ref 0–60)
GLUCOSE SERPL-MCNC: 77 MG/DL (ref 70–99)
HAPTOGLOB SERPL-MCNC: 120 MG/DL (ref 42–346)
INTERPRETATION: ABNORMAL
LABORATORY COMMENT REPORT: ABNORMAL
LIVER FIBR SCORE SERPL CALC.FIBROSURE: 0.32 (ref 0–0.21)
Lab: ABNORMAL
NASH - STEATOSIS GRADE: ABNORMAL
NASH - STEATOSIS GRADING: ABNORMAL
NASH - STEATOSIS SCORE: 0.27 (ref 0–0.4)
NASH SCORING: ABNORMAL
NECROINFLAMMATORY ACT GRADE SERPL QL: ABNORMAL
NECROINFLAMMATORY ACT SCORE SERPL: 0 (ref 0–0.25)
SERVICE CMNT-IMP: ABNORMAL
TRIGL SERPL-MCNC: 49 MG/DL (ref 0–149)

## 2023-09-25 RX ORDER — RIVAROXABAN 20 MG/1
TABLET, FILM COATED ORAL
Qty: 30 TABLET | Refills: 5 | Status: SHIPPED | OUTPATIENT
Start: 2023-09-25

## 2023-09-29 ENCOUNTER — TELEPHONE (OUTPATIENT)
Age: 73
End: 2023-09-29

## 2023-10-04 ENCOUNTER — OFFICE VISIT (OUTPATIENT)
Age: 73
End: 2023-10-04
Payer: MEDICARE

## 2023-10-04 VITALS
OXYGEN SATURATION: 99 % | HEART RATE: 88 BPM | SYSTOLIC BLOOD PRESSURE: 104 MMHG | RESPIRATION RATE: 18 BRPM | DIASTOLIC BLOOD PRESSURE: 67 MMHG | HEIGHT: 65 IN | WEIGHT: 128 LBS | BODY MASS INDEX: 21.33 KG/M2 | TEMPERATURE: 97.2 F

## 2023-10-04 DIAGNOSIS — K75.81 NASH (NONALCOHOLIC STEATOHEPATITIS): ICD-10-CM

## 2023-10-04 DIAGNOSIS — R06.83 SNORING: Primary | ICD-10-CM

## 2023-10-04 DIAGNOSIS — R29.818 SUSPECTED SLEEP APNEA: ICD-10-CM

## 2023-10-04 DIAGNOSIS — G45.9 TIA (TRANSIENT ISCHEMIC ATTACK): ICD-10-CM

## 2023-10-04 DIAGNOSIS — D68.69 SECONDARY HYPERCOAGULABLE STATE (HCC): ICD-10-CM

## 2023-10-04 DIAGNOSIS — I48.0 PAF (PAROXYSMAL ATRIAL FIBRILLATION) (HCC): ICD-10-CM

## 2023-10-04 DIAGNOSIS — M06.9 RHEUMATOID ARTHRITIS, INVOLVING UNSPECIFIED SITE, UNSPECIFIED WHETHER RHEUMATOID FACTOR PRESENT (HCC): ICD-10-CM

## 2023-10-04 DIAGNOSIS — G47.19 EXCESSIVE DAYTIME SLEEPINESS: ICD-10-CM

## 2023-10-04 PROCEDURE — 99204 OFFICE O/P NEW MOD 45 MIN: CPT | Performed by: OTOLARYNGOLOGY

## 2023-10-04 PROCEDURE — 1123F ACP DISCUSS/DSCN MKR DOCD: CPT | Performed by: OTOLARYNGOLOGY

## 2023-10-04 ASSESSMENT — ENCOUNTER SYMPTOMS
WHEEZING: 0
SHORTNESS OF BREATH: 0
CHEST TIGHTNESS: 0
COUGH: 0
CHOKING: 0
STRIDOR: 0

## 2023-10-04 ASSESSMENT — SLEEP AND FATIGUE QUESTIONNAIRES
HOW LIKELY ARE YOU TO NOD OFF OR FALL ASLEEP WHEN YOU ARE A PASSENGER IN A CAR FOR AN HOUR WITHOUT A BREAK: 3
NECK CIRCUMFERENCE (INCHES): 13
ESS TOTAL SCORE: 21
HOW LIKELY ARE YOU TO NOD OFF OR FALL ASLEEP WHILE LYING DOWN TO REST IN THE AFTERNOON WHEN CIRCUMSTANCES PERMIT: 3
HOW LIKELY ARE YOU TO NOD OFF OR FALL ASLEEP WHILE SITTING AND READING: 3
HOW LIKELY ARE YOU TO NOD OFF OR FALL ASLEEP WHILE SITTING AND TALKING TO SOMEONE: 0
HOW LIKELY ARE YOU TO NOD OFF OR FALL ASLEEP WHILE WATCHING TV: 3
HOW LIKELY ARE YOU TO NOD OFF OR FALL ASLEEP WHILE SITTING QUIETLY AFTER LUNCH WITHOUT ALCOHOL: 3
HOW LIKELY ARE YOU TO NOD OFF OR FALL ASLEEP IN A CAR, WHILE STOPPED FOR A FEW MINUTES IN TRAFFIC: 3
HOW LIKELY ARE YOU TO NOD OFF OR FALL ASLEEP WHILE SITTING INACTIVE IN A PUBLIC PLACE: 3

## 2023-10-04 ASSESSMENT — PATIENT HEALTH QUESTIONNAIRE - PHQ9
SUM OF ALL RESPONSES TO PHQ QUESTIONS 1-9: 0
1. LITTLE INTEREST OR PLEASURE IN DOING THINGS: 0
SUM OF ALL RESPONSES TO PHQ9 QUESTIONS 1 & 2: 0
2. FEELING DOWN, DEPRESSED OR HOPELESS: 0
SUM OF ALL RESPONSES TO PHQ QUESTIONS 1-9: 0

## 2023-10-04 NOTE — PROGRESS NOTES
Cecil Villagomez presents today for   Chief Complaint   Patient presents with    Fatigue    Sleep Problem    Snoring       Is someone accompanying this pt? no    Is the patient using any DME equipment during OV? no    -DME Company N/A    Have you ever had a sleep study done before? no    Depression Screening:      10/4/2023     2:50 PM   PHQ-9    Little interest or pleasure in doing things 0   Feeling down, depressed, or hopeless 0   PHQ-2 Score 0   PHQ-9 Total Score 0        [place] Sleepiness Scale:      10/4/2023     2:54 PM   Sleep Medicine   Sitting and reading 3   Watching TV 3   Sitting, inactive in a public place (e.g. a theatre or a meeting) 3   As a passenger in a car for an hour without a break 3   Lying down to rest in the afternoon when circumstances permit 3   Sitting and talking to someone 0   Sitting quietly after a lunch without alcohol 3   In a car, while stopped for a few minutes in traffic 3   [place] Sleepiness Score 21   Neck circumference (Inches) 13       Stop-Bang:      10/4/2023     2:00 PM   STOP-BANG QUESTIONNAIRE   Are you a loud and/or regular snorer? 1   Do you often feel tired or groggy upon awakening or do you awaken with a headache? 1   Have you been observed to gasp or stop breathing during sleep? 0   Are you often tired or fatigued during wake time hours? 0   Do you fall asleep sitting, reading, watching TV or driving? 0   Do you often have problems with memory or concentration? 0   Do you have or are you being treated for high blood pressure? 0   Recent BMI (Calculated) 21.6   Is BMI greater than 35 kg/m2? 0=No   Age older than 48years old? 1=Yes   Is your neck circumference greater than 17 inches (Male) or 16 inches (Female)? 0   Gender - Male 0=No   STOP-Bang Total Score 24.6         Coordination of Care:  1. Have you been to the ER, urgent care clinic since your last visit? Hospitalized since your last visit? no    2.  Have you seen or consulted any other health care
65%. Left ventricle size is normal. Normal wall motion. Left Atrium: Left atrium is dilated. Normal sized appendage. Normal appendage flow velocity. No obvious left atrial appendage thrombus noted. Pectinate noted. Right Atrium: Right atrium is dilated. Interatrial Septum: No obvious interatrial shunt visualized with color Doppler. Agitated saline study appears negative with and without provocation. Mitral Valve: Mildly thickened leaflets. Mild leaflet prolapse noted of the anterior leaflet. Moderate to severe regurgitation with multiple, eccentrically directed jets and may underestimate severity. MR radius of 0.6 cm, Regurgitant volume of 57 mL, and ERO of 0.4 cm2. Signed by: Katina Montez MD on 5/22/2023 10:45 AM     Total time spent on this encounter, including previsit review of of separately obtained history, face to face interaction, performing medically appropriate physical exam, patient/counseling, ordering tests, care coordination and documentation was  40   minutes. I did review the referring provider's notes as well as cardiology notes. I reviewed recent labs. I also ordered a sleep study. This note was dictated utilizing voice recognition software which may lead to typographical errors. I apologize in advance if the situation occurs. If questions arise please do not hesitate to contact me or call our department.     Electronically signed by Garriosn Mitchell DO on10/4/2023 at 5:10 PM

## 2023-10-04 NOTE — PATIENT INSTRUCTIONS
Please make a follow up appointment to discuss the results of your sleep study. If this is impossible for some reason, please send me a \"My Chart\" message so that I may get back with you in a timely manner. The [facility] Lab is located in the [address] [facility], adjacent to [facility]. The lab is on the second floor. The direct number to call for sleep study related questions is: [phone]. Please call our clinic back at [phone] or send a message on [employer] if you have any questions or concerns or if you are experiencing any of the following: You have not received a follow up appointment within 30 days prior the recommended follow up time. If you are not tolerating treatment plan and/or not able to obtain equipment or prescribed medication(s). if you are experiencing any difficulties with the [address] [place] [address] [facility]  (DME) Company you may be using or is assigned to you. Two weeks have passed and you have not received an appointment for a scheduled procedure. Two weeks have passed since you underwent a test and/or procedure and you have not received your results. If you are using a CPAP/BIPAP, or Home Ventilator Device- Please note the following. Currently, many DMEs are experiencing supply chain difficulties and orders for equipment may be back logged several weeks. Your  Durable Medical Equipment (DME ) company is supposed to provide you with replacement filters, tubing and masks. You can either call your DME when you need new supplies or you can arrange for an automatic shipment schedule. Your need to be seen by our office at lat minimum of every 12 months in order to renew the prescription for these supplies. Please make note of who your DME company is and their phone number. Please make sure that you clean your mask and hosing on a regular basis.   Your DME can provide you with additional information regarding proper care and cleaning of your

## 2023-10-11 DIAGNOSIS — F41.9 ANXIETY: Primary | ICD-10-CM

## 2023-10-11 RX ORDER — DIAZEPAM 5 MG/1
5 TABLET ORAL EVERY 8 HOURS PRN
Qty: 1 TABLET | Refills: 0 | Status: SHIPPED | OUTPATIENT
Start: 2023-10-11 | End: 2023-10-13 | Stop reason: SDUPTHER

## 2023-10-13 DIAGNOSIS — F41.9 ANXIETY: ICD-10-CM

## 2023-10-13 RX ORDER — DIAZEPAM 5 MG/1
TABLET ORAL
Qty: 1 TABLET | Refills: 0 | Status: SHIPPED | OUTPATIENT
Start: 2023-10-13 | End: 2023-10-13

## 2023-11-10 ENCOUNTER — TELEPHONE (OUTPATIENT)
Age: 73
End: 2023-11-10

## 2023-11-10 NOTE — TELEPHONE ENCOUNTER
----- Message from Yue Lerma MD sent at 11/1/2023  3:07 PM EDT -----  Please let her know I reviewed the mri of the heart, function good, no evidence amyloid. Valve moderate range for leakiness. No changes for now.    ----- Message -----  From: Trinity Aragon CMA  Sent: 11/1/2023   3:01 PM EDT  To: Yue Lerma MD    Patient had Cardiac MRI done on 10/19. Results are in care everywhere. Please advise. I can call her with results tomorrow.

## 2023-12-04 ENCOUNTER — OFFICE VISIT (OUTPATIENT)
Age: 73
End: 2023-12-04
Payer: MEDICARE

## 2023-12-04 VITALS
WEIGHT: 130 LBS | BODY MASS INDEX: 20.89 KG/M2 | HEART RATE: 95 BPM | SYSTOLIC BLOOD PRESSURE: 99 MMHG | DIASTOLIC BLOOD PRESSURE: 68 MMHG | HEIGHT: 66 IN | OXYGEN SATURATION: 99 %

## 2023-12-04 DIAGNOSIS — Z79.01 CURRENT USE OF LONG TERM ANTICOAGULATION: ICD-10-CM

## 2023-12-04 DIAGNOSIS — I34.1 MVP (MITRAL VALVE PROLAPSE): ICD-10-CM

## 2023-12-04 DIAGNOSIS — I34.0 MITRAL VALVE INSUFFICIENCY, UNSPECIFIED ETIOLOGY: ICD-10-CM

## 2023-12-04 DIAGNOSIS — I48.0 PAROXYSMAL ATRIAL FIBRILLATION (HCC): Primary | ICD-10-CM

## 2023-12-04 DIAGNOSIS — G47.33 OSA (OBSTRUCTIVE SLEEP APNEA): ICD-10-CM

## 2023-12-04 PROCEDURE — G8399 PT W/DXA RESULTS DOCUMENT: HCPCS | Performed by: INTERNAL MEDICINE

## 2023-12-04 PROCEDURE — 99214 OFFICE O/P EST MOD 30 MIN: CPT | Performed by: INTERNAL MEDICINE

## 2023-12-04 PROCEDURE — G8420 CALC BMI NORM PARAMETERS: HCPCS | Performed by: INTERNAL MEDICINE

## 2023-12-04 PROCEDURE — 1123F ACP DISCUSS/DSCN MKR DOCD: CPT | Performed by: INTERNAL MEDICINE

## 2023-12-04 PROCEDURE — G8484 FLU IMMUNIZE NO ADMIN: HCPCS | Performed by: INTERNAL MEDICINE

## 2023-12-04 PROCEDURE — 1090F PRES/ABSN URINE INCON ASSESS: CPT | Performed by: INTERNAL MEDICINE

## 2023-12-04 PROCEDURE — 3017F COLORECTAL CA SCREEN DOC REV: CPT | Performed by: INTERNAL MEDICINE

## 2023-12-04 PROCEDURE — G8428 CUR MEDS NOT DOCUMENT: HCPCS | Performed by: INTERNAL MEDICINE

## 2023-12-04 PROCEDURE — 1036F TOBACCO NON-USER: CPT | Performed by: INTERNAL MEDICINE

## 2023-12-04 NOTE — PROGRESS NOTES
HISTORY OF PRESENT ILLNESS  Nicole Caceres is a 71 y.o. female. Palpitations   The history is provided by the patient. This is a chronic problem. The problem has not changed since onset. The problem occurs rarely. Pertinent negatives include no diaphoresis, no fever, no malaise/fatigue, no numbness, no near-syncope, no orthopnea, no PND, no nausea, no vomiting, no dizziness, no weakness, no cough, no hemoptysis and no sputum production. Her past medical history is significant for atrial fibrillation. 4/24/2023  Ms. Briseyda Terry presents with c/o rapid heart rates which she first noted on Sunday. She denies chest pain, shortness of breath, palpitations or edema. She has noted fatigue with stair climbing. Review of Systems   Constitutional: Negative for chills, diaphoresis, fever, malaise/fatigue and weight loss. HENT: Negative for congestion, ear discharge, ear pain, hearing loss, nosebleeds and tinnitus. Eyes: Negative for blurred vision. Respiratory: Negative for cough, hemoptysis, sputum production, wheezing and stridor. Cardiovascular: Positive for palpitations. Negative for orthopnea, leg swelling, PND and near-syncope. Gastrointestinal: Negative for heartburn, nausea and vomiting. Musculoskeletal: Negative for myalgias and neck pain. Skin: Negative for itching and rash. Neurological: Negative for dizziness, tingling, tremors, focal weakness, loss of consciousness, weakness and numbness. Psychiatric/Behavioral: Negative for depression and suicidal ideas.    Family History   Problem Relation Age of Onset    Heart Disease Father     Cancer Father     Heart Attack Father     Stroke Father     Diabetes Father     High Cholesterol Father     Hypertension Father     Depression Sister     Sleep Apnea Sister     Breast Cancer Maternal Grandmother        Past Medical History:   Diagnosis Date    A-fib [facility])     Arthritis     Feet    Memory change     Menopause     Multiple thyroid nodules 2009

## 2023-12-04 NOTE — PROGRESS NOTES
1. Have you been to the ER, urgent care clinic since your last visit? Hospitalized since your last visit? NO      2. Where do you normally have your labs drawn? [facility]    3. Do you need any refills today?    NO

## 2023-12-04 NOTE — PROGRESS NOTES
1. Have you been to the ER, urgent care clinic since your last visit? Hospitalized since your last visit? No    2. Have you seen or consulted any other health care providers outside of the [address] [place] [address] since your last visit? Include any pap smears or colon screening.  Yes Where: Rhuematology Reason for visit: Injections

## 2023-12-06 ENCOUNTER — HOSPITAL ENCOUNTER (OUTPATIENT)
Dept: SLEEP MEDICINE | Facility: HOSPITAL | Age: 73
Discharge: HOME | End: 2023-12-09
Attending: OTOLARYNGOLOGY
Payer: MEDICARE

## 2023-12-06 DIAGNOSIS — I48.0 PAF (PAROXYSMAL ATRIAL FIBRILLATION) (HCC): ICD-10-CM

## 2023-12-06 DIAGNOSIS — G47.19 EXCESSIVE DAYTIME SLEEPINESS: ICD-10-CM

## 2023-12-06 DIAGNOSIS — R29.818 SUSPECTED SLEEP APNEA: ICD-10-CM

## 2023-12-06 DIAGNOSIS — G45.9 TIA (TRANSIENT ISCHEMIC ATTACK): ICD-10-CM

## 2023-12-06 DIAGNOSIS — R06.83 SNORING: ICD-10-CM

## 2023-12-06 PROCEDURE — 95810 POLYSOM 6/> YRS 4/> PARAM: CPT

## 2023-12-06 ASSESSMENT — SLEEP AND FATIGUE QUESTIONNAIRES
HOW LIKELY ARE YOU TO NOD OFF OR FALL ASLEEP IN A CAR, WHILE STOPPED FOR A FEW MINUTES IN TRAFFIC: 3
HOW LIKELY ARE YOU TO NOD OFF OR FALL ASLEEP WHILE SITTING INACTIVE IN A PUBLIC PLACE: 3
HOW LIKELY ARE YOU TO NOD OFF OR FALL ASLEEP WHILE SITTING QUIETLY AFTER LUNCH WITHOUT ALCOHOL: 3
HOW LIKELY ARE YOU TO NOD OFF OR FALL ASLEEP WHILE WATCHING TV: 2
HOW LIKELY ARE YOU TO NOD OFF OR FALL ASLEEP WHILE SITTING AND READING: 3
HOW LIKELY ARE YOU TO NOD OFF OR FALL ASLEEP WHEN YOU ARE A PASSENGER IN A CAR FOR AN HOUR WITHOUT A BREAK: 3
HOW LIKELY ARE YOU TO NOD OFF OR FALL ASLEEP WHILE SITTING AND TALKING TO SOMEONE: 2
ESS TOTAL SCORE: 22
HOW LIKELY ARE YOU TO NOD OFF OR FALL ASLEEP WHILE LYING DOWN TO REST IN THE AFTERNOON WHEN CIRCUMSTANCES PERMIT: 3

## 2023-12-07 VITALS
HEIGHT: 65 IN | BODY MASS INDEX: 21.87 KG/M2 | WEIGHT: 131.25 LBS | DIASTOLIC BLOOD PRESSURE: 86 MMHG | HEART RATE: 53 BPM | SYSTOLIC BLOOD PRESSURE: 111 MMHG

## 2023-12-07 NOTE — PROGRESS NOTES
Patient arrived for sleep study. Physicians orders were reviewed. Patient education to include initiation of PAP therapy per protocol. Patient questions were answered. The patient demonstrated good understanding of the positive airway device.

## 2023-12-19 PROBLEM — G47.33 OBSTRUCTIVE SLEEP APNEA (ADULT) (PEDIATRIC): Status: ACTIVE | Noted: 2023-12-19

## 2023-12-19 PROBLEM — R06.83 SNORING: Status: ACTIVE | Noted: 2023-12-19

## 2024-01-12 ENCOUNTER — OFFICE VISIT (OUTPATIENT)
Age: 74
End: 2024-01-12
Payer: MEDICARE

## 2024-01-12 ENCOUNTER — CLINICAL DOCUMENTATION (OUTPATIENT)
Age: 74
End: 2024-01-12

## 2024-01-12 VITALS
DIASTOLIC BLOOD PRESSURE: 81 MMHG | HEIGHT: 66 IN | HEART RATE: 68 BPM | TEMPERATURE: 97 F | BODY MASS INDEX: 21.38 KG/M2 | WEIGHT: 133 LBS | RESPIRATION RATE: 18 BRPM | SYSTOLIC BLOOD PRESSURE: 114 MMHG | OXYGEN SATURATION: 97 %

## 2024-01-12 DIAGNOSIS — D68.69 SECONDARY HYPERCOAGULABLE STATE (HCC): ICD-10-CM

## 2024-01-12 DIAGNOSIS — I48.0 PAF (PAROXYSMAL ATRIAL FIBRILLATION) (HCC): ICD-10-CM

## 2024-01-12 DIAGNOSIS — M06.9 RHEUMATOID ARTHRITIS, INVOLVING UNSPECIFIED SITE, UNSPECIFIED WHETHER RHEUMATOID FACTOR PRESENT (HCC): ICD-10-CM

## 2024-01-12 DIAGNOSIS — G45.9 TIA (TRANSIENT ISCHEMIC ATTACK): ICD-10-CM

## 2024-01-12 DIAGNOSIS — G25.81 RESTLESS LEGS SYNDROME (RLS): ICD-10-CM

## 2024-01-12 DIAGNOSIS — K75.81 NASH (NONALCOHOLIC STEATOHEPATITIS): ICD-10-CM

## 2024-01-12 DIAGNOSIS — G47.19 EXCESSIVE DAYTIME SLEEPINESS: ICD-10-CM

## 2024-01-12 DIAGNOSIS — R06.83 SNORING: ICD-10-CM

## 2024-01-12 DIAGNOSIS — G47.33 MILD OBSTRUCTIVE SLEEP APNEA: Primary | ICD-10-CM

## 2024-01-12 PROCEDURE — 1090F PRES/ABSN URINE INCON ASSESS: CPT | Performed by: OTOLARYNGOLOGY

## 2024-01-12 PROCEDURE — G8427 DOCREV CUR MEDS BY ELIG CLIN: HCPCS | Performed by: OTOLARYNGOLOGY

## 2024-01-12 PROCEDURE — G8399 PT W/DXA RESULTS DOCUMENT: HCPCS | Performed by: OTOLARYNGOLOGY

## 2024-01-12 PROCEDURE — 1036F TOBACCO NON-USER: CPT | Performed by: OTOLARYNGOLOGY

## 2024-01-12 PROCEDURE — 1123F ACP DISCUSS/DSCN MKR DOCD: CPT | Performed by: OTOLARYNGOLOGY

## 2024-01-12 PROCEDURE — 99214 OFFICE O/P EST MOD 30 MIN: CPT | Performed by: OTOLARYNGOLOGY

## 2024-01-12 PROCEDURE — G8484 FLU IMMUNIZE NO ADMIN: HCPCS | Performed by: OTOLARYNGOLOGY

## 2024-01-12 PROCEDURE — G8420 CALC BMI NORM PARAMETERS: HCPCS | Performed by: OTOLARYNGOLOGY

## 2024-01-12 PROCEDURE — 3017F COLORECTAL CA SCREEN DOC REV: CPT | Performed by: OTOLARYNGOLOGY

## 2024-01-12 ASSESSMENT — PATIENT HEALTH QUESTIONNAIRE - PHQ9
2. FEELING DOWN, DEPRESSED OR HOPELESS: 0
1. LITTLE INTEREST OR PLEASURE IN DOING THINGS: 0
SUM OF ALL RESPONSES TO PHQ QUESTIONS 1-9: 0
SUM OF ALL RESPONSES TO PHQ9 QUESTIONS 1 & 2: 0

## 2024-01-12 ASSESSMENT — SLEEP AND FATIGUE QUESTIONNAIRES
HOW LIKELY ARE YOU TO NOD OFF OR FALL ASLEEP WHILE SITTING AND TALKING TO SOMEONE: 1
HOW LIKELY ARE YOU TO NOD OFF OR FALL ASLEEP WHILE LYING DOWN TO REST IN THE AFTERNOON WHEN CIRCUMSTANCES PERMIT: 2
HOW LIKELY ARE YOU TO NOD OFF OR FALL ASLEEP WHILE SITTING AND READING: 2
HOW LIKELY ARE YOU TO NOD OFF OR FALL ASLEEP WHILE WATCHING TV: 2
HOW LIKELY ARE YOU TO NOD OFF OR FALL ASLEEP IN A CAR, WHILE STOPPED FOR A FEW MINUTES IN TRAFFIC: 2
HOW LIKELY ARE YOU TO NOD OFF OR FALL ASLEEP WHEN YOU ARE A PASSENGER IN A CAR FOR AN HOUR WITHOUT A BREAK: 2
ESS TOTAL SCORE: 14
HOW LIKELY ARE YOU TO NOD OFF OR FALL ASLEEP WHILE SITTING INACTIVE IN A PUBLIC PLACE: 2
HOW LIKELY ARE YOU TO NOD OFF OR FALL ASLEEP WHILE SITTING QUIETLY AFTER LUNCH WITHOUT ALCOHOL: 1

## 2024-01-12 ASSESSMENT — ENCOUNTER SYMPTOMS
WHEEZING: 0
CHEST TIGHTNESS: 0
STRIDOR: 0
COUGH: 0
SHORTNESS OF BREATH: 0
CHOKING: 0

## 2024-01-12 NOTE — PROGRESS NOTES
Joslyn Estrada presents today for   Chief Complaint   Patient presents with    Follow-up    Results       Is someone accompanying this pt? no    Is the patient using any DME equipment during OV? no    -DME Company N/A    Depression Screenin/12/2024     9:55 AM   PHQ-9    Little interest or pleasure in doing things 0   Feeling down, depressed, or hopeless 0   PHQ-2 Score 0   PHQ-9 Total Score 0        [place] Sleepiness Scale:      2024     9:57 AM   Sleep Medicine   Sitting and reading 2   Watching TV 2   Sitting, inactive in a public place (e.g. a theatre or a meeting) 2   As a passenger in a car for an hour without a break 2   Lying down to rest in the afternoon when circumstances permit 2   Sitting and talking to someone 1   Sitting quietly after a lunch without alcohol 1   In a car, while stopped for a few minutes in traffic 2   [place] Sleepiness Score 14       Stop-Bang:      10/4/2023     2:00 PM   STOP-BANG QUESTIONNAIRE   Are you a loud and/or regular snorer? 1   Do you often feel tired or groggy upon awakening or do you awaken with a headache? 1   Have you been observed to gasp or stop breathing during sleep? 0   Are you often tired or fatigued during wake time hours?  0   Do you fall asleep sitting, reading, watching TV or driving? 0   Do you often have problems with memory or concentration? 0   Do you have or are you being treated for high blood pressure? 0   Recent BMI (Calculated) 21.6   Is BMI greater than 35 kg/m2? 0=No   Age older than 50 years old? 1=Yes   Is your neck circumference greater than 17 inches (Male) or 16 inches (Female)? 0   Gender - Male 0=No   STOP-Bang Total Score 24.6           Coordination of Care:  1. Have you been to the ER, urgent care clinic since your last visit? Hospitalized since your last visit?  no    2. Have you seen or consulted any other health care providers outside of the [facility] since your last visit? Include any pap smears or

## 2024-01-12 NOTE — PROGRESS NOTES
[facility]   Sleep Medicine     Office Progress Note         [address]  [address]  [facility] [id number] [phone] Fax    Reason for visit/referral: f/u PSG  Assessment:    1. Mild obstructive sleep apnea  -     DME Order for Oral Appliance as OP  2. PAF (paroxysmal atrial fibrillation) (HCC)  3. TIA (transient ischemic attack)  Overview:  possible history of      4. Restless legs syndrome (RLS)  Comments:  adequately treated with Gabapentin. periodic limb movement index was 0 during her recent sleep study  Assessment & Plan:    will follow-up on this after treating her obstructive sleep apnea.  5. Rheumatoid arthritis, involving unspecified site, unspecified whether rheumatoid factor present (HCC)  6. Excessive daytime sleepiness  7. Snoring  8. WALLER (nonalcoholic steatohepatitis)  9. Secondary hypercoagulable state (HCC)       Plan:    I have discussed the results of the sleep study with the patient to include pertinent information regarding diagnostic findings on the study.  We discussed the nature of obstructive apneas and why it would be important to treat this as it relates to other medical disorders.  I advised the patient that CPAP is the gold standard treatment and is recommended in most cases of obstructive sleep apnea and is considered first-line treatment. We also discussed other possible options for treatment to include weight loss, oral appliances and optimization of nasal patency or other surgical options that could be considered if applicable.      After our discussion today the patient wants to try an oral appliance.  This is a perfectly acceptable and reasonable option I will send over a consult/order today to get her started on this.  Will need to see her back in a few months and reassess her level of sleepiness and we can do a home study with the device in place as well.    We did discuss the possibility that her beta-blocker is causing her to

## 2024-01-31 ENCOUNTER — HOSPITAL ENCOUNTER (OUTPATIENT)
Dept: WOMENS IMAGING | Facility: HOSPITAL | Age: 74
Discharge: HOME | End: 2024-02-03
Attending: INTERNAL MEDICINE
Payer: MEDICARE

## 2024-01-31 DIAGNOSIS — Z12.31 ENCOUNTER FOR SCREENING MAMMOGRAM FOR MALIGNANT NEOPLASM OF BREAST: ICD-10-CM

## 2024-01-31 PROCEDURE — 77063 BREAST TOMOSYNTHESIS BI: CPT

## 2024-03-25 RX ORDER — RIVAROXABAN 20 MG/1
TABLET, FILM COATED ORAL
Qty: 30 TABLET | Refills: 5 | Status: SHIPPED | OUTPATIENT
Start: 2024-03-25

## 2024-04-27 ENCOUNTER — HOSPITAL ENCOUNTER (OUTPATIENT)
Facility: HOSPITAL | Age: 74
End: 2024-04-27
Payer: MEDICARE

## 2024-04-27 DIAGNOSIS — M19.90 OSTEOARTHRITIS, UNSPECIFIED OSTEOARTHRITIS TYPE, UNSPECIFIED SITE: ICD-10-CM

## 2024-04-27 PROCEDURE — 73120 X-RAY EXAM OF HAND: CPT

## 2024-04-27 PROCEDURE — 73620 X-RAY EXAM OF FOOT: CPT

## 2024-04-27 PROCEDURE — 73560 X-RAY EXAM OF KNEE 1 OR 2: CPT

## 2024-04-27 PROCEDURE — 73521 X-RAY EXAM HIPS BI 2 VIEWS: CPT

## 2024-04-27 PROCEDURE — 73630 X-RAY EXAM OF FOOT: CPT

## 2024-05-06 ENCOUNTER — HOSPITAL ENCOUNTER (OUTPATIENT)
Facility: HOSPITAL | Age: 74
Discharge: HOME | End: 2024-05-09
Payer: MEDICARE

## 2024-05-06 DIAGNOSIS — M15.0 PRIMARY GENERALIZED HYPERTROPHIC OSTEOARTHROSIS: ICD-10-CM

## 2024-05-06 PROCEDURE — 73721 MRI JNT OF LWR EXTRE W/O DYE: CPT

## 2024-05-22 ENCOUNTER — OFFICE VISIT (OUTPATIENT)
Age: 74
End: 2024-05-22
Payer: MEDICARE

## 2024-05-22 DIAGNOSIS — M23.204 DEGENERATIVE TEAR OF MEDIAL MENISCUS OF LEFT KNEE: Primary | ICD-10-CM

## 2024-05-22 PROCEDURE — 99203 OFFICE O/P NEW LOW 30 MIN: CPT | Performed by: ORTHOPAEDIC SURGERY

## 2024-05-22 PROCEDURE — 1090F PRES/ABSN URINE INCON ASSESS: CPT | Performed by: ORTHOPAEDIC SURGERY

## 2024-05-22 PROCEDURE — G8399 PT W/DXA RESULTS DOCUMENT: HCPCS | Performed by: ORTHOPAEDIC SURGERY

## 2024-05-22 PROCEDURE — 1036F TOBACCO NON-USER: CPT | Performed by: ORTHOPAEDIC SURGERY

## 2024-05-22 PROCEDURE — G8420 CALC BMI NORM PARAMETERS: HCPCS | Performed by: ORTHOPAEDIC SURGERY

## 2024-05-22 PROCEDURE — 1123F ACP DISCUSS/DSCN MKR DOCD: CPT | Performed by: ORTHOPAEDIC SURGERY

## 2024-05-22 PROCEDURE — G8427 DOCREV CUR MEDS BY ELIG CLIN: HCPCS | Performed by: ORTHOPAEDIC SURGERY

## 2024-05-22 PROCEDURE — 3017F COLORECTAL CA SCREEN DOC REV: CPT | Performed by: ORTHOPAEDIC SURGERY

## 2024-05-22 NOTE — PROGRESS NOTES
Joslyn Estrada  1950   Chief Complaint   Patient presents with    Knee Pain     left        HISTORY OF PRESENT ILLNESS  Joslyn Estrada is a 74 y.o. female who presents today for evaluation of left knee pain.  Pain is a 1/10. Pain has been present since February 2024 without any obvious injury. Pt was being by Dr. Lockhart for her RA. Pain is worst in the posterior aspect of her knee. Endorses a giving way sensation. She expresses that overall, her pain is manageable. Pain is worse with prolonged walking.     Has tried following treatments: Injections:No; Brace:No; Therapy:No; Cane/Crutch:No      Allergies   Allergen Reactions    Hydroxychloroquine Nausea And Vomiting    Alendronate Other (See Comments)    Azithromycin Hives, Other (See Comments) and Rash     [facility] records state skin peeling.  High fever, peeling    Penicillins Itching and Rash    Prednisone Nausea And Vomiting     She reports a plaquenil/prednisone come causes vomiting        Past Medical History:   Diagnosis Date    A-fib ([facility])     Arthritis     Feet    Memory change     Menopause     Multiple thyroid nodules 2009    under care of Dr. Rosenbaum    MVP (mitral valve prolapse)     under care of Dr. Mejia    Neuropathy     Plantar fasciitis     PSVT (paroxysmal supraventricular tachycardia) ([facility]) 02/08/2011    RA (rheumatoid arthritis) ([facility])     Right knee pain     Routine gynecological examination     done by Dr. Wray    S/P colonoscopy     done past 1-2 yrs?  Dr. Allen    Sjogren's syndrome ([facility])     sees Dr. Lockhart    TIA (transient ischemic attack)     possible history of    Tibialis tendonitis     following with Dr Baires-left ankle      Social History       Tobacco History       Smoking Status  Never      Smokeless Tobacco Use  Never              Alcohol History       Alcohol Use Status  Never              Drug Use       Drug Use Status  Never              Sexual Activity       Sexually Active  Not Asked

## 2024-06-05 ENCOUNTER — OFFICE VISIT (OUTPATIENT)
Age: 74
End: 2024-06-05
Payer: MEDICARE

## 2024-06-05 VITALS
BODY MASS INDEX: 20.89 KG/M2 | SYSTOLIC BLOOD PRESSURE: 99 MMHG | HEART RATE: 92 BPM | WEIGHT: 130 LBS | OXYGEN SATURATION: 96 % | HEIGHT: 66 IN | DIASTOLIC BLOOD PRESSURE: 70 MMHG

## 2024-06-05 DIAGNOSIS — I48.0 PAROXYSMAL ATRIAL FIBRILLATION (HCC): Primary | ICD-10-CM

## 2024-06-05 DIAGNOSIS — I34.0 MITRAL VALVE INSUFFICIENCY, UNSPECIFIED ETIOLOGY: ICD-10-CM

## 2024-06-05 DIAGNOSIS — Z86.73 HISTORY OF TIA (TRANSIENT ISCHEMIC ATTACK): ICD-10-CM

## 2024-06-05 DIAGNOSIS — R06.09 DYSPNEA ON EXERTION: ICD-10-CM

## 2024-06-05 PROCEDURE — G8399 PT W/DXA RESULTS DOCUMENT: HCPCS | Performed by: INTERNAL MEDICINE

## 2024-06-05 PROCEDURE — G8428 CUR MEDS NOT DOCUMENT: HCPCS | Performed by: INTERNAL MEDICINE

## 2024-06-05 PROCEDURE — 1123F ACP DISCUSS/DSCN MKR DOCD: CPT | Performed by: INTERNAL MEDICINE

## 2024-06-05 PROCEDURE — 93000 ELECTROCARDIOGRAM COMPLETE: CPT | Performed by: INTERNAL MEDICINE

## 2024-06-05 PROCEDURE — 1036F TOBACCO NON-USER: CPT | Performed by: INTERNAL MEDICINE

## 2024-06-05 PROCEDURE — 99214 OFFICE O/P EST MOD 30 MIN: CPT | Performed by: INTERNAL MEDICINE

## 2024-06-05 PROCEDURE — 3017F COLORECTAL CA SCREEN DOC REV: CPT | Performed by: INTERNAL MEDICINE

## 2024-06-05 PROCEDURE — G8420 CALC BMI NORM PARAMETERS: HCPCS | Performed by: INTERNAL MEDICINE

## 2024-06-05 PROCEDURE — 1090F PRES/ABSN URINE INCON ASSESS: CPT | Performed by: INTERNAL MEDICINE

## 2024-06-05 ASSESSMENT — ENCOUNTER SYMPTOMS
COLOR CHANGE: 0
CONSTIPATION: 0
APNEA: 0
CHEST TIGHTNESS: 0
COUGH: 0
SHORTNESS OF BREATH: 1
WHEEZING: 0
ABDOMINAL PAIN: 0
NAUSEA: 0
DIARRHEA: 0
BLOOD IN STOOL: 0

## 2024-06-05 NOTE — PATIENT INSTRUCTIONS
Learning About the Mediterranean Diet  What is the Mediterranean diet?     The Mediterranean diet is a style of eating rather than a diet plan. It features foods eaten in [place], [place], southern [place], and other countries along the [place] Sea. It emphasizes eating foods like fish, fruits, vegetables, beans, high-fiber breads and whole grains, nuts, and olive oil. This style of eating includes limited red meat, cheese, and sweets.  Why choose the Mediterranean diet?  A Mediterranean-style diet may improve heart health. It contains more fat than other heart-healthy diets. But the fats are mainly from nuts, unsaturated oils (such as fish oils and olive oil), and certain nut or seed oils (such as canola, soybean, or flaxseed oil). These fats may help protect the heart and blood vessels.  How can you get started on the Mediterranean diet?  Here are some things you can do to switch to a more Mediterranean way of eating.  What to eat  Eat a variety of fruits and vegetables each day, such as grapes, blueberries, tomatoes, broccoli, peppers, figs, olives, spinach, eggplant, beans, lentils, and chickpeas.  Eat a variety of whole-grain foods each day, such as oats, brown rice, and whole wheat bread, pasta, and couscous.  Eat fish at least 2 times a week. Try tuna, salmon, mackerel, lake trout, herring, or sardines.  Eat moderate amounts of low-fat dairy products, such as milk, cheese, or yogurt.  Eat moderate amounts of poultry and eggs.  Choose healthy (unsaturated) fats, such as nuts, olive oil, and certain nut or seed oils like canola, soybean, and flaxseed.  Limit unhealthy (saturated) fats, such as butter, palm oil, and coconut oil. And limit fats found in animal products, such as meat and dairy products made with whole milk. Try to eat red meat only a few times a month in very small amounts.  Limit sweets and desserts to only a few times a week. This includes sugar-sweetened drinks like soda.  The

## 2024-06-05 NOTE — PROGRESS NOTES
Have you had Fatigue?  No     2.   Have you had have you had Chest Pain? No    3.   Have you had Dyspnea (SOB) ? Yes ican you walk 2 blocks or a flight of stairs without SOB no    4.   Have you had Orthopnea? No     5.   Have you had PND? No   6.   Have you had leg swelling? No   7.    Have you had any weight gain? No i    8. Have you had any palpitations? No     9. Have you had any syncope? No   10. Do you have any wounds on legs?no  
daily  Patient not taking: Reported on 9/13/2023    Automatic Reconciliation, Ar   diclofenac sodium (VOLTAREN) 1 % GEL Apply topically as needed    Automatic Reconciliation, Ar   gabapentin (NEURONTIN) 300 MG capsule Take 2 capsules by mouth. 6/14/18   Automatic Reconciliation, Ar       No results found for: \"LIPIDPAN\", \"BMP\", \"CMP\"     There were no vitals taken for this visit.    HPI    Review of Systems   Constitutional:  Negative for activity change, appetite change, diaphoresis, fatigue and unexpected weight change.   Eyes:  Negative for visual disturbance.   Respiratory:  Positive for shortness of breath. Negative for apnea, cough, chest tightness and wheezing.    Cardiovascular:  Negative for chest pain, palpitations and leg swelling.   Gastrointestinal:  Negative for abdominal pain, blood in stool, constipation, diarrhea and nausea.   Endocrine: Negative for cold intolerance and heat intolerance.   Genitourinary:  Negative for decreased urine volume and difficulty urinating.   Musculoskeletal:  Negative for gait problem, myalgias and neck pain.   Skin:  Negative for color change, pallor and rash.   Neurological:  Negative for dizziness, syncope, facial asymmetry, speech difficulty, weakness, light-headedness and numbness.   Psychiatric/Behavioral:  Negative for confusion and sleep disturbance.          Physical Exam  Vitals reviewed.   Constitutional:       General: She is not in acute distress.     Appearance: Normal appearance. She is not ill-appearing or diaphoretic.   HENT:      Head: Normocephalic and atraumatic.   Eyes:      General: No scleral icterus.        Right eye: No discharge.         Left eye: No discharge.      Conjunctiva/sclera: Conjunctivae normal.   Neck:      Vascular: No carotid bruit.   Cardiovascular:      Rate and Rhythm: Normal rate and regular rhythm.      Heart sounds: Normal heart sounds. No murmur heard.     No friction rub. No gallop.   Pulmonary:      Effort: Pulmonary effort

## 2024-06-15 ENCOUNTER — HOSPITAL ENCOUNTER (OUTPATIENT)
Facility: HOSPITAL | Age: 74
End: 2024-06-15
Payer: MEDICARE

## 2024-06-15 DIAGNOSIS — M06.9 RHEUMATOID ARTHRITIS, INVOLVING UNSPECIFIED SITE, UNSPECIFIED WHETHER RHEUMATOID FACTOR PRESENT (HCC): ICD-10-CM

## 2024-06-15 PROCEDURE — 71046 X-RAY EXAM CHEST 2 VIEWS: CPT

## 2024-06-21 ENCOUNTER — OFFICE VISIT (OUTPATIENT)
Age: 74
End: 2024-06-21
Payer: MEDICARE

## 2024-06-21 VITALS
BODY MASS INDEX: 21.05 KG/M2 | SYSTOLIC BLOOD PRESSURE: 116 MMHG | HEART RATE: 72 BPM | WEIGHT: 131 LBS | HEIGHT: 66 IN | OXYGEN SATURATION: 98 % | DIASTOLIC BLOOD PRESSURE: 71 MMHG

## 2024-06-21 DIAGNOSIS — R06.09 DYSPNEA ON EXERTION: Primary | ICD-10-CM

## 2024-06-21 DIAGNOSIS — Z86.73 HISTORY OF TIA (TRANSIENT ISCHEMIC ATTACK): ICD-10-CM

## 2024-06-21 DIAGNOSIS — I48.0 PAROXYSMAL ATRIAL FIBRILLATION (HCC): ICD-10-CM

## 2024-06-21 DIAGNOSIS — I34.0 MODERATE MITRAL VALVE REGURGITATION: ICD-10-CM

## 2024-06-21 DIAGNOSIS — I48.91 ATRIAL FIBRILLATION, UNSPECIFIED TYPE (HCC): ICD-10-CM

## 2024-06-21 PROCEDURE — 3017F COLORECTAL CA SCREEN DOC REV: CPT | Performed by: INTERNAL MEDICINE

## 2024-06-21 PROCEDURE — 99214 OFFICE O/P EST MOD 30 MIN: CPT | Performed by: INTERNAL MEDICINE

## 2024-06-21 PROCEDURE — G8399 PT W/DXA RESULTS DOCUMENT: HCPCS | Performed by: INTERNAL MEDICINE

## 2024-06-21 PROCEDURE — 1036F TOBACCO NON-USER: CPT | Performed by: INTERNAL MEDICINE

## 2024-06-21 PROCEDURE — 1090F PRES/ABSN URINE INCON ASSESS: CPT | Performed by: INTERNAL MEDICINE

## 2024-06-21 PROCEDURE — G8428 CUR MEDS NOT DOCUMENT: HCPCS | Performed by: INTERNAL MEDICINE

## 2024-06-21 PROCEDURE — G8420 CALC BMI NORM PARAMETERS: HCPCS | Performed by: INTERNAL MEDICINE

## 2024-06-21 PROCEDURE — 1123F ACP DISCUSS/DSCN MKR DOCD: CPT | Performed by: INTERNAL MEDICINE

## 2024-06-21 NOTE — PROGRESS NOTES
Have you had Fatigue?  No   2.   Have you had have you had Chest Pain? No     3.   Have you had Dyspnea (SOB) ? No   4.   Have you had Orthopnea? No    5.   Have you had PND? No   6.   Have you had leg swelling? No   7.    Have you had any weight gain? No     8. Have you had any palpitations? No     9. Have you had any syncope? No   10. Do you have any wounds on legs?no  
EVERY EVENING WITH DINNER 3/25/24   Damari Vinson, APRN - NP   abatacept (ORENCIA) 250 MG injection Infuse intravenously every 30 days    Provider, MD Gilbert   acetaminophen (TYLENOL) 325 MG tablet Take 1 tablet by mouth every 4 hours as needed    Automatic Reconciliation, Ar   ascorbic acid (VITAMIN C) 500 MG tablet Take 1 tablet by mouth 2 times daily    Automatic Reconciliation, Ar   vitamin D3 (CHOLECALCIFEROL) 125 MCG (5000 UT) TABS tablet Take 1,000 Units by mouth daily    Automatic Reconciliation, Ar   diclofenac sodium (VOLTAREN) 1 % GEL Apply topically as needed    Automatic Reconciliation, Ar   gabapentin (NEURONTIN) 300 MG capsule Take 2 capsules by mouth. 6/14/18   Automatic Reconciliation, Ar       No results found for: \"LIPIDPAN\", \"BMP\", \"CMP\"     There were no vitals taken for this visit.    HPI    Review of Systems   Constitutional:  Negative for activity change, appetite change, diaphoresis, fatigue and unexpected weight change.   Eyes:  Negative for visual disturbance.   Respiratory:  Positive for shortness of breath. Negative for apnea, cough, chest tightness and wheezing.    Cardiovascular:  Negative for chest pain, palpitations and leg swelling.   Gastrointestinal:  Negative for abdominal pain, blood in stool, constipation, diarrhea and nausea.   Endocrine: Negative for cold intolerance and heat intolerance.   Genitourinary:  Negative for decreased urine volume and difficulty urinating.   Musculoskeletal:  Negative for gait problem, myalgias and neck pain.   Skin:  Negative for color change, pallor and rash.   Neurological:  Negative for dizziness, syncope, facial asymmetry, speech difficulty, weakness, light-headedness and numbness.   Psychiatric/Behavioral:  Negative for confusion and sleep disturbance.          Physical Exam  Vitals reviewed.   Constitutional:       General: She is not in acute distress.     Appearance: Normal appearance. She is not ill-appearing or diaphoretic.   HENT:

## 2024-06-21 NOTE — PATIENT INSTRUCTIONS
Learning About the Mediterranean Diet  What is the Mediterranean diet?     The Mediterranean diet is a style of eating rather than a diet plan. It features foods eaten in [place], [place], [place], and other countries along the [place] Sea. It emphasizes eating foods like fish, fruits, vegetables, beans, high-fiber breads and whole grains, nuts, and olive oil. This style of eating includes limited red meat, cheese, and sweets.  Why choose the Mediterranean diet?  A Mediterranean-style diet may improve heart health. It contains more fat than other heart-healthy diets. But the fats are mainly from nuts, unsaturated oils (such as fish oils and olive oil), and certain nut or seed oils (such as canola, soybean, or flaxseed oil). These fats may help protect the heart and blood vessels.  How can you get started on the Mediterranean diet?  Here are some things you can do to switch to a more Mediterranean way of eating.  What to eat  Eat a variety of fruits and vegetables each day, such as grapes, blueberries, tomatoes, broccoli, peppers, figs, olives, spinach, eggplant, beans, lentils, and chickpeas.  Eat a variety of whole-grain foods each day, such as oats, brown rice, and whole wheat bread, pasta, and couscous.  Eat fish at least 2 times a week. Try tuna, salmon, mackerel, lake trout, herring, or sardines.  Eat moderate amounts of low-fat dairy products, such as milk, cheese, or yogurt.  Eat moderate amounts of poultry and eggs.  Choose healthy (unsaturated) fats, such as nuts, olive oil, and certain nut or seed oils like canola, soybean, and flaxseed.  Limit unhealthy (saturated) fats, such as butter, palm oil, and coconut oil. And limit fats found in animal products, such as meat and dairy products made with whole milk. Try to eat red meat only a few times a month in very small amounts.  Limit sweets and desserts to only a few times a week. This includes sugar-sweetened drinks like soda.  The

## 2024-07-05 ENCOUNTER — TELEPHONE (OUTPATIENT)
Age: 74
End: 2024-07-05

## 2024-07-05 NOTE — TELEPHONE ENCOUNTER
Left message on patient voicemail per Dr. Martinez Heart rate increased at times needs to come to appointment to discuss further.

## 2024-07-05 NOTE — TELEPHONE ENCOUNTER
----- Message from Hua Martinez MD sent at 7/3/2024  9:14 PM EDT -----  Heart rate increased at times needs to come to appointment to discuss further.    ----- Message -----  From: Hua Martinez MD  Sent: 7/3/2024   9:04 PM EDT  To: Hua Martinez MD

## 2024-07-31 ASSESSMENT — ENCOUNTER SYMPTOMS
COUGH: 0
CONSTIPATION: 0
WHEEZING: 0
APNEA: 0
CHEST TIGHTNESS: 0
DIARRHEA: 0
NAUSEA: 0
COLOR CHANGE: 0
BLOOD IN STOOL: 0
ABDOMINAL PAIN: 0

## 2024-07-31 NOTE — PROGRESS NOTES
HISTORY OF PRESENT ILLNESS  Joslyn Estrada is a 74 y.o. female.    PMH history of TIA, history of RA, Atrial fibrillation   ----  CARDIAC STUDIES  ----  Cardiac holter monitor 7/3/2024  Patient monitored for 3d 1h, analyzable time was 3d starting on 06/21/2024 09:37 am. Primary rhythm was Atrial Fibrillation / Flutter. Average heart rate was 107 bpm, Minimum heart rate was 65 bpm on Day 2 / 05:12:00 am, Max heart rate was 170 bpm on Day 1 / 11:47:57 am Atrial Fibrillation or Flutter: [place] was 100 %, longest event 3d 1h on Day 1 / 09:37:49 am, fastest event 170 bpm on Day 1 / 09:37:49 am PVC(s): [place] was 0.11 %, 519 total PVC(s), 2 disparate morphologies   ----  2d echo 6/14/2024    Left Ventricle: Normal left ventricular systolic function with a visually estimated EF of 60 - 65%. Left ventricle is smaller than normal. Mildly increased wall thickness. Normal wall motion.    Mitral Valve: Mild leaflet prolapse noted of the anterior leaflet. Mild to moderate regurgitation with an eccentrically directed jet and may underestimate severity.    Tricuspid Valve: Normal RVSP. The estimated RVSP is 25 mmHg.    Pulmonic Valve: Moderate regurgitation.    Left Atrium: Left atrium is visually dilated. 2D measurements may be underestimated.    Right Atrium: Right atrium is visually dilated.    Aorta: Normal sized ascending aorta. Dilated aortic root. Ao root diameter is 3.5 cm.    Image quality is adequate.  ----  SHAYNA 5/22/2023    Left Ventricle: Normal left ventricular systolic function with a visually estimated EF of 60 - 65%. Left ventricle size is normal. Normal wall motion.    Left Atrium: Left atrium is dilated. Normal sized appendage. Normal appendage flow velocity. No obvious left atrial appendage thrombus noted. Pectinate noted.    Right Atrium: Right atrium is dilated.    Interatrial Septum: No obvious interatrial shunt visualized with color Doppler. Agitated saline study appears negative with and without

## 2024-08-02 ENCOUNTER — OFFICE VISIT (OUTPATIENT)
Age: 74
End: 2024-08-02

## 2024-08-02 VITALS
OXYGEN SATURATION: 96 % | DIASTOLIC BLOOD PRESSURE: 73 MMHG | SYSTOLIC BLOOD PRESSURE: 109 MMHG | BODY MASS INDEX: 20.73 KG/M2 | WEIGHT: 129 LBS | HEART RATE: 90 BPM | HEIGHT: 66 IN

## 2024-08-02 DIAGNOSIS — Z86.73 HISTORY OF TIA (TRANSIENT ISCHEMIC ATTACK): ICD-10-CM

## 2024-08-02 DIAGNOSIS — I34.0 MODERATE MITRAL VALVE REGURGITATION: ICD-10-CM

## 2024-08-02 DIAGNOSIS — I48.91 ATRIAL FIBRILLATION, UNSPECIFIED TYPE (HCC): ICD-10-CM

## 2024-08-02 DIAGNOSIS — R06.09 DYSPNEA ON EXERTION: Primary | ICD-10-CM

## 2024-08-02 ASSESSMENT — ENCOUNTER SYMPTOMS: SHORTNESS OF BREATH: 0

## 2024-08-02 NOTE — PATIENT INSTRUCTIONS
Learning About the Mediterranean Diet  What is the Mediterranean diet?     The Mediterranean diet is a style of eating rather than a diet plan. It features foods eaten in [place], [place], [place], and other countries along the [place] Sea. It emphasizes eating foods like fish, fruits, vegetables, beans, high-fiber breads and whole grains, nuts, and olive oil. This style of eating includes limited red meat, cheese, and sweets.  Why choose the Mediterranean diet?  A Mediterranean-style diet may improve heart health. It contains more fat than other heart-healthy diets. But the fats are mainly from nuts, unsaturated oils (such as fish oils and olive oil), and certain nut or seed oils (such as canola, soybean, or flaxseed oil). These fats may help protect the heart and blood vessels.  How can you get started on the Mediterranean diet?  Here are some things you can do to switch to a more Mediterranean way of eating.  What to eat  Eat a variety of fruits and vegetables each day, such as grapes, blueberries, tomatoes, broccoli, peppers, figs, olives, spinach, eggplant, beans, lentils, and chickpeas.  Eat a variety of whole-grain foods each day, such as oats, brown rice, and whole wheat bread, pasta, and couscous.  Eat fish at least 2 times a week. Try tuna, salmon, mackerel, lake trout, herring, or sardines.  Eat moderate amounts of low-fat dairy products, such as milk, cheese, or yogurt.  Eat moderate amounts of poultry and eggs.  Choose healthy (unsaturated) fats, such as nuts, olive oil, and certain nut or seed oils like canola, soybean, and flaxseed.  Limit unhealthy (saturated) fats, such as butter, palm oil, and coconut oil. And limit fats found in animal products, such as meat and dairy products made with whole milk. Try to eat red meat only a few times a month in very small amounts.  Limit sweets and desserts to only a few times a week. This includes sugar-sweetened drinks like soda.  The

## 2024-08-02 NOTE — PROGRESS NOTES
Have you had Fatigue?  No     2.   Have you had have you had Chest Pain? No    3.   Have you had Dyspnea (SOB) ? No   4.   Have you had Orthopnea? No     5.   Have you had PND? No   6.   Have you had leg swelling? No   7.    Have you had any weight gain? No     8. Have you had any palpitations? Yes      9. Have you had any syncope? No   10. Do you have any wounds on legs?no

## 2024-08-19 RX ORDER — RIVAROXABAN 20 MG/1
TABLET, FILM COATED ORAL
Qty: 30 TABLET | Refills: 6 | Status: SHIPPED | OUTPATIENT
Start: 2024-08-19

## 2024-08-30 ENCOUNTER — TELEPHONE (OUTPATIENT)
Facility: CLINIC | Age: 74
End: 2024-08-30

## 2024-08-30 NOTE — TELEPHONE ENCOUNTER
----- Message from Katelynn EVERETT sent at 8/30/2024 11:56 AM EDT -----  Regarding: [facility] Referral Request  [facility] Referral Request    Reason for referral request: Lab/Test Order    Specialist/Lab/Test patient is requesting (if known): Blood work    Specialist Phone Number (if applicable):    Additional Information Patient wanted to request Order for her blood work prior to her appointment on the September 16, 2024 at 10 am in the morning.   --------------------------------------------------------------------------------------------------------------------------    Relationship to Patient: Self     Call Back Information: OK to leave message on voicemail  Preferred Call Back Number: Phone [phone]

## 2024-09-11 ENCOUNTER — HOSPITAL ENCOUNTER (OUTPATIENT)
Facility: HOSPITAL | Age: 74
Discharge: HOME | End: 2024-09-14
Payer: MEDICARE

## 2024-09-11 LAB
ALBUMIN SERPL-MCNC: 3.5 G/DL (ref 3.4–5)
ALBUMIN/GLOB SERPL: 1.3 (ref 0.8–1.7)
ALP SERPL-CCNC: 77 U/L (ref 45–117)
ALT SERPL-CCNC: 39 U/L (ref 13–56)
ANION GAP SERPL CALC-SCNC: 3 MMOL/L (ref 3–18)
AST SERPL-CCNC: 35 U/L (ref 10–38)
BASOPHILS # BLD: 0.1 K/UL (ref 0–0.1)
BASOPHILS NFR BLD: 1 % (ref 0–2)
BILIRUB SERPL-MCNC: 0.7 MG/DL (ref 0.2–1)
BUN SERPL-MCNC: 20 MG/DL (ref 7–18)
BUN/CREAT SERPL: 28 (ref 12–20)
CALCIUM SERPL-MCNC: 8.8 MG/DL (ref 8.5–10.1)
CHLORIDE SERPL-SCNC: 106 MMOL/L (ref 100–111)
CHOLEST SERPL-MCNC: 142 MG/DL
CO2 SERPL-SCNC: 29 MMOL/L (ref 21–32)
CREAT SERPL-MCNC: 0.72 MG/DL (ref 0.6–1.3)
CREAT UR-MCNC: 21 MG/DL (ref 30–125)
CRP SERPL-MCNC: <0.3 MG/DL (ref 0–0.3)
DIFFERENTIAL METHOD BLD: ABNORMAL
EOSINOPHIL # BLD: 0.5 K/UL (ref 0–0.4)
EOSINOPHIL NFR BLD: 9 % (ref 0–5)
ERYTHROCYTE [DISTWIDTH] IN BLOOD BY AUTOMATED COUNT: 12.9 % (ref 11.6–14.5)
GLOBULIN SER CALC-MCNC: 2.8 G/DL (ref 2–4)
GLUCOSE SERPL-MCNC: 92 MG/DL (ref 74–99)
HCT VFR BLD AUTO: 44.3 % (ref 35–45)
HDLC SERPL-MCNC: 57 MG/DL (ref 40–60)
HDLC SERPL: 2.5 (ref 0–5)
HGB BLD-MCNC: 14.8 G/DL (ref 12–16)
IMM GRANULOCYTES # BLD AUTO: 0 K/UL (ref 0–0.04)
IMM GRANULOCYTES NFR BLD AUTO: 0 % (ref 0–0.5)
LDLC SERPL CALC-MCNC: 73.8 MG/DL (ref 0–100)
LIPID PANEL: NORMAL
LYMPHOCYTES # BLD: 1.9 K/UL (ref 0.9–3.6)
LYMPHOCYTES NFR BLD: 33 % (ref 21–52)
MCH RBC QN AUTO: 32.3 PG (ref 24–34)
MCHC RBC AUTO-ENTMCNC: 33.4 G/DL (ref 31–37)
MCV RBC AUTO: 96.7 FL (ref 78–100)
MICROALBUMIN UR-MCNC: 0.5 MG/DL (ref 0–3)
MICROALBUMIN/CREAT UR-RTO: 24 MG/G (ref 0–30)
MONOCYTES # BLD: 0.5 K/UL (ref 0.05–1.2)
MONOCYTES NFR BLD: 8 % (ref 3–10)
NEUTS SEG # BLD: 2.8 K/UL (ref 1.8–8)
NEUTS SEG NFR BLD: 48 % (ref 40–73)
NRBC # BLD: 0 K/UL (ref 0–0.01)
NRBC BLD-RTO: 0 PER 100 WBC
PLATELET # BLD AUTO: 198 K/UL (ref 135–420)
PMV BLD AUTO: 9.3 FL (ref 9.2–11.8)
POTASSIUM SERPL-SCNC: 4.9 MMOL/L (ref 3.5–5.5)
PROT SERPL-MCNC: 6.3 G/DL (ref 6.4–8.2)
RBC # BLD AUTO: 4.58 M/UL (ref 4.2–5.3)
SODIUM SERPL-SCNC: 138 MMOL/L (ref 136–145)
T4 FREE SERPL-MCNC: 1 NG/DL (ref 0.7–1.5)
TRIGL SERPL-MCNC: 56 MG/DL
TSH SERPL DL<=0.05 MIU/L-ACNC: 3.12 UIU/ML (ref 0.36–3.74)
VLDLC SERPL CALC-MCNC: 11.2 MG/DL
WBC # BLD AUTO: 5.7 K/UL (ref 4.6–13.2)

## 2024-09-11 PROCEDURE — 84439 ASSAY OF FREE THYROXINE: CPT

## 2024-09-11 PROCEDURE — 84443 ASSAY THYROID STIM HORMONE: CPT

## 2024-09-11 PROCEDURE — 86140 C-REACTIVE PROTEIN: CPT

## 2024-09-11 PROCEDURE — 82043 UR ALBUMIN QUANTITATIVE: CPT

## 2024-09-11 PROCEDURE — 80061 LIPID PANEL: CPT

## 2024-09-11 PROCEDURE — 80053 COMPREHEN METABOLIC PANEL: CPT

## 2024-09-11 PROCEDURE — 36415 COLL VENOUS BLD VENIPUNCTURE: CPT

## 2024-09-11 PROCEDURE — 82570 ASSAY OF URINE CREATININE: CPT

## 2024-09-11 PROCEDURE — 85025 COMPLETE CBC W/AUTO DIFF WBC: CPT

## 2024-09-16 ENCOUNTER — OFFICE VISIT (OUTPATIENT)
Facility: CLINIC | Age: 74
End: 2024-09-16

## 2024-09-16 VITALS
WEIGHT: 129 LBS | OXYGEN SATURATION: 97 % | SYSTOLIC BLOOD PRESSURE: 113 MMHG | HEART RATE: 56 BPM | RESPIRATION RATE: 16 BRPM | DIASTOLIC BLOOD PRESSURE: 89 MMHG | HEIGHT: 66 IN | TEMPERATURE: 98 F | BODY MASS INDEX: 20.73 KG/M2

## 2024-09-16 DIAGNOSIS — Z00.00 MEDICARE ANNUAL WELLNESS VISIT, SUBSEQUENT: ICD-10-CM

## 2024-09-16 DIAGNOSIS — Z12.31 ENCOUNTER FOR SCREENING MAMMOGRAM FOR BREAST CANCER: ICD-10-CM

## 2024-09-16 DIAGNOSIS — Z71.89 ADVANCE CARE PLANNING: ICD-10-CM

## 2024-09-16 DIAGNOSIS — I10 HYPERTENSION, UNSPECIFIED TYPE: ICD-10-CM

## 2024-09-16 DIAGNOSIS — I48.0 PAROXYSMAL ATRIAL FIBRILLATION (HCC): ICD-10-CM

## 2024-09-16 DIAGNOSIS — K76.0 FATTY (CHANGE OF) LIVER, NOT ELSEWHERE CLASSIFIED: ICD-10-CM

## 2024-09-16 DIAGNOSIS — M06.9 RHEUMATOID ARTHRITIS, INVOLVING UNSPECIFIED SITE, UNSPECIFIED WHETHER RHEUMATOID FACTOR PRESENT (HCC): ICD-10-CM

## 2024-09-16 DIAGNOSIS — E04.2 NONTOXIC MULTINODULAR GOITER: Primary | ICD-10-CM

## 2024-09-16 SDOH — ECONOMIC STABILITY: FOOD INSECURITY: WITHIN THE PAST 12 MONTHS, YOU WORRIED THAT YOUR FOOD WOULD RUN OUT BEFORE YOU GOT MONEY TO BUY MORE.: PATIENT DECLINED

## 2024-09-16 SDOH — ECONOMIC STABILITY: FOOD INSECURITY: WITHIN THE PAST 12 MONTHS, THE FOOD YOU BOUGHT JUST DIDN'T LAST AND YOU DIDN'T HAVE MONEY TO GET MORE.: PATIENT DECLINED

## 2024-09-16 SDOH — ECONOMIC STABILITY: INCOME INSECURITY: HOW HARD IS IT FOR YOU TO PAY FOR THE VERY BASICS LIKE FOOD, HOUSING, MEDICAL CARE, AND HEATING?: PATIENT DECLINED

## 2024-09-16 ASSESSMENT — LIFESTYLE VARIABLES
HOW OFTEN DO YOU HAVE A DRINK CONTAINING ALCOHOL: NEVER
HOW MANY STANDARD DRINKS CONTAINING ALCOHOL DO YOU HAVE ON A TYPICAL DAY: PATIENT DOES NOT DRINK

## 2024-09-16 ASSESSMENT — PATIENT HEALTH QUESTIONNAIRE - PHQ9
1. LITTLE INTEREST OR PLEASURE IN DOING THINGS: NOT AT ALL
SUM OF ALL RESPONSES TO PHQ QUESTIONS 1-9: 0
SUM OF ALL RESPONSES TO PHQ9 QUESTIONS 1 & 2: 0
2. FEELING DOWN, DEPRESSED OR HOPELESS: NOT AT ALL
SUM OF ALL RESPONSES TO PHQ QUESTIONS 1-9: 0

## 2024-09-16 ASSESSMENT — ENCOUNTER SYMPTOMS
SHORTNESS OF BREATH: 0
EYE PAIN: 0
SORE THROAT: 0
ANAL BLEEDING: 0
BLOOD IN STOOL: 0
ABDOMINAL PAIN: 0
COUGH: 0

## 2024-09-23 NOTE — PROGRESS NOTES
1. Have you been to the ER, urgent care clinic since your last visit? Hospitalized since your last visit? No    2. Have you seen or consulted any other health care providers outside of the [address] [place] [address] since your last visit? Include any pap smears or colon screening. No     3. Do you need any refills today?    no
HISTORY OF PRESENT ILLNESS  Coty Mackenzie is a 71 y.o. female. Palpitations   The history is provided by the patient. This is a chronic problem. The problem has not changed since onset. The problem occurs rarely. Pertinent negatives include no diaphoresis, no fever, no malaise/fatigue, no numbness, no near-syncope, no orthopnea, no PND, no nausea, no vomiting, no dizziness, no weakness, no cough, no hemoptysis and no sputum production. Her past medical history is significant for atrial fibrillation. 4/24/2023  Ms. Jill Burgess presents with c/o rapid heart rates which she first noted on Sunday. She denies chest pain, shortness of breath, palpitations or edema. She has noted fatigue with stair climbing. Review of Systems   Constitutional: Negative for chills, diaphoresis, fever, malaise/fatigue and weight loss. HENT: Negative for congestion, ear discharge, ear pain, hearing loss, nosebleeds and tinnitus. Eyes: Negative for blurred vision. Respiratory: Negative for cough, hemoptysis, sputum production, wheezing and stridor. Cardiovascular: Positive for palpitations. Negative for orthopnea, leg swelling, PND and near-syncope. Gastrointestinal: Negative for heartburn, nausea and vomiting. Musculoskeletal: Negative for myalgias and neck pain. Skin: Negative for itching and rash. Neurological: Negative for dizziness, tingling, tremors, focal weakness, loss of consciousness, weakness and numbness. Psychiatric/Behavioral: Negative for depression and suicidal ideas.    Family History   Problem Relation Age of Onset    Breast Cancer Maternal Grandmother     Depression Sister     Heart Disease Father     Cancer Father     Heart Attack Father     Stroke Father     Diabetes Father     High Cholesterol Father     Hypertension Father        Past Medical History:   Diagnosis Date    A-fib [facility])     Arthritis     Feet    Memory change     Menopause     Multiple thyroid nodules 2009    under care of 
Statement Selected

## 2024-11-05 ENCOUNTER — TELEPHONE (OUTPATIENT)
Age: 74
End: 2024-11-05

## 2024-11-06 DIAGNOSIS — G47.33 MILD OBSTRUCTIVE SLEEP APNEA: Primary | ICD-10-CM

## 2024-11-07 ENCOUNTER — TELEPHONE (OUTPATIENT)
Dept: SLEEP MEDICINE | Facility: HOSPITAL | Age: 74
End: 2024-11-07

## 2024-11-15 ENCOUNTER — HOSPITAL ENCOUNTER (OUTPATIENT)
Facility: HOSPITAL | Age: 74
Discharge: HOME | End: 2024-11-18
Attending: INTERNAL MEDICINE
Payer: MEDICARE

## 2024-11-15 DIAGNOSIS — E04.2 NONTOXIC MULTINODULAR GOITER: ICD-10-CM

## 2024-11-15 PROCEDURE — 76536 US EXAM OF HEAD AND NECK: CPT

## 2024-11-19 ENCOUNTER — HOSPITAL ENCOUNTER (OUTPATIENT)
Dept: SLEEP MEDICINE | Facility: HOSPITAL | Age: 74
Discharge: HOME | End: 2024-11-22
Attending: OTOLARYNGOLOGY
Payer: MEDICARE

## 2024-11-19 DIAGNOSIS — G47.33 MILD OBSTRUCTIVE SLEEP APNEA: ICD-10-CM

## 2024-11-19 PROCEDURE — 95800 SLP STDY UNATTENDED: CPT

## 2024-11-21 ENCOUNTER — TELEPHONE (OUTPATIENT)
Facility: CLINIC | Age: 74
End: 2024-11-21

## 2024-11-21 NOTE — TELEPHONE ENCOUNTER
----- Message from Dr. Lucy Pedraza MD sent at 11/21/2024  1:06 PM EST -----  Please let her know that her thyroid ultrasound findings show no new findings.  She still has a 4 mm right thyroid lobe cyst and a subcentimeter left lower thyroid nodule that is present.  There are a few smaller bilateral cysts.  We will continue to monitor her thyroid gland.  At this time, no additional studies are warranted.  I will address this again with her next year.    Dr. Lucy Pedraza  [facility] [address], [address]  [place] [address]  Phone: [phone]  Fax: [phone]

## 2024-11-21 NOTE — TELEPHONE ENCOUNTER
Called pt to notify of results. No answer. Left VM to return call. Sent msg via [employer]       YING Montaño LPN

## 2024-12-16 ENCOUNTER — TELEPHONE (OUTPATIENT)
Age: 74
End: 2024-12-16

## 2024-12-16 NOTE — TELEPHONE ENCOUNTER
Pt called about the results of her follow up sleep study on 11-19 after receiving her oral appliance. Pt stated she was concerned about her A-Fib. Please send her a message on my chart on which route she needs to take for the A-Fib.

## 2025-02-05 ENCOUNTER — HOSPITAL ENCOUNTER (OUTPATIENT)
Dept: WOMENS IMAGING | Facility: HOSPITAL | Age: 75
Discharge: HOME | End: 2025-02-08
Payer: MEDICARE

## 2025-02-05 DIAGNOSIS — Z12.31 ENCOUNTER FOR SCREENING MAMMOGRAM FOR BREAST CANCER: ICD-10-CM

## 2025-02-05 PROCEDURE — 77063 BREAST TOMOSYNTHESIS BI: CPT

## 2025-05-05 ASSESSMENT — ENCOUNTER SYMPTOMS
SHORTNESS OF BREATH: 0
APNEA: 0
NAUSEA: 0
CONSTIPATION: 0
DIARRHEA: 0
COUGH: 0
COLOR CHANGE: 0
WHEEZING: 0
CHEST TIGHTNESS: 0
ABDOMINAL PAIN: 0
BLOOD IN STOOL: 0

## 2025-05-06 NOTE — PROGRESS NOTES
HISTORY OF PRESENT ILLNESS  Joslyn Estrada is a 75 y.o. female.    PMH history of TIA, history of RA, Atrial fibrillation   ----  CARDIAC STUDIES  ----  Cardiac holter monitor 7/3/2024  Patient monitored for 3d 1h, analyzable time was 3d starting on 06/21/2024 09:37 am. Primary rhythm was Atrial Fibrillation / Flutter. Average heart rate was 107 bpm, Minimum heart rate was 65 bpm on Day 2 / 05:12:00 am, Max heart rate was 170 bpm on Day 1 / 11:47:57 am Atrial Fibrillation or Flutter: [place] was 100 %, longest event 3d 1h on Day 1 / 09:37:49 am, fastest event 170 bpm on Day 1 / 09:37:49 am PVC(s): [place] was 0.11 %, 519 total PVC(s), 2 disparate morphologies   ----  2d echo 6/14/2024    Left Ventricle: Normal left ventricular systolic function with a visually estimated EF of 60 - 65%. Left ventricle is smaller than normal. Mildly increased wall thickness. Normal wall motion.    Mitral Valve: Mild leaflet prolapse noted of the anterior leaflet. Mild to moderate regurgitation with an eccentrically directed jet and may underestimate severity.    Tricuspid Valve: Normal RVSP. The estimated RVSP is 25 mmHg.    Pulmonic Valve: Moderate regurgitation.    Left Atrium: Left atrium is visually dilated. 2D measurements may be underestimated.    Right Atrium: Right atrium is visually dilated.    Aorta: Normal sized ascending aorta. Dilated aortic root. Ao root diameter is 3.5 cm.    Image quality is adequate.  ----  SHAYNA 5/22/2023    Left Ventricle: Normal left ventricular systolic function with a visually estimated EF of 60 - 65%. Left ventricle size is normal. Normal wall motion.    Left Atrium: Left atrium is dilated. Normal sized appendage. Normal appendage flow velocity. No obvious left atrial appendage thrombus noted. Pectinate noted.    Right Atrium: Right atrium is dilated.    Interatrial Septum: No obvious interatrial shunt visualized with color Doppler. Agitated saline study appears negative with and without

## 2025-05-09 ENCOUNTER — OFFICE VISIT (OUTPATIENT)
Age: 75
End: 2025-05-09
Payer: MEDICARE

## 2025-05-09 VITALS — SYSTOLIC BLOOD PRESSURE: 110 MMHG | OXYGEN SATURATION: 97 % | DIASTOLIC BLOOD PRESSURE: 70 MMHG | HEART RATE: 110 BPM

## 2025-05-09 DIAGNOSIS — I48.91 ATRIAL FIBRILLATION, UNSPECIFIED TYPE (HCC): Primary | ICD-10-CM

## 2025-05-09 DIAGNOSIS — Z86.73 HISTORY OF TIA (TRANSIENT ISCHEMIC ATTACK): ICD-10-CM

## 2025-05-09 DIAGNOSIS — I34.0 MODERATE MITRAL VALVE REGURGITATION: ICD-10-CM

## 2025-05-09 PROCEDURE — 1090F PRES/ABSN URINE INCON ASSESS: CPT | Performed by: INTERNAL MEDICINE

## 2025-05-09 PROCEDURE — G8427 DOCREV CUR MEDS BY ELIG CLIN: HCPCS | Performed by: INTERNAL MEDICINE

## 2025-05-09 PROCEDURE — 1126F AMNT PAIN NOTED NONE PRSNT: CPT | Performed by: INTERNAL MEDICINE

## 2025-05-09 PROCEDURE — 1036F TOBACCO NON-USER: CPT | Performed by: INTERNAL MEDICINE

## 2025-05-09 PROCEDURE — 1123F ACP DISCUSS/DSCN MKR DOCD: CPT | Performed by: INTERNAL MEDICINE

## 2025-05-09 PROCEDURE — 1159F MED LIST DOCD IN RCRD: CPT | Performed by: INTERNAL MEDICINE

## 2025-05-09 PROCEDURE — 99214 OFFICE O/P EST MOD 30 MIN: CPT | Performed by: INTERNAL MEDICINE

## 2025-05-09 PROCEDURE — 3017F COLORECTAL CA SCREEN DOC REV: CPT | Performed by: INTERNAL MEDICINE

## 2025-05-09 PROCEDURE — 93000 ELECTROCARDIOGRAM COMPLETE: CPT | Performed by: INTERNAL MEDICINE

## 2025-05-09 PROCEDURE — G8399 PT W/DXA RESULTS DOCUMENT: HCPCS | Performed by: INTERNAL MEDICINE

## 2025-05-09 PROCEDURE — G8420 CALC BMI NORM PARAMETERS: HCPCS | Performed by: INTERNAL MEDICINE

## 2025-05-09 NOTE — PROGRESS NOTES
Have you had fatigue?  No  2.   Have you had chest pain? No  3.   Have you had dyspnea (SOB)?  No  4.   Have you had orthopnea? No  5.   Have you had PND?  No  6.   Have you had leg swelling?   No  7.   Have you had any weight gain? No    8.   Have you had any palpitations?  No  9.   Have you had any syncope? No  10. Do you have any wounds on legs?  No

## 2025-05-09 NOTE — PATIENT INSTRUCTIONS
Learning About the Mediterranean Diet  What is the Mediterranean diet?     The Mediterranean diet is a style of eating rather than a diet plan. It features foods eaten in [place], [place], [place], and other countries along the [place] Sea. It emphasizes eating foods like fish, fruits, vegetables, beans, high-fiber breads and whole grains, nuts, and olive oil. This style of eating includes limited red meat, cheese, and sweets.  Why choose the Mediterranean diet?  A Mediterranean-style diet may improve heart health. It contains more fat than other heart-healthy diets. But the fats are mainly from nuts, unsaturated oils (such as fish oils and olive oil), and certain nut or seed oils (such as canola, soybean, or flaxseed oil). These fats may help protect the heart and blood vessels.  How can you get started on the Mediterranean diet?  Here are some things you can do to switch to a more Mediterranean way of eating.  What to eat  Eat a variety of fruits and vegetables each day, such as grapes, blueberries, tomatoes, broccoli, peppers, figs, olives, spinach, eggplant, beans, lentils, and chickpeas.  Eat a variety of whole-grain foods each day, such as oats, brown rice, and whole wheat bread, pasta, and couscous.  Eat fish at least 2 times a week. Try tuna, salmon, mackerel, lake trout, herring, or sardines.  Eat moderate amounts of low-fat dairy products, such as milk, cheese, or yogurt.  Eat moderate amounts of poultry and eggs.  Choose healthy (unsaturated) fats, such as nuts, olive oil, and certain nut or seed oils like canola, soybean, and flaxseed.  Limit unhealthy (saturated) fats, such as butter, palm oil, and coconut oil. And limit fats found in animal products, such as meat and dairy products made with whole milk. Try to eat red meat only a few times a month in very small amounts.  Limit sweets and desserts to only a few times a week. This includes sugar-sweetened drinks like soda.  The

## 2025-05-19 RX ORDER — METOPROLOL TARTRATE 25 MG/1
25 TABLET, FILM COATED ORAL 2 TIMES DAILY
Qty: 180 TABLET | Refills: 1 | Status: SHIPPED | OUTPATIENT
Start: 2025-05-19

## 2025-06-25 NOTE — TELEPHONE ENCOUNTER
ALLERGY AND IMMUNOLOGY CLINIC NOTE - RETURN VISIT    Encounter Date:  07/09/25     Subjective:     Chief Complaint   Patient presents with    Office Visit    Follow-up     Food Allergy, Asthma       Chief Complaint: Follow up for allergic rhinitis, asthma and food allergy      History of Present Illness:  Faye Hurtado is a 73 year old female with past medical history of eHTN, HLD, myasthenia gravis (prednisone 5 mg), history of PE and DVT on apixaban, allergic rhinitis, persistent asthma, food allergy (shellfish), s/p robotic right thorascopic (VATS) thymectomy, a resection of anterior mediastinal mass and pleural met on 01/20 7/22 with pathology consistent for lymphoepithelioma who is seen for follow up of the above.  Last clinic visit was 1/8/25       Interval history     The patient presents for evaluation of sinus infection, myasthenia gravis, asthma, and allergies.    She was prescribed Augmentin at an urgent care visit on 06/12/2025 due to severe sinus drainage and allergies. She was advised to continue her cetirizine medication, which helped with the drainage. However, she developed a black tongue after taking the Augmentin, which she believes may be a side effect of the medication. She managed to clean it off with toothpaste. She also experienced a cough with yellow phlegm. She did not have a fever. She continues to use her nasal spray and Flonase.    Her myasthenia gravis symptoms worsen in hot weather, causing difficulty in swallowing and discomfort in her neck muscles. She has been drinking plenty of water to stay hydrated. She plans to request an increase in her prednisone dosage from 5 mg to 10 mg due to the heat.    Her asthma has been well-controlled recently, although she had to use her rescue inhaler a few times. She continues to use Symbicort once in the morning and is trying to wean herself off it.   No UC, ED visits or hospitalizations since last clinic visit.  No need for systemic  Appt scheduled antibiotics or oral steroids since last clinic visit.     She occasionally consumes peanuts and peanut butter on cherelle crackers.    She has discontinued Pataday eye drops due to cost but found relief with over-the-counter eye drops provided by her cataract doctor. She has a history of bee sting, though no reaction. She does not want her allergy list updated.          No other changes to her past medical history, past surgical history, family history, and past social history since last clinic visit.    Previous Allergy and Immunology Testing:     May 2023  Pre-Pen and Penicillin G Skin Testing Results:     Prick test:  Histamine control: 12 mm wheal/ 14 mm flare  Saline Control: 4 mm wheal/ 4 mm flare  Pre-Pen: 3 mm wheal/ 3 mm flare  Pen  mm wheal/ 3 mm flare     Intradermal test:  Saline Control: 5 mm wheal/ 5 mm flare  Pre-Pen: 0 mm wheal/ 3 mm flare  Pre-Pen: 3 mm wheal/ 3 mm flare  Pen G: 3 mm wheal/ 3 mm flare  Pen  mm wheal/ 3 mm flare     The test was performed by the RN and I personally interpreted the skin test.      A positive result to prick testing of Pre-Pen is defined as a pale wheal, sometimes with pseudopods, surrounding the puncture site and varying in diameter from 5 to 15 mm (or more).     A positive result to intradermal testing of Pre-Pen is defined as itching and significant increase in size of original blebs to at least 5 mm. Wheal may exceed 20 mm in diameter and exhibit pseudopods.     A positive result to prick and intradermal testing to Penicillin G is defined as a wheal of 3 mm greater than the wheal of the negative (saline) control.       Result of the test: NEGATIVE         - Skin testing performed today to penicillins and is NEGATIVE. Therefore, the patient is at no greater risk than anyone in the general population to have an IgE-mediated hypersensitivity reaction to penicillin (negative predictive value 97-99%). Note this does not preclude the future development of an  allergy or adverse reaction to penicillin.  - The patient can safely proceed with the administration of amoxicillin 250-500 mg in office or inpatient. Once first dose is tolerated, penicillin can be removed from the medication allergy list and may be used in the future as needed.     January 2024  - PFTs: FVC: 1.65 L and 71 %. Pre-FEV1 73%, 1.32L, FEV1/FVC 0.80; FEV1 11% change. Interpretation: No obstruction, mild restriction.  Non-significant post-bronchodilator change.  ATS criteria met.       Patient Active Problem List   Diagnosis    Environmental allergies    Enthesopathy, knee    Allergic rhinitis    Benign essential HTN    Hyperlipidemia    Prediabetes    Mild persistent asthma without complication (CMD)    Class 2 obesity due to excess calories with body mass index (BMI) of 39.0 to 39.9 in adult    History of pulmonary embolus (PE)    Epithelial neoplasm    S/P robotic right thoracoscopy (VATS) thymectomy/ resection of anterior mediastinal mass and pleural met resection     Primary thymic carcinoma  (CMD)    DVT (deep venous thrombosis)  (CMD)    Food allergy     History of penicillin allergy    Myasthenia gravis (CMD)    Allergic rhinoconjunctivitis     Past Medical History:   Diagnosis Date    Allergic rhinitis due to pollen     Allergic rhinitis    Arthritis     Back pain, chronic     Cancer  (CMD) Not sure of the year msybe 2022 or 2021    Chemotherapy induced neutropenia (CMD) 06/09/2022    Contact dermatitis and other eczema, due to unspecified cause     Nummular eczema, dyshidrotic hand eczema    DVT (deep venous thrombosis)  (CMD)     Dyslipidemia     Dysphagia     with solids     (1/24/22 patient denies and able to eat solids)    Epithelial neoplasm 10/27/2021    mediastinal biopsy    Essential (primary) hypertension     HTN (hypertension)     Migraine headache     Multiple subsegmental pulmonary emboli without acute cor pulmonale (CMD) 10/19/2021    incidental on CT scan    Need for prophylactic  immunotherapy 01/01/1988    Obesity     Open fracture of unspecified part of lower end of humerus     Left humeral fracture    Other spontaneous pneumothorax 01/01/1959    Personal history of chemotherapy     Polyp of vocal cord or larynx     Vocal cord polyps    Pre-diabetes     Pulmonary embolism (CMD)     RAD (reactive airway disease) (CMD)     S/P robotic right thoracoscopy (VATS) thymectomy/ resection of anterior mediastinal mass and pleural met resection  01/27/2022    Unspecified asthma(493.90)     Wears reading eyeglasses      Family History   Problem Relation Age of Onset    Osteoarthritis Mother     Hypertension Mother     Lung Disease Father         pneumonia    Dementia/Alzheimers Father     Other Father     Hypertension Sister     Gastrointestinal Sister         colitis    Hypertension Sister     Schizophrenia Sister     Bipolar disorder Sister     Diabetes Paternal Grandmother     Allergic Rhinitis Son     Hypertension Son     Diabetes Maternal Aunt     Stroke Maternal Aunt      Social History     Tobacco Use    Smoking status: Never    Smokeless tobacco: Never   Vaping Use    Vaping status: never used   Substance Use Topics    Alcohol use: No    Drug use: No     Past Surgical History:   Procedure Laterality Date    Breast biopsy Left 2007    b9    Breast biopsy Left 10/05/2017    b9    Can't find surg/proc      Breast biopsy, benign, left side    Colonoscopy diagnostic  03/30/2015    Colon in 10 yrs. Dr. Malave.     [place] cerv inc/vag w/end curett  10/2013     EMMETT I on pap    Ganglion cyst excision      Polypectomy      larynx    Remove tonsils/adenoids,<12 y/o      Tonsillectomy with adenoids    Shoulder surg proc unlisted      Right shoulder arthroscopy    Thymectomy Right 01/27/2022    robotic right thoracoscopy (VATS) thymectomy/ resection of anterior mediastinal mass and pleural met resection         ROS: As per HPI.  Systems reviewed: General, HEENT, Respiratory, Cardiovascular, Allergy  and Immunology, and Integumentum.     Objective:     Visit Vitals  Pulse 96   Temp 97.3 °F (36.3 °C) (Temporal)   LMP 02/19/2010   SpO2 94%     General:  No acute distress, well groomed.  HEENT:  Normocephalic/atraumatic, slight conjunctival injection and no periorbital edema, no nasal drainage, no angioedema. Turbinates not enlarged or  erythematous.   CV: Regular rhythm, normal rate. S1 and S2 present. No cyanosis or clubbing.  Respiratory:  Speaking in full sentences, no respiratory distress. No wheezing or rales appreciated.  Skin:  No eczema, hives, or other skin lesions noted.  Neuro/Psych: AAOx4, no focal deficits. Normal mood and affect. Conversational and pleasant.      Data Reviewed:  No additional relevant data for review.       I attest to having personally viewed the images/test and approve the above interpretation. Trudy Sheth MD.     Assessment/Plan:     Faye was seen today for office visit and follow-up.    Diagnoses and all orders for this visit:    Mild persistent asthma without complication (CMD)  Assessment: Asthma well-controlled.  Chronic condition.  ACT acceptable.  No need for hospitalizations or ER visits last clinic visit.  No need for systemic antibiotic or oral steroid since last clinic visit either.  Notes more effectiveness with Symbicort over generic Bryena. Will prescribe Symbicort and give aztrazeneca coupon.   Plan:  Commended patient on obtaining pulmonary function testing  Continue Symbicort 1 puff bid  May also use as rescue, 10 more puffs per day  Rinse mouth after each use    Food allergy  Assessment: Chronic problem, controlled avoidance history of shellfish allergies. No accidental ingestions   Plan:  Continue strict shellfish avoidence   Declines epi pen due to cost, advised on goodrx coupon for consideration    Allergic rhinoconjunctivitis  -Chronic problem, controlled with intranasal corticosteroid and daily oral antihistamine-cetirizine  Continue present  management-      Return to clinic in 6 months for follow up, or sooner if needed.    Time spent for today's services total length 25 minutes.      Trudy Sheth MD  Allergy and Immunology  [facility], [place]

## 2025-08-19 ENCOUNTER — PATIENT MESSAGE (OUTPATIENT)
Facility: CLINIC | Age: 75
End: 2025-08-19